# Patient Record
Sex: FEMALE | Race: WHITE
[De-identification: names, ages, dates, MRNs, and addresses within clinical notes are randomized per-mention and may not be internally consistent; named-entity substitution may affect disease eponyms.]

---

## 2018-05-29 NOTE — ED
General Adult HPI





- General


Chief complaint: Chest Pain


Stated complaint: CHEST PAIN


Time Seen by Provider: 05/29/18 19:45


Source: EMS, RN notes reviewed, old records reviewed


Mode of arrival: EMS


Limitations: no limitations





- History of Present Illness


Initial comments: 





This is a 89-year-old female to the ER for evaluation.  Patient does say for 

evaluation regarding chest pain, history of similar episode chest pain she did 

have heart catheterization which he states was normal.  Hasn't increased stress 

due to death of stent mother today.  Patient has anterior chest pain no 

radiation or shortness of breath.





- Related Data


 Home Medications











 Medication  Instructions  Recorded  Confirmed


 


Lisinopril [Prinivil] 10 mg PO DAILY 05/29/18 05/29/18


 


Metoprolol Succinate (ER) [Toprol 50 mg PO DAILY 05/29/18 05/29/18





Xl]   


 


Metoprolol Succinate (ER) [Toprol 100 mg PO DAILY 05/29/18 05/29/18





Xl]   


 


amLODIPine [Norvasc] 5 mg PO DAILY 05/29/18 05/29/18











 Allergies











Allergy/AdvReac Type Severity Reaction Status Date / Time


 


No Known Allergies Allergy   Verified 05/29/18 20:17














Review of Systems


ROS Statement: 


Those systems with pertinent positive or pertinent negative responses have been 

documented in the HPI.





ROS Other: All systems not noted in ROS Statement are negative.





Past Medical History


Past Medical History: Atrial Fibrillation, Hypertension


History of Any Multi-Drug Resistant Organisms: None Reported


Past Surgical History: Heart Catheterization, Hysterectomy, Tubal Ligation


Past Psychological History: No Psychological Hx Reported


Smoking Status: Never smoker


Past Alcohol Use History: Occasional


Past Drug Use History: None Reported





General Exam


Limitations: no limitations


General appearance: alert, in no apparent distress, anxious


Head exam: Present: atraumatic, normocephalic, normal inspection


Eye exam: Present: normal appearance, PERRL, EOMI.  Absent: scleral icterus, 

conjunctival injection, periorbital swelling


ENT exam: Present: normal exam, mucous membranes moist


Neck exam: Present: normal inspection.  Absent: tenderness, meningismus, 

lymphadenopathy


Respiratory exam: Present: normal lung sounds bilaterally.  Absent: respiratory 

distress, wheezes, rales, rhonchi, stridor


Cardiovascular Exam: Present: regular rate, normal rhythm, normal heart sounds.

  Absent: systolic murmur, diastolic murmur, rubs, gallop, clicks


GI/Abdominal exam: Present: soft, normal bowel sounds.  Absent: distended, 

tenderness, guarding, rebound, rigid


Extremities exam: Present: normal inspection, full ROM, normal capillary 

refill.  Absent: tenderness, pedal edema, joint swelling, calf tenderness


Back exam: Present: normal inspection


Neurological exam: Present: alert, oriented X3, CN II-XII intact


Psychiatric exam: Present: normal affect, normal mood


Skin exam: Present: warm, dry, intact, normal color.  Absent: rash





Course


 Vital Signs











  05/29/18 05/29/18





  19:43 21:14


 


Temperature 98.0 F 


 


Pulse Rate 93 91


 


Respiratory 18 18





Rate  


 


Blood Pressure 152/78 163/96


 


O2 Sat by Pulse 100 98





Oximetry  














- Reevaluation(s)


Reevaluation #1: 





05/29/18 20:39


Medical record is reviewed





EKG Findings





- EKG Comments:


EKG Findings:: EKG shows sinus rhythm rate of 87, CA 1:30, QRS 90, QTc 445





Medical Decision Making





- Medical Decision Making





58 female to ED co CP, SOB, Diaphoresis, continued chest pain currently, 

positive troponin 0.7, patient be admitted for anticoagulation cardiology 

evaluation and observation.  Positive has positive troponin again patient 

placed on anticoagulation telemetry and we'll trend troponins





- Lab Data


Result diagrams: 


 05/29/18 19:49





 05/29/18 19:49


 Lab Results











  05/29/18 05/29/18 05/29/18 Range/Units





  19:49 19:49 19:49 


 


WBC   8.9   (3.8-10.6)  k/uL


 


RBC   5.45 H   (3.80-5.40)  m/uL


 


Hgb   16.1 H   (11.4-16.0)  gm/dL


 


Hct   48.5 H   (34.0-46.0)  %


 


MCV   89.0   (80.0-100.0)  fL


 


MCH   29.5   (25.0-35.0)  pg


 


MCHC   33.2   (31.0-37.0)  g/dL


 


RDW   12.5   (11.5-15.5)  %


 


Plt Count   259   (150-450)  k/uL


 


Neutrophils %   68   %


 


Lymphocytes %   23   %


 


Monocytes %   6   %


 


Eosinophils %   2   %


 


Basophils %   1   %


 


Neutrophils #   6.0   (1.3-7.7)  k/uL


 


Lymphocytes #   2.0   (1.0-4.8)  k/uL


 


Monocytes #   0.6   (0-1.0)  k/uL


 


Eosinophils #   0.2   (0-0.7)  k/uL


 


Basophils #   0.1   (0-0.2)  k/uL


 


PT     (9.0-12.0)  sec


 


INR     (<1.2)  


 


APTT     (22.0-30.0)  sec


 


Sodium    141  (137-145)  mmol/L


 


Potassium    4.4  (3.5-5.1)  mmol/L


 


Chloride    102  ()  mmol/L


 


Carbon Dioxide    23  (22-30)  mmol/L


 


Anion Gap    16  mmol/L


 


BUN    15  (7-17)  mg/dL


 


Creatinine    0.92  (0.52-1.04)  mg/dL


 


Est GFR (CKD-EPI)AfAm    79  (>60 ml/min/1.73 sqM)  


 


Est GFR (CKD-EPI)NonAf    69  (>60 ml/min/1.73 sqM)  


 


Glucose    121 H  (74-99)  mg/dL


 


Calcium    9.4  (8.4-10.2)  mg/dL


 


Magnesium    2.0  (1.6-2.3)  mg/dL


 


Total Bilirubin    0.8  (0.2-1.3)  mg/dL


 


AST    42 H  (14-36)  U/L


 


ALT    49  (9-52)  U/L


 


Alkaline Phosphatase    68  ()  U/L


 


Total Creatine Kinase  122    ()  U/L


 


CK-MB (CK-2)  3.8 H*    (0.0-2.4)  ng/mL


 


CK-MB (CK-2) Rel Index  3.1    


 


Troponin I  0.766 H*    (0.000-0.034)  ng/mL


 


Total Protein    7.2  (6.3-8.2)  g/dL


 


Albumin    4.4  (3.5-5.0)  g/dL


 


Lipase    137  ()  U/L














  05/29/18 Range/Units





  19:49 


 


WBC   (3.8-10.6)  k/uL


 


RBC   (3.80-5.40)  m/uL


 


Hgb   (11.4-16.0)  gm/dL


 


Hct   (34.0-46.0)  %


 


MCV   (80.0-100.0)  fL


 


MCH   (25.0-35.0)  pg


 


MCHC   (31.0-37.0)  g/dL


 


RDW   (11.5-15.5)  %


 


Plt Count   (150-450)  k/uL


 


Neutrophils %   %


 


Lymphocytes %   %


 


Monocytes %   %


 


Eosinophils %   %


 


Basophils %   %


 


Neutrophils #   (1.3-7.7)  k/uL


 


Lymphocytes #   (1.0-4.8)  k/uL


 


Monocytes #   (0-1.0)  k/uL


 


Eosinophils #   (0-0.7)  k/uL


 


Basophils #   (0-0.2)  k/uL


 


PT  9.9  (9.0-12.0)  sec


 


INR  1.0  (<1.2)  


 


APTT  22.6  (22.0-30.0)  sec


 


Sodium   (137-145)  mmol/L


 


Potassium   (3.5-5.1)  mmol/L


 


Chloride   ()  mmol/L


 


Carbon Dioxide   (22-30)  mmol/L


 


Anion Gap   mmol/L


 


BUN   (7-17)  mg/dL


 


Creatinine   (0.52-1.04)  mg/dL


 


Est GFR (CKD-EPI)AfAm   (>60 ml/min/1.73 sqM)  


 


Est GFR (CKD-EPI)NonAf   (>60 ml/min/1.73 sqM)  


 


Glucose   (74-99)  mg/dL


 


Calcium   (8.4-10.2)  mg/dL


 


Magnesium   (1.6-2.3)  mg/dL


 


Total Bilirubin   (0.2-1.3)  mg/dL


 


AST   (14-36)  U/L


 


ALT   (9-52)  U/L


 


Alkaline Phosphatase   ()  U/L


 


Total Creatine Kinase   ()  U/L


 


CK-MB (CK-2)   (0.0-2.4)  ng/mL


 


CK-MB (CK-2) Rel Index   


 


Troponin I   (0.000-0.034)  ng/mL


 


Total Protein   (6.3-8.2)  g/dL


 


Albumin   (3.5-5.0)  g/dL


 


Lipase   ()  U/L














- Radiology Data


Radiology results: report reviewed (cxr neg for acute dz), image reviewed





Critical Care Time


Critical Care Time: Yes


Total Critical Care Time: 31





Disposition


Clinical Impression: 


 NSTEMI (non-ST elevated myocardial infarction)





Disposition: ADMITTED [facility] IP TO [facility] [place]


Condition: Serious


Is patient prescribed a controlled substance at d/c from ED?: No


Referrals: 


Laureen Jordan DO [Primary Care Provider] - 1-2 days

## 2018-05-29 NOTE — XR
EXAMINATION TYPE: XR chest 2V

 

DATE OF EXAM: 5/29/2018

 

COMPARISON: Prior chest 7/7/2011

 

HISTORY: Chest pain

 

TECHNIQUE:  Frontal and lateral views of the chest are obtained.

 

FINDINGS:  There is no focal air space opacity, pleural effusion, or pneumothorax seen.  The cardiac 
silhouette size is enlarged. There are overlying cardiac leads. Prominent lung volume may be indicati
ve of COPD. The osseous structures are intact.

 

IMPRESSION:  Cardiomegaly

## 2018-05-30 NOTE — HP
HISTORY AND PHYSICAL



DATE OF SERVICE:

05/29/2018



CHIEF COMPLAINT:

Chest pain.



HISTORY OF PRESENT ILLNESS:

This 58-year-old woman with a past medical history of multiple medical problems

including atrial fibrillation, hypertension, history of cardiac catheterization being

followed by Dr. Jordan and Dr. Peck in the outpatient setting complaining of

chest pain.  The pain was felt in the anterior part of the chest which was similar to

the previous chest pain.  The pain was pressure type of pain, mild to moderate

intensity and radiating to the back and the shoulder blades without any associated

sweating or palpitation.  Patient came to [facility] and admitted for further

evaluation and treatment. Patient also reported increase in stress due to her mother's

demise recently and EKG was done which showed sinus rhythm and occasional PVCs.  No

acute abnormality, but however the troponins were found to be elevated up to 0.76.

Hence, the patient admitted for further  evaluation and treatment. There is no history

of any fever or rigors. No history of headache, loss of consciousness, or seizures.



PAST MEDICAL HISTORY:

Atrial fibrillation, hypertension, history of cardiac catheterization.



MEDICATIONS:

Medications prior to admission include:

1. Prinivil 10 mg p.o. daily.

2. Norvasc 5 mg p.o. daily.

3. Toprol  mg p.o. daily.



ALLERGIES:

Allergies are none.



FAMILY HISTORY:

No history of heart disease or strokes in the family.



SOCIAL HISTORY:

No history of smoking. No history of alcohol intake.



REVIEW OF SYSTEMS:

ENT: No diminished hearing or diminished vision.

CARDIOVASCULAR SYSTEM: As mentioned earlier.

RESPIRATORY SYSTEM: As mentioned earlier.

GI: No nausea or vomiting.

: No dysuria.

NERVOUS SYSTEM: No numbness or weakness.

ALLERGY/IMMUNOLOGY:  No history of asthma or hay fever.

MUSCULOSKELETAL: As mentioned earlier.

HEMATOLOGY/ONCOLOGY: No history of anemia.

ENDOCRINE: No history of diabetes or hypothyroidism.

CONSTITUTIONAL: As mentioned.

DERMATOLOGY: Negative.

RHEUMATOLOGY:  Negative.

PSYCHIATRY:  As mentioned earlier.



PHYSICAL EXAMINATION:

The patient is alert and oriented x3. Pulse 98, blood pressure 186/93, respiration 18,

temperature 97.9, pulse ox 97% on room air.

HEENT: Conjunctivae normal.  Oral mucosa moist.

Neck is no jugular venous distention.  No carotid bruit. No lymph node enlargement.

CARDIOVASCULAR: S1 and S2 muffled.

RESPIRATORY:  Breath sounds diminished at the bases. No rhonchi, no crackles.

ABDOMEN:  Soft, obese, nontender.  No mass palpable.

LEGS:  No edema, no swelling.

NERVOUS SYSTEM: Higher functions as mentioned earlier. Moves all 4 limbs. No focal

deficits.

LYMPHATICS:  No lymphadenopathy of the neck, axillae or groin.

SKIN:  No ulcer, rash or bleeding.



LABS:

Labs are CBC hemoglobin 16.1. Glucose 121. Troponin noted.



ASSESSMENT:

1. Chest pain, possible acute non ST-segment elevation myocardial infarction.

2. Troponin 0.766.

3. Mild polycythemia.

4. Increased random blood sugar.

5. Atrial fibrillation.

6. Hypertension.

7. History of cardiac catheterization.



RECOMMENDATIONS AND DISCUSSION:

This 58-year-old woman who presented with multiple complex medical issues, we will

monitor the patient closely. Continue the current medication. Continue symptomatic

treatment. Otherwise, at this time I would recommend cardiology consultation. Repeat

labs in the morning.  Possible cardiac cath. Medication reconciliation was done.

Otherwise we will continue to monitor and guarded prognosis because of multiple complex

medical issues. Further recommendations to follow.



MMODL / IJN: [id number] / Job#: [id number]

## 2018-05-30 NOTE — ECHOF
Referral Reason:stemi



MEASUREMENTS

--------

HEIGHT: 170.2 cm

WEIGHT: 117.9 kg

BP: 110/72

RVIDd:   3.0 cm     (< 3.3)

IVSd:   1.4 cm     (0.6 - 1.1)

LVIDd:   4.0 cm     (3.9 - 5.3)

LVPWd:   1.3 cm     (0.6 - 1.1)

IVSs:   1.3 cm

LVIDs:   3.4 cm

LVPWs:   1.9 cm

LA Diam:   3.2 cm     (2.7 - 3.8)

LAESV Index (A-L):   25.60 ml/m

Ao Diam:   3.2 cm     (2.0 - 3.7)

AV Cusp:   2.4 cm     (1.5 - 2.6)

MV EXCURSION:   19.783 mm     (> 18.000)

MV EF SLOPE:   129 mm/s     (70 - 150)

EPSS:   0.3 cm

MV E Ryan:   0.95 m/s

MV DecT:   174 ms

MV A Ryan:   0.93 m/s

MV E/A Ratio:   1.02 

RAP:   5.00 mmHg

RVSP:   42.15 mmHg







FINDINGS

--------

Sinus rhythm with extra systolic beats.

This was a technically adequate study.

The left ventricular size is normal.   There is moderate concentric left ventricular hypertrophy.   O
verall left ventricular systolic function is moderately impaired with, an EF between 35 - 40 %.   Mid
 anterior LV wall motion is hypokinetic.    Mid anteroseptal LV wall motion is hypokinetic.    Apical
 anterior LV wall motion is hypokinetic.    Apical septum LV wall motion is hypokinetic.

The right ventricle is normal in size.

Normal LA  size by volume 22+/-6 ml/m2.

The right atrium is normal in size.

The aortic valve is trileaflet and appears structurally normal.

The mitral valve is normal.   Mild mitral regurgitation is present.

Mild tricuspid regurgitation present.   There is mild pulmonary hypertension.   The right ventricular
 systolic pressure, as measured by Doppler, is 42.15mmHg.

There is no pulmonic regurgitation present.

The aortic root size is normal.

Normal inferior vena cava with normal inspiratory collapse consistent with estimated right atrial pre
ssure of  5 mmHg.   The inferior vena cava is mildly dilated.

There is no pericardial effusion.



CONCLUSIONS

--------

1. Sinus rhythm with extra systolic beats.

2. This was a technically adequate study.

3. The left ventricular size is normal.

4. There is moderate concentric left ventricular hypertrophy.

5. Overall left ventricular systolic function is moderately impaired with, an EF between 35 - 40 %.

6. Mid anterior LV wall motion is hypokinetic.

7. Mid anteroseptal LV wall motion is hypokinetic.

8. Apical anterior LV wall motion is hypokinetic.

9. Apical septum LV wall motion is hypokinetic.

10. Normal LA size by volume 22+/-6 ml/m2.

11. The aortic valve is trileaflet and appears structurally normal.

12. Mild mitral regurgitation is present.

13. Mild tricuspid regurgitation present.

14. There is mild pulmonary hypertension.

15. There is no pulmonic regurgitation present.

16. The aortic root size is normal.

17. Normal inferior vena cava with normal inspiratory collapse consistent with estimated right atrial
 pressure of  5 mmHg.

18. The inferior vena cava is mildly dilated.

19. There is no pericardial effusion.





SONOGRAPHER: Tessa Riggins RDCS

## 2018-05-30 NOTE — P.CRDCN
History of Present Illness


Consult date: 05/30/18


Requesting physician: Mushtaq Lerma


Consult reason: non-Q-wave MI


Chief complaint: Chest pain


History of present illness: 


This is a pleasant 58-year-old  female who follows regularly with Dr. Peck in the office.  She has a known history of hypertension, family 

history of premature coronary artery, who had a myocardial infarction 60s, she 

is nondiabetic, nonsmoker, no hyperlipidemia.  Patient states she recently had 

a stress test in the office with Dr. Peck in January which was reported 

to be normal.  Patient also states that over 10 years ago she had a heart 

catheterization which was reported to be normal.  She presents to the hospital 

on this occasion with symptoms of chest pressure and heaviness.  According to 

the patient, she was quite angry and frustrated yesterday had to walk out to 

the road have a truck and boat move out of blocking a driveway, on return to 

the office, patient then received a phone call that her mother-in-law had just 

passed away.  Just prior to walking out to the road, patient states that she 

noticed a pressure and heaviness sensation in her chest, this sensation 

continue to worsen and became quite severe, she was nauseated and diaphoretic.  

She called her daughter who stopped in the EMS was then called and patient was 

to the emergency room for further evaluation.  Her blood pressure on arrival 

here 152/78, heart rate 90, 100% on room air.  Temperature 98.0.  White blood 

cell count normal, hemoglobin 16.1, platelet count 259.  Sodium 141, potassium 

4.4, BUN 15, creatinine 0.9.  Troponins 0.766 and 2.3.  Magnesium level 2.0.  

EKG on arrival here showed normal sinus rhythm with PVCs, no acute changes 

noted.  Chest x-ray shows cardiomegaly.  Subsequent EKG shows normal sinus 

rhythm with PVCs, no acute changes noted.  At the time of my examination this 

morning, patient continues to have pressure in her chest, we will apply oxygen 

and repeat an EKG this morning.  Stat echocardiogram with Doppler study will be 

obtained.  Patient is currently on heparin drip along with aspirin, Lipitor 80, 

lisinopril 10 mg daily, Toprol 150 mg daily.





Past Medical History


Past Medical History: Atrial Fibrillation, Hypertension


History of Any Multi-Drug Resistant Organisms: None Reported


Past Surgical History: Heart Catheterization, Hysterectomy, Tubal Ligation


Past Psychological History: No Psychological Hx Reported


Smoking Status: Never smoker


Past Alcohol Use History: Occasional


Past Drug Use History: None Reported





Medications and Allergies


 Home Medications











 Medication  Instructions  Recorded  Confirmed  Type


 


Lisinopril [Prinivil] 10 mg PO DAILY 05/29/18 05/29/18 History


 


Metoprolol Succinate (ER) [Toprol 50 mg PO DAILY 05/29/18 05/29/18 History





Xl]    


 


Metoprolol Succinate (ER) [Toprol 100 mg PO DAILY 05/29/18 05/29/18 History





Xl]    


 


amLODIPine [Norvasc] 5 mg PO DAILY 05/29/18 05/29/18 History











 Allergies











Allergy/AdvReac Type Severity Reaction Status Date / Time


 


No Known Allergies Allergy   Verified 05/29/18 20:17














Physical Exam


Vitals: 


 Vital Signs











  Temp Pulse Pulse Resp BP BP Pulse Ox


 


 05/30/18 04:00    85  12   


 


 05/30/18 03:15  97.8 F   85  12   110/72  93 L


 


 05/30/18 00:00    85  12   124/74  94 L


 


 05/29/18 22:25  97.9 F   98  18   186/93  97


 


 05/29/18 21:58  99.0 F  89   18  158/83   96


 


 05/29/18 21:14   91   18  163/96   98


 


 05/29/18 19:43  98.0 F  93   18  152/78   100








 Intake and Output











 05/29/18 05/30/18 05/30/18





 22:59 06:59 14:59


 


Intake Total  101 


 


Balance  101 


 


Intake:   


 


  Intake, IV Titration  101 





  Amount   


 


    Heparin Sodium,Porcine/  101 





    D5w Pmx 25,000 unit In   





    Dextrose/Water 1 500ml.   





    bag @ 8.48 UNITS/KG/HR 20   





    mls/hr IV .Q24H Pending sale to [facility] Rx#:   





    [id number]   


 


Other:   


 


  # Voids 1 2 


 


  Weight 117.934 kg 118.2 kg 











PHYSICAL EXAMINATION: 





GENERAL: This is a 58-year-old  female complaining of mild chest 

heaviness at the time of my examination





HEENT: Head is atraumatic, normocephalic.  Pupils equal, round.  Sclera 

anicteric. Conjunctiva are clear.  Mucous membranes of the mouth are moist.  

Neck is supple.  There is no elevated jugular venous pressure.]  bruit is heard.





HEART EXAMINATION: Heart S1, S2 normal.  No murmur or gallop heard.





CHEST EXAMINATION: Lungs are clear to auscultation and precussion. No chest 

wall tenderness is noted on palpation or with deep breathing.





ABDOMEN:  Soft, obese, nontender. Bowel sounds are heard. No organomegaly noted.


 


EXTREMITIES: 2+ peripheral pulses with no evidence of peripheral edema and no 

calf tenderness noted.





NEUROLOGIC patient is awake, alert and oriented -3.


 


.


 











Results





 05/29/18 19:49





 05/29/18 19:49


 Cardiac Enzymes











  05/29/18 05/29/18 05/30/18 Range/Units





  19:49 19:49 01:13 


 


AST   42 H   (14-36)  U/L


 


CK-MB (CK-2)  3.8 H*   9.1 H*  (0.0-2.4)  ng/mL


 


Troponin I  0.766 H*   2.330 H*  (0.000-0.034)  ng/mL








 Coagulation











  05/29/18 05/30/18 Range/Units





  19:49 01:13 


 


PT  9.9   (9.0-12.0)  sec


 


APTT  22.6  36.5 H  (22.0-30.0)  sec








 CBC











  05/29/18 Range/Units





  19:49 


 


WBC  8.9  (3.8-10.6)  k/uL


 


RBC  5.45 H  (3.80-5.40)  m/uL


 


Hgb  16.1 H  (11.4-16.0)  gm/dL


 


Hct  48.5 H  (34.0-46.0)  %


 


Plt Count  259  (150-450)  k/uL








 Comprehensive Metabolic Panel











  05/29/18 Range/Units





  19:49 


 


Sodium  141  (137-145)  mmol/L


 


Potassium  4.4  (3.5-5.1)  mmol/L


 


Chloride  102  ()  mmol/L


 


Carbon Dioxide  23  (22-30)  mmol/L


 


BUN  15  (7-17)  mg/dL


 


Creatinine  0.92  (0.52-1.04)  mg/dL


 


Glucose  121 H  (74-99)  mg/dL


 


Calcium  9.4  (8.4-10.2)  mg/dL


 


AST  42 H  (14-36)  U/L


 


ALT  49  (9-52)  U/L


 


Alkaline Phosphatase  68  ()  U/L


 


Total Protein  7.2  (6.3-8.2)  g/dL


 


Albumin  4.4  (3.5-5.0)  g/dL








 Current Medications











Generic Name Dose Route Start Last Admin





  Trade Name Freq  PRN Reason Stop Dose Admin


 


Acetaminophen  500 mg  05/30/18 00:08  





  Tylenol Tab  PO   





  Q6HR PRN   





  Fever and/ or MILD Pain   


 


Hydrocodone Bitart/Acetaminophen  1 each  05/30/18 00:08  





  [place] 5-325  PO   





  Q6HR PRN   





  MODERATE Pain   


 


Alprazolam  0.25 mg  05/30/18 00:08  





  Xanax  PO   





  TID PRN   





  Anxiety   


 


Amlodipine Besylate  5 mg  05/29/18 23:45  05/30/18 00:07





  Norvasc  PO   5 mg





  DAILY VIOLET   Administration


 


Aspirin  325 mg  05/30/18 09:00  





  Aspirin  PO   





  DAILY Pending sale to [facility]   


 


Atorvastatin Calcium  80 mg  05/29/18 22:47  05/29/18 23:21





  Lipitor  PO   80 mg





  DAILY VIOLET   Administration


 


Heparin Sodium (Porcine)  0 unit  05/29/18 21:28  





  Heparin  IV   





  Q6HR PRN   





  Low PTT   





  Protocol   


 


Hydralazine HCl  10 mg  05/30/18 00:08  





  Apresoline  IVP   





  Q4HR PRN   





  sbp> 140, dbp> 90   


 


Heparin Sodium/Dextrose 25,000  500 mls @ 20 mls/hr  05/29/18 21:45  05/30/18 03

:00





  unit/ IV Solution  IV   11.48 units/kg/hr





  .Q24H VIOLET   27.07 mls/hr





  Protocol   Titration





  8.48 UNITS/KG/HR   


 


Sodium Chloride  1,000 mls @ 100 mls/hr  05/29/18 21:30  05/29/18 21:56





  Saline 0.9%  IV   100 mls/hr





  .Q10H VIOLET   Administration


 


Lisinopril  10 mg  05/29/18 23:45  05/30/18 00:07





  Zestril  PO   10 mg





  DAILY VIOLET   Administration


 


Metoprolol Succinate  50 mg  05/30/18 09:00  





  Toprol Xl  PO   





  DAILY VIOLET   


 


Metoprolol Succinate  100 mg  05/30/18 09:00  





  Toprol Xl  PO   





  DAILY Pending sale to [facility]   


 


Nitroglycerin  0.4 mg  05/29/18 21:28  05/29/18 22:54





  Nitrostat  SUBLINGUAL   0.4 mg





  Q5M PRN   Administration





  Chest Pain   


 


Temazepam  15 mg  05/30/18 00:08  





  Restoril  PO   





  HS PRN   





  Insomnia   








 Intake and Output











 05/29/18 05/30/18 05/30/18





 22:59 06:59 14:59


 


Intake Total  101 


 


Balance  101 


 


Intake:   


 


  Intake, IV Titration  101 





  Amount   


 


    Heparin Sodium,Porcine/  101 





    D5w Pmx 25,000 unit In   





    Dextrose/Water 1 500ml.   





    bag @ 8.48 UNITS/KG/HR 20   





    mls/hr IV .Q24H VIOLET Rx#:   





    [id number]   


 


Other:   


 


  # Voids 1 2 


 


  Weight 117.934 kg 118.2 kg 








 





 05/29/18 19:49 





 05/29/18 19:49 











EKG Interpretations (text)


EKG shows normal sinus rhythm with occasional PVCs, no acute changes noted








Assessment and Plan


Plan: 


Assessment and plan


#1 symptoms of chest pressure and heaviness with associated nausea, troponin 

0.76, 2.3.  EKG shows normal sinus rhythm with occasional.  No acute changes 

noted.  Clinical picture  suggests acute coronary syndrome.


#2 hypertension





Plan


We will apply oxygen, repeat EKG this morning.  Obtain stat echocardiogram with 

Doppler study.  Patient has been advised that she will need to undergo cardiac 

catheterization, the risks and the benefits were explained to the patient in 

detail and she is willing to proceed.  Further recommendations will be based on 

these findings and patient's clinical course.





DNP note has been reviewed, I agree with a documented findings and plan of 

care.  Patient was seen and examined.

## 2018-05-31 NOTE — P.PN
Subjective


Progress Note Date: 05/30/18


Progress note being dictated for Dr. Mobley.














Interval history: This 58-year-old female admitted with chest pain, possible 

acute non-STEMI, elevated troponin and multiple other medical issues.  

Evaluated by cardiology.  Underwent cardiac catheterization with reports of no 

significant obstructive CAD,Echo reporting anterior hypokinesis and EF 35%, 

attributed to stress cardiomyopathy. Medical management recommended.  Denies 

chest pain, palpitations or increasing shortness of breath.  Telemetry sinus 

rhythm with occasional PVCs.








Objective





- Vital Signs


Vital signs: 


 Vital Signs











Temp  98.1 F   05/30/18 15:35


 


Pulse  82   05/30/18 16:46


 


Resp  16   05/30/18 15:35


 


BP  107/71   05/30/18 16:46


 


Pulse Ox  96   05/30/18 16:46








 Intake & Output











 05/30/18 05/30/18 05/31/18





 06:59 18:59 06:59


 


Intake Total 101 340 


 


Output Total  700 


 


Balance 101 -360 


 


Weight 118.2 kg  


 


Intake:   


 


  IV  100 


 


  Intake, IV Titration 101  





  Amount   


 


    Heparin Sodium,Porcine/ 101  





    D5w Pmx 25,000 unit In   





    Dextrose/Water 1 500ml.   





    bag @ 8.48 UNITS/KG/HR 20   





    mls/hr IV .Q24H VIOLET Rx#:   





    [id number]   


 


  Oral  240 


 


Output:   


 


  Urine  700 


 


Other:   


 


  # Voids 2  1














- Exam


PHYSICAL EXAM:


VITAL SIGNS: As above


GENERAL: Lying flat, no acute distress


HEENT:  Conjunctivae normal. eyes normal.  Oral mucosa moist


NECK:  No JVD. No thyroid enlargement. No LNs


CARDIOVASCULAR:  S1, S2 muffled. No murmur


RESPIRATION: Breath sounds diminished in the bases. No rhonchi or crackles. No 

bronchial breathing.


ABDOMEN:  Soft,  nontender . No guarding. no masses palpable.Bowel sounds heard.


LEGS:  No edema. no swelling 


PSYCHIATRY: Alert and oriented -3, mood and affect normal.


NERVOUS SYSTEM:  Cranial N 2-12 grossly normal. No focal deficits.


Skin: no ulcer no rash 


Joints: No active swelling. No inflammation.


Lymphatic system. No LN neck axilla or groin.











- Labs


CBC & Chem 7: 


 05/31/18 06:12





 05/31/18 06:12


Labs: 


 Abnormal Lab Results - Last 24 Hours (Table)











  05/29/18 05/29/18 05/29/18 Range/Units





  19:49 19:49 19:49 


 


RBC   5.45 H   (3.80-5.40)  m/uL


 


Hgb   16.1 H   (11.4-16.0)  gm/dL


 


Hct   48.5 H   (34.0-46.0)  %


 


APTT     (22.0-30.0)  sec


 


Glucose    121 H  (74-99)  mg/dL


 


POC Glucose (mg/dL)     (75-99)  mg/dL


 


AST    42 H  (14-36)  U/L


 


Total Creatine Kinase     ()  U/L


 


CK-MB (CK-2)  3.8 H*    (0.0-2.4)  ng/mL


 


Troponin I  0.766 H*    (0.000-0.034)  ng/mL


 


Triglycerides     (<150)  mg/dL


 


HDL Cholesterol     (40-60)  mg/dL














  05/30/18 05/30/18 05/30/18 Range/Units





  01:13 01:13 07:54 


 


RBC     (3.80-5.40)  m/uL


 


Hgb     (11.4-16.0)  gm/dL


 


Hct     (34.0-46.0)  %


 


APTT   36.5 H   (22.0-30.0)  sec


 


Glucose     (74-99)  mg/dL


 


POC Glucose (mg/dL)     (75-99)  mg/dL


 


AST     (14-36)  U/L


 


Total Creatine Kinase  226 H   651 H  ()  U/L


 


CK-MB (CK-2)  9.1 H*   10.8 H*  (0.0-2.4)  ng/mL


 


Troponin I  2.330 H*   1.900 H*  (0.000-0.034)  ng/mL


 


Triglycerides     (<150)  mg/dL


 


HDL Cholesterol     (40-60)  mg/dL














  05/30/18 05/30/18 Range/Units





  07:54 11:40 


 


RBC    (3.80-5.40)  m/uL


 


Hgb    (11.4-16.0)  gm/dL


 


Hct    (34.0-46.0)  %


 


APTT    (22.0-30.0)  sec


 


Glucose    (74-99)  mg/dL


 


POC Glucose (mg/dL)   118 H  (75-99)  mg/dL


 


AST    (14-36)  U/L


 


Total Creatine Kinase    ()  U/L


 


CK-MB (CK-2)    (0.0-2.4)  ng/mL


 


Troponin I    (0.000-0.034)  ng/mL


 


Triglycerides  202 H   (<150)  mg/dL


 


HDL Cholesterol  39 L   (40-60)  mg/dL














Assessment and Plan


Assessment: 


1.  Chest pain, acute non-STEMI ruled out, status post cardiac cath reporting 

no obstructive CAD, anterior hypokinesia, EF 35%; stress cardiomyopathy as per 

cardiology].


2. [ Troponin 0.766].


3. [ Hypertension].

















Plan: Continue current medication regime ,monitoring and symptomatic treatment.

  Maximizing medical therapy.  Potential discharge planning in progress for 

tomorrow pending cardiac clearance.














The impression and plan of care has been dictated as directed.





:


I performed a history and examination of this patient,  discussed the same with 

the dictator.  I agree with the dictator's note ,documented as a scribe.  Any 

additional findings or plans will be noted.

## 2018-05-31 NOTE — P.PN
Subjective


Progress Note Date: 05/31/18





This is a pleasant 58-year-old  female who follows regularly with Dr. Peck in the office.  She has a known history of hypertension, family 

history of premature coronary artery, who had a myocardial infarction 60s, she 

is nondiabetic, nonsmoker, no hyperlipidemia.  Patient states she recently had 

a stress test in the office with Dr. Peck in January which was reported 

to be normal.  Patient also states that over 10 years ago she had a heart 

catheterization which was reported to be normal.  She presents to the hospital 

on this occasion with symptoms of chest pressure and heaviness.  According to 

the patient, she was quite angry and frustrated yesterday had to walk out to 

the road have a truck and boat move out of blocking a driveway, on return to 

the office, patient then received a phone call that her mother-in-law had just 

passed away.  Just prior to walking out to the road, patient states that she 

noticed a pressure and heaviness sensation in her chest, this sensation 

continue to worsen and became quite severe, she was nauseated and diaphoretic.  

She called her daughter who stopped in the EMS was then called and patient was 

to the emergency room for further evaluation.  Her blood pressure on arrival 

here 152/78, heart rate 90, 100% on room air.  Temperature 98.0.  White blood 

cell count normal, hemoglobin 16.1, platelet count 259.  Sodium 141, potassium 

4.4, BUN 15, creatinine 0.9.  Troponins 0.766 and 2.3.  Magnesium level 2.0.  

EKG on arrival here showed normal sinus rhythm with PVCs, no acute changes 

noted.  Chest x-ray shows cardiomegaly.  Subsequent EKG shows normal sinus 

rhythm with PVCs, no acute changes noted.  At the time of my examination this 

morning, patient continues to have pressure in her chest, we will apply oxygen 

and repeat an EKG this morning.  Stat echocardiogram with Doppler study will be 

obtained.  Patient is currently on heparin drip along with aspirin, Lipitor 80, 

lisinopril 10 mg daily, Toprol 150 mg daily.





05/31/2018


Patient underwent a cardiac catheterization yesterday that did not reveal any 

significant obstructive coronary artery disease.  Echocardiogram with Doppler 

study revealed an overall left ventricular systolic function of 35-40%.  

Clinical picture suggestive of stress cardiomyopathy or blood pressure today 128

/80 with a heart rate in the 80s.  92% on room air.  White blood cell count is 

normal, hemoglobin 13.9, platelet count 209.  Sodium 140, potassium 4.3, BUN 13

, creatinine 0.8.  We will continue the patient on aspirin 81 mg daily, Lipitor 

80 mg daily, lisinopril 10 mg daily, metoprolol 150 mg daily, we'll also add 

Aldactone 25 mg daily to her medication regime.  She's been instructed to be up 

ambulating in the hallway today we'll plan for possible discharge home in 24 

hours if stable.








Objective





- Vital Signs


Vital signs: 


 Vital Signs











Temp  98.4 F   05/31/18 04:00


 


Pulse  79   05/31/18 11:40


 


Resp  16   05/31/18 11:40


 


BP  128/84   05/31/18 11:40


 


Pulse Ox  92 L  05/31/18 11:40








 Intake & Output











 05/30/18 05/31/18 05/31/18





 18:59 06:59 18:59


 


Intake Total 340  620


 


Output Total 700  


 


Balance -360  620


 


Weight  118.3 kg 


 


Intake:   


 


    


 


  Oral 240  620


 


Output:   


 


  Urine 700  


 


Other:   


 


  Voiding Method  Toilet Toilet


 


  # Voids  2 1


 


  # Bowel Movements   0














- Exam


PHYSICAL EXAMINATION: 





GENERAL:





HEENT: Head is atraumatic, normocephalic.  Pupils equal, round.  Sclera 

anicteric. Conjunctiva are clear.  Mucous membranes of the mouth are moist.  

Neck is supple.  There is no elevated jugular venous pressure.] No carotid 

bruit is heard.





HEART EXAMINATION: Heart S1, S2 normal.  No murmur or gallop heard.





CHEST EXAMINATION: Lungs are clear to auscultation and precussion. No chest 

wall tenderness is noted on palpation or with deep breathing.





ABDOMEN:  Soft, obese, nontender. Bowel sounds are heard. No organomegaly noted.


 


EXTREMITIES: 2+ peripheral pulses with no evidence of peripheral edema and no 

calf tenderness noted.  Right radial site clean and dry, good distal pulse.





NEUROLOGIC patient is awake, alert and oriented -3.


 


.


 











- Labs


CBC & Chem 7: 


 05/31/18 06:12





 05/31/18 06:12


Labs: 


 Abnormal Lab Results - Last 24 Hours (Table)











  05/31/18 Range/Units





  06:12 


 


Glucose  107 H  (74-99)  mg/dL














Assessment and Plan


Plan: 


Assessment and plan


#1 symptoms of chest pressure and heaviness with associated nausea, troponin 

0.76, 2.3.  EKG shows normal sinus rhythm with occasional.  No acute changes 

noted.  Patient underwent a cardiac catheterization yesterday which did not 

reveal any significant obstructive coronary artery disease echocardiogram with 

Doppler study revealed anterior hypokinesia with ejection fraction of 35%.  

Suggesting a stress cardiomyopathy. 


#2 hypertension





Plan


We will continue the patient on her current medications, add Aldactone to her 

medication regime.  Increase activity today as tolerated and plan for possible 

discharge home in 24 hours if stable.


DNP note has been reviewed, I agree with a documented findings and plan of 

care.  Patient was seen and examined.

## 2018-05-31 NOTE — P.PN
Subjective


Progress Note Date: 05/31/18


Progress note being dictated for Dr. Mobley.














Interval history: This 58-year-old female admitted with chest pain, possible 

acute non-STEMI, elevated troponin and multiple other medical issues.  

Evaluated by cardiology.  Underwent cardiac catheterization with reports of no 

significant obstructive CAD,Echo reporting anterior hypokinesis and EF 35%, 

attributed to stress cardiomyopathy. Medical management recommended.  Denies 

chest pain, palpitations or increasing shortness of breath.  Telemetry sinus 

rhythm with occasional PVCs.








5/31/18   no overnight events.  Ambulating, tolerating exertion.  Denies chest 

pain, denies shortness of breath.  Denies focal deficits.  Mild nausea last 

night, which has subsided.  Tolerating diet.  Continue maximizing medical 

therapy.





Objective





- Vital Signs


Vital signs: 


 Vital Signs











Temp  98.4 F   05/31/18 04:00


 


Pulse  80   05/31/18 15:23


 


Resp  16   05/31/18 15:23


 


BP  109/67   05/31/18 15:23


 


Pulse Ox  100   05/31/18 15:23








 Intake & Output











 05/30/18 05/31/18 05/31/18





 18:59 06:59 18:59


 


Intake Total 340  1100


 


Output Total 700  


 


Balance -360  1100


 


Weight  118.3 kg 


 


Intake:   


 


    


 


  Oral 240  1100


 


Output:   


 


  Urine 700  


 


Other:   


 


  Voiding Method  Toilet Toilet


 


  # Voids  2 2


 


  # Bowel Movements   0














- Exam


PHYSICAL EXAM:


VITAL SIGNS: As above


GENERAL: Sitting up in bed, no acute distress


HEENT:  Conjunctivae normal. eyes normal.  Oral mucosa moist


NECK:  No JVD. No thyroid enlargement. No LNs


CARDIOVASCULAR:  S1, S2 muffled. No murmur


RESPIRATION: Breath sounds diminished in the bases. No rhonchi or crackles. 


ABDOMEN:  Soft,  nontender . No guarding. no masses palpable.Bowel sounds heard.


LEGS:  No edema. no swelling 


PSYCHIATRY: Alert and oriented -3, mood and affect normal.


NERVOUS SYSTEM:  Cranial N 2-12 grossly normal. No focal deficits.


Skin: no ulcer no rash 


Joints: No active swelling. No inflammation.














- Labs


CBC & Chem 7: 


 05/31/18 06:12





 05/31/18 06:12


Labs: 


 Abnormal Lab Results - Last 24 Hours (Table)











  05/31/18 Range/Units





  06:12 


 


Glucose  107 H  (74-99)  mg/dL














Assessment and Plan


Assessment: 


1.  Chest pain, acute non-STEMI ruled out, status post cardiac cath reporting 

no obstructive CAD, anterior hypokinesia, EF 35%; stress cardiomyopathy as per 

cardiology].


2. [ Troponin 0.766].


3. [ Hypertension].

















Plan: Continue current medication regime ,monitoring and symptomatic treatment.

  Maximizing medical therapy.  Increase ambulation as tolerated. Potential 

discharge planning in progress for tomorrow pending cardiac clearance.














The impression and plan of care has been dictated as directed.





:


I performed a history and examination of this patient,  discussed the same with 

the dictator.  I agree with the dictator's note ,documented as a scribe.  Any 

additional findings or plans will be noted.

## 2018-06-01 NOTE — P.PCN
Date of Procedure: 06/01/18


Preoperative Diagnosis: 


Non-ST elevation MI


Postoperative Diagnosis: 


Mild coronary artery disease.  Broken heart syndrome


Procedure(s) Performed: 


This is a 58-year-old female with history of hypertension and 

hypercholesteremia and known mild coronary artery disease who was admitted to 

the hospital with symptoms of chest pain and positive troponin suggestive of non

-ST elevation myocardial infarction.  Echocardiogram showed wall motion 

abnormalities involving the anteroapical and septal area.  Patient is advised 

to have a cardiac catheterization for definitive diagnosis.








CONSENT:I have discussed the risks, benefits and alternative therapies for the 

above-mentioned procedure and for both sedation/analgesia as well as necessary 

blood product administration, if indicated, as they pertain to this patient.  

The patient has indicated understanding and acceptance of the risks and 

procedures discussed.








PROCEDURE: Patient was brought to the lab in a fasting state.  Patient was 

given some IV sedation.  The right groin is infiltrated with lidocaine and 

right femoral artery was entered using Seldinger technique.  A 6-Armenian 

catheter was left in place and selective coronary arteriography and left 

ventriculography was performed.  Patient tolerated the procedure well.  Femoral 

angiogram was performed and manual compression was applied for hemostasis.  No 

immediate complications were noted and patient was transferred to [facility] in a 

stable condition





Conscious Sedation: Versed 1mg


Fentanyl 25g


Duration 20minutes   


HEMODYNAMICS: The aortic pressure is about 130/70.  Left ankle end-diastolic 

pressure is about 16-20.  There was no gradient across the aortic valve





SELECTIVE CORONARY ARTERIOGRAPHY: 


                          LEFT MAIN: This is a long with plaque with about 30% 

luminal narrowing at the bifurcation into LAD and circumflex system.


                          THE LEFT ANTERIOR DESCENDING CORONARY ARTERY:  This 

is a good caliber vessel with mild disease without any significant focal 

occlusive lesions.


                          THE LEFT CIRCUMFLEX AND IS CORONARY ARTERY:  This is 

a nondominant vessel with about 60-70% lesion in the midportion.  This seemed 

to be nonculprit vessel.


                          THE RIGHT CORONARY ARTERY:  This is a dominant vessel 

with mild disease in the proximal portion.  It uses good-sized PDA and PLV.





      





LEFT VENTRICULOGRAPHY:  This revealed severe hypokinesia of the basal and mid 

anterior wall.  Apical portion appears to be karena well.  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 

consistent with apical ballooning syndrome.


FINAL IMPRESSION:  #1.  Broken heart syndrome #2.  Mild to moderate coronary 

artery disease involving the distal left main and also right coronary artery.  #

3.  Impaired LV function.  #4.  Elevated end-diastolic pressures





PLAN:  continue maximal medical therapy.





PROGNOSIS:  fair .

## 2018-06-01 NOTE — P.DS
Providers


Date of admission: 


05/29/18 21:28





Expected date of discharge: 06/01/18


Attending physician: 


Mushtaq Mobley


Consults: 





 





05/29/18 21:28


Consult Physician Urgent 


   Consulting Provider: Fortunato Gutierrez


   Consult Reason/Comments: nstemi


   Do you want consulting provider notified?: Yes











Primary care physician: 


Laureen Jordan





Hospital Course: 


Final Diagnoses:


1.  Chest pain, acute non-STEMI ruled out, status post cardiac cath reporting 

no obstructive CAD, anterior hypokinesia, EF 35%; stress cardiomyopathy,

takotsubo syndrome  as per cardiology].


2. [ Troponin 0.766].


3. [ Hypertension].




















Hospital course:This 58-year-old female admitted with chest pain, possible 

acute non-STEMI, elevated troponin and multiple other medical issues.  

Evaluated by cardiology.  Underwent cardiac catheterization with reports of no 

significant obstructive CAD,Echo reporting anterior hypokinesis and EF 35%, 

attributed to stress cardiomyopathy/takotsubo syndrome. Medical management 

recommended.  Cleared by cardiology for discharge.  Patient is being discharged 

home in a stable condition with guarded prognosis.














PHYSICAL EXAM:





GENERAL: Alert and oriented 3, Sitting up in bed, no acute distress


CARDIOVASCULAR:  S1, S2 muffled. No murmur


RESPIRATION: Breath sounds diminished in the bases. No rhonchi or crackles. 


ABDOMEN:  Soft,  nontender . No guarding. no masses palpable.Bowel sounds heard.


NERVOUS SYSTEM:  Cranial N 2-12 grossly normal. No focal deficits.

















The impression and plan of care has been dictated as directed.





DrJackie:


I performed a history and examination of this patient,  discussed the same with 

the dictator.  I agree with the dictator's note ,documented as a scribe.  Any 

additional findings or plans will be noted.














Time taken: 35 minutes








Patient Condition at Discharge: Stable





Plan - Discharge Summary


Discharge Rx Participant: Yes


New Discharge Prescriptions: 


New


   Aspirin 81 mg PO DAILY #30 chew


   Atorvastatin [Lipitor] 80 mg PO DAILY #30 tab


   Spironolactone [Aldactone] 25 mg PO DAILY #30 tab





Continue


   Lisinopril [Prinivil] 10 mg PO DAILY


   Metoprolol Succinate (ER) [Toprol XL] 100 mg PO DAILY


   Metoprolol Succinate (ER) [Toprol XL] 50 mg PO DAILY





No Action


   amLODIPine [Norvasc] 5 mg PO DAILY


Discharge Medication List





Lisinopril [Prinivil] 10 mg PO DAILY 05/29/18 [History]


Metoprolol Succinate (ER) [Toprol XL] 50 mg PO DAILY 05/29/18 [History]


Metoprolol Succinate (ER) [Toprol XL] 100 mg PO DAILY 05/29/18 [History]


amLODIPine [Norvasc] 5 mg PO DAILY 05/29/18 [History]


Aspirin 81 mg PO DAILY #30 chew 06/01/18 [Rx]


Atorvastatin [Lipitor] 80 mg PO DAILY #30 tab 06/01/18 [Rx]


Spironolactone [Aldactone] 25 mg PO DAILY #30 tab 06/01/18 [Rx]








Follow up Appointment(s)/Referral(s): 


Laureen Jordan DO [Primary Care Provider] - 06/05/18 8:40 am (Tuesday)


Bennett Peck MD [STAFF PHYSICIAN] - 06/07/18 11:30 am (Thursday)


Ambulatory/Diagnostic Orders: 


Comprehensive Metabolic Panel [LAB.AMB] Time Frame: 3 Days, Location: 

Determined By Patient


Patient Instructions/Handouts:  *[facility] - After Heart Catheterization - 

Ambulatory Care Instructions, Left Heart Catheterization (DC)


Discharge Disposition: HOME SELF-CARE

## 2018-06-01 NOTE — PN
PROGRESS NOTE



Mrs. Burnham came in with a takotsubo syndrome type picture.  No significant obstructive

CAD.  She is doing well today and denies chest pain, improved functional capacity,

feels well.



Vital signs are stable.  S1-S2 heard normally.  Lungs are clear.  Abdomen and lower

extremity exam is unchanged.



Plan is to continue current medications.  Increase activity.  She can be discharged and

she will see Dr. Peck in one week.





MMODL / IJN: [id number] / Job#: [id number]

## 2018-08-23 NOTE — CONS
CONSULTATION



DATE OF SERVICE:

08/23/2018



A 58-year-old lady has been evaluated in [facility] for possible obstructive sleep

apnea-hypopnea syndrome.



HISTORY OF PRESENT ILLNESS/SLEEP-WAKE EVALUATION:

Patient usual sleep schedule from 11:00 p.m. to 6:00 a.m. on weekdays and from 11:00

p.m. to 7:00 a.m. on weekend. Sometimes she has a problem with falling asleep, but not

more than 1 hour.  No TV in bedroom.  She sleeps on the side position with snoring and

awakenings from sleep up to 6 times with nocturia. No history of hypnagogic

hallucinations, sleep paralysis or cataplexy.  [place] Sleepiness Scale increased to

10.



PAST MEDICAL HISTORY:

Positive for hypertension, obesity, hyperlipidemia, heart attack in May 29, 2018,

anxiety.



PAST SURGICAL HISTORY:

Tubal ligation, partial hysterectomy, cardiac cath showed some narrowing of coronary

arteries.



MEDICATIONS:

Toprol, Norvasc, lisinopril, Lipitor, Aldactone, aspirin, Cymbalta.



SOCIAL HISTORY:

Negative for smoking. Alcohol consumption occasional.



FAMILY HISTORY:

Hypertension, heart problems, sinus headaches, pneumoniae, cancer.



REVIEW OF SYSTEMS:

Multiple awakenings from sleep, sometimes difficulties to initiate sleep, sleepiness

and tiredness during the day, episodes of anxiety.



PHYSICAL EXAM:

GENERAL   lady without distress.

VITAL SIGNS  /62, HR about 36, RR 18, height 5 feet 6 inches, weight 240.4, body

mass index 38.7, temperature 98.2, oxygen saturation on room air 95%.

HEENT  PERRLA, EOMI, evaluation of oropharynx showed extremely low position of soft

palate.  Wide neck 19-1/2 inches in circumference.

NECK  Supple, no JVD.  Thyroid is not palpable.

LUNGS  Clear to percussion and to auscultation.  Good air exchange.  No wheezing or

rhonchi.

HEART  S1, S2.  Regular bradycardia.

ABDOMEN  Obese. Soft and nontender.  Bowel sounds are present.  No organomegaly

appreciated.

EXTREMITIES  No clubbing or cyanosis.

CNS  Awake, alert, and oriented X3.  Cranial nerves 2 to 7 intact.  There is no

fasciculation or atrophy. noted.  No focal deficits observed.



IMPRESSION:

1. Snoring, multiple awakenings from sleep, low position of soft palate, excessive

    daytime sleepiness, wide neck, obstructive sleep apnea-hypopnea syndrome.

2. Obesity, BMI 38.7.

3. Coronary artery disease, status post heart attack in May of 2018.

4. Anxiety.

5. Hyperlipidemia.



PLAN:

1. Polysomnography for evaluation of patient's breathing during sleep.

2. CPAP/BiPAP titration if sleep study confirms obstructive sleep apnea-hypopnea

    syndrome.

3. Preferable position during sleep on the side.

4. No driving if patient feels any sleepiness.

5. I will see patient for follow up visit to explain results of testing and following

    plan.



Thank you very much for referring this patient for consultation.



Sincerely,



Jude Madera MD, PhD, FAASM

Diplomat of [employer] Specialties

American Board of Internal Medicine

Medical Director of [facility]





MMSHARRONL / SHELLEYN: [id number] / Job#: [id number]

## 2018-08-30 NOTE — MM
Reason for exam: screening  (asymptomatic).



History:

Patient is postmenopausal.

Family history of breast cancer in mother at age 70.



Physical Findings:

A clinical breast exam by your physician is recommended on an annual basis and 

results should be correlated with mammographic findings.



MG 3D Screening Mammo W/Cad

Bilateral CC and MLO view(s) were taken.

No prior studies available for comparison.

The breast tissue is heterogeneously dense. This may lower the sensitivity of 

mammography.  There is no discrete abnormality.  No significant changes when 

compared with prior studies.





ASSESSMENT: Negative, BI-RAD 1



RECOMMENDATION:

Routine screening mammogram of both breasts in 1 year.

## 2018-09-13 NOTE — BD
EXAMINATION TYPE: Axial Bone Density

 

DATE OF EXAM: 9/13/2018

 

COMPARISON: NONE

 

CLINICAL HISTORY: Postmenopausal female

 

 

Height:  66

Weight:  238.2

 

FRAX RISK QUESTIONS:

Alcohol (3 or more units per day):  no

Family History (Parent hip fracture):  no

Glucocorticoids (More than 3mos):  no

           (Ex: prednisone, prednisolone, methylprednisolone, dexamethasone, and hydrocortisone).    
     

History of Fracture in Adulthood: no

Secondary Osteoporosis:

  1.  Type 1 Diabetes: no

  2.  Hyperthyroidism: no

  3.  Menopause before 45: yes

  4.  Malnutrition: no

  5.  Chronic liver disease: no

Rheumatoid Arthritis: no

Current Tobacco Use: no

 

RISK FACTORS 

HISTORY OF: 

Family History of Osteoporosis: no

Active: yes

Diet low in dairy products/other sources of calcium:  no

Postmenopausal woman: hysterectomy age 42

 

MEDICATIONS: Cymbalta, Aldactone, aspirin, lisinopril, Lipitor, amlodipine, Toprol

 

 

Additional History:

 

 

EXAM MEASUREMENTS: 

Bone mineral densitometry was performed using the [employer] System.

Bone mineral density as measured about the Lumbar spine is:  

----- L1-L4(G/cm2): 1.201

T Score Values are as follows:

----- L2: -0.6

----- L3: 1.1

----- L4: 1.0

----- L1-L4: 0.2

Bone mineral density : baseline

 

Bone mineral density about the R hip (g/cm2): 0.966

Bone mineral density about the L hip (g/cm2): 1.068

T Score values are as follows:

-----R Neck: -0.5

-----L Neck: 0.2

-----R Total: 0.3

-----L Total: 0.5 

bone mineral density: baseline 

 

 

IMPRESSION:

Normal (Values between +1 and -1 indicate normal bone mass).  Consider repeating this study in 5 year
s or sooner if there is some new clinical indication.

 

NOTE:  T-SCORE=SD OF THE YOUNG ADULT MEAN.

## 2018-12-19 NOTE — PN
PROGRESS NOTE



DATE OF SERVICE:

12/19/2018





58-year-old lady has been followed in Sleep Center for treatment of obstructive sleep

apnea-hypopnea syndrome.  Recently patient had a polysomnogram done which showed

obstructive sleep apnea with apnea-hypopnea index 14.3 and oxygen desaturation to 84%.

The patient was started on treatment with Auto PAP and able to use equipment every

night for the whole night and she feels better with the machine and she sleeps better.

[place] Sleepiness Scale is 3, which is totally normal.



MEDICATIONS:

Toprol, Norvasc, lisinopril, Lipitor, Aldactone, aspirin, Cymbalta.



I checked the patient CPAP unit.  Range of the pressure 5-20 cm of water. Usage is 100%

of the night more, than 4 hours every 7.1 hours.  Pressure most of the time 9.9 cm of

water. Leak is 28 L/minute which is slightly high for the nasal pillow mask may

indicate the patient opens her mouth.  Apnea-hypopnea index of 0.7, which is absolutely

normal.



PHYSICAL EXAM:

Patient in no distress.  /83, HR 59, RR 16, weight 248, temp 98.2.  Oropharynx

extremely low position of soft palate.  Abdomen slightly obese.

Neck  Supple, no JVD.  Thyroid is not palpable.

LUNGS  Clear to percussion and to auscultation.  Good air exchange.  No wheezing or

rhonchi.

HEART  S1, S2 regular.  No murmurs, gallops, or rubs.

ABDOMEN: Obese.  Soft and nontender.  Bowel sounds are present.  No organomegaly

appreciated.

EXTREMITIES  No clubbing or cyanosis.

CNS  Awake, alert, and oriented X3.  Cranial nerves 2 to 7 intact.  There is no

fasciculation or atrophy. noted.  No focal deficits observed.



IMPRESSION:

1. Obstructive sleep apnea-hypopnea syndrome apnea-hypopnea index 14.3 with oxygen

    desaturation to 84% with full control on CPAP.  The patient demonstrated 100%

    compliance with treatment benefitting from treatment.

2. Coronary artery disease, status post heart attack.

3. Hypertension.

4. Anxiety.

5. Hyperlipidemia.



PLAN:

1. Patient will continue to use CPAP equipment every night for the whole night.

2. I will decrease maximal pressure in the machine to 12.

3. Losing weight.

4. Sleep hygiene with regular time in bed for at least 8 hours.

5. Prescription for chin strap.

Thank you very much for allowing me to participate in management of your patient.



Sincerely,







Jude Madera MD, PhD, FAASM

Diplomat of American Board of Medical Specialties

American Board of Internal Medicine

Medical Director of [facility] [place]





FRANKLIN / ZUHAIR: [id number] / Job#: [id number]

## 2019-08-09 NOTE — CT
EXAMINATION TYPE: CT abdomen pelvis wo con

 

DATE OF EXAM: 8/9/2019

 

HISTORY: Right sided flank pain with urination changes. Low back pain.

 

CT DLP: 1499.9 mGycm.  Automated Exposure Control for Dose Reduction was Utilized.

 

TECHNIQUE:  CT scan of the abdomen and pelvis is performed without oral or IV contrast.

 

COMPARISON: NONE

 

FINDINGS:  Within the limitations of a non-contrast study, the following observations are made.

 

LUNG BASES: Coronary calcification in the RCA distribution axial image 1 is noted.

 

LIVER/GB: Low density dependent gallstone. No CT evidence for surrounding inflammatory change

 

PANCREAS: No significant abnormality is seen.

 

SPLEEN: No significant abnormality is seen.

 

ADRENALS: No significant abnormality is seen.

 

KIDNEYS: Single 1 to 2 mm calculus upper pole of the right kidney coronal image 67. No hydronephrosis
 or obstructing ureteral calculi are seen bilaterally. No intraluminal calculi and poorly distended b
ladder.

 

BOWEL: Slightly low-lying cecum into right pelvis. No suspicious small and large bowel dilatation.

 

GENITAL ORGANS: Uterus is surgically absent or markedly atrophic. Remnant ovaries are felt present.

 

LYMPH NODES: No greater than 1cm abdominal or pelvic lymph nodes are appreciated.

 

OSSEOUS STRUCTURES: Moderate disc space narrowing at L4-L5 and L5-S1 levels.

 

OTHER: No significant additional abnormality is seen.

 

IMPRESSION: Single 1 to 2 mm nonobstructing right renal calculus. No hydronephrosis or obstructing ur
eteral calculi identified bilaterally

## 2019-10-24 NOTE — SFUN
SLEEP CENTER FOLLOW UP NOTE



DATE OF SERVICE:

10/24/2019



A 59-year-old lady who has been followed in the Sleep Center for treatment of

obstructive sleep apnea-hypopnea syndrome.  Patient successfully continuing to use her

CPAP equipment every night for the whole night without significant problems, except she

mainly feels that her mouth is dry when she wakes up in the morning.  [place]

Sleepiness Scale today is 3.



I checked her CPAP unit, range with a pressure of 5-12, average pressure 10.6, usage is

100% of nights more than 4 hours with average usage 7.5 hours per night, which is great

compliance.  Leak 28 L/minute which is slightly high for nasal pillow mask and again

indicate possibly opening mouth.  At the same time, apnea-hypopnea index is absolutely

perfect only 1.7.



Recently, patient was found to have stones in the gallbladder under evaluation.



MEDICATIONS:

Norvasc, Toprol, lisinopril, Lipitor, Aldactone, Cymbalta, aspirin.



PHYSICAL EXAM:

Patient in no distress. /79, HR 57, RR 16, height 5 foot 6-1/2 inches, weight

261.6 pounds, which is about 13 pounds above weight during last year, temperature 98.9.

Oxygen saturation at room air 94%.

OROPHARYNX:  Low position of soft palate.  Mallampati IV.

ABDOMEN:  Slightly obese.

Neck  Supple, no JVD.  Thyroid is not palpable.

LUNGS  Clear to percussion and to auscultation.  Good air exchange.  No wheezing or

rhonchi.

HEART  S1, S2 regular.  No murmurs, gallops, or rubs.

EXTREMITIES  No clubbing or cyanosis.

CNS  Awake, alert, and oriented X3.  Cranial nerves 2 to 7 intact.  There is no

fasciculation or atrophy. noted.  No focal deficits observed.



IMPRESSION:

1. Obstructive sleep apnea-hypopnea syndrome, on full control with CPAP.  Patient

    demonstrated 100% compliance with treatment.

2. Patient possibly opens her mouth during the sleep, slightly high leak.

3. Obesity, patient increased weight around 12 pounds.  Body mass index 41.4.

4. Hypertension.

5. Coronary artery disease, status post heart attack.

6. Cholelithiasis. Recently found that under evaluation.

7. History of anxiety.

8. Hyperlipidemia.



PLAN:

1. Patient will continue to use CPAP equipment every night for the whole night.

2. Sleep hygiene with regular time in bed for at least 7-1/2 hours.

3. Losing weight.

4. Prescription for all necessary CPAP supplies and additionally for the chin strap.

5. Precautions related to driving, no driving if feeling sleepiness.

6. Follow-up visit in 1 year or earlier if patient has any problems.



Thank you very much for allowing me to participate in management of your patient.



Sincerely,





Jude Madera MD, PhD, FAASM

Diplomat of American Board of Medical Specialties

American Board of Internal Medicine

Medical Director of [facility] [place]





MMODL / IJN: [id number] / Job#: [id number]

## 2020-09-24 NOTE — MM
Reason for exam: screening  (asymptomatic).

Last mammogram was performed 2 years and 1 month ago.



History:

Patient is postmenopausal.

Family history of breast cancer in mother at age 70.



Physical Findings:

A clinical breast exam by your physician is recommended on an annual basis and 

results should be correlated with mammographic findings.



MG 3D Screening Mammo W/Cad

Bilateral CC and MLO view(s) were taken.

Prior study comparison: August 29, 2018, bilateral MG 3d screening mammo w/cad.

The breast tissue is heterogeneously dense. This may lower the sensitivity of 

mammography.  There are benign appearing round, linear calcifications bilaterally.

There is chronic nodularity bilaterally. There is no discrete abnormality.





ASSESSMENT: Benign, BI-RAD 2



RECOMMENDATION:

Routine screening mammogram of both breasts in 1 year.

## 2020-10-22 ENCOUNTER — HOSPITAL ENCOUNTER (OUTPATIENT)
Dept: HOSPITAL 47 - SLEEP | Age: 60
Discharge: HOME | End: 2020-10-22
Attending: INTERNAL MEDICINE
Payer: COMMERCIAL

## 2020-10-22 DIAGNOSIS — Z87.19: ICD-10-CM

## 2020-10-22 DIAGNOSIS — G47.33: Primary | ICD-10-CM

## 2020-10-22 DIAGNOSIS — Z99.89: ICD-10-CM

## 2020-10-22 DIAGNOSIS — Z90.49: ICD-10-CM

## 2020-10-22 DIAGNOSIS — E78.5: ICD-10-CM

## 2020-10-22 DIAGNOSIS — I25.2: ICD-10-CM

## 2020-10-22 DIAGNOSIS — Z86.59: ICD-10-CM

## 2020-10-22 DIAGNOSIS — I25.10: ICD-10-CM

## 2020-10-22 DIAGNOSIS — I10: ICD-10-CM

## 2020-10-22 NOTE — SFUN
SLEEP CENTER FOLLOW UP NOTE



DATE OF SERVICE:

10/22/2020



This is a 60-year-old lady who has been followed in Sleep Center for treatment of

obstructive sleep apnea-hypopnea syndrome.  The patient successfully continues to use

her CPAP equipment every night.  Sometimes she feels that possibly she opens her mouth.

She is getting all her supplies in time.



[place] Sleepiness Scale today is 13, which may indicate some sleepiness.



Recently the patient had a cholecystectomy.



I checked the patient's CPAP unit. Range of the pressure is from 5 to 12 with average

pressure 10.9. Usage is 30/30 nights for more than 4 hours; average 6.1 hours per

night. Leak is 31 L/minute, which is high. Apnea-hypopnea index is 1.7, which is

perfect.



MEDICATIONS:

1. Toprol 100 mg once daily.

2. Amlodipine 5 mg once daily.

3. Lisinopril 10 mg once daily.

4. Lipitor 40 mg once daily.

5. Aldactone half a tablet 25 mg daily.

6. Baby aspirin 81 mg daily.

7. Citalopram HBr 40 mg once daily at bedtime.



PHYSICAL EXAMINATION:

GENERAL: A pleasant patient in no distress.

VITAL SIGNS: /81, HR 62, RR 15, height 5 feet 6-1/2 inches, weight 272, BMI 43.2,

temperature 98.4.  Patient's weight has increased by 11 pounds since last visit.

Oxygen saturation at room air 93%.

HEENT: PERRLA, EOMI. Evaluation of oropharynx showed tongue protrudes midline.

Extremely low position of soft palate. Mallampati IV.

NECK: Supple. No JVD.  Thyroid is not palpable.

LUNGS: Clear to percussion and to auscultation.  Good air exchange.  No wheezing or

rhonchi.

HEART: S1, S2 regular.  No murmurs, gallops or rubs.

ABDOMEN: Obese.

EXTREMITIES:  No clubbing or cyanosis.

CNS: Awake, alert, and oriented X3.  Cranial nerves 2 to 7 intact.  There is no

fasciculation or atrophy. noted.  No focal deficits observed.



IMPRESSION:

1. Obstructive sleep apnea-hypopnea syndrome.  Patient demonstrated 100% compliance

    with treatment, benefitting from treatment.

2. Significant leak reading from the machine. Possibly patient opens her mouth during

    sleep.

3. Obesity.  Patient's weight has increased by 11 pounds since her last visit.

4. Hypertension.

5. Coronary artery disease, status post heart attack.

6. History of cholelithiasis, status post cholecystectomy.

7. History of anxiety.

8. Hyperlipidemia.



PLAN:

1. Prescription for chin strap.  I explained to the patient the necessity of using a

    chinstrap.

2. Patient will continue to use PAP equipment every night for the whole night.

3. Sleep hygiene with regular time in bed for at least 7-1/2 to 8 hours.

4. Precautions related to driving. No driving if feeling sleepiness.

5. I will maintain all necessary prescription for PAP supplies including mask, tube,

    filters.

6. Watching weight.

7. Follow-up visit in 6 months or earlier if patient has any problems.

Thank you very much for allowing me to participate in the management of your patient.



Sincerely,







Jude Madera MD, PhD, FAASM

Diplomat of American Board of Medical Specialties

American Board of Internal Medicine

Medical Director of [facility] [place]





MMODL / IJN: [id number] / Job#: [id number]

## 2021-03-31 ENCOUNTER — HOSPITAL ENCOUNTER (INPATIENT)
Dept: HOSPITAL 47 - EC | Age: 61
LOS: 21 days | Discharge: NURSING, REHAB OR LONG-TERM CARE | DRG: 870 | End: 2021-04-21
Attending: HOSPITALIST | Admitting: HOSPITALIST
Payer: COMMERCIAL

## 2021-03-31 VITALS — BODY MASS INDEX: 39.3 KG/M2

## 2021-03-31 DIAGNOSIS — Z98.51: ICD-10-CM

## 2021-03-31 DIAGNOSIS — D72.810: ICD-10-CM

## 2021-03-31 DIAGNOSIS — I95.9: ICD-10-CM

## 2021-03-31 DIAGNOSIS — E87.3: ICD-10-CM

## 2021-03-31 DIAGNOSIS — Z79.82: ICD-10-CM

## 2021-03-31 DIAGNOSIS — E66.9: ICD-10-CM

## 2021-03-31 DIAGNOSIS — Z90.710: ICD-10-CM

## 2021-03-31 DIAGNOSIS — I48.0: ICD-10-CM

## 2021-03-31 DIAGNOSIS — E78.5: ICD-10-CM

## 2021-03-31 DIAGNOSIS — R74.01: ICD-10-CM

## 2021-03-31 DIAGNOSIS — R00.1: ICD-10-CM

## 2021-03-31 DIAGNOSIS — G72.0: ICD-10-CM

## 2021-03-31 DIAGNOSIS — T38.0X5A: ICD-10-CM

## 2021-03-31 DIAGNOSIS — Z86.79: ICD-10-CM

## 2021-03-31 DIAGNOSIS — A08.39: ICD-10-CM

## 2021-03-31 DIAGNOSIS — U07.1: ICD-10-CM

## 2021-03-31 DIAGNOSIS — E43: ICD-10-CM

## 2021-03-31 DIAGNOSIS — I10: ICD-10-CM

## 2021-03-31 DIAGNOSIS — J12.82: ICD-10-CM

## 2021-03-31 DIAGNOSIS — A41.89: Primary | ICD-10-CM

## 2021-03-31 DIAGNOSIS — Z79.899: ICD-10-CM

## 2021-03-31 DIAGNOSIS — G72.81: ICD-10-CM

## 2021-03-31 DIAGNOSIS — Z90.49: ICD-10-CM

## 2021-03-31 DIAGNOSIS — I25.10: ICD-10-CM

## 2021-03-31 DIAGNOSIS — E66.01: ICD-10-CM

## 2021-03-31 LAB
ALBUMIN SERPL-MCNC: 3.4 G/DL (ref 3.5–5)
ALP SERPL-CCNC: 106 U/L (ref 38–126)
ALT SERPL-CCNC: 60 U/L (ref 4–34)
ANION GAP SERPL CALC-SCNC: 9 MMOL/L
APTT BLD: 25.2 SEC (ref 22–30)
ARTERIAL PATENCY WRIST A: (no result)
AST SERPL-CCNC: 94 U/L (ref 14–36)
BASOPHILS # BLD AUTO: 0 K/UL (ref 0–0.2)
BASOPHILS NFR BLD AUTO: 0 %
BUN SERPL-SCNC: 19 MG/DL (ref 7–17)
CALCIUM SPEC-MCNC: 8.1 MG/DL (ref 8.4–10.2)
CHLORIDE SERPL-SCNC: 108 MMOL/L (ref 98–107)
CO2 BLDA-SCNC: 21 MMOL/L (ref 19–24)
CO2 BLDA-SCNC: 23 MMOL/L (ref 19–24)
CO2 BLDA-SCNC: 25 MMOL/L (ref 19–24)
CO2 SERPL-SCNC: 21 MMOL/L (ref 22–30)
EOSINOPHIL # BLD AUTO: 0 K/UL (ref 0–0.7)
EOSINOPHIL NFR BLD AUTO: 0 %
ERYTHROCYTE [DISTWIDTH] IN BLOOD BY AUTOMATED COUNT: 4.28 M/UL (ref 3.8–5.4)
ERYTHROCYTE [DISTWIDTH] IN BLOOD: 13 % (ref 11.5–15.5)
GLUCOSE BLD-MCNC: 193 MG/DL (ref 75–99)
GLUCOSE SERPL-MCNC: 167 MG/DL (ref 74–99)
HCO3 BLDA-SCNC: 20 MMOL/L (ref 21–25)
HCO3 BLDA-SCNC: 22 MMOL/L (ref 21–25)
HCO3 BLDA-SCNC: 24 MMOL/L (ref 21–25)
HCT VFR BLD AUTO: 37.4 % (ref 34–46)
HGB BLD-MCNC: 13.3 GM/DL (ref 11.4–16)
INR PPP: 0.9 (ref ?–1.2)
LDH SPEC-CCNC: 1471 U/L (ref 313–618)
LYMPHOCYTES # SPEC AUTO: 0.5 K/UL (ref 1–4.8)
LYMPHOCYTES NFR SPEC AUTO: 24 %
MAGNESIUM SPEC-SCNC: 2.1 MG/DL (ref 1.6–2.3)
MCH RBC QN AUTO: 31 PG (ref 25–35)
MCHC RBC AUTO-ENTMCNC: 35.5 G/DL (ref 31–37)
MCV RBC AUTO: 87.5 FL (ref 80–100)
MONOCYTES # BLD AUTO: 0.2 K/UL (ref 0–1)
MONOCYTES NFR BLD AUTO: 8 %
NEUTROPHILS # BLD AUTO: 1.4 K/UL (ref 1.3–7.7)
NEUTROPHILS NFR BLD AUTO: 67 %
PCO2 BLDA: 35 MMHG (ref 35–45)
PCO2 BLDA: 38 MMHG (ref 35–45)
PCO2 BLDA: 41 MMHG (ref 35–45)
PH BLDA: 7.36 [PH] (ref 7.35–7.45)
PH BLDA: 7.37 [PH] (ref 7.35–7.45)
PH BLDA: 7.37 [PH] (ref 7.35–7.45)
PLATELET # BLD AUTO: 101 K/UL (ref 150–450)
PO2 BLDA: 54 MMHG (ref 83–108)
PO2 BLDA: 61 MMHG (ref 83–108)
PO2 BLDA: 84 MMHG (ref 83–108)
POTASSIUM SERPL-SCNC: 3.8 MMOL/L (ref 3.5–5.1)
PROT SERPL-MCNC: 6.1 G/DL (ref 6.3–8.2)
PT BLD: 9.5 SEC (ref 9–12)
SODIUM SERPL-SCNC: 138 MMOL/L (ref 137–145)
WBC # BLD AUTO: 2.1 K/UL (ref 3.8–10.6)

## 2021-03-31 PROCEDURE — 80053 COMPREHEN METABOLIC PANEL: CPT

## 2021-03-31 PROCEDURE — 94660 CPAP INITIATION&MGMT: CPT

## 2021-03-31 PROCEDURE — 94002 VENT MGMT INPAT INIT DAY: CPT

## 2021-03-31 PROCEDURE — 06HY33Z INSERTION OF INFUSION DEVICE INTO LOWER VEIN, PERCUTANEOUS APPROACH: ICD-10-PCS

## 2021-03-31 PROCEDURE — 82805 BLOOD GASES W/O2 SATURATION: CPT

## 2021-03-31 PROCEDURE — 87635 SARS-COV-2 COVID-19 AMP PRB: CPT

## 2021-03-31 PROCEDURE — XW033H5 INTRODUCTION OF TOCILIZUMAB INTO PERIPHERAL VEIN, PERCUTANEOUS APPROACH, NEW TECHNOLOGY GROUP 5: ICD-10-PCS

## 2021-03-31 PROCEDURE — 36410 VNPNXR 3YR/> PHY/QHP DX/THER: CPT

## 2021-03-31 PROCEDURE — 87205 SMEAR GRAM STAIN: CPT

## 2021-03-31 PROCEDURE — 85384 FIBRINOGEN ACTIVITY: CPT

## 2021-03-31 PROCEDURE — 84132 ASSAY OF SERUM POTASSIUM: CPT

## 2021-03-31 PROCEDURE — 84100 ASSAY OF PHOSPHORUS: CPT

## 2021-03-31 PROCEDURE — 5A09357 ASSISTANCE WITH RESPIRATORY VENTILATION, LESS THAN 24 CONSECUTIVE HOURS, CONTINUOUS POSITIVE AIRWAY PRESSURE: ICD-10-PCS

## 2021-03-31 PROCEDURE — 82728 ASSAY OF FERRITIN: CPT

## 2021-03-31 PROCEDURE — 85027 COMPLETE CBC AUTOMATED: CPT

## 2021-03-31 PROCEDURE — 80048 BASIC METABOLIC PNL TOTAL CA: CPT

## 2021-03-31 PROCEDURE — 93306 TTE W/DOPPLER COMPLETE: CPT

## 2021-03-31 PROCEDURE — 85610 PROTHROMBIN TIME: CPT

## 2021-03-31 PROCEDURE — 94640 AIRWAY INHALATION TREATMENT: CPT

## 2021-03-31 PROCEDURE — 4A133J1 MONITORING OF ARTERIAL PULSE, PERIPHERAL, PERCUTANEOUS APPROACH: ICD-10-PCS

## 2021-03-31 PROCEDURE — 5A1955Z RESPIRATORY VENTILATION, GREATER THAN 96 CONSECUTIVE HOURS: ICD-10-PCS

## 2021-03-31 PROCEDURE — 84145 PROCALCITONIN (PCT): CPT

## 2021-03-31 PROCEDURE — 84484 ASSAY OF TROPONIN QUANT: CPT

## 2021-03-31 PROCEDURE — 83735 ASSAY OF MAGNESIUM: CPT

## 2021-03-31 PROCEDURE — 84443 ASSAY THYROID STIM HORMONE: CPT

## 2021-03-31 PROCEDURE — 94003 VENT MGMT INPAT SUBQ DAY: CPT

## 2021-03-31 PROCEDURE — 84478 ASSAY OF TRIGLYCERIDES: CPT

## 2021-03-31 PROCEDURE — 85379 FIBRIN DEGRADATION QUANT: CPT

## 2021-03-31 PROCEDURE — 86140 C-REACTIVE PROTEIN: CPT

## 2021-03-31 PROCEDURE — 87070 CULTURE OTHR SPECIMN AEROBIC: CPT

## 2021-03-31 PROCEDURE — 99291 CRITICAL CARE FIRST HOUR: CPT

## 2021-03-31 PROCEDURE — 3E0333Z INTRODUCTION OF ANTI-INFLAMMATORY INTO PERIPHERAL VEIN, PERCUTANEOUS APPROACH: ICD-10-PCS

## 2021-03-31 PROCEDURE — 4A133B1 MONITORING OF ARTERIAL PRESSURE, PERIPHERAL, PERCUTANEOUS APPROACH: ICD-10-PCS

## 2021-03-31 PROCEDURE — 03HY32Z INSERTION OF MONITORING DEVICE INTO UPPER ARTERY, PERCUTANEOUS APPROACH: ICD-10-PCS

## 2021-03-31 PROCEDURE — 71045 X-RAY EXAM CHEST 1 VIEW: CPT

## 2021-03-31 PROCEDURE — 85730 THROMBOPLASTIN TIME PARTIAL: CPT

## 2021-03-31 PROCEDURE — 76937 US GUIDE VASCULAR ACCESS: CPT

## 2021-03-31 PROCEDURE — 93005 ELECTROCARDIOGRAM TRACING: CPT

## 2021-03-31 PROCEDURE — 87040 BLOOD CULTURE FOR BACTERIA: CPT

## 2021-03-31 PROCEDURE — 83605 ASSAY OF LACTIC ACID: CPT

## 2021-03-31 PROCEDURE — 83625 ASSAY OF LDH ENZYMES: CPT

## 2021-03-31 PROCEDURE — 82553 CREATINE MB FRACTION: CPT

## 2021-03-31 PROCEDURE — 82550 ASSAY OF CK (CPK): CPT

## 2021-03-31 PROCEDURE — 83615 LACTATE (LD) (LDH) ENZYME: CPT

## 2021-03-31 PROCEDURE — 85025 COMPLETE CBC W/AUTO DIFF WBC: CPT

## 2021-03-31 PROCEDURE — 36600 WITHDRAWAL OF ARTERIAL BLOOD: CPT

## 2021-03-31 PROCEDURE — 5A0935A ASSISTANCE WITH RESPIRATORY VENTILATION, LESS THAN 24 CONSECUTIVE HOURS, HIGH NASAL FLOW/VELOCITY: ICD-10-PCS

## 2021-03-31 PROCEDURE — 0BH17EZ INSERTION OF ENDOTRACHEAL AIRWAY INTO TRACHEA, VIA NATURAL OR ARTIFICIAL OPENING: ICD-10-PCS

## 2021-03-31 RX ADMIN — ENOXAPARIN SODIUM SCH MG: 40 INJECTION SUBCUTANEOUS at 08:39

## 2021-03-31 RX ADMIN — CITALOPRAM HYDROBROMIDE SCH MG: 20 TABLET ORAL at 08:38

## 2021-03-31 RX ADMIN — CHLORHEXIDINE GLUCONATE SCH ML: 1.2 RINSE ORAL at 20:45

## 2021-03-31 RX ADMIN — SODIUM CHLORIDE SCH MLS/HR: 900 INJECTION, SOLUTION INTRAVENOUS at 14:42

## 2021-03-31 RX ADMIN — ALBUTEROL SULFATE PRN PUFF: 90 AEROSOL, METERED RESPIRATORY (INHALATION) at 07:44

## 2021-03-31 RX ADMIN — SPIRONOLACTONE SCH MG: 25 TABLET, FILM COATED ORAL at 08:38

## 2021-03-31 RX ADMIN — FAMOTIDINE SCH MG: 20 TABLET, FILM COATED ORAL at 08:36

## 2021-03-31 RX ADMIN — Medication SCH MG: at 08:40

## 2021-03-31 RX ADMIN — CEFAZOLIN SCH MLS/HR: 330 INJECTION, POWDER, FOR SOLUTION INTRAMUSCULAR; INTRAVENOUS at 18:17

## 2021-03-31 RX ADMIN — DEXTROSE SCH MG: 50 INJECTION, SOLUTION INTRAVENOUS at 08:41

## 2021-03-31 RX ADMIN — ATORVASTATIN CALCIUM SCH MG: 40 TABLET, FILM COATED ORAL at 08:44

## 2021-03-31 RX ADMIN — ASPIRIN 81 MG CHEWABLE TABLET SCH MG: 81 TABLET CHEWABLE at 08:37

## 2021-03-31 RX ADMIN — Medication SCH MCG: at 08:35

## 2021-03-31 RX ADMIN — SODIUM CHLORIDE SCH MLS/HR: 900 INJECTION, SOLUTION INTRAVENOUS at 16:58

## 2021-03-31 RX ADMIN — ONDANSETRON PRN MG: 2 INJECTION INTRAMUSCULAR; INTRAVENOUS at 01:59

## 2021-03-31 RX ADMIN — METOPROLOL SUCCINATE SCH MG: 100 TABLET, EXTENDED RELEASE ORAL at 08:40

## 2021-03-31 RX ADMIN — CEFAZOLIN SCH MLS/HR: 330 INJECTION, POWDER, FOR SOLUTION INTRAMUSCULAR; INTRAVENOUS at 22:30

## 2021-03-31 RX ADMIN — OXYCODONE HYDROCHLORIDE AND ACETAMINOPHEN SCH MG: 500 TABLET ORAL at 08:37

## 2021-03-31 NOTE — ED
Recheck HPI





- General


Chief Complaint: Shortness of Breath


Stated Complaint: LELIA


Time Seen by Provider: 03/31/21 00:42


Source: patient, EMS, RN notes reviewed, old records reviewed


Mode of arrival: EMS


Limitations: no limitations





- History of Present Illness


Initial Comments: 





This is a 61-year-old female DF for evaluation.  Patient presents with signifi

cant respiratory distress known positive coronavirus with persistent fevers now 

increasing shortness of breath.  Patient brought in by EMS he states patient's 

been significantly low on oxygen level.  Patient herself is unable to give 

complaints secondary to poor strain secondary to current clinical condition 

patient has history of A. fib hypertension and persistent shortness of breath


MD Complaint: abnormal lab (Positive coronavirus)


-: days(s)


Returns Today for: persistent/worsening pain related to initial visit, other 

(Worsening shortness of breath)


Symptoms Since Prior Visit: worsening pain, fever


Context: other (Sent in for worsening symptoms)


Associated Symptoms: fever, chills, shortness of breath, malaise, nausea


Treatments Prior to Arrival: home treatments





- Related Data


                                Home Medications











 Medication  Instructions  Recorded  Confirmed


 


Lisinopril [Prinivil] 10 mg PO DAILY 05/29/18 05/29/18


 


Metoprolol Succinate (ER) [Toprol 50 mg PO DAILY 05/29/18 05/29/18





XL]   


 


Metoprolol Succinate (ER) [Toprol 100 mg PO DAILY 05/29/18 05/29/18





XL]   


 


amLODIPine [Norvasc] 5 mg PO DAILY 05/29/18 05/29/18








                                  Previous Rx's











 Medication  Instructions  Recorded


 


Aspirin 81 mg PO DAILY #30 chew 06/01/18


 


Atorvastatin [Lipitor] 80 mg PO DAILY #30 tab 06/01/18


 


Spironolactone [Aldactone] 25 mg PO DAILY #30 tab 06/01/18











                                    Allergies











Allergy/AdvReac Type Severity Reaction Status Date / Time


 


No Known Allergies Allergy   Verified 05/29/18 20:17














Review of Systems


ROS Statement: 


Those systems with pertinent positive or pertinent negative responses have been 

documented in the HPI.





ROS Other: All systems not noted in ROS Statement are negative.





Past Medical History


Past Medical History: Atrial Fibrillation, Hypertension


History of Any Multi-Drug Resistant Organisms: None Reported


Past Surgical History: Cholecystectomy, Heart Catheterization, Hysterectomy, 

Tubal Ligation


Past Psychological History: No Psychological Hx Reported


Past Alcohol Use History: Occasional


Past Drug Use History: None Reported





General Exam


Limitations: no limitations


General appearance: alert, in no apparent distress, anxious


Head exam: Present: atraumatic, normocephalic, normal inspection


Eye exam: Present: normal appearance, PERRL, EOMI.  Absent: scleral icterus, 

conjunctival injection, periorbital swelling


ENT exam: Present: normal exam, mucous membranes moist


Neck exam: Present: normal inspection.  Absent: tenderness, meningismus, 

lymphadenopathy


Respiratory exam: Present: respiratory distress, rhonchi, decreased breath 

sounds, prolonged expiratory.  Absent: wheezes, rales, stridor


Cardiovascular Exam: Present: regular rate, normal rhythm, normal heart sounds. 

 Absent: systolic murmur, diastolic murmur, rubs, gallop, clicks


GI/Abdominal exam: Present: soft, normal bowel sounds.  Absent: distended, 

tenderness, guarding, rebound, rigid


Extremities exam: Present: normal inspection, full ROM, normal capillary refill.

  Absent: tenderness, pedal edema, joint swelling, calf tenderness


Back exam: Present: normal inspection


Neurological exam: Present: alert, oriented X3, CN II-XII intact


Psychiatric exam: Present: normal affect, normal mood


Skin exam: Present: warm, dry, intact, normal color.  Absent: rash





Course


                                   Vital Signs











  03/31/21





  00:34


 


Temperature 98.6 F


 


Pulse Rate 88


 


Respiratory 18





Rate 


 


Blood Pressure 178/82


 


O2 Sat by Pulse 90 L





Oximetry 














- Reevaluation(s)


Reevaluation #1: 





03/31/21 01:31


Medical record is reviewed


Reevaluation #2: 





03/31/21 01:31


Patient has no improvement in symptoms here in the ER


Reevaluation #3: 





03/31/21 01:31


Spoke patient regarding results of findings questions answered





- Consultations


Consultation #1: 





Spoke with [facility] agreed to admit the patient





Medical Decision Making





- Medical Decision Making





61 female with known coronavirus will admit for positive coronavirus with 

pneumonia and hypoxia





- EKG Data


-: EKG Interpreted by Me (EKG shows sinus rhythm 89  QRS 86 QTc 462)





- Radiology Data


Radiology results: report reviewed (Chest x-ray does show coronavirus 

pneumonia), image reviewed





Critical Care Time


Critical Care Time: Yes


Total Critical Care Time: 31





Disposition


Clinical Impression: 


 Coronavirus infection, Pneumonia due to COVID-19 virus, Hypoxia





Disposition: ADMITTED AS IP TO THIS HOSP


Condition: Fair


Is patient prescribed a controlled substance at d/c from ED?: No

## 2021-03-31 NOTE — P.HPIM
History of Present Illness








This is a pleasant 61 years old female with past medical history of atrial 

fibrillation not on anticoagulation and her cardiologist is Dr. Peck, 

hypertension, hyperlipidemia.  She is a patient of Dr. Ribeiro


Patient states that she was tested positive for covid On 03/26/2021.  And now 

she presents with dyspnea of one-day duration when started earlier.  Associated 

with cough on the patella brown phlegm.


She is also complaining of from chest pain without coughing , on the right side 

of the chest nonradiating about 5/10 in severity


She has no vomiting but diarrhea about twice per day





Patient is afebrile, tachypneic 22-28 breaths per minute, hypoxic needed 15 L/m 

of oxygen with oxygen saturation 85%


Labs showing leukopenia with WBC 2.1K with lymphopenia.  Rest of the CBC, INR 

unremarkable.  BMP is unremarkable with carbon dioxide is 21.  Lactic acid 1.2. 

Liver enzymes slightly elevated with AST 94 and ALT 60.


Lactate dehydrogenase is elevated at 1471, C-reactive protein is 88.


Chest x-ray: Multifocal patchy opacity in the mid and lower lungs bilaterally


EKG shows normal sinus rhythm with PVC at 89 BPM, with T-wave inversion in lead 

3 and aVF


Instrument emergency room patient was started on Lovenox, given Decadron, normal

saline and albuterol.




















Review of Systems





CONSTITUTIONAL: No fever, no malaise, no fatigue. 


HEENT: No recent visual problems or hearing problems. Denied any sore throat. 


CARDIOVASCULAR: No  orthopnea, PND, no palpitations, no syncope. 


PULMONARY: No chest wall tenderness, no cough, no hemoptysis. 


GASTROINTESTINAL: No diarrhea, no nausea, no vomiting, no abdominal pain. 

Normoactive bowel sounds. 


NEUROLOGICAL: No headaches, no weakness, no numbness. 


HEMATOLOGICAL: Denies any bleeding or petechiae. 


GENITOURINARY: Denies any burning micturition, frequency, or urgency. 


MUSCULOSKELETAL/RHEUMATOLOGICAL: Denies any joint pain, swelling, or any muscle 

pain. 


ENDOCRINE: Denies any polyuria or polydipsia.











Past Medical History


Past Medical History: Atrial Fibrillation, Hypertension


History of Any Multi-Drug Resistant Organisms: None Reported


Past Surgical History: Cholecystectomy, Heart Catheterization, Hysterectomy, 

Tubal Ligation


Past Psychological History: No Psychological Hx Reported


Past Alcohol Use History: Occasional


Past Drug Use History: None Reported





Medications and Allergies


                                Home Medications











 Medication  Instructions  Recorded  Confirmed  Type


 


Lisinopril [Prinivil] 10 mg PO DAILY 05/29/18 03/31/21 History


 


Metoprolol Succinate (ER) [Toprol 100 mg PO DAILY 05/29/18 03/31/21 History





XL]    


 


amLODIPine [Norvasc] 5 mg PO DAILY 05/29/18 03/31/21 History


 


Aspirin 81 mg PO DAILY #30 chew 06/01/18 03/31/21 Rx


 


Atorvastatin [Lipitor] 40 mg PO DAILY 03/31/21 03/31/21 History


 


Citalopram Hydrobromide [CeleXA] 40 mg PO DAILY 03/31/21 03/31/21 History


 


Spironolactone [Aldactone] 12.5 mg PO DAILY 03/31/21 03/31/21 History








                                    Allergies











Allergy/AdvReac Type Severity Reaction Status Date / Time


 


No Known Allergies Allergy   Verified 03/31/21 07:35














Physical Exam


Vitals: 


                                   Vital Signs











  Temp Pulse Resp BP Pulse Ox


 


 03/31/21 07:41   81  22  147/71  85 L


 


 03/31/21 06:07  98.8 F  88  28 H  144/75  87 L


 


 03/31/21 04:27  99.2 F  76  28 H  132/76  88 L


 


 03/31/21 04:21      86 L


 


 03/31/21 04:00      79 L


 


 03/31/21 03:10    22  


 


 03/31/21 02:10   84  20   87 L


 


 03/31/21 00:34  98.6 F  88  18  178/82  90 L








                                Intake and Output











 03/30/21 03/31/21 03/31/21





 22:59 06:59 14:59


 


Other:   


 


  Weight  120.202 kg 














GENERAL: The patient is alert and oriented x3, not in any acute distress. Well 

developed, well nourished. 


HEENT: Pupils are round and equally reacting to light. EOMI. No scleral icterus.

 No conjunctival pallor. Normocephalic, atraumatic. No pharyngeal erythema. No 

thyromegaly. 


CARDIOVASCULAR: S1 and S2 present. No murmurs, rubs, or gallops. 


-PULMONARY: Chest is clear to auscultation, no wheezing.  Bilateral crepitation


ABDOMEN: Soft, nontender, nondistended, normoactive bowel sounds. No palpable 

organomegaly. 


MUSCULOSKELETAL: No joint swelling or deformity. 


EXTREMITIES: No cyanosis, clubbing, or pedal edema. 


NEUROLOGICAL: Gross neurological examination did not reveal any focal deficits. 


SKIN: No rashes. No petechiae








Results


CBC & Chem 7: 


                                 03/31/21 01:16





                                 03/31/21 01:16


Labs: 


                  Abnormal Lab Results - Last 24 Hours (Table)











  03/31/21 03/31/21 Range/Units





  01:16 01:16 


 


WBC  2.1 L   (3.8-10.6)  k/uL


 


Plt Count  101 L   (150-450)  k/uL


 


Lymphocytes #  0.5 L   (1.0-4.8)  k/uL


 


Chloride   108 H  ()  mmol/L


 


Carbon Dioxide   21 L  (22-30)  mmol/L


 


BUN   19 H  (7-17)  mg/dL


 


Glucose   167 H  (74-99)  mg/dL


 


Calcium   8.1 L  (8.4-10.2)  mg/dL


 


AST   94 H  (14-36)  U/L


 


ALT   60 H  (4-34)  U/L


 


Lactate Dehydrogenase   1471 H  (313-618)  U/L


 


C-Reactive Protein   88.1 H  (<10.0)  mg/L


 


Total Protein   6.1 L  (6.3-8.2)  g/dL


 


Albumin   3.4 L  (3.5-5.0)  g/dL














Assessment and Plan


Assessment: 





Acute bilateral pneumonia, mostly secondary to Covid 19 infection


Acute hypoxic respiratory failure


Increased inflammatory markers


Acute Covid gastroenteritis


Mild transaminitis, mostly secondary to Covid


Hypertension


Hyperlipidemia


Paroxysmal A. fib, currently heart rate controlled

















Plan: 





This is a pleasant 61 years old female who presents with respiratory distress 

mostly secondary to covid, retest covid.  Continue with steroids, vitamin C, 

zinc, vitamin D, bronchodilator and oxygen as needed.  Lovenox for DVT 

prophylaxis and pulmonary consult


Labs and medication were reviewed..  Continue same treatment.  Continue with 

symptomatic treatment.  Resume home medication.  Monitor lytes and vitals.  DVT 

and GI prophylaxis.  Further recommendations depends on the clinical course of 

the patient


DVT prophylaxis: Subcutaneous Lovenox


GI Prophylaxis: Pepcid


Prognosis is guarded

## 2021-03-31 NOTE — P.CNPUL
History of Present Illness


Consult date: 03/31/21


Requesting physician: Mushtaq Lerma


Reason for consult: dyspnea, cough, hypoxemia, pneumonia, abnormal CXR/CT


Chief complaint: Shortness of breath, cough, fever, diarrhea.


History of present illness: 





61-year-old female, who hasn't been feeling well for about 10-11 days.  She 

tested positive for COVID 19 on March 24.  She's been sick for 10-11 days old.  

She complains of TYPICAL symptoms including weakness, fever, diarrhea, cough, 

and shortness of breath.  She's quite hypoxemic.  On AIRVO 100%, and a 

nonrebreather mask, her saturations are right around 80-83%.  Any movement, and 

she desaturates even further.  I just called the intensive care unit, and we'll 

plan on putting her in the intensive care unit.  She's currently on saline at 75

mL an hour.  She can barely speak without becoming short of breath.  The patient

is not a candidate for REM, but could get TOCI.  Sodium 138, potassium 3.8, 

chlorides 108, CO2 21, anion gap 9, BUN 19, creatinine 0.7.  AST 94, ALTs 60, 

LDH 1471, C-reactive protein 88.1.  Again, the patient's been sick for at least 

10-11 days and maybe a bit longer.  Her oral intake has been poor as well.  The 

patient's chest x-ray shows diffuse bilateral infiltrates consistent with 

coronavirus infection.





Review of Systems





REVIEW OF SYSTEMS:  


CONSTITUTIONAL:  Fever, weakness, decreased oral intake, fatigue.


NEUROLOGIC: [ Negative.]


HEENT:  [ Negative.]


CARDIAC:  [Negative.]


PULMONARY:  Shortness of breath, cough, painful cough.


GI:  Diarrhea.


:  [Negative.]


RHEUMATOLOGIC: [ Negative.]


IMMUNOLOGIC: [ Negative.]


ENDOCRINE:  [Negative.  ] 


DERMATOLOGIC: [Negative.]








Past Medical History


Past Medical History: Atrial Fibrillation, Hypertension


History of Any Multi-Drug Resistant Organisms: None Reported


Past Surgical History: Cholecystectomy, Heart Catheterization, Hysterectomy, 

Tubal Ligation


Past Psychological History: No Psychological Hx Reported


Past Alcohol Use History: Occasional


Past Drug Use History: None Reported





Medications and Allergies


                                Home Medications











 Medication  Instructions  Recorded  Confirmed  Type


 


Lisinopril [Prinivil] 10 mg PO DAILY 05/29/18 03/31/21 History


 


Metoprolol Succinate (ER) [Toprol 100 mg PO DAILY 05/29/18 03/31/21 History





XL]    


 


amLODIPine [Norvasc] 5 mg PO DAILY 05/29/18 03/31/21 History


 


Aspirin 81 mg PO DAILY #30 chew 06/01/18 03/31/21 Rx


 


Atorvastatin [Lipitor] 40 mg PO DAILY 03/31/21 03/31/21 History


 


Citalopram Hydrobromide [CeleXA] 40 mg PO DAILY 03/31/21 03/31/21 History


 


Spironolactone [Aldactone] 12.5 mg PO DAILY 03/31/21 03/31/21 History








                                    Allergies











Allergy/AdvReac Type Severity Reaction Status Date / Time


 


No Known Allergies Allergy   Verified 03/31/21 07:35














Physical Exam


Osteopathic Statement: *.  No significant issues noted on an osteopathic 

structural exam other than those noted in the History and Physical/Consult.


Vitals: 


                                   Vital Signs











  Temp Pulse Resp BP Pulse Ox


 


 03/31/21 10:39  99.1 F    


 


 03/31/21 10:14   88  24  


 


 03/31/21 07:41   81  22  147/71  85 L


 


 03/31/21 06:07  98.8 F  88  28 H  144/75  87 L


 


 03/31/21 04:27  99.2 F  76  28 H  132/76  88 L


 


 03/31/21 04:21      86 L


 


 03/31/21 04:00      79 L


 


 03/31/21 03:10    22  


 


 03/31/21 02:10   84  20   87 L


 


 03/31/21 00:34  98.6 F  88  18  178/82  90 L








                                Intake and Output











 03/30/21 03/31/21 03/31/21





 22:59 06:59 14:59


 


Other:   


 


  Weight  120.202 kg 














No acute distress, oriented 3.  Conversational dyspnea, without audible 

wheezing, or use of accessory muscles.





HEENT examination is grossly unremarkable.  Mucous membranes are moist.  No oral

 lesions.





Neck supple.  Full range of motion.  No adenopathy thyromegaly or neck vein 

distention.





Cardiovascular examination reveals regular rhythm rate.  S1-S2 normal.  No S3 or

 S4.  No discernible murmur noted.  Heart sounds distant.  Heart rate 88 bpm.





Lungs reveal diffuse bilateral rhonchi and crackles.  Breath sounds equal.  She 

takes a deep breath when she coughs.  Also painful to take a deep breath.  





Abdomen soft bowel sounds are heard.  No masses or tenderness.





Extremities are intact.  No cyanosis clubbing or edema.





Skin is without rash or lesion.





Neurologic examination is brief but nonfocal.





Results





- Laboratory Findings


CBC and BMP: 


                                 03/31/21 01:16





                                 03/31/21 01:16


PT/INR, D-dimer











PT  9.5 sec (9.0-12.0)   03/31/21  02:29    


 


INR  0.9  (<1.2)   03/31/21  02:29    








Abnormal lab findings: 


                                  Abnormal Labs











  03/31/21 03/31/21 03/31/21





  01:16 01:16 08:50


 


WBC  2.1 L  


 


Plt Count  101 L  


 


Lymphocytes #  0.5 L  


 


Chloride   108 H 


 


Carbon Dioxide   21 L 


 


BUN   19 H 


 


Glucose   167 H 


 


Calcium   8.1 L 


 


AST   94 H 


 


ALT   60 H 


 


Lactate Dehydrogenase   1471 H 


 


C-Reactive Protein   88.1 H 


 


Total Protein   6.1 L 


 


Albumin   3.4 L 


 


Coronavirus (PCR)    Detected A














- Diagnostic Findings


Chest x-ray: image reviewed





Assessment and Plan


Assessment: 





Acute hypoxemic respiratory failure secondary to COVID 19 pneumonia/pneumonitis.





History of hypertension.





History of atrial fibrillation.





Previous heart catheterization.





History of hyperlipidemia.








Plan: 





Plan dated 03/31/2021.





The patient will be moved to the intensive care unit.  We'll get a blood gas.  

The patient is not a candidate for REM.  She's had symptoms for at least 11 days

 and maybe longer.  She is a candidate for TOCI.  We'll put the order in.  She 

also needs a D-dimer test done.  Additional recommendations and suggestions are 

forthcoming.  In the end, she may or intubation and mechanical ventilation.  We 

can watch her more closely in the intensive care unit.  I did speak to the 

charge nurse in the ICU.  Additional recommendations and suggestions are 

forthcoming.  She should deftly get Decadron 6 mg IV daily. 


Time with Patient: Greater than 30

## 2021-03-31 NOTE — PCN
PROCEDURE NOTE



PROCEDURE PERFORMED:

Placement of the right radial arterial line.



PREOPERATIVE DIAGNOSES:

Acute hypoxic respiratory failure, hypotension, and the line had to be placed

emergently.



PROCEDURE:

The patient was placed in a supine position, the right wrist was prepared in a sterile

fashion and drapes were applied.  The right radial artery was palpated, cannulated

easily and a guidewire was placed.  A Cook catheter was inserted over the guidewire,

and the guidewire was removed.  Good blood flow, good waveform noted.  No evidence of

any immediate complications.





MMODL / IJN: [id number] / Job#: [id number]

## 2021-03-31 NOTE — PCN
PROCEDURE NOTE



PROCEDURE REPORT:

Placement of the right femoral triple-lumen catheter.



PREOPERATIVE DIAGNOSIS:

Acute hypoxic respiratory failure requiring immediate intubation and mechanical

ventilation as soon as she arrived to the ICU with relative hypotension, and the line

had to be placed emergently.



PROCEDURE:

Patient was placed in the supine position, the right groin was prepared in a sterile

fashion and drapes were applied.  The right groin was locally anesthetized with

lidocaine.  The right femoral vein was attempted to be cannulated, however, we

cannulated the right femoral artery, a triple-lumen catheter was placed and then we

realized that this was actually the right femoral artery.  Medial to that placement,

the right femoral vein was then cannulated, and a triple-lumen catheter was inserted

after a guidewire placed and after dilatation around the guidewire.  After placement of

the right femoral vein catheter, we went ahead and removed the right femoral arterial

catheter, and a FemoStop was applied.  No evidence of any immediate complications.

Procedure was well tolerated.  A FemoStop was applied after the catheter from the right

femoral artery was removed.





MMODL / IJN: [id number] / Job#: [id number]

## 2021-03-31 NOTE — XR
EXAMINATION TYPE: XR chest 1V portable

 

DATE OF EXAM: 3/31/2021

 

COMPARISON: Prior chest x-ray same dated earlier time

 

HISTORY: Intubated

 

TECHNIQUE: Single frontal view of the chest is obtained.

 

FINDINGS:  No significant interval change.

 

IMPRESSION:  NG tube is folded within the esophagus.

## 2021-03-31 NOTE — XR
EXAMINATION TYPE: XR chest 1V portable

 

DATE OF EXAM: 3/31/2021

 

COMPARISON: 3/31/2021

 

INDICATION: NG tube placement

 

TECHNIQUE: Single frontal view of the chest is obtained.

 

FINDINGS:  

The heart size is normal.  

The pulmonary vasculature is normal.  

Patchy infiltrates are present bilaterally.  

Endotracheal tube remains present with the tip above the rajnan. There is an adjustment of the nasoga
stric tube which transverses the thorax and has the tip located within the abdomen.

 

IMPRESSION:  

1. Patchy bilateral lung infiltrates.

2. Adjustment of the nasogastric tube with the tip in the abdomen

## 2021-03-31 NOTE — XR
EXAMINATION TYPE: XR chest 1V portable

 

DATE OF EXAM: 3/31/2021

 

COMPARISON: Prior chest x-ray dated same dated earlier time.

 

HISTORY: Tube placement

 

TECHNIQUE: Single frontal view of the chest is obtained.

 

FINDINGS:  There is been interval placement of an endotracheal tube which is thought to overlie the t
fredy air column. NG tube is folded in the midthoracic esophagus shows the distal tip overlying the
 cervical esophagus. Bilateral pneumonia changes are again seen.

 

IMPRESSION:  NG tube as described, report relayed to ICU staff at the time of interpretation by telep
thuan

## 2021-03-31 NOTE — XR
EXAM:

  XR Chest, 1 View

 

CLINICAL HISTORY:

  ITS.REASON XR Reason: Suspected COVID-19 pneumonia

 

TECHNIQUE:

  Frontal view of the chest.

 

COMPARISON:

  No relevant prior studies available.

 

FINDINGS:

  Lungs: Multifocal patchy opacities in the mid to lower lungs 

bilaterally.

  Pleural space:  No acute findings

  Heart:  No cardiomegaly.

  Bones/joints:  No acute findings.

 

IMPRESSION:     

  

Multifocal patchy opacities in the mid to lower lungs bilaterally.

## 2021-04-01 LAB
ALBUMIN SERPL-MCNC: 2.5 G/DL (ref 3.5–5)
ALP SERPL-CCNC: 94 U/L (ref 38–126)
ALT SERPL-CCNC: 59 U/L (ref 4–34)
ANION GAP SERPL CALC-SCNC: 3 MMOL/L
AST SERPL-CCNC: 81 U/L (ref 14–36)
BASOPHILS # BLD AUTO: 0 K/UL (ref 0–0.2)
BASOPHILS NFR BLD AUTO: 0 %
BUN SERPL-SCNC: 18 MG/DL (ref 7–17)
CALCIUM SPEC-MCNC: 7.5 MG/DL (ref 8.4–10.2)
CHLORIDE SERPL-SCNC: 114 MMOL/L (ref 98–107)
CK SERPL-CCNC: 190 U/L (ref 30–135)
CO2 BLDA-SCNC: 20 MMOL/L (ref 19–24)
CO2 SERPL-SCNC: 21 MMOL/L (ref 22–30)
EOSINOPHIL # BLD AUTO: 0 K/UL (ref 0–0.7)
EOSINOPHIL NFR BLD AUTO: 0 %
ERYTHROCYTE [DISTWIDTH] IN BLOOD BY AUTOMATED COUNT: 3.62 M/UL (ref 3.8–5.4)
ERYTHROCYTE [DISTWIDTH] IN BLOOD: 13.2 % (ref 11.5–15.5)
GLUCOSE BLD-MCNC: 173 MG/DL (ref 75–99)
GLUCOSE BLD-MCNC: 174 MG/DL (ref 75–99)
GLUCOSE BLD-MCNC: 180 MG/DL (ref 75–99)
GLUCOSE BLD-MCNC: 185 MG/DL (ref 75–99)
GLUCOSE BLD-MCNC: 193 MG/DL (ref 75–99)
GLUCOSE SERPL-MCNC: 191 MG/DL (ref 74–99)
HCO3 BLDA-SCNC: 19 MMOL/L (ref 21–25)
HCT VFR BLD AUTO: 32.2 % (ref 34–46)
HGB BLD-MCNC: 11.3 GM/DL (ref 11.4–16)
LDH SPEC-CCNC: 1497 U/L (ref 313–618)
LYMPHOCYTES # SPEC AUTO: 0.8 K/UL (ref 1–4.8)
LYMPHOCYTES NFR SPEC AUTO: 26 %
MAGNESIUM SPEC-SCNC: 2.2 MG/DL (ref 1.6–2.3)
MCH RBC QN AUTO: 31.3 PG (ref 25–35)
MCHC RBC AUTO-ENTMCNC: 35.1 G/DL (ref 31–37)
MCV RBC AUTO: 89.1 FL (ref 80–100)
MONOCYTES # BLD AUTO: 0.1 K/UL (ref 0–1)
MONOCYTES NFR BLD AUTO: 4 %
NEUTROPHILS # BLD AUTO: 2 K/UL (ref 1.3–7.7)
NEUTROPHILS NFR BLD AUTO: 69 %
PCO2 BLDA: 33 MMHG (ref 35–45)
PH BLDA: 7.38 [PH] (ref 7.35–7.45)
PLATELET # BLD AUTO: 188 K/UL (ref 150–450)
PO2 BLDA: 70 MMHG (ref 83–108)
POTASSIUM SERPL-SCNC: 3.7 MMOL/L (ref 3.5–5.1)
PROT SERPL-MCNC: 4.8 G/DL (ref 6.3–8.2)
SODIUM SERPL-SCNC: 138 MMOL/L (ref 137–145)
WBC # BLD AUTO: 2.9 K/UL (ref 3.8–10.6)

## 2021-04-01 RX ADMIN — SODIUM CHLORIDE SCH MLS/HR: 900 INJECTION, SOLUTION INTRAVENOUS at 02:19

## 2021-04-01 RX ADMIN — ALBUTEROL SULFATE PRN PUFF: 90 AEROSOL, METERED RESPIRATORY (INHALATION) at 07:06

## 2021-04-01 RX ADMIN — INSULIN ASPART SCH UNIT: 100 INJECTION, SOLUTION INTRAVENOUS; SUBCUTANEOUS at 15:00

## 2021-04-01 RX ADMIN — POTASSIUM CHLORIDE SCH MLS/HR: 7.46 INJECTION, SOLUTION INTRAVENOUS at 07:08

## 2021-04-01 RX ADMIN — SODIUM CHLORIDE SCH MLS/HR: 900 INJECTION, SOLUTION INTRAVENOUS at 17:06

## 2021-04-01 RX ADMIN — CEFAZOLIN SCH MLS/HR: 330 INJECTION, POWDER, FOR SOLUTION INTRAMUSCULAR; INTRAVENOUS at 17:13

## 2021-04-01 RX ADMIN — DEXTROSE SCH MG: 50 INJECTION, SOLUTION INTRAVENOUS at 09:40

## 2021-04-01 RX ADMIN — ASPIRIN 81 MG CHEWABLE TABLET SCH MG: 81 TABLET CHEWABLE at 09:42

## 2021-04-01 RX ADMIN — NOREPINEPHRINE BITARTRATE SCH: 1 INJECTION, SOLUTION, CONCENTRATE INTRAVENOUS at 09:38

## 2021-04-01 RX ADMIN — INSULIN ASPART SCH UNIT: 100 INJECTION, SOLUTION INTRAVENOUS; SUBCUTANEOUS at 00:25

## 2021-04-01 RX ADMIN — ENOXAPARIN SODIUM SCH MG: 40 INJECTION SUBCUTANEOUS at 09:41

## 2021-04-01 RX ADMIN — Medication SCH MG: at 09:42

## 2021-04-01 RX ADMIN — SODIUM CHLORIDE SCH MLS/HR: 900 INJECTION, SOLUTION INTRAVENOUS at 03:11

## 2021-04-01 RX ADMIN — OXYCODONE HYDROCHLORIDE AND ACETAMINOPHEN SCH MG: 500 TABLET ORAL at 09:42

## 2021-04-01 RX ADMIN — CISATRACURIUM BESYLATE SCH MLS/HR: 10 INJECTION INTRAVENOUS at 13:32

## 2021-04-01 RX ADMIN — SPIRONOLACTONE SCH MG: 25 TABLET, FILM COATED ORAL at 09:42

## 2021-04-01 RX ADMIN — NOREPINEPHRINE BITARTRATE SCH: 1 INJECTION, SOLUTION, CONCENTRATE INTRAVENOUS at 09:39

## 2021-04-01 RX ADMIN — NOREPINEPHRINE BITARTRATE SCH: 1 INJECTION, SOLUTION, CONCENTRATE INTRAVENOUS at 19:28

## 2021-04-01 RX ADMIN — METOPROLOL SUCCINATE SCH: 100 TABLET, EXTENDED RELEASE ORAL at 09:43

## 2021-04-01 RX ADMIN — FAMOTIDINE SCH MG: 20 TABLET, FILM COATED ORAL at 09:41

## 2021-04-01 RX ADMIN — CHLORHEXIDINE GLUCONATE SCH ML: 1.2 RINSE ORAL at 21:25

## 2021-04-01 RX ADMIN — INSULIN ASPART SCH UNIT: 100 INJECTION, SOLUTION INTRAVENOUS; SUBCUTANEOUS at 18:57

## 2021-04-01 RX ADMIN — PANTOPRAZOLE SODIUM SCH MG: 40 INJECTION, POWDER, FOR SOLUTION INTRAVENOUS at 09:41

## 2021-04-01 RX ADMIN — POTASSIUM CHLORIDE SCH MLS/HR: 7.46 INJECTION, SOLUTION INTRAVENOUS at 06:11

## 2021-04-01 RX ADMIN — ATORVASTATIN CALCIUM SCH MG: 40 TABLET, FILM COATED ORAL at 09:41

## 2021-04-01 RX ADMIN — Medication SCH MCG: at 09:42

## 2021-04-01 RX ADMIN — CITALOPRAM HYDROBROMIDE SCH MG: 20 TABLET ORAL at 09:42

## 2021-04-01 RX ADMIN — INSULIN ASPART SCH UNIT: 100 INJECTION, SOLUTION INTRAVENOUS; SUBCUTANEOUS at 06:24

## 2021-04-01 RX ADMIN — ALBUTEROL SULFATE PRN PUFF: 90 AEROSOL, METERED RESPIRATORY (INHALATION) at 15:06

## 2021-04-01 RX ADMIN — CEFAZOLIN SCH MLS/HR: 330 INJECTION, POWDER, FOR SOLUTION INTRAMUSCULAR; INTRAVENOUS at 07:15

## 2021-04-01 RX ADMIN — ALBUTEROL SULFATE PRN PUFF: 90 AEROSOL, METERED RESPIRATORY (INHALATION) at 22:06

## 2021-04-01 RX ADMIN — CHLORHEXIDINE GLUCONATE SCH ML: 1.2 RINSE ORAL at 09:41

## 2021-04-01 RX ADMIN — DEXTRAN 70 AND HYPROMELLOSE 2910 SCH DROPS: 1; 3 SOLUTION/ DROPS OPHTHALMIC at 21:25

## 2021-04-01 NOTE — XR
EXAMINATION TYPE: XR chest 1V

 

DATE OF EXAM: 4/1/2021

 

COMPARISON: 3/31/2021

 

INDICATION: Difficulty breathing

 

TECHNIQUE: Single frontal view of the chest is obtained.

 

FINDINGS:  

The heart size is normal.  

The pulmonary vasculature is normal.  

Patchy infiltrates are present bilaterally can be compatible with atypical pneumonia. There may be so
me improvement over the interval.  

Endotracheal tube tip is above the ranjan. Nasogastric tube transverses the thorax.

 

IMPRESSION:  

1. Improving patchy bilateral lung infiltrates.

2. Lines and catheters discussed above

## 2021-04-01 NOTE — P.PN
Subjective





This is a pleasant 61 years old female with past medical history of atrial 

fibrillation not on anticoagulation and her cardiologist is Dr. Peck, 

hypertension, hyperlipidemia.  She is a patient of Dr. Ribeiro


Patient states that she was tested positive for covid On 03/26/2021.  And now 

she presents with dyspnea of one-day duration when started earlier.  Associated 

with cough on the patella brown phlegm.


She is also complaining of from chest pain without coughing , on the right side 

of the chest nonradiating about 5/10 in severity


She has no vomiting but diarrhea about twice per day





Patient is afebrile, tachypneic 22-28 breaths per minute, hypoxic needed 15 L/m 

of oxygen with oxygen saturation 85%


Labs showing leukopenia with WBC 2.1K with lymphopenia.  Rest of the CBC, INR 

unremarkable.  BMP is unremarkable with carbon dioxide is 21.  Lactic acid 1.2. 

Liver enzymes slightly elevated with AST 94 and ALT 60.


Lactate dehydrogenase is elevated at 1471, C-reactive protein is 88.


Chest x-ray: Multifocal patchy opacity in the mid and lower lungs bilaterally


EKG shows normal sinus rhythm with PVC at 89 BPM, with T-wave inversion in lead 

3 and aVF


Instrument emergency room patient was started on Lovenox, given Decadron, normal

saline and albuterol.








04/1/2021


Patient yesterday she got intubated and placed on mechanical ventilation, 

currently she needs high peak of 16, with pulmonary/critical care team monitor 

her closely and help with vent management.


She is tachypneic and bradycardic.  Temperature is 95.5


Labs showing WBCs of 2.9, hemoglobin 11.3.  PH is normal 7.3, pCO2 is low 33 and

pO2 70.  BMP is unremarkable, sugar is controlled.  Liver enzymes mildly 

elevated.  Blood culture showed no growth so far


Chest x-ray this morning, improving patchy bilateral lung infiltrates 

radiologist.  However when I reviewed chest x-ray by myself showing improvement 

in the upper part of the lung but worsening in the lower part of the lungs 

compared to yesterday.


She is still a normal sinus to 30 mL/h, she is on zinc, vitamin C, vitamin D, 

dexamethasone.  She status 1 dose of tocilizumab.  Also she is on Lovenox.








Review of systems: N/a


                               Active Medications











Generic Name Dose Route Start Last Admin





  Trade Name Freq  PRN Reason Stop Dose Admin


 


Albuterol Sulfate  2 puff  03/31/21 00:55  04/01/21 07:06





  Albuterol Hfa Inhaler  INHALATION   2 puff





  RT-Q6H PRN   Administration





  Shortness Of Breath Or Wheezing  


 


Amlodipine Besylate  5 mg  03/31/21 09:00  04/01/21 09:42





  Amlodipine 5 Mg Tab  PO   5 mg





  DAILY VIOLET   Administration


 


Ascorbic Acid  1,000 mg  03/31/21 09:00  04/01/21 09:42





  Ascorbic Acid 500 Mg Tab  PO   1,000 mg





  DAILY VIOLET   Administration


 


Aspirin  81 mg  03/31/21 09:00  04/01/21 09:42





  Aspirin 81 Mg  PO   81 mg





  DAILY VIOLET   Administration


 


Atorvastatin Calcium  40 mg  03/31/21 09:00  04/01/21 09:41





  Atorvastatin 40 Mg Tab  PO   40 mg





  DAILY VIOLET   Administration


 


Chlorhexidine Gluconate  15 ml  03/31/21 21:00  04/01/21 09:41





  Chlorhexidine Gluconate 15 Ml Cup  MUCOUS MEM   15 ml





  BID VIOLET   Administration


 


Cholecalciferol  50 mcg  03/31/21 09:00  04/01/21 09:42





  Cholecalciferol 25 Mcg (1000 Iu) Tablet  PO   50 mcg





  DAILY VIOLET   Administration


 


Citalopram Hydrobromide  40 mg  03/31/21 09:00  04/01/21 09:42





  Citalopram Hydrobromide 20 Mg Tab  PO   40 mg





  DAILY VIOLET   Administration


 


Dexamethasone Sodium Phosphate  6 mg  03/31/21 09:00  04/01/21 09:40





  Dexamethasone Sod Phosphate 10 Mg/Ml 1 Ml Vial  IV   6 mg





  DAILY VIOLET   Administration


 


Enoxaparin Sodium  40 mg  03/31/21 09:00  04/01/21 09:41





  Enoxaparin 40 Mg/0.4 Ml Syringe  SQ   40 mg





  DAILY VIOLET   Administration


 


Propofol 1,000 mg/ IV Solution  100 mls @ 0 mls/hr  03/31/21 14:00  04/01/21 

04:30





  IV   50 mcg/kg/min





  .Q0M VIOLET   36.061 mls/hr





    Administration





  Protocol  





  Titrate  


 


Fentanyl Citrate 1,000 mcg/  100 mls @ 0 mls/hr  03/31/21 14:30  04/01/21 10:15





  Sodium Chloride  IV   1 mcg/kg/hr





  .Q0M VIOLET   12.02 mls/hr





    Titration





  Protocol  





  Per Protocol  


 


Sodium Chloride  1,000 mls @ 130 mls/hr  03/31/21 16:45  04/01/21 07:15





  Saline 0.9%  IV   130 mls/hr





  .Q7H42M VIOLET   Administration


 


Norepinephrine Bitartrate 4 mg  254 mls @ 22.898 mls/hr  03/31/21 20:00  

04/01/21 09:39





  / Sodium Chloride  IV   Not Given





  .Q11H6M VIOLET  





  Protocol  





  0.05 MCG/KG/MIN  


 


Cisatracurium Besylate 200 mg/  200 mls @ 7.512 mls/hr  04/01/21 11:00 





  Sodium Chloride  IV  





  .Q24H VIOLET  





  Protocol  





  1 MCG/KG/MIN  


 


Insulin Aspart  0 unit  04/01/21 00:00  04/01/21 06:24





  Insulin Aspart (Novolog) 100 Unit/Ml Vial  SQ   2 unit





  Q6H VIOLET   Administration





  Protocol  


 


Lisinopril  10 mg  03/31/21 09:00  04/01/21 09:41





  Lisinopril 10 Mg Tab  PO   10 mg





  DAILY VIOLET   Administration


 


Metoprolol Succinate  100 mg  03/31/21 09:00  04/01/21 09:43





  Metoprolol Succinate (Er) 100 Mg Tab.Er.24h  PO   Not Given





  DAILY VIOLET  


 


Miscellaneous Information  1 each  04/01/21 05:53 





  Potassium Replacement Protocol 1 Each Misc  MISCELLANE  





  DAILY PRN  





  Per Protocol  





  Protocol  


 


Naloxone HCl  0.2 mg  03/31/21 00:55 





  Naloxone 0.4 Mg/Ml 1 Ml Vial  IV  





  Q2M PRN  





  Opioid Reversal  


 


Ondansetron HCl  4 mg  03/31/21 00:55  03/31/21 01:59





  Ondansetron 4 Mg/2 Ml Vial  IVP   4 mg





  Q8HR PRN   Administration





  Nausea And Vomiting  


 


Pantoprazole Sodium  40 mg  04/01/21 09:00  04/01/21 09:41





  Pantoprazole 40 Mg/10 Ml Vial  IV   40 mg





  DAILY VIOLET   Administration


 


Spironolactone  12.5 mg  03/31/21 09:00  04/01/21 09:42





  Spironolactone 25 Mg Tab  PO   12.5 mg





  DAILY VIOLET   Administration


 


Zinc Sulfate  220 mg  03/31/21 09:00  04/01/21 09:42





  Zinc Sulfate 220 Mg Cap  PO   220 mg





  DAILY VIOLET   Administration














Objective





- Vital Signs


Vital signs: 


                                   Vital Signs











Temp  95.5 F L  04/01/21 08:00


 


Pulse  53 L  04/01/21 10:00


 


Resp  34 H  04/01/21 10:00


 


BP  103/59   04/01/21 10:00


 


Pulse Ox  91 L  04/01/21 10:00








                                 Intake & Output











 03/31/21 04/01/21 04/01/21





 18:59 06:59 18:59


 


Intake Total 5480.996 2214.584 702.023


 


Output Total 280 735 225


 


Balance 1404.558 945.584 477.023


 


Weight  125.2 kg 125.2 kg


 


Intake:   


 


   1430 520


 


    0.9 NACL 520 1430 520


 


  Intake, IV Titration 1164.558 250.584 182.023





  Amount   


 


    Cisatracurium 200 mg In   139.552





    Sodium Chloride 0.9% 180   





    ml @ 1 MCG/KG/MIN 7.212   





    mls/hr IV .Q24H [facility] Rx#:   





    [id number]   


 


    Sodium Chloride 0.9% 1, 1000  





    000 ml @ 999 mls/hr IV .   





    Q1H1M ONE Rx#:[id number]   


 


    fentaNYL (PF) 1,000 mcg 21.636 50.584 42.471





    In Sodium Chloride 0.9%   





    80 ml @ Per Protocol IV .   





    Q0M [facility] Rx#:[id number]   


 


    propofoL 1,000 mg In 142.922 200 





    Empty Bag 1 bag @ Titrate   





    IV .Q0M [facility] Rx#:   





    [id number]   


 


Output:   


 


  Gastric Drainage  150 


 


  Urine 280 585 225


 


Other:   


 


  Voiding Method Indwelling Catheter Indwelling Catheter Indwelling Catheter





 External Catheter  








                       ABP, PAP, CO, CI - Last Documented











Arterial Blood Pressure        160/65

















- Exam





-GENERAL: The patient is intubated and sedated


HEENT: Pupils are round and equally reacting to light. EOMI. No scleral icterus.

No conjunctival pallor. Normocephalic, atraumatic. No pharyngeal erythema. No 

thyromegaly. 


CARDIOVASCULAR: S1 and S2 present. No murmurs, rubs, or gallops. 


PULMONARY: Chest is clear to auscultation, no wheezing or crackles. 


ABDOMEN: Soft, nontender, nondistended, normoactive bowel sounds. No palpable 

organomegaly. 


MUSCULOSKELETAL: No joint swelling or deformity. 


EXTREMITIES: No cyanosis, clubbing, or pedal edema. 


NEUROLOGICAL: Gross neurological examination did not reveal any focal deficits. 


SKIN: No rashes. no petechiae.





- Labs


CBC & Chem 7: 


                                 04/01/21 04:20





                                 04/01/21 04:20


Labs: 


                  Abnormal Lab Results - Last 24 Hours (Table)











  03/31/21 03/31/21 03/31/21 Range/Units





  12:08 12:08 12:36 


 


WBC     (3.8-10.6)  k/uL


 


RBC     (3.80-5.40)  m/uL


 


Hgb     (11.4-16.0)  gm/dL


 


Hct     (34.0-46.0)  %


 


Lymphocytes #     (1.0-4.8)  k/uL


 


D-Dimer  1.15 H    (<0.60)  mg/L FEU


 


ABG pCO2     (35-45)  mmHg


 


ABG pO2     ()  mmHg


 


ABG HCO3     (21-25)  mmol/L


 


ABG Total CO2     (19-24)  mmol/L


 


ABG O2 Saturation     (94-97)  %


 


Chloride     ()  mmol/L


 


Carbon Dioxide     (22-30)  mmol/L


 


BUN     (7-17)  mg/dL


 


Glucose     (74-99)  mg/dL


 


POC Glucose (mg/dL)    193 H  (75-99)  mg/dL


 


Calcium     (8.4-10.2)  mg/dL


 


AST     (14-36)  U/L


 


ALT     (4-34)  U/L


 


Total Protein     (6.3-8.2)  g/dL


 


Albumin     (3.5-5.0)  g/dL


 


Procalcitonin   0.10 H   (0.02-0.09)  ng/mL














  03/31/21 03/31/21 03/31/21 Range/Units





  13:38 15:11 20:22 


 


WBC     (3.8-10.6)  k/uL


 


RBC     (3.80-5.40)  m/uL


 


Hgb     (11.4-16.0)  gm/dL


 


Hct     (34.0-46.0)  %


 


Lymphocytes #     (1.0-4.8)  k/uL


 


D-Dimer     (<0.60)  mg/L FEU


 


ABG pCO2     (35-45)  mmHg


 


ABG pO2  54 L*  61 L   ()  mmHg


 


ABG HCO3    20 L  (21-25)  mmol/L


 


ABG Total CO2  25 H    (19-24)  mmol/L


 


ABG O2 Saturation  86.7 L  90.4 L   (94-97)  %


 


Chloride     ()  mmol/L


 


Carbon Dioxide     (22-30)  mmol/L


 


BUN     (7-17)  mg/dL


 


Glucose     (74-99)  mg/dL


 


POC Glucose (mg/dL)     (75-99)  mg/dL


 


Calcium     (8.4-10.2)  mg/dL


 


AST     (14-36)  U/L


 


ALT     (4-34)  U/L


 


Total Protein     (6.3-8.2)  g/dL


 


Albumin     (3.5-5.0)  g/dL


 


Procalcitonin     (0.02-0.09)  ng/mL














  04/01/21 04/01/21 04/01/21 Range/Units





  00:09 04:20 04:20 


 


WBC   2.9 L   (3.8-10.6)  k/uL


 


RBC   3.62 L   (3.80-5.40)  m/uL


 


Hgb   11.3 L   (11.4-16.0)  gm/dL


 


Hct   32.2 L   (34.0-46.0)  %


 


Lymphocytes #   0.8 L   (1.0-4.8)  k/uL


 


D-Dimer     (<0.60)  mg/L FEU


 


ABG pCO2     (35-45)  mmHg


 


ABG pO2     ()  mmHg


 


ABG HCO3     (21-25)  mmol/L


 


ABG Total CO2     (19-24)  mmol/L


 


ABG O2 Saturation     (94-97)  %


 


Chloride    114 H  ()  mmol/L


 


Carbon Dioxide    21 L  (22-30)  mmol/L


 


BUN    18 H  (7-17)  mg/dL


 


Glucose    191 H  (74-99)  mg/dL


 


POC Glucose (mg/dL)  193 H    (75-99)  mg/dL


 


Calcium    7.5 L  (8.4-10.2)  mg/dL


 


AST    81 H  (14-36)  U/L


 


ALT    59 H  (4-34)  U/L


 


Total Protein    4.8 L  (6.3-8.2)  g/dL


 


Albumin    2.5 L  (3.5-5.0)  g/dL


 


Procalcitonin     (0.02-0.09)  ng/mL














  04/01/21 04/01/21 Range/Units





  04:32 06:18 


 


WBC    (3.8-10.6)  k/uL


 


RBC    (3.80-5.40)  m/uL


 


Hgb    (11.4-16.0)  gm/dL


 


Hct    (34.0-46.0)  %


 


Lymphocytes #    (1.0-4.8)  k/uL


 


D-Dimer    (<0.60)  mg/L FEU


 


ABG pCO2  33 L   (35-45)  mmHg


 


ABG pO2  70 L   ()  mmHg


 


ABG HCO3  19 L   (21-25)  mmol/L


 


ABG Total CO2    (19-24)  mmol/L


 


ABG O2 Saturation    (94-97)  %


 


Chloride    ()  mmol/L


 


Carbon Dioxide    (22-30)  mmol/L


 


BUN    (7-17)  mg/dL


 


Glucose    (74-99)  mg/dL


 


POC Glucose (mg/dL)   174 H  (75-99)  mg/dL


 


Calcium    (8.4-10.2)  mg/dL


 


AST    (14-36)  U/L


 


ALT    (4-34)  U/L


 


Total Protein    (6.3-8.2)  g/dL


 


Albumin    (3.5-5.0)  g/dL


 


Procalcitonin    (0.02-0.09)  ng/mL








                      Microbiology - Last 24 Hours (Table)











 03/31/21 02:30 Blood Culture - Preliminary





 Blood    No Growth after 24 hours


 


 03/31/21 02:15 Blood Culture - Preliminary





 Blood    No Growth after 24 hours














Assessment and Plan


Assessment: 





Acute bilateral pneumonia, mostly secondary to Covid 19 infection


Acute hypoxic respiratory failure, needing intubation and mechanical ventilation


Increased inflammatory markers


Acute Covid gastroenteritis


Mild transaminitis, mostly secondary to Covid


Hypertension


Hyperlipidemia


Paroxysmal A. fib, currently heart rate controlled

















Plan: 





This is a pleasant 61 years old female who presents with respiratory distress 

mostly secondary to covid, retest covid.  Continue with steroids, vitamin C, 

zinc, vitamin D, bronchodilator and oxygen as needed.  Lovenox for DVT 

prophylaxis and pulmonary consult.  Continue with vent management as per 

pulmonary team.


Labs and medication were reviewed..  Continue same treatment.  Continue with 

symptomatic treatment.  Resume home medication.  Monitor lytes and vitals.  DVT 

and GI prophylaxis.  Further recommendations depends on the clinical course of 

the patient


DVT prophylaxis: Subcutaneous Lovenox


GI Prophylaxis: Pepcid


Prognosis is guarded

## 2021-04-01 NOTE — P.PN
Subjective


Progress Note Date: 04/01/21


Principal diagnosis: 





Hypoxemic respiratory failure secondary to CoVID 19 pneumonia





61-year-old female, who hasn't been feeling well for about 10-11 days.  She 

tested positive for COVID 19 on March 24.  She's been sick for 10-11 days old.  

She complains of TYPICAL symptoms including weakness, fever, diarrhea, cough, 

and shortness of breath.  She's quite hypoxemic.  On AIRVO 100%, and a 

nonrebreather mask, her saturations are right around 80-83%.  Any movement, and 

she desaturates even further.  I just called the intensive care unit, and we'll 

plan on putting her in the intensive care unit.  She's currently on saline at 75

mL an hour.  She can barely speak without becoming short of breath.  The patient

is not a candidate for REM, but could get TOCI.  Sodium 138, potassium 3.8, 

chlorides 108, CO2 21, anion gap 9, BUN 19, creatinine 0.7.  AST 94, ALTs 60, 

LDH 1471, C-reactive protein 88.1.  Again, the patient's been sick for at least 

10-11 days and maybe a bit longer.  Her oral intake has been poor as well.  The 

patient's chest x-ray shows diffuse bilateral infiltrates consistent with 

coronavirus infection.





The patient is seen today 04/01/2021 in follow-up in the intensive care unit.  

Shortly after arriving to the ICU yesterday she required intubation and mechan

ical ventilatory support injury to acute hypoxemic respiratory failure.  She is 

currently on the ventilator and assist control mode with a rate of 34, tidal on 

450, FiO2 70%, PEEP of 16.  Morning blood gases reveal pO2 of 70, pCO2 33, P 

87.38.  Fentanyl drip at 0.5 mcg/kg per hour, propofol at 15 mcg/kg/m, Nimbex at

1 mcg/min per hour.  0.9 normal saline at 130 ML's per hour.  She did receive 

Tociluzimab.  She remains on Decadron 6 mg IV daily.  Lovenox 40 mg subcutaneous

daily.  Tube feedings will be initiated for nutritional support.  Blood cultures

reveal no growth.  White count 2.9.  Hemoglobin 11.3 and platelets 188.  

Lymphocytes 0.8.  She is continued on Lovenox, dexamethasone, vitamin 

supplements.





Objective





- Vital Signs


Vital signs: 


                                   Vital Signs











Temp  95.5 F L  04/01/21 08:00


 


Pulse  53 L  04/01/21 10:00


 


Resp  34 H  04/01/21 10:00


 


BP  103/59   04/01/21 10:00


 


Pulse Ox  91 L  04/01/21 10:00








                                 Intake & Output











 03/31/21 04/01/21 04/01/21





 18:59 06:59 18:59


 


Intake Total 7989.608 5655.584 702.023


 


Output Total 280 735 225


 


Balance 1404.558 945.584 477.023


 


Weight  125.2 kg 125.2 kg


 


Intake:   


 


   1430 520


 


    0.9 NACL 520 1430 520


 


  Intake, IV Titration 1164.558 250.584 182.023





  Amount   


 


    Cisatracurium 200 mg In   139.552





    Sodium Chloride 0.9% 180   





    ml @ 1 MCG/KG/MIN 7.212   





    mls/hr IV .Q24H [facility] Rx#:   





    [id number]   


 


    Sodium Chloride 0.9% 1, 1000  





    000 ml @ 999 mls/hr IV .   





    Q1H1M ONE Rx#:[id number]   


 


    fentaNYL (PF) 1,000 mcg 21.636 50.584 42.471





    In Sodium Chloride 0.9%   





    80 ml @ Per Protocol IV .   





    Q0M [facility] Rx#:[id number]   


 


    propofoL 1,000 mg In 142.922 200 





    Empty Bag 1 bag @ Titrate   





    IV .Q0M [facility] Rx#:   





    [id number]   


 


Output:   


 


  Gastric Drainage  150 


 


  Urine 280 585 225


 


Other:   


 


  Voiding Method Indwelling Catheter Indwelling Catheter Indwelling Catheter





 External Catheter  








                       ABP, PAP, CO, CI - Last Documented











Arterial Blood Pressure        160/65

















- Exam





GENERAL EXAM: Intubated, sedated, paralyzed 61-year-old female patient, 

comfortable in no apparent distress.


HEAD: Normocephalic.


EYES: Normal reaction of pupils, equal size.


NOSE: Clear with pink turbinates.


THROAT: No erythema or exudates.


NECK: No masses, no JVD.


CHEST: No chest wall deformity.


LUNGS: Equal air entry with crackles in the bilateral posterior bases.


CVS: S1 and S2 normal with no audible murmur, regular rhythm.


ABDOMEN: No hepatosplenomegaly, normal bowel sounds, no guarding or rigidity.


SPINE: No scoliosis or deformity


SKIN: No rashes


CENTRAL NERVOUS SYSTEM: The dated, paralyzed, tone is normal in all 4 

extremities.


EXTREMITIES: There is no peripheral edema.  No clubbing, no cyanosis.  

Peripheral pulses are intact.





- Labs


CBC & Chem 7: 


                                 04/01/21 04:20





                                 04/01/21 04:20


Labs: 


                  Abnormal Lab Results - Last 24 Hours (Table)











  03/31/21 03/31/21 03/31/21 Range/Units





  12:08 12:08 12:36 


 


WBC     (3.8-10.6)  k/uL


 


RBC     (3.80-5.40)  m/uL


 


Hgb     (11.4-16.0)  gm/dL


 


Hct     (34.0-46.0)  %


 


Lymphocytes #     (1.0-4.8)  k/uL


 


D-Dimer  1.15 H    (<0.60)  mg/L FEU


 


ABG pCO2     (35-45)  mmHg


 


ABG pO2     ()  mmHg


 


ABG HCO3     (21-25)  mmol/L


 


ABG Total CO2     (19-24)  mmol/L


 


ABG O2 Saturation     (94-97)  %


 


Chloride     ()  mmol/L


 


Carbon Dioxide     (22-30)  mmol/L


 


BUN     (7-17)  mg/dL


 


Glucose     (74-99)  mg/dL


 


POC Glucose (mg/dL)    193 H  (75-99)  mg/dL


 


Calcium     (8.4-10.2)  mg/dL


 


AST     (14-36)  U/L


 


ALT     (4-34)  U/L


 


Total Protein     (6.3-8.2)  g/dL


 


Albumin     (3.5-5.0)  g/dL


 


Procalcitonin   0.10 H   (0.02-0.09)  ng/mL














  03/31/21 03/31/21 03/31/21 Range/Units





  13:38 15:11 20:22 


 


WBC     (3.8-10.6)  k/uL


 


RBC     (3.80-5.40)  m/uL


 


Hgb     (11.4-16.0)  gm/dL


 


Hct     (34.0-46.0)  %


 


Lymphocytes #     (1.0-4.8)  k/uL


 


D-Dimer     (<0.60)  mg/L FEU


 


ABG pCO2     (35-45)  mmHg


 


ABG pO2  54 L*  61 L   ()  mmHg


 


ABG HCO3    20 L  (21-25)  mmol/L


 


ABG Total CO2  25 H    (19-24)  mmol/L


 


ABG O2 Saturation  86.7 L  90.4 L   (94-97)  %


 


Chloride     ()  mmol/L


 


Carbon Dioxide     (22-30)  mmol/L


 


BUN     (7-17)  mg/dL


 


Glucose     (74-99)  mg/dL


 


POC Glucose (mg/dL)     (75-99)  mg/dL


 


Calcium     (8.4-10.2)  mg/dL


 


AST     (14-36)  U/L


 


ALT     (4-34)  U/L


 


Total Protein     (6.3-8.2)  g/dL


 


Albumin     (3.5-5.0)  g/dL


 


Procalcitonin     (0.02-0.09)  ng/mL














  04/01/21 04/01/21 04/01/21 Range/Units





  00:09 04:20 04:20 


 


WBC   2.9 L   (3.8-10.6)  k/uL


 


RBC   3.62 L   (3.80-5.40)  m/uL


 


Hgb   11.3 L   (11.4-16.0)  gm/dL


 


Hct   32.2 L   (34.0-46.0)  %


 


Lymphocytes #   0.8 L   (1.0-4.8)  k/uL


 


D-Dimer     (<0.60)  mg/L FEU


 


ABG pCO2     (35-45)  mmHg


 


ABG pO2     ()  mmHg


 


ABG HCO3     (21-25)  mmol/L


 


ABG Total CO2     (19-24)  mmol/L


 


ABG O2 Saturation     (94-97)  %


 


Chloride    114 H  ()  mmol/L


 


Carbon Dioxide    21 L  (22-30)  mmol/L


 


BUN    18 H  (7-17)  mg/dL


 


Glucose    191 H  (74-99)  mg/dL


 


POC Glucose (mg/dL)  193 H    (75-99)  mg/dL


 


Calcium    7.5 L  (8.4-10.2)  mg/dL


 


AST    81 H  (14-36)  U/L


 


ALT    59 H  (4-34)  U/L


 


Total Protein    4.8 L  (6.3-8.2)  g/dL


 


Albumin    2.5 L  (3.5-5.0)  g/dL


 


Procalcitonin     (0.02-0.09)  ng/mL














  04/01/21 04/01/21 Range/Units





  04:32 06:18 


 


WBC    (3.8-10.6)  k/uL


 


RBC    (3.80-5.40)  m/uL


 


Hgb    (11.4-16.0)  gm/dL


 


Hct    (34.0-46.0)  %


 


Lymphocytes #    (1.0-4.8)  k/uL


 


D-Dimer    (<0.60)  mg/L FEU


 


ABG pCO2  33 L   (35-45)  mmHg


 


ABG pO2  70 L   ()  mmHg


 


ABG HCO3  19 L   (21-25)  mmol/L


 


ABG Total CO2    (19-24)  mmol/L


 


ABG O2 Saturation    (94-97)  %


 


Chloride    ()  mmol/L


 


Carbon Dioxide    (22-30)  mmol/L


 


BUN    (7-17)  mg/dL


 


Glucose    (74-99)  mg/dL


 


POC Glucose (mg/dL)   174 H  (75-99)  mg/dL


 


Calcium    (8.4-10.2)  mg/dL


 


AST    (14-36)  U/L


 


ALT    (4-34)  U/L


 


Total Protein    (6.3-8.2)  g/dL


 


Albumin    (3.5-5.0)  g/dL


 


Procalcitonin    (0.02-0.09)  ng/mL








                      Microbiology - Last 24 Hours (Table)











 03/31/21 02:30 Blood Culture - Preliminary





 Blood    No Growth after 24 hours


 


 03/31/21 02:15 Blood Culture - Preliminary





 Blood    No Growth after 24 hours














Assessment and Plan


Assessment: 





1 Acute hypoxemic respiratory failure secondary to CoVID 19 

pneumonia/pneumonitis requiring intubation mechanical ventilatory support on 

03/31/2021.  Outside the window for Remdesivir. Received tocilizumab.





2 Hypertension, history of





3 Paroxysmal atrial fibrillation





4 Hyperlipidemia





Plan:





The patient was seen and evaluated by Dr. Barba


Decrease the FiO2 to 60%


Attempted to wean off Nimbex however the patient had desaturations and became 

asynchronous


Continue Decadron, Lovenox, vitamin supplements


Received tocilizumab


Tube feedings for nutritional support


Follow-up d-dimer, inflammatory markers


Chest x-ray, ABGs in a.m.


We will continue to follow and make further recommendations based on her 

clinical status





Critical care time 38 minutes





I, the cosigning physician, performed a history & physical examination of the 

patient. Lungs sounds with coarse crackles in the bilateral bases.  Maintaining 

O2 saturations in the 90s on 60% FiO2 and a PEEP of 16.  I discussed the 

assessment and plan of care with my nurse practitioner, Leatha Rodriguez. I attest to 

the above note as dictated by her.

## 2021-04-02 LAB
ANION GAP SERPL CALC-SCNC: 3 MMOL/L
BASOPHILS # BLD AUTO: 0 K/UL (ref 0–0.2)
BASOPHILS NFR BLD AUTO: 0 %
BUN SERPL-SCNC: 18 MG/DL (ref 7–17)
CALCIUM SPEC-MCNC: 7.6 MG/DL (ref 8.4–10.2)
CHLORIDE SERPL-SCNC: 116 MMOL/L (ref 98–107)
CK SERPL-CCNC: 144 U/L (ref 30–135)
CO2 BLDA-SCNC: 21 MMOL/L (ref 19–24)
CO2 SERPL-SCNC: 20 MMOL/L (ref 22–30)
EOSINOPHIL # BLD AUTO: 0 K/UL (ref 0–0.7)
EOSINOPHIL NFR BLD AUTO: 0 %
ERYTHROCYTE [DISTWIDTH] IN BLOOD BY AUTOMATED COUNT: 3.65 M/UL (ref 3.8–5.4)
ERYTHROCYTE [DISTWIDTH] IN BLOOD: 13.3 % (ref 11.5–15.5)
GLUCOSE BLD-MCNC: 180 MG/DL (ref 75–99)
GLUCOSE BLD-MCNC: 183 MG/DL (ref 75–99)
GLUCOSE BLD-MCNC: 198 MG/DL (ref 75–99)
GLUCOSE SERPL-MCNC: 174 MG/DL (ref 74–99)
HCO3 BLDA-SCNC: 20 MMOL/L (ref 21–25)
HCT VFR BLD AUTO: 32.6 % (ref 34–46)
HGB BLD-MCNC: 11.4 GM/DL (ref 11.4–16)
LDH SPEC-CCNC: 1263 U/L (ref 313–618)
LYMPHOCYTES # SPEC AUTO: 0.7 K/UL (ref 1–4.8)
LYMPHOCYTES NFR SPEC AUTO: 17 %
MCH RBC QN AUTO: 31.3 PG (ref 25–35)
MCHC RBC AUTO-ENTMCNC: 35.1 G/DL (ref 31–37)
MCV RBC AUTO: 89.2 FL (ref 80–100)
MONOCYTES # BLD AUTO: 0.3 K/UL (ref 0–1)
MONOCYTES NFR BLD AUTO: 8 %
NEUTROPHILS # BLD AUTO: 2.9 K/UL (ref 1.3–7.7)
NEUTROPHILS NFR BLD AUTO: 74 %
PCO2 BLDA: 34 MMHG (ref 35–45)
PH BLDA: 7.39 [PH] (ref 7.35–7.45)
PLATELET # BLD AUTO: 241 K/UL (ref 150–450)
PO2 BLDA: 104 MMHG (ref 83–108)
POTASSIUM SERPL-SCNC: 4.4 MMOL/L (ref 3.5–5.1)
SODIUM SERPL-SCNC: 139 MMOL/L (ref 137–145)
WBC # BLD AUTO: 3.9 K/UL (ref 3.8–10.6)

## 2021-04-02 RX ADMIN — ALBUTEROL SULFATE PRN PUFF: 90 AEROSOL, METERED RESPIRATORY (INHALATION) at 07:27

## 2021-04-02 RX ADMIN — ALBUTEROL SULFATE PRN PUFF: 90 AEROSOL, METERED RESPIRATORY (INHALATION) at 15:35

## 2021-04-02 RX ADMIN — CITALOPRAM HYDROBROMIDE SCH MG: 20 TABLET ORAL at 08:19

## 2021-04-02 RX ADMIN — METOPROLOL SUCCINATE SCH: 100 TABLET, EXTENDED RELEASE ORAL at 08:23

## 2021-04-02 RX ADMIN — DEXTRAN 70 AND HYPROMELLOSE 2910 SCH DROPS: 1; 3 SOLUTION/ DROPS OPHTHALMIC at 00:12

## 2021-04-02 RX ADMIN — PANTOPRAZOLE SODIUM SCH MG: 40 INJECTION, POWDER, FOR SOLUTION INTRAVENOUS at 08:19

## 2021-04-02 RX ADMIN — OXYCODONE HYDROCHLORIDE AND ACETAMINOPHEN SCH MG: 500 TABLET ORAL at 08:20

## 2021-04-02 RX ADMIN — ATORVASTATIN CALCIUM SCH MG: 40 TABLET, FILM COATED ORAL at 08:19

## 2021-04-02 RX ADMIN — INSULIN ASPART SCH UNIT: 100 INJECTION, SOLUTION INTRAVENOUS; SUBCUTANEOUS at 17:25

## 2021-04-02 RX ADMIN — SODIUM CHLORIDE SCH MLS/HR: 900 INJECTION, SOLUTION INTRAVENOUS at 19:03

## 2021-04-02 RX ADMIN — INSULIN ASPART SCH UNIT: 100 INJECTION, SOLUTION INTRAVENOUS; SUBCUTANEOUS at 11:52

## 2021-04-02 RX ADMIN — INSULIN ASPART SCH UNIT: 100 INJECTION, SOLUTION INTRAVENOUS; SUBCUTANEOUS at 06:14

## 2021-04-02 RX ADMIN — CEFAZOLIN SCH MLS/HR: 330 INJECTION, POWDER, FOR SOLUTION INTRAMUSCULAR; INTRAVENOUS at 11:53

## 2021-04-02 RX ADMIN — DEXTRAN 70 AND HYPROMELLOSE 2910 SCH DROPS: 1; 3 SOLUTION/ DROPS OPHTHALMIC at 08:11

## 2021-04-02 RX ADMIN — ASPIRIN 81 MG CHEWABLE TABLET SCH MG: 81 TABLET CHEWABLE at 08:20

## 2021-04-02 RX ADMIN — Medication SCH MG: at 08:20

## 2021-04-02 RX ADMIN — DEXTRAN 70 AND HYPROMELLOSE 2910 SCH DROPS: 1; 3 SOLUTION/ DROPS OPHTHALMIC at 11:52

## 2021-04-02 RX ADMIN — ENOXAPARIN SODIUM SCH MG: 40 INJECTION SUBCUTANEOUS at 08:20

## 2021-04-02 RX ADMIN — NOREPINEPHRINE BITARTRATE SCH: 1 INJECTION, SOLUTION, CONCENTRATE INTRAVENOUS at 17:25

## 2021-04-02 RX ADMIN — CEFAZOLIN SCH MLS/HR: 330 INJECTION, POWDER, FOR SOLUTION INTRAMUSCULAR; INTRAVENOUS at 06:14

## 2021-04-02 RX ADMIN — SPIRONOLACTONE SCH MG: 25 TABLET, FILM COATED ORAL at 08:20

## 2021-04-02 RX ADMIN — DEXTRAN 70 AND HYPROMELLOSE 2910 SCH DROPS: 1; 3 SOLUTION/ DROPS OPHTHALMIC at 17:24

## 2021-04-02 RX ADMIN — CHLORHEXIDINE GLUCONATE SCH ML: 1.2 RINSE ORAL at 08:19

## 2021-04-02 RX ADMIN — CHLORHEXIDINE GLUCONATE SCH ML: 1.2 RINSE ORAL at 21:19

## 2021-04-02 RX ADMIN — DEXTRAN 70 AND HYPROMELLOSE 2910 SCH DROPS: 1; 3 SOLUTION/ DROPS OPHTHALMIC at 04:21

## 2021-04-02 RX ADMIN — Medication SCH MCG: at 08:20

## 2021-04-02 RX ADMIN — CEFAZOLIN SCH MLS/HR: 330 INJECTION, POWDER, FOR SOLUTION INTRAMUSCULAR; INTRAVENOUS at 21:19

## 2021-04-02 RX ADMIN — INSULIN ASPART SCH UNIT: 100 INJECTION, SOLUTION INTRAVENOUS; SUBCUTANEOUS at 00:11

## 2021-04-02 RX ADMIN — DEXTROSE SCH MG: 50 INJECTION, SOLUTION INTRAVENOUS at 08:19

## 2021-04-02 RX ADMIN — SODIUM CHLORIDE SCH MLS/HR: 900 INJECTION, SOLUTION INTRAVENOUS at 08:21

## 2021-04-02 RX ADMIN — CISATRACURIUM BESYLATE SCH: 10 INJECTION INTRAVENOUS at 11:24

## 2021-04-02 RX ADMIN — CEFAZOLIN SCH MLS/HR: 330 INJECTION, POWDER, FOR SOLUTION INTRAMUSCULAR; INTRAVENOUS at 00:12

## 2021-04-02 RX ADMIN — DEXTRAN 70 AND HYPROMELLOSE 2910 SCH DROPS: 1; 3 SOLUTION/ DROPS OPHTHALMIC at 21:19

## 2021-04-02 RX ADMIN — ALBUTEROL SULFATE PRN PUFF: 90 AEROSOL, METERED RESPIRATORY (INHALATION) at 18:56

## 2021-04-02 RX ADMIN — NOREPINEPHRINE BITARTRATE SCH: 1 INJECTION, SOLUTION, CONCENTRATE INTRAVENOUS at 06:01

## 2021-04-02 NOTE — XR
EXAMINATION TYPE: XR chest 1V

 

DATE OF EXAM: 4/2/2021

 

COMPARISON: 4/1/2021

 

HISTORY: 61-year-old female COVID, on ventilator.

 

TECHNIQUE: Single frontal view of the chest is obtained.

 

FINDINGS:  

ET tube tip at the level of the medial clavicular heads. NG tube courses below the diaphragm. Heart u
pper limits of normal in size. Patchy bilateral airspace opacities have become less confluent from pr
ior exam. No pleural effusion.

 

IMPRESSION:  

Persistent but improving patchy bilateral airspace disease.

## 2021-04-02 NOTE — P.PN
Subjective


Progress Note Date: 04/02/21


Principal diagnosis: 


Acute hypoxemic respiratory failure secondary to COVID 19 pneumonia/pneumonitis/

requiring intubation and mechanical ventilation





61 years old female with past medical history of atrial fibrillation not on 

anticoagulation and her cardiologist is Dr. Peck, hypertension, 

hyperlipidemia.  She is a patient of Dr. Ribeiro


Patient states that she was tested positive for covid On 03/26/2021.  And now 

she presents with dyspnea of one-day duration when started earlier.  Associated 

with cough on the patella brown phlegm.


She is also complaining of from chest pain without coughing , on the right side 

of the chest nonradiating about 5/10 in severity


She has no vomiting but diarrhea about twice per day





04/02/2021 


patient is seen and evaluatedin follow-up in the intensive care unit.  She 

remains intubated on the mechanical ventilator.  Assist-control mode.  She is 

currently sedated; remains on 0.9 normal saline at 130 ML's per hour.  She is 

being nourished with vital HP at 20 ML's per hour.  


- She did receive tocilizumab; remains on dexamethasone, Lovenox, vitamin 

supplements.  


- Chest x-ray showing persistent but improving patchy bilateral airspace 

disease.  Sputum and blood cultures are pending.  White count 3.9.  Hemoglobin 

11.4.  Lymphocytes 0.7.  D-dimer 1.19.  Sodium 139.  Potassium 4.4.  Bicarb 20. 

Creatinine 0.64.  Glucose 174.


pulmonary service is following and planning to repeat ABGs and chest x-ray in a

.m.; further plan of care pending clinical course





Objective





- Vital Signs


Vital signs: 


                                   Vital Signs











Temp  98.6 F   04/02/21 04:00


 


Pulse  45 L  04/02/21 07:00


 


Resp  34 H  04/02/21 07:00


 


BP  105/54   04/02/21 07:00


 


Pulse Ox  98   04/02/21 07:00








                                 Intake & Output











 04/01/21 04/02/21 04/02/21





 18:59 06:59 18:59


 


Intake Total 2008.082 2079.168 307.653


 


Output Total 570 580 50


 


Balance 0695.260 1577.168 257.653


 


Weight 125.2 kg 126.6 kg 


 


Intake:   


 


  IV 1560 1593 133


 


    0.9 NACL 1560 1560 130


 


    pressure bag  33 3


 


  Intake, IV Titration 448.082 286.168 154.653





  Amount   


 


    Cisatracurium 200 mg In 139.552  





    Sodium Chloride 0.9% 180   





    ml @ 1 MCG/KG/MIN 7.212   





    mls/hr IV .Q24H VIOLET Rx#:   





    [id number]   


 


    Cisatracurium 200 mg In 27.794 9.953 





    Sodium Chloride 0.9% 180   





    ml @ 1 MCG/KG/MIN 7.512   





    mls/hr IV .Q24H VIOLET Rx#:   





    [id number]   


 


    fentaNYL (PF) 1,000 mcg 100.200  91.552





    In Sodium Chloride 0.9%   





    80 ml @ Per Protocol IV .   





    Q0M VIOLET Rx#:[id number]   


 


    propofoL 1,000 mg In 180.536 276.215 63.101





    Empty Bag 1 bag @ Titrate   





    IV .Q0M VIOLET Rx#:   





    [id number]   


 


  Tube Feeding  140 20


 


  Other  60 


 


Output:   


 


  Urine 570 580 50


 


Other:   


 


  Voiding Method Indwelling Catheter Indwelling Catheter Indwelling Catheter








                       ABP, PAP, CO, CI - Last Documented











Arterial Blood Pressure        120/52

















- Exam


GENERAL EXAM: Intubated


HEAD: Normocephalic.


NECK: No masses, no JVD.


CHEST: No chest wall deformity.


LUNGS: Equal air entry with crackles in the bilateral posterior bases.


CVS: S1 and S2 normal with no audible murmur, regular rhythm.


ABDOMEN: No hepatosplenomegaly, normal bowel sounds, no guarding or rigidity.


CENTRAL NERVOUS SYSTEM: The dated, paralyzed, tone is normal in all 4 extremiti

es.


EXTREMITIES: There is no peripheral edema.  No clubbing, no cyanosis.  

Peripheral pulses are intact.








- Labs


CBC & Chem 7: 


                                 04/02/21 03:50





                                 04/02/21 03:50


Labs: 


                  Abnormal Lab Results - Last 24 Hours (Table)











  04/01/21 04/01/21 04/01/21 Range/Units





  04:20 13:50 18:52 


 


RBC     (3.80-5.40)  m/uL


 


Hct     (34.0-46.0)  %


 


Lymphocytes #     (1.0-4.8)  k/uL


 


D-Dimer     (<0.60)  mg/L FEU


 


ABG pCO2     (35-45)  mmHg


 


ABG HCO3     (21-25)  mmol/L


 


ABG O2 Saturation     (94-97)  %


 


Chloride     ()  mmol/L


 


Carbon Dioxide     (22-30)  mmol/L


 


BUN     (7-17)  mg/dL


 


Glucose     (74-99)  mg/dL


 


POC Glucose (mg/dL)   173 H  185 H  (75-99)  mg/dL


 


Calcium     (8.4-10.2)  mg/dL


 


Lactate Dehydrogenase  1497 H    (313-618)  U/L


 


Creatine Kinase  190 H    ()  U/L


 


C-Reactive Protein  61.8 H    (<10.0)  mg/L














  04/01/21 04/02/21 04/02/21 Range/Units





  23:50 03:50 03:50 


 


RBC    3.65 L  (3.80-5.40)  m/uL


 


Hct    32.6 L  (34.0-46.0)  %


 


Lymphocytes #    0.7 L  (1.0-4.8)  k/uL


 


D-Dimer   1.19 H   (<0.60)  mg/L FEU


 


ABG pCO2     (35-45)  mmHg


 


ABG HCO3     (21-25)  mmol/L


 


ABG O2 Saturation     (94-97)  %


 


Chloride     ()  mmol/L


 


Carbon Dioxide     (22-30)  mmol/L


 


BUN     (7-17)  mg/dL


 


Glucose     (74-99)  mg/dL


 


POC Glucose (mg/dL)  180 H    (75-99)  mg/dL


 


Calcium     (8.4-10.2)  mg/dL


 


Lactate Dehydrogenase     (313-618)  U/L


 


Creatine Kinase     ()  U/L


 


C-Reactive Protein     (<10.0)  mg/L














  04/02/21 04/02/21 04/02/21 Range/Units





  03:50 04:10 06:06 


 


RBC     (3.80-5.40)  m/uL


 


Hct     (34.0-46.0)  %


 


Lymphocytes #     (1.0-4.8)  k/uL


 


D-Dimer     (<0.60)  mg/L FEU


 


ABG pCO2   34 L   (35-45)  mmHg


 


ABG HCO3   20 L   (21-25)  mmol/L


 


ABG O2 Saturation   98.0 H   (94-97)  %


 


Chloride  116 H    ()  mmol/L


 


Carbon Dioxide  20 L    (22-30)  mmol/L


 


BUN  18 H    (7-17)  mg/dL


 


Glucose  174 H    (74-99)  mg/dL


 


POC Glucose (mg/dL)    183 H  (75-99)  mg/dL


 


Calcium  7.6 L    (8.4-10.2)  mg/dL


 


Lactate Dehydrogenase     (313-618)  U/L


 


Creatine Kinase     ()  U/L


 


C-Reactive Protein     (<10.0)  mg/L








                      Microbiology - Last 24 Hours (Table)











 03/31/21 02:30 Blood Culture - Preliminary





 Blood    No Growth after 48 hours


 


 03/31/21 02:15 Blood Culture - Preliminary





 Blood    No Growth after 48 hours


 


 04/01/21 11:13 Gram Stain - Preliminary





 Sputum Sputum Culture - Preliminary














Assessment and Plan


Assessment: 


Acute bilateral pneumonia, mostly secondary to Covid 19 infection


Acute hypoxic respiratory failure, needing intubation and mechanical ventilation


Increased inflammatory markers


Acute Covid gastroenteritis


Mild transaminitis, mostly secondary to Covid


Hypertension


Hyperlipidemia


Paroxysmal A. fib, currently heart rate controlled





Plan: 


This is a pleasant 61 years old female who presents with respiratory distress 

mostly secondary to covid, retest covid.  Continue with steroids, vitamin C, 

zinc, vitamin D, bronchodilator and oxygen as needed.  Lovenox for DVT 

prophylaxis and pulmonary consult.  Continue with vent management as per 

pulmonary team.


Labs and medication were reviewed..  Continue same treatment.  Continue with 

symptomatic treatment.  Resume home medication.  Monitor lytes and vitals.  DVT 

and GI prophylaxis.  Further recommendations depends on the clinical course of 

the patient


DVT prophylaxis: Subcutaneous Lovenox


GI Prophylaxis: Pepcid


Prognosis is guarded

## 2021-04-02 NOTE — P.PN
Subjective


Progress Note Date: 04/02/21


Principal diagnosis: 





Hypoxemic respiratory failure secondary to CoVID 19 pneumonia





61-year-old female, who hasn't been feeling well for about 10-11 days.  She 

tested positive for COVID 19 on March 24.  She's been sick for 10-11 days old.  

She complains of TYPICAL symptoms including weakness, fever, diarrhea, cough, 

and shortness of breath.  She's quite hypoxemic.  On AIRVO 100%, and a 

nonrebreather mask, her saturations are right around 80-83%.  Any movement, and 

she desaturates even further.  I just called the intensive care unit, and we'll 

plan on putting her in the intensive care unit.  She's currently on saline at 75

mL an hour.  She can barely speak without becoming short of breath.  The patient

is not a candidate for REM, but could get TOCI.  Sodium 138, potassium 3.8, 

chlorides 108, CO2 21, anion gap 9, BUN 19, creatinine 0.7.  AST 94, ALTs 60, 

LDH 1471, C-reactive protein 88.1.  Again, the patient's been sick for at least 

10-11 days and maybe a bit longer.  Her oral intake has been poor as well.  The 

patient's chest x-ray shows diffuse bilateral infiltrates consistent with 

coronavirus infection.





The patient is seen today 04/01/2021 in follow-up in the intensive care unit.  

Shortly after arriving to the ICU yesterday she required intubation and mechan

ical ventilatory support injury to acute hypoxemic respiratory failure.  She is 

currently on the ventilator and assist control mode with a rate of 34, tidal on 

450, FiO2 70%, PEEP of 16.  Morning blood gases reveal pO2 of 70, pCO2 33, P 

87.38.  Fentanyl drip at 0.5 mcg/kg per hour, propofol at 15 mcg/kg/m, Nimbex at

1 mcg/min per hour.  0.9 normal saline at 130 ML's per hour.  She did receive 

Tociluzimab.  She remains on Decadron 6 mg IV daily.  Lovenox 40 mg subcutaneous

daily.  Tube feedings will be initiated for nutritional support.  Blood cultures

reveal no growth.  White count 2.9.  Hemoglobin 11.3 and platelets 188.  

Lymphocytes 0.8.  She is continued on Lovenox, dexamethasone, vitamin 

supplements.





The patient is seen today 04/02/2021 in follow-up in the intensive care unit.  

She remains intubated on the mechanical ventilator.  Assist-control mode.  Rate 

of 30, tidal volume 450, FiO2 60% and a PEEP of 16.  She is currently sedated on

propofol at 40 mcg/kg/m, Nimbex at 1 mcg/kg/m, fentanyl at 0.5 mcg/kg per hour. 

She has 0.9 normal saline at 130 ML's per hour.  She is being nourished with 

vital HP at 20 ML's per hour.  She did receive tocilizumab.  He remains on 

dexamethasone, Lovenox, vitamin supplements.  Chest x-ray showing persistent but

improving patchy bilateral airspace disease.  Sputum and blood cultures are 

pending.  White count 3.9.  Hemoglobin 11.4.  Lymphocytes 0.7.  D-dimer 1.19.  

Sodium 139.  Potassium 4.4.  Bicarb 20.  Creatinine 0.64.  Glucose 174.





Objective





- Vital Signs


Vital signs: 


                                   Vital Signs











Temp  98.8 F   04/02/21 08:00


 


Pulse  40 L  04/02/21 12:00


 


Resp  34 H  04/02/21 12:00


 


BP  105/57   04/02/21 12:00


 


Pulse Ox  97   04/02/21 12:00








                                 Intake & Output











 04/01/21 04/02/21 04/02/21





 18:59 06:59 18:59


 


Intake Total 2008.082 2079.168 1371.909


 


Output Total 570 580 325


 


Balance 0569.111 2929.168 1046.909


 


Weight 125.2 kg 126.6 kg 


 


Intake:   


 


  IV 1560 1593 798


 


    0.9 NACL 1560 1560 780


 


    pressure bag  33 18


 


  Intake, IV Titration 448.082 286.168 360.909





  Amount   


 


    Cisatracurium 200 mg In 139.552  





    Sodium Chloride 0.9% 180   





    ml @ 1 MCG/KG/MIN 7.212   





    mls/hr IV .Q24H VIOLET Rx#:   





    [id number]   


 


    Cisatracurium 200 mg In 27.794 9.953 109.8





    Sodium Chloride 0.9% 180   





    ml @ 1 MCG/KG/MIN 7.512   





    mls/hr IV .Q24H VIOLET Rx#:   





    [id number]   


 


    fentaNYL (PF) 1,000 mcg 100.200  109.883





    In Sodium Chloride 0.9%   





    80 ml @ Per Protocol IV .   





    Q0M VIOLET Rx#:[id number]   


 


    propofoL 1,000 mg In 180.536 276.215 141.226





    Empty Bag 1 bag @ Titrate   





    IV .Q0M VIOLET Rx#:   





    [id number]   


 


  Tube Feeding  140 153


 


  Other  60 60


 


Output:   


 


  Urine 570 580 325


 


Other:   


 


  Voiding Method Indwelling Catheter Indwelling Catheter Indwelling Catheter








                       ABP, PAP, CO, CI - Last Documented











Arterial Blood Pressure        115/49

















- Exam





GENERAL EXAM: Intubated, sedated, paralyzed 61-year-old female patient, 

comfortable in no apparent distress.


HEAD: Normocephalic.


EYES: Normal reaction of pupils, equal size.


NOSE: Clear with pink turbinates.


THROAT: No erythema or exudates.


NECK: No masses, no JVD.


CHEST: No chest wall deformity.


LUNGS: Equal air entry with crackles in the bilateral posterior bases.


CVS: S1 and S2 normal with no audible murmur, regular rhythm.


ABDOMEN: No hepatosplenomegaly, normal bowel sounds, no guarding or rigidity.


SPINE: No scoliosis or deformity


SKIN: No rashes


CENTRAL NERVOUS SYSTEM: The dated, paralyzed, tone is normal in all 4 e

xtremities.


EXTREMITIES: There is no peripheral edema.  No clubbing, no cyanosis.  

Peripheral pulses are intact.





- Labs


CBC & Chem 7: 


                                 04/02/21 03:50





                                 04/02/21 03:50


Labs: 


                  Abnormal Lab Results - Last 24 Hours (Table)











  04/01/21 04/01/21 04/01/21 Range/Units





  04:20 13:50 18:52 


 


RBC     (3.80-5.40)  m/uL


 


Hct     (34.0-46.0)  %


 


Lymphocytes #     (1.0-4.8)  k/uL


 


D-Dimer     (<0.60)  mg/L FEU


 


ABG pCO2     (35-45)  mmHg


 


ABG HCO3     (21-25)  mmol/L


 


ABG O2 Saturation     (94-97)  %


 


Chloride     ()  mmol/L


 


Carbon Dioxide     (22-30)  mmol/L


 


BUN     (7-17)  mg/dL


 


Glucose     (74-99)  mg/dL


 


POC Glucose (mg/dL)   173 H  185 H  (75-99)  mg/dL


 


Calcium     (8.4-10.2)  mg/dL


 


Lactate Dehydrogenase  1497 H    (313-618)  U/L


 


Creatine Kinase  190 H    ()  U/L


 


C-Reactive Protein  61.8 H    (<10.0)  mg/L














  04/01/21 04/02/21 04/02/21 Range/Units





  23:50 03:50 03:50 


 


RBC    3.65 L  (3.80-5.40)  m/uL


 


Hct    32.6 L  (34.0-46.0)  %


 


Lymphocytes #    0.7 L  (1.0-4.8)  k/uL


 


D-Dimer   1.19 H   (<0.60)  mg/L FEU


 


ABG pCO2     (35-45)  mmHg


 


ABG HCO3     (21-25)  mmol/L


 


ABG O2 Saturation     (94-97)  %


 


Chloride     ()  mmol/L


 


Carbon Dioxide     (22-30)  mmol/L


 


BUN     (7-17)  mg/dL


 


Glucose     (74-99)  mg/dL


 


POC Glucose (mg/dL)  180 H    (75-99)  mg/dL


 


Calcium     (8.4-10.2)  mg/dL


 


Lactate Dehydrogenase     (313-618)  U/L


 


Creatine Kinase     ()  U/L


 


C-Reactive Protein     (<10.0)  mg/L














  04/02/21 04/02/21 04/02/21 Range/Units





  03:50 04:10 06:06 


 


RBC     (3.80-5.40)  m/uL


 


Hct     (34.0-46.0)  %


 


Lymphocytes #     (1.0-4.8)  k/uL


 


D-Dimer     (<0.60)  mg/L FEU


 


ABG pCO2   34 L   (35-45)  mmHg


 


ABG HCO3   20 L   (21-25)  mmol/L


 


ABG O2 Saturation   98.0 H   (94-97)  %


 


Chloride  116 H    ()  mmol/L


 


Carbon Dioxide  20 L    (22-30)  mmol/L


 


BUN  18 H    (7-17)  mg/dL


 


Glucose  174 H    (74-99)  mg/dL


 


POC Glucose (mg/dL)    183 H  (75-99)  mg/dL


 


Calcium  7.6 L    (8.4-10.2)  mg/dL


 


Lactate Dehydrogenase     (313-618)  U/L


 


Creatine Kinase     ()  U/L


 


C-Reactive Protein     (<10.0)  mg/L














  04/02/21 Range/Units





  11:43 


 


RBC   (3.80-5.40)  m/uL


 


Hct   (34.0-46.0)  %


 


Lymphocytes #   (1.0-4.8)  k/uL


 


D-Dimer   (<0.60)  mg/L FEU


 


ABG pCO2   (35-45)  mmHg


 


ABG HCO3   (21-25)  mmol/L


 


ABG O2 Saturation   (94-97)  %


 


Chloride   ()  mmol/L


 


Carbon Dioxide   (22-30)  mmol/L


 


BUN   (7-17)  mg/dL


 


Glucose   (74-99)  mg/dL


 


POC Glucose (mg/dL)  180 H  (75-99)  mg/dL


 


Calcium   (8.4-10.2)  mg/dL


 


Lactate Dehydrogenase   (313-618)  U/L


 


Creatine Kinase   ()  U/L


 


C-Reactive Protein   (<10.0)  mg/L








                      Microbiology - Last 24 Hours (Table)











 03/31/21 02:30 Blood Culture - Preliminary





 Blood    No Growth after 48 hours


 


 03/31/21 02:15 Blood Culture - Preliminary





 Blood    No Growth after 48 hours


 


 04/01/21 11:13 Gram Stain - Preliminary





 Sputum Sputum Culture - Preliminary














Assessment and Plan


Assessment: 





1 Acute hypoxemic respiratory failure secondary to CoVID 19 

pneumonia/pneumonitis requiring intubation mechanical ventilatory support on 

03/31/2021.  Outside the window for Remdesivir. Received tocilizumab.





2 Hypertension, history of





3 Paroxysmal atrial fibrillation





4 Hyperlipidemia





Plan:





The patient was seen and evaluated by Dr. Barba


Decrease the FiO2 to 50%


Increase fentanyl drip to 1 mg/kg/h


Increased propofol to 75 mg/kg/m


Discontinue Nimbex


Continue Decadron, Lovenox, vitamin supplements


Received tocilizumab


Follow-up d-dimer, inflammatory markers


Chest x-ray, ABGs in a.m.


We will continue to follow and make further recommendations based on her 

clinical status





Critical care time 36 minutes





I, the cosigning physician, performed a history & physical examination of the 

patient. Lungs sounds with coarse crackles in the bilateral bases.  Maintaining 

O2 saturations in the 90s on 50% FiO2 and a PEEP of 16.  I discussed the 

assessment and plan of care with my nurse practitioner, Leatha Rodriguez. I attest to 

the above note as dictated by her.





Time with Patient: Greater than 30

## 2021-04-03 LAB
ALBUMIN SERPL-MCNC: 2.5 G/DL (ref 3.5–5)
ALP SERPL-CCNC: 84 U/L (ref 38–126)
ALT SERPL-CCNC: 61 U/L (ref 4–34)
ANION GAP SERPL CALC-SCNC: 4 MMOL/L
AST SERPL-CCNC: 61 U/L (ref 14–36)
BASOPHILS # BLD AUTO: 0 K/UL (ref 0–0.2)
BASOPHILS NFR BLD AUTO: 0 %
BUN SERPL-SCNC: 22 MG/DL (ref 7–17)
CALCIUM SPEC-MCNC: 7.7 MG/DL (ref 8.4–10.2)
CHLORIDE SERPL-SCNC: 115 MMOL/L (ref 98–107)
CK SERPL-CCNC: 85 U/L (ref 30–135)
CO2 BLDA-SCNC: 20 MMOL/L (ref 19–24)
CO2 SERPL-SCNC: 19 MMOL/L (ref 22–30)
EOSINOPHIL # BLD AUTO: 0 K/UL (ref 0–0.7)
EOSINOPHIL NFR BLD AUTO: 0 %
ERYTHROCYTE [DISTWIDTH] IN BLOOD BY AUTOMATED COUNT: 3.81 M/UL (ref 3.8–5.4)
ERYTHROCYTE [DISTWIDTH] IN BLOOD: 13.5 % (ref 11.5–15.5)
GLUCOSE BLD-MCNC: 162 MG/DL (ref 75–99)
GLUCOSE BLD-MCNC: 169 MG/DL (ref 75–99)
GLUCOSE BLD-MCNC: 171 MG/DL (ref 75–99)
GLUCOSE BLD-MCNC: 189 MG/DL (ref 75–99)
GLUCOSE BLD-MCNC: 189 MG/DL (ref 75–99)
GLUCOSE SERPL-MCNC: 176 MG/DL (ref 74–99)
HCO3 BLDA-SCNC: 19 MMOL/L (ref 21–25)
HCT VFR BLD AUTO: 34.1 % (ref 34–46)
HGB BLD-MCNC: 11.8 GM/DL (ref 11.4–16)
LDH SPEC-CCNC: 1133 U/L (ref 313–618)
LYMPHOCYTES # SPEC AUTO: 0.9 K/UL (ref 1–4.8)
LYMPHOCYTES NFR SPEC AUTO: 23 %
MCH RBC QN AUTO: 31 PG (ref 25–35)
MCHC RBC AUTO-ENTMCNC: 34.7 G/DL (ref 31–37)
MCV RBC AUTO: 89.5 FL (ref 80–100)
MONOCYTES # BLD AUTO: 0.3 K/UL (ref 0–1)
MONOCYTES NFR BLD AUTO: 8 %
NEUTROPHILS # BLD AUTO: 2.7 K/UL (ref 1.3–7.7)
NEUTROPHILS NFR BLD AUTO: 68 %
PCO2 BLDA: 30 MMHG (ref 35–45)
PH BLDA: 7.4 [PH] (ref 7.35–7.45)
PLATELET # BLD AUTO: 260 K/UL (ref 150–450)
PO2 BLDA: 117 MMHG (ref 83–108)
POTASSIUM SERPL-SCNC: 4.2 MMOL/L (ref 3.5–5.1)
PROT SERPL-MCNC: 4.9 G/DL (ref 6.3–8.2)
SODIUM SERPL-SCNC: 138 MMOL/L (ref 137–145)
WBC # BLD AUTO: 4 K/UL (ref 3.8–10.6)

## 2021-04-03 RX ADMIN — CEFAZOLIN SCH MLS/HR: 330 INJECTION, POWDER, FOR SOLUTION INTRAMUSCULAR; INTRAVENOUS at 06:27

## 2021-04-03 RX ADMIN — ALBUTEROL SULFATE SCH PUFF: 90 AEROSOL, METERED RESPIRATORY (INHALATION) at 18:59

## 2021-04-03 RX ADMIN — DEXTRAN 70 AND HYPROMELLOSE 2910 SCH DROPS: 1; 3 SOLUTION/ DROPS OPHTHALMIC at 00:17

## 2021-04-03 RX ADMIN — NOREPINEPHRINE BITARTRATE SCH: 1 INJECTION, SOLUTION, CONCENTRATE INTRAVENOUS at 16:46

## 2021-04-03 RX ADMIN — SPIRONOLACTONE SCH MG: 25 TABLET, FILM COATED ORAL at 09:11

## 2021-04-03 RX ADMIN — DEXTROSE SCH MG: 50 INJECTION, SOLUTION INTRAVENOUS at 09:15

## 2021-04-03 RX ADMIN — CEFAZOLIN SCH MLS/HR: 330 INJECTION, POWDER, FOR SOLUTION INTRAMUSCULAR; INTRAVENOUS at 14:09

## 2021-04-03 RX ADMIN — CHLORHEXIDINE GLUCONATE SCH ML: 1.2 RINSE ORAL at 09:10

## 2021-04-03 RX ADMIN — CITALOPRAM HYDROBROMIDE SCH MG: 20 TABLET ORAL at 09:14

## 2021-04-03 RX ADMIN — NOREPINEPHRINE BITARTRATE SCH: 1 INJECTION, SOLUTION, CONCENTRATE INTRAVENOUS at 02:43

## 2021-04-03 RX ADMIN — Medication SCH MCG: at 09:11

## 2021-04-03 RX ADMIN — INSULIN ASPART SCH UNIT: 100 INJECTION, SOLUTION INTRAVENOUS; SUBCUTANEOUS at 00:14

## 2021-04-03 RX ADMIN — CISATRACURIUM BESYLATE SCH: 10 INJECTION INTRAVENOUS at 11:15

## 2021-04-03 RX ADMIN — SODIUM CHLORIDE SCH MLS/HR: 900 INJECTION, SOLUTION INTRAVENOUS at 11:07

## 2021-04-03 RX ADMIN — DEXTRAN 70 AND HYPROMELLOSE 2910 SCH DROPS: 1; 3 SOLUTION/ DROPS OPHTHALMIC at 09:16

## 2021-04-03 RX ADMIN — DEXTRAN 70 AND HYPROMELLOSE 2910 SCH DROPS: 1; 3 SOLUTION/ DROPS OPHTHALMIC at 23:41

## 2021-04-03 RX ADMIN — ASPIRIN 81 MG CHEWABLE TABLET SCH MG: 81 TABLET CHEWABLE at 09:11

## 2021-04-03 RX ADMIN — ALBUTEROL SULFATE PRN PUFF: 90 AEROSOL, METERED RESPIRATORY (INHALATION) at 11:36

## 2021-04-03 RX ADMIN — ALBUTEROL SULFATE SCH PUFF: 90 AEROSOL, METERED RESPIRATORY (INHALATION) at 22:51

## 2021-04-03 RX ADMIN — INSULIN ASPART SCH UNIT: 100 INJECTION, SOLUTION INTRAVENOUS; SUBCUTANEOUS at 17:49

## 2021-04-03 RX ADMIN — ALBUTEROL SULFATE SCH PUFF: 90 AEROSOL, METERED RESPIRATORY (INHALATION) at 16:42

## 2021-04-03 RX ADMIN — ALBUTEROL SULFATE PRN PUFF: 90 AEROSOL, METERED RESPIRATORY (INHALATION) at 08:02

## 2021-04-03 RX ADMIN — PANTOPRAZOLE SODIUM SCH MG: 40 INJECTION, POWDER, FOR SOLUTION INTRAVENOUS at 09:10

## 2021-04-03 RX ADMIN — SODIUM CHLORIDE SCH MLS/HR: 900 INJECTION, SOLUTION INTRAVENOUS at 18:39

## 2021-04-03 RX ADMIN — ENOXAPARIN SODIUM SCH MG: 40 INJECTION SUBCUTANEOUS at 09:10

## 2021-04-03 RX ADMIN — METOPROLOL SUCCINATE SCH: 100 TABLET, EXTENDED RELEASE ORAL at 09:15

## 2021-04-03 RX ADMIN — INSULIN ASPART SCH UNIT: 100 INJECTION, SOLUTION INTRAVENOUS; SUBCUTANEOUS at 23:41

## 2021-04-03 RX ADMIN — INSULIN ASPART SCH UNIT: 100 INJECTION, SOLUTION INTRAVENOUS; SUBCUTANEOUS at 06:26

## 2021-04-03 RX ADMIN — ATORVASTATIN CALCIUM SCH MG: 40 TABLET, FILM COATED ORAL at 09:11

## 2021-04-03 RX ADMIN — Medication SCH MG: at 09:16

## 2021-04-03 RX ADMIN — OXYCODONE HYDROCHLORIDE AND ACETAMINOPHEN SCH MG: 500 TABLET ORAL at 09:15

## 2021-04-03 RX ADMIN — DEXTRAN 70 AND HYPROMELLOSE 2910 SCH DROPS: 1; 3 SOLUTION/ DROPS OPHTHALMIC at 14:09

## 2021-04-03 RX ADMIN — SODIUM CHLORIDE SCH MLS/HR: 900 INJECTION, SOLUTION INTRAVENOUS at 02:44

## 2021-04-03 RX ADMIN — DEXTRAN 70 AND HYPROMELLOSE 2910 SCH DROPS: 1; 3 SOLUTION/ DROPS OPHTHALMIC at 04:31

## 2021-04-03 RX ADMIN — CHLORHEXIDINE GLUCONATE SCH ML: 1.2 RINSE ORAL at 20:33

## 2021-04-03 RX ADMIN — CEFAZOLIN SCH MLS/HR: 330 INJECTION, POWDER, FOR SOLUTION INTRAMUSCULAR; INTRAVENOUS at 20:34

## 2021-04-03 RX ADMIN — INSULIN ASPART SCH UNIT: 100 INJECTION, SOLUTION INTRAVENOUS; SUBCUTANEOUS at 14:06

## 2021-04-03 RX ADMIN — DEXTRAN 70 AND HYPROMELLOSE 2910 SCH DROPS: 1; 3 SOLUTION/ DROPS OPHTHALMIC at 20:34

## 2021-04-03 RX ADMIN — DEXTRAN 70 AND HYPROMELLOSE 2910 SCH DROPS: 1; 3 SOLUTION/ DROPS OPHTHALMIC at 17:49

## 2021-04-03 NOTE — P.PN
Subjective


Progress Note Date: 04/03/21


Principal diagnosis: 





Hypoxemic respiratory failure secondary to CoVID 19 pneumonia





61-year-old female, who hasn't been feeling well for about 10-11 days.  She 

tested positive for COVID 19 on March 24.  She's been sick for 10-11 days old.  

She complains of TYPICAL symptoms including weakness, fever, diarrhea, cough, 

and shortness of breath.  She's quite hypoxemic.  On AIRVO 100%, and a 

nonrebreather mask, her saturations are right around 80-83%.  Any movement, and 

she desaturates even further.  I just called the intensive care unit, and we'll 

plan on putting her in the intensive care unit.  She's currently on saline at 75

mL an hour.  She can barely speak without becoming short of breath.  The patient

is not a candidate for REM, but could get TOCI.  Sodium 138, potassium 3.8, 

chlorides 108, CO2 21, anion gap 9, BUN 19, creatinine 0.7.  AST 94, ALTs 60, 

LDH 1471, C-reactive protein 88.1.  Again, the patient's been sick for at least 

10-11 days and maybe a bit longer.  Her oral intake has been poor as well.  The 

patient's chest x-ray shows diffuse bilateral infiltrates consistent with 

coronavirus infection.





The patient is seen today 04/01/2021 in follow-up in the intensive care unit.  

Shortly after arriving to the ICU yesterday she required intubation and mechan

ical ventilatory support injury to acute hypoxemic respiratory failure.  She is 

currently on the ventilator and assist control mode with a rate of 34, tidal on 

450, FiO2 70%, PEEP of 16.  Morning blood gases reveal pO2 of 70, pCO2 33, P 

87.38.  Fentanyl drip at 0.5 mcg/kg per hour, propofol at 15 mcg/kg/m, Nimbex at

1 mcg/min per hour.  0.9 normal saline at 130 ML's per hour.  She did receive 

Tociluzimab.  She remains on Decadron 6 mg IV daily.  Lovenox 40 mg subcutaneous

daily.  Tube feedings will be initiated for nutritional support.  Blood cultures

reveal no growth.  White count 2.9.  Hemoglobin 11.3 and platelets 188.  

Lymphocytes 0.8.  She is continued on Lovenox, dexamethasone, vitamin 

supplements.





The patient is seen today 04/02/2021 in follow-up in the intensive care unit.  

She remains intubated on the mechanical ventilator.  Assist-control mode.  Rate 

of 30, tidal volume 450, FiO2 60% and a PEEP of 16.  She is currently sedated on

propofol at 40 mcg/kg/m, Nimbex at 1 mcg/kg/m, fentanyl at 0.5 mcg/kg per hour. 

She has 0.9 normal saline at 130 ML's per hour.  She is being nourished with 

vital HP at 20 ML's per hour.  She did receive tocilizumab.  He remains on 

dexamethasone, Lovenox, vitamin supplements.  Chest x-ray showing persistent but

improving patchy bilateral airspace disease.  Sputum and blood cultures are 

pending.  White count 3.9.  Hemoglobin 11.4.  Lymphocytes 0.7.  D-dimer 1.19.  

Sodium 139.  Potassium 4.4.  Bicarb 20.  Creatinine 0.64.  Glucose 174.





The patient is seen today 04/03/2020 in follow-up in the intensive care unit.  

She remains intubated on mechanical ventilator.  Current settings are assist-

control mode.  Rate of 34, tidal volume 450, FiO2 50% and a PEEP of 16.  Morning

blood gases reveal a pO2 of 117, pCO2 30, pH 7.40.  She remains sedated on 

propofol at 55 mcg/kg/m.  Fentanyl and 1 mcg/kg/hr.  She is being nourished with

vital HP at 33 ML's per hour.  0.9 normal saline at 130 ML's per hour.  She 

remains on dexamethasone, Lovenox, bronchodilators.  White count 4.0.  

Hemoglobin 11.8.  D-dimer 1.81.  Sodium 138.  Potassium 4.2.  Bicarb 19.  LDH 

1133.  C-reactive protein 15.5.  Asked x-ray continues to show moderate 

scattered interstitial and partial airspace opacities.  





Objective





- Vital Signs


Vital signs: 


                                   Vital Signs











Temp  98.2 F   04/03/21 08:00


 


Pulse  44 L  04/03/21 10:00


 


Resp  30 H  04/03/21 10:00


 


BP  127/74   04/03/21 10:00


 


Pulse Ox  93 L  04/03/21 10:00








                                 Intake & Output











 04/02/21 04/03/21 04/03/21





 18:59 06:59 18:59


 


Intake Total 2845.578 2618.832 1019.312


 


Output Total 555 645 250


 


Balance 2290.578 1973.832 769.312


 


Weight 126.6 kg 129.2 kg 129.2 kg


 


Intake:   


 


  IV 1596 1596 532


 


    0.9 NACL 1560 1560 520


 


    pressure bag 36 36 12


 


  Intake, IV Titration 742.578 536.832 292.312





  Amount   


 


    Cisatracurium 200 mg In 109.8  





    Sodium Chloride 0.9% 180   





    ml @ 1 MCG/KG/MIN 7.512   





    mls/hr IV .Q24H VIOLET Rx#:   





    [id number]   


 


    fentaNYL (PF) 1,000 mcg 191.552 92.354 100





    In Sodium Chloride 0.9%   





    80 ml @ Per Protocol IV .   





    Q0M VIOLET Rx#:[id number]   


 


    propofoL 1,000 mg In 441.226 444.478 192.312





    Empty Bag 1 bag @ Titrate   





    IV .Q0M VIOLET Rx#:   





    [id number]   


 


  Tube Feeding 417 396 165


 


  Other 90 90 30


 


Output:   


 


  Urine 555 645 250


 


Other:   


 


  Voiding Method Indwelling Catheter Indwelling Catheter Indwelling Catheter








                       ABP, PAP, CO, CI - Last Documented











Arterial Blood Pressure        141/56

















- Exam





GENERAL EXAM: Intubated, sedated, 61-year-old female patient, comfortable in no 

apparent distress.


HEAD: Normocephalic.


EYES: Normal reaction of pupils, equal size.


NOSE: Clear with pink turbinates.


THROAT: No erythema or exudates.


NECK: No masses, no JVD.


CHEST: No chest wall deformity.


LUNGS: Equal air entry with crackles in the bilateral posterior bases.


CVS: S1 and S2 normal with no audible murmur, regular rhythm.


ABDOMEN: No hepatosplenomegaly, normal bowel sounds, no guarding or rigidity.


SPINE: No scoliosis or deformity


SKIN: No rashes


CENTRAL NERVOUS SYSTEM: Sedated, tone is normal in all 4 extremities.


EXTREMITIES: There is no peripheral edema.  No clubbing, no cyanosis.  

Peripheral pulses are intact.





- Labs


CBC & Chem 7: 


                                 04/03/21 03:45





                                 04/03/21 03:45


Labs: 


                  Abnormal Lab Results - Last 24 Hours (Table)











  04/02/21 04/02/21 04/03/21 Range/Units





  03:50 17:20 00:06 


 


Lymphocytes #     (1.0-4.8)  k/uL


 


D-Dimer     (<0.60)  mg/L FEU


 


ABG pCO2     (35-45)  mmHg


 


ABG pO2     ()  mmHg


 


ABG HCO3     (21-25)  mmol/L


 


ABG O2 Saturation     (94-97)  %


 


Chloride     ()  mmol/L


 


Carbon Dioxide     (22-30)  mmol/L


 


BUN     (7-17)  mg/dL


 


Glucose     (74-99)  mg/dL


 


POC Glucose (mg/dL)   198 H  189 H  (75-99)  mg/dL


 


Calcium     (8.4-10.2)  mg/dL


 


AST     (14-36)  U/L


 


ALT     (4-34)  U/L


 


Lactate Dehydrogenase  1263 H    (313-618)  U/L


 


Creatine Kinase  144 H    ()  U/L


 


C-Reactive Protein  28.5 H    (<10.0)  mg/L


 


Total Protein     (6.3-8.2)  g/dL


 


Albumin     (3.5-5.0)  g/dL














  04/03/21 04/03/21 04/03/21 Range/Units





  03:45 03:45 03:45 


 


Lymphocytes #  0.9 L    (1.0-4.8)  k/uL


 


D-Dimer    1.81 H  (<0.60)  mg/L FEU


 


ABG pCO2     (35-45)  mmHg


 


ABG pO2     ()  mmHg


 


ABG HCO3     (21-25)  mmol/L


 


ABG O2 Saturation     (94-97)  %


 


Chloride   115 H   ()  mmol/L


 


Carbon Dioxide   19 L   (22-30)  mmol/L


 


BUN   22 H   (7-17)  mg/dL


 


Glucose   176 H   (74-99)  mg/dL


 


POC Glucose (mg/dL)     (75-99)  mg/dL


 


Calcium   7.7 L   (8.4-10.2)  mg/dL


 


AST   61 H   (14-36)  U/L


 


ALT   61 H   (4-34)  U/L


 


Lactate Dehydrogenase   1133 H   (313-618)  U/L


 


Creatine Kinase     ()  U/L


 


C-Reactive Protein   15.5 H   (<10.0)  mg/L


 


Total Protein   4.9 L   (6.3-8.2)  g/dL


 


Albumin   2.5 L   (3.5-5.0)  g/dL














  04/03/21 04/03/21 04/03/21 Range/Units





  04:50 06:21 11:44 


 


Lymphocytes #     (1.0-4.8)  k/uL


 


D-Dimer     (<0.60)  mg/L FEU


 


ABG pCO2  30 L    (35-45)  mmHg


 


ABG pO2  117 H    ()  mmHg


 


ABG HCO3  19 L    (21-25)  mmol/L


 


ABG O2 Saturation  99.0 H    (94-97)  %


 


Chloride     ()  mmol/L


 


Carbon Dioxide     (22-30)  mmol/L


 


BUN     (7-17)  mg/dL


 


Glucose     (74-99)  mg/dL


 


POC Glucose (mg/dL)   171 H  162 H  (75-99)  mg/dL


 


Calcium     (8.4-10.2)  mg/dL


 


AST     (14-36)  U/L


 


ALT     (4-34)  U/L


 


Lactate Dehydrogenase     (313-618)  U/L


 


Creatine Kinase     ()  U/L


 


C-Reactive Protein     (<10.0)  mg/L


 


Total Protein     (6.3-8.2)  g/dL


 


Albumin     (3.5-5.0)  g/dL








                      Microbiology - Last 24 Hours (Table)











 04/01/21 11:13 Gram Stain - Final





 Sputum Sputum Culture - Final


 


 03/31/21 02:30 Blood Culture - Preliminary





 Blood    No Growth after 72 hours


 


 03/31/21 02:15 Blood Culture - Preliminary





 Blood    No Growth after 72 hours














Assessment and Plan


Assessment: 





1 Acute hypoxemic respiratory failure secondary to CoVID 19 pneumonia/pn

eumonitis requiring intubation mechanical ventilatory support on 03/31/2021.  

Outside the window for Remdesivir. Received tocilizumab.





2 Hypertension, history of





3 Paroxysmal atrial fibrillation





4 Hyperlipidemia





Plan:





The patient was seen and evaluated by Dr. Barba


Chest x-ray, ABGs and labs reviewed


Decrease the PEEP to 12.  Decrease the rate to 30.


Continue Decadron, Lovenox, vitamin supplements


Received tocilizumab


Follow-up d-dimer, inflammatory markers


Chest x-ray, ABGs in a.m.


We will continue to follow and make further recommendations based on her 

clinical status





Critical care time 38 minutes





I, the cosigning physician, performed a history & physical examination of the 

patient. Lungs sounds with coarse crackles in the bilateral bases.  Maintaining 

O2 saturations in the 90s on 50% FiO2 and a PEEP of 12.  I discussed the 

assessment and plan of care with my nurse practitioner, Leatha Rodriguez. I attest to 

the above note as dictated by her.

## 2021-04-03 NOTE — P.PN
Subjective


Progress Note Date: 04/03/21


Principal diagnosis: 


Acute hypoxemic respiratory failure secondary to COVID 19 pneumonia/pneumonitis/

requiring intubation and mechanical ventilation





61 years old female with past medical history of atrial fibrillation not on 

anticoagulation and her cardiologist is Dr. Peck, hypertension, 

hyperlipidemia.  She is a patient of Dr. Ribeiro


Patient states that she was tested positive for covid On 03/26/2021.  And now 

she presents with dyspnea of one-day duration when started earlier.  Associated 

with cough on the patella brown phlegm.


She is also complaining of from chest pain without coughing , on the right side 

of the chest nonradiating about 5/10 in severity


She has no vomiting but diarrhea about twice per day





04/02/2021 


patient is seen and evaluatedin follow-up in the intensive care unit.  She 

remains intubated on the mechanical ventilator.  Assist-control mode.  She is 

currently sedated; remains on 0.9 normal saline at 130 ML's per hour.  She is 

being nourished with vital HP at 20 ML's per hour.  


- She did receive tocilizumab; remains on dexamethasone, Lovenox, vitamin 

supplements.  


- Chest x-ray showing persistent but improving patchy bilateral airspace 

disease.  Sputum and blood cultures are pending.  White count 3.9.  Hemoglobin 

11.4.  Lymphocytes 0.7.  D-dimer 1.19.  Sodium 139.  Potassium 4.4.  Bicarb 20. 

Creatinine 0.64.  Glucose 174.


pulmonary service is following and planning to repeat ABGs and chest x-ray in a

.m.; further plan of care pending clinical course





04/03/2021


Patient is seen and evaluated in follow-up in the intensive care unit; remains 

intubated on mechanical ventilator.  Current settings are assist-control mode.  


She remains sedated;  being nourished with vital HP at 33 ML's per hour.  0.9 

normal saline at 130 ML's per hour.  


Patient remains on dexamethasone, Lovenox, bronchodilators.  


White count 4.0.  Hemoglobin 11.8.  D-dimer 1.81.  Sodium 138.  Potassium 4.2.  

Bicarb 19.  LDH 1133.  C-reactive protein 15.5.  Cx-ray continues to show 

moderate scattered interstitial and partial airspace opacities.  


Pulmonary service on board and recommending to continue with current management 

with plans to monitor d-dimer, inflammatory markers a chest x-ray and ABGs





Objective





- Vital Signs


Vital signs: 


                                   Vital Signs











Temp  97.9 F   04/03/21 04:00


 


Pulse  40 L  04/03/21 07:00


 


Resp  34 H  04/03/21 08:00


 


BP  123/66   04/03/21 07:00


 


Pulse Ox  97   04/03/21 07:00








                                 Intake & Output











 04/02/21 04/03/21 04/03/21





 18:59 06:59 18:59


 


Intake Total 2845.578 2618.832 657.962


 


Output Total 555 645 210


 


Balance 2290.578 1973.832 447.962


 


Weight 126.6 kg 129.2 kg 


 


Intake:   


 


  IV 1596 1596 399


 


    0.9 NACL 1560 1560 390


 


    pressure bag 36 36 9


 


  Intake, IV Titration 742.578 536.832 96.962





  Amount   


 


    Cisatracurium 200 mg In 109.8  





    Sodium Chloride 0.9% 180   





    ml @ 1 MCG/KG/MIN 7.512   





    mls/hr IV .Q24H VIOLET Rx#:   





    [id number]   


 


    fentaNYL (PF) 1,000 mcg 191.552 92.354 





    In Sodium Chloride 0.9%   





    80 ml @ Per Protocol IV .   





    Q0M VIOLET Rx#:[id number]   


 


    propofoL 1,000 mg In 441.226 444.478 96.962





    Empty Bag 1 bag @ Titrate   





    IV .Q0M VIOLET Rx#:   





    [id number]   


 


  Tube Feeding 417 396 132


 


  Other 90 90 30


 


Output:   


 


  Urine 555 645 210


 


Other:   


 


  Voiding Method Indwelling Catheter Indwelling Catheter Indwelling Catheter








                       ABP, PAP, CO, CI - Last Documented











Arterial Blood Pressure        139/56

















- Exam


GENERAL EXAM: Intubated


HEAD: Normocephalic.


NECK: No masses, no JVD.


CHEST: No chest wall deformity.


LUNGS: Equal air entry with crackles in the bilateral posterior bases.


CVS: S1 and S2 normal with no audible murmur, regular rhythm.


ABDOMEN: No hepatosplenomegaly, normal bowel sounds, no guarding or rigidity.


CENTRAL NERVOUS SYSTEM: The dated, paralyzed, tone is normal in all 4 extremitie

s.


EXTREMITIES: There is no peripheral edema.  No clubbing, no cyanosis.  

Peripheral pulses are intact.








- Labs


CBC & Chem 7: 


                                 04/03/21 03:45





                                 04/03/21 03:45


Labs: 


                  Abnormal Lab Results - Last 24 Hours (Table)











  04/02/21 04/02/21 04/02/21 Range/Units





  03:50 11:43 17:20 


 


Lymphocytes #     (1.0-4.8)  k/uL


 


D-Dimer     (<0.60)  mg/L FEU


 


ABG pCO2     (35-45)  mmHg


 


ABG pO2     ()  mmHg


 


ABG HCO3     (21-25)  mmol/L


 


ABG O2 Saturation     (94-97)  %


 


Chloride     ()  mmol/L


 


Carbon Dioxide     (22-30)  mmol/L


 


BUN     (7-17)  mg/dL


 


Glucose     (74-99)  mg/dL


 


POC Glucose (mg/dL)   180 H  198 H  (75-99)  mg/dL


 


Calcium     (8.4-10.2)  mg/dL


 


AST     (14-36)  U/L


 


ALT     (4-34)  U/L


 


Lactate Dehydrogenase  1263 H    (313-618)  U/L


 


Creatine Kinase  144 H    ()  U/L


 


C-Reactive Protein  28.5 H    (<10.0)  mg/L


 


Total Protein     (6.3-8.2)  g/dL


 


Albumin     (3.5-5.0)  g/dL














  04/03/21 04/03/21 04/03/21 Range/Units





  00:06 03:45 03:45 


 


Lymphocytes #   0.9 L   (1.0-4.8)  k/uL


 


D-Dimer     (<0.60)  mg/L FEU


 


ABG pCO2     (35-45)  mmHg


 


ABG pO2     ()  mmHg


 


ABG HCO3     (21-25)  mmol/L


 


ABG O2 Saturation     (94-97)  %


 


Chloride    115 H  ()  mmol/L


 


Carbon Dioxide    19 L  (22-30)  mmol/L


 


BUN    22 H  (7-17)  mg/dL


 


Glucose    176 H  (74-99)  mg/dL


 


POC Glucose (mg/dL)  189 H    (75-99)  mg/dL


 


Calcium    7.7 L  (8.4-10.2)  mg/dL


 


AST    61 H  (14-36)  U/L


 


ALT    61 H  (4-34)  U/L


 


Lactate Dehydrogenase    1133 H  (313-618)  U/L


 


Creatine Kinase     ()  U/L


 


C-Reactive Protein    15.5 H  (<10.0)  mg/L


 


Total Protein    4.9 L  (6.3-8.2)  g/dL


 


Albumin    2.5 L  (3.5-5.0)  g/dL














  04/03/21 04/03/21 04/03/21 Range/Units





  03:45 04:50 06:21 


 


Lymphocytes #     (1.0-4.8)  k/uL


 


D-Dimer  1.81 H    (<0.60)  mg/L FEU


 


ABG pCO2   30 L   (35-45)  mmHg


 


ABG pO2   117 H   ()  mmHg


 


ABG HCO3   19 L   (21-25)  mmol/L


 


ABG O2 Saturation   99.0 H   (94-97)  %


 


Chloride     ()  mmol/L


 


Carbon Dioxide     (22-30)  mmol/L


 


BUN     (7-17)  mg/dL


 


Glucose     (74-99)  mg/dL


 


POC Glucose (mg/dL)    171 H  (75-99)  mg/dL


 


Calcium     (8.4-10.2)  mg/dL


 


AST     (14-36)  U/L


 


ALT     (4-34)  U/L


 


Lactate Dehydrogenase     (313-618)  U/L


 


Creatine Kinase     ()  U/L


 


C-Reactive Protein     (<10.0)  mg/L


 


Total Protein     (6.3-8.2)  g/dL


 


Albumin     (3.5-5.0)  g/dL








                      Microbiology - Last 24 Hours (Table)











 04/01/21 11:13 Gram Stain - Final





 Sputum Sputum Culture - Final


 


 03/31/21 02:30 Blood Culture - Preliminary





 Blood    No Growth after 72 hours


 


 03/31/21 02:15 Blood Culture - Preliminary





 Blood    No Growth after 72 hours














Assessment and Plan


Assessment: 


Acute bilateral pneumonia, mostly secondary to Covid 19 infection


Acute hypoxic respiratory failure, needing intubation and mechanical ventilation


Increased inflammatory markers


Acute Covid gastroenteritis


Mild transaminitis, mostly secondary to Covid


Hypertension


Hyperlipidemia


Paroxysmal A. fib, currently heart rate controlled





Plan: 


This is a pleasant 61 years old female who presents with respiratory distress 

mostly secondary to covid, retest covid.  Continue with steroids, vitamin C, 

zinc, vitamin D, bronchodilator and oxygen as needed.  Lovenox for DVT 

prophylaxis and pulmonary consult.  Continue with vent management as per 

pulmonary team.


Labs and medication were reviewed..  Continue same treatment.  Continue with 

symptomatic treatment.  Resume home medication.  Monitor lytes and vitals.  DVT 

and GI prophylaxis.  Further recommendations depends on the clinical course of 

the patient


DVT prophylaxis: Subcutaneous Lovenox


GI Prophylaxis: Pepcid


Prognosis is guarded

## 2021-04-03 NOTE — XR
EXAMINATION TYPE: XR chest 1V portable

 

DATE OF EXAM: 4/3/2021

 

COMPARISON: 4/2/2021

 

HISTORY: Tube placement

 

TECHNIQUE: Single frontal view of the chest is obtained.

 

FINDINGS:  There is an ET tube approximately 4.8 cm above the ranjan. There is an NG tube within the 
stomach.

 

There are moderate scattered interstitial and partial airspace opacities persist type change since th
ere is less inspiratory effort on the current study.

 

There is no large pleural effusion or pneumothorax. Heart size is normal. The osseous structures are 
intact

 

IMPRESSION:  Allowing for differences in technique with poor inspiration on the current study there i
s slightly no significant interval change.

## 2021-04-04 LAB
ALBUMIN SERPL-MCNC: 2.4 G/DL (ref 3.5–5)
ALP SERPL-CCNC: 73 U/L (ref 38–126)
ALT SERPL-CCNC: 71 U/L (ref 4–34)
ANION GAP SERPL CALC-SCNC: 3 MMOL/L
AST SERPL-CCNC: 63 U/L (ref 14–36)
BASOPHILS # BLD AUTO: 0 K/UL (ref 0–0.2)
BASOPHILS NFR BLD AUTO: 0 %
BUN SERPL-SCNC: 20 MG/DL (ref 7–17)
CALCIUM SPEC-MCNC: 7.6 MG/DL (ref 8.4–10.2)
CHLORIDE SERPL-SCNC: 117 MMOL/L (ref 98–107)
CK SERPL-CCNC: 215 U/L (ref 30–135)
CO2 BLDA-SCNC: 21 MMOL/L (ref 19–24)
CO2 SERPL-SCNC: 19 MMOL/L (ref 22–30)
EOSINOPHIL # BLD AUTO: 0 K/UL (ref 0–0.7)
EOSINOPHIL NFR BLD AUTO: 0 %
ERYTHROCYTE [DISTWIDTH] IN BLOOD BY AUTOMATED COUNT: 3.82 M/UL (ref 3.8–5.4)
ERYTHROCYTE [DISTWIDTH] IN BLOOD: 13.3 % (ref 11.5–15.5)
GLUCOSE BLD-MCNC: 147 MG/DL (ref 75–99)
GLUCOSE BLD-MCNC: 164 MG/DL (ref 75–99)
GLUCOSE BLD-MCNC: 180 MG/DL (ref 75–99)
GLUCOSE BLD-MCNC: 190 MG/DL (ref 75–99)
GLUCOSE SERPL-MCNC: 145 MG/DL (ref 74–99)
HCO3 BLDA-SCNC: 20 MMOL/L (ref 21–25)
HCT VFR BLD AUTO: 34 % (ref 34–46)
HGB BLD-MCNC: 12 GM/DL (ref 11.4–16)
LDH SPEC-CCNC: 1082 U/L (ref 313–618)
LYMPHOCYTES # SPEC AUTO: 1.3 K/UL (ref 1–4.8)
LYMPHOCYTES NFR SPEC AUTO: 24 %
MCH RBC QN AUTO: 31.4 PG (ref 25–35)
MCHC RBC AUTO-ENTMCNC: 35.3 G/DL (ref 31–37)
MCV RBC AUTO: 88.9 FL (ref 80–100)
MONOCYTES # BLD AUTO: 0.3 K/UL (ref 0–1)
MONOCYTES NFR BLD AUTO: 6 %
NEUTROPHILS # BLD AUTO: 3.6 K/UL (ref 1.3–7.7)
NEUTROPHILS NFR BLD AUTO: 68 %
PCO2 BLDA: 33 MMHG (ref 35–45)
PH BLDA: 7.39 [PH] (ref 7.35–7.45)
PLATELET # BLD AUTO: 269 K/UL (ref 150–450)
PO2 BLDA: 65 MMHG (ref 83–108)
POTASSIUM SERPL-SCNC: 4 MMOL/L (ref 3.5–5.1)
PROT SERPL-MCNC: 4.6 G/DL (ref 6.3–8.2)
SODIUM SERPL-SCNC: 139 MMOL/L (ref 137–145)
WBC # BLD AUTO: 5.3 K/UL (ref 3.8–10.6)

## 2021-04-04 RX ADMIN — Medication SCH MG: at 08:26

## 2021-04-04 RX ADMIN — ALBUTEROL SULFATE SCH PUFF: 90 AEROSOL, METERED RESPIRATORY (INHALATION) at 19:45

## 2021-04-04 RX ADMIN — ALBUTEROL SULFATE SCH PUFF: 90 AEROSOL, METERED RESPIRATORY (INHALATION) at 15:28

## 2021-04-04 RX ADMIN — DEXTRAN 70 AND HYPROMELLOSE 2910 SCH DROPS: 1; 3 SOLUTION/ DROPS OPHTHALMIC at 02:56

## 2021-04-04 RX ADMIN — DEXTRAN 70 AND HYPROMELLOSE 2910 SCH DROPS: 1; 3 SOLUTION/ DROPS OPHTHALMIC at 19:29

## 2021-04-04 RX ADMIN — DEXTRAN 70 AND HYPROMELLOSE 2910 SCH DROPS: 1; 3 SOLUTION/ DROPS OPHTHALMIC at 16:17

## 2021-04-04 RX ADMIN — NOREPINEPHRINE BITARTRATE SCH: 1 INJECTION, SOLUTION, CONCENTRATE INTRAVENOUS at 13:02

## 2021-04-04 RX ADMIN — INSULIN ASPART SCH UNIT: 100 INJECTION, SOLUTION INTRAVENOUS; SUBCUTANEOUS at 17:36

## 2021-04-04 RX ADMIN — Medication SCH MCG: at 08:27

## 2021-04-04 RX ADMIN — ALBUTEROL SULFATE SCH PUFF: 90 AEROSOL, METERED RESPIRATORY (INHALATION) at 11:56

## 2021-04-04 RX ADMIN — INSULIN ASPART SCH UNIT: 100 INJECTION, SOLUTION INTRAVENOUS; SUBCUTANEOUS at 06:34

## 2021-04-04 RX ADMIN — CEFAZOLIN SCH MLS/HR: 330 INJECTION, POWDER, FOR SOLUTION INTRAMUSCULAR; INTRAVENOUS at 06:34

## 2021-04-04 RX ADMIN — CHLORHEXIDINE GLUCONATE SCH ML: 1.2 RINSE ORAL at 19:37

## 2021-04-04 RX ADMIN — CISATRACURIUM BESYLATE SCH: 10 INJECTION INTRAVENOUS at 12:25

## 2021-04-04 RX ADMIN — DEXTRAN 70 AND HYPROMELLOSE 2910 SCH DROPS: 1; 3 SOLUTION/ DROPS OPHTHALMIC at 23:06

## 2021-04-04 RX ADMIN — CITALOPRAM HYDROBROMIDE SCH MG: 20 TABLET ORAL at 08:27

## 2021-04-04 RX ADMIN — SPIRONOLACTONE SCH MG: 25 TABLET, FILM COATED ORAL at 08:26

## 2021-04-04 RX ADMIN — CHLORHEXIDINE GLUCONATE SCH ML: 1.2 RINSE ORAL at 08:26

## 2021-04-04 RX ADMIN — CEFAZOLIN SCH MLS/HR: 330 INJECTION, POWDER, FOR SOLUTION INTRAMUSCULAR; INTRAVENOUS at 16:17

## 2021-04-04 RX ADMIN — ALBUTEROL SULFATE SCH PUFF: 90 AEROSOL, METERED RESPIRATORY (INHALATION) at 23:19

## 2021-04-04 RX ADMIN — METOPROLOL SUCCINATE SCH: 100 TABLET, EXTENDED RELEASE ORAL at 08:28

## 2021-04-04 RX ADMIN — SODIUM CHLORIDE SCH MLS/HR: 900 INJECTION, SOLUTION INTRAVENOUS at 02:52

## 2021-04-04 RX ADMIN — ALBUTEROL SULFATE SCH PUFF: 90 AEROSOL, METERED RESPIRATORY (INHALATION) at 08:48

## 2021-04-04 RX ADMIN — DEXTROSE SCH MG: 50 INJECTION, SOLUTION INTRAVENOUS at 08:26

## 2021-04-04 RX ADMIN — DEXTRAN 70 AND HYPROMELLOSE 2910 SCH DROPS: 1; 3 SOLUTION/ DROPS OPHTHALMIC at 08:40

## 2021-04-04 RX ADMIN — NOREPINEPHRINE BITARTRATE SCH: 1 INJECTION, SOLUTION, CONCENTRATE INTRAVENOUS at 01:54

## 2021-04-04 RX ADMIN — ENOXAPARIN SODIUM SCH MG: 40 INJECTION SUBCUTANEOUS at 08:28

## 2021-04-04 RX ADMIN — DEXTRAN 70 AND HYPROMELLOSE 2910 SCH DROPS: 1; 3 SOLUTION/ DROPS OPHTHALMIC at 12:25

## 2021-04-04 RX ADMIN — CEFAZOLIN SCH MLS/HR: 330 INJECTION, POWDER, FOR SOLUTION INTRAMUSCULAR; INTRAVENOUS at 17:38

## 2021-04-04 RX ADMIN — INSULIN ASPART SCH UNIT: 100 INJECTION, SOLUTION INTRAVENOUS; SUBCUTANEOUS at 12:27

## 2021-04-04 RX ADMIN — SODIUM CHLORIDE SCH MLS/HR: 900 INJECTION, SOLUTION INTRAVENOUS at 09:01

## 2021-04-04 RX ADMIN — SODIUM CHLORIDE SCH MLS/HR: 900 INJECTION, SOLUTION INTRAVENOUS at 23:47

## 2021-04-04 RX ADMIN — NOREPINEPHRINE BITARTRATE SCH: 1 INJECTION, SOLUTION, CONCENTRATE INTRAVENOUS at 23:07

## 2021-04-04 RX ADMIN — INSULIN ASPART SCH UNIT: 100 INJECTION, SOLUTION INTRAVENOUS; SUBCUTANEOUS at 23:47

## 2021-04-04 RX ADMIN — OXYCODONE HYDROCHLORIDE AND ACETAMINOPHEN SCH MG: 500 TABLET ORAL at 08:26

## 2021-04-04 RX ADMIN — PANTOPRAZOLE SODIUM SCH MG: 40 INJECTION, POWDER, FOR SOLUTION INTRAVENOUS at 08:25

## 2021-04-04 RX ADMIN — ATORVASTATIN CALCIUM SCH MG: 40 TABLET, FILM COATED ORAL at 08:26

## 2021-04-04 RX ADMIN — ALBUTEROL SULFATE SCH PUFF: 90 AEROSOL, METERED RESPIRATORY (INHALATION) at 03:00

## 2021-04-04 RX ADMIN — SODIUM CHLORIDE SCH MLS/HR: 900 INJECTION, SOLUTION INTRAVENOUS at 17:35

## 2021-04-04 RX ADMIN — ASPIRIN 81 MG CHEWABLE TABLET SCH MG: 81 TABLET CHEWABLE at 08:26

## 2021-04-04 NOTE — P.PN
Subjective


Progress Note Date: 04/04/21


Principal diagnosis: 


Acute hypoxemic respiratory failure secondary to COVID 19 pneumonia/pneumonitis/

requiring intubation and mechanical ventilation





61 years old female with past medical history of atrial fibrillation not on 

anticoagulation and her cardiologist is Dr. Peck, hypertension, 

hyperlipidemia.  She is a patient of Dr. Ribeiro


Patient states that she was tested positive for covid On 03/26/2021.  And now 

she presents with dyspnea of one-day duration when started earlier.  Associated 

with cough on the patella brown phlegm.


She is also complaining of from chest pain without coughing , on the right side 

of the chest nonradiating about 5/10 in severity


She has no vomiting but diarrhea about twice per day





04/02/2021 


patient is seen and evaluatedin follow-up in the intensive care unit.  She 

remains intubated on the mechanical ventilator.  Assist-control mode.  She is 

currently sedated; remains on 0.9 normal saline at 130 ML's per hour.  She is 

being nourished with vital HP at 20 ML's per hour.  


- She did receive tocilizumab; remains on dexamethasone, Lovenox, vitamin 

supplements.  


- Chest x-ray showing persistent but improving patchy bilateral airspace 

disease.  Sputum and blood cultures are pending.  White count 3.9.  Hemoglobin 

11.4.  Lymphocytes 0.7.  D-dimer 1.19.  Sodium 139.  Potassium 4.4.  Bicarb 20. 

Creatinine 0.64.  Glucose 174.


pulmonary service is following and planning to repeat ABGs and chest x-ray in a

.m.; further plan of care pending clinical course





04/03/2021


Patient is seen and evaluated in follow-up in the intensive care unit; remains 

intubated on mechanical ventilator.  Current settings are assist-control mode.  


She remains sedated;  being nourished with vital HP at 33 ML's per hour.  0.9 

normal saline at 130 ML's per hour.  


Patient remains on dexamethasone, Lovenox, bronchodilators.  


White count 4.0.  Hemoglobin 11.8.  D-dimer 1.81.  Sodium 138.  Potassium 4.2.  

Bicarb 19.  LDH 1133.  C-reactive protein 15.5.  Cx-ray continues to show 

moderate scattered interstitial and partial airspace opacities.  


Pulmonary service on board and recommending to continue with current management 

with plans to monitor d-dimer, inflammatory markers a chest x-ray and ABGs





04/04/2021 


Patient is seen and evaluated in follow-up in the intensive care unit.  She 

remains intubated and sedated.  


Vital HP at 24 mls per hour.  She did receive Actemra.  


Blood culture reveals no growth.  Sputum culture reveals no growth.  White count

5.3.  Hemoglobin 12.0.  D-dimer 2.0.  Sodium 139.  Potassium 4.0.  Creatinine 

0.59.  LDH 1082.  C reactive protein 8.6.  Chest x-ray continues to show no 

interval change,  persistent moderate patchy opacities bilaterally. 


Continued on vitamin supplements, Lovenox, IV Solu-Medrol. 





Objective





- Vital Signs


Vital signs: 


                                   Vital Signs











Temp  97.0 F L  04/04/21 04:00


 


Pulse  37 L  04/04/21 07:00


 


Resp  30 H  04/04/21 07:00


 


BP  132/76   04/04/21 07:00


 


Pulse Ox  91 L  04/04/21 07:00








                                 Intake & Output











 04/03/21 04/04/21 04/04/21





 18:59 06:59 18:59


 


Intake Total 2715.863 2399.864 157


 


Output Total 790 965 50


 


Balance 7685.683 2892.864 107


 


Weight 129.2 kg  


 


Intake:   


 


  IV 1626 1596 133


 


    0.9 NACL 1560 1560 130


 


    pressure bag 66 36 3


 


  Intake, IV Titration 582.863 401.864 





  Amount   


 


    fentaNYL (PF) 1,000 mcg 190.551 98.764 





    In Sodium Chloride 0.9%   





    80 ml @ Per Protocol IV .   





    Q0M VIOLET Rx#:[id number]   


 


    propofoL 1,000 mg In 392.312 303.100 





    Empty Bag 1 bag @ Titrate   





    IV .Q0M VIOLET Rx#:   





    [id number]   


 


  Tube Feeding 417 312 24


 


  Other 90 90 


 


Output:   


 


  Urine 790 965 50


 


Other:   


 


  Voiding Method Indwelling Catheter Indwelling Catheter 








                       ABP, PAP, CO, CI - Last Documented











Arterial Blood Pressure        151/64

















- Exam


GENERAL EXAM: Intubated


HEAD: Normocephalic.


NECK: No masses, no JVD.


CHEST: No chest wall deformity.


LUNGS: Equal air entry with crackles in the bilateral posterior bases.


CVS: S1 and S2 normal with no audible murmur, regular rhythm.


ABDOMEN: No hepatosplenomegaly, normal bowel sounds, no guarding or rigidity.


CENTRAL NERVOUS SYSTEM: The dated, paralyzed, tone is normal in all 4 

extremities.


EXTREMITIES: There is no peripheral edema.  No clubbing, no cyanosis.  

Peripheral pulses are intact.








- Labs


CBC & Chem 7: 


                                 04/04/21 03:45





                                 04/04/21 03:45


Labs: 


                  Abnormal Lab Results - Last 24 Hours (Table)











  04/03/21 04/03/21 04/03/21 Range/Units





  11:44 17:14 23:34 


 


D-Dimer     (<0.60)  mg/L FEU


 


ABG pCO2     (35-45)  mmHg


 


ABG pO2     ()  mmHg


 


ABG HCO3     (21-25)  mmol/L


 


ABG O2 Saturation     (94-97)  %


 


Chloride     ()  mmol/L


 


Carbon Dioxide     (22-30)  mmol/L


 


BUN     (7-17)  mg/dL


 


Glucose     (74-99)  mg/dL


 


POC Glucose (mg/dL)  162 H  189 H  169 H  (75-99)  mg/dL


 


Calcium     (8.4-10.2)  mg/dL


 


AST     (14-36)  U/L


 


ALT     (4-34)  U/L


 


Lactate Dehydrogenase     (313-618)  U/L


 


Creatine Kinase     ()  U/L


 


Total Protein     (6.3-8.2)  g/dL


 


Albumin     (3.5-5.0)  g/dL














  04/04/21 04/04/21 04/04/21 Range/Units





  03:45 03:45 05:46 


 


D-Dimer  2.00 H    (<0.60)  mg/L FEU


 


ABG pCO2    33 L  (35-45)  mmHg


 


ABG pO2    65 L  ()  mmHg


 


ABG HCO3    20 L  (21-25)  mmol/L


 


ABG O2 Saturation    93.0 L  (94-97)  %


 


Chloride   117 H   ()  mmol/L


 


Carbon Dioxide   19 L   (22-30)  mmol/L


 


BUN   20 H   (7-17)  mg/dL


 


Glucose   145 H   (74-99)  mg/dL


 


POC Glucose (mg/dL)     (75-99)  mg/dL


 


Calcium   7.6 L   (8.4-10.2)  mg/dL


 


AST   63 H   (14-36)  U/L


 


ALT   71 H   (4-34)  U/L


 


Lactate Dehydrogenase   1082 H   (313-618)  U/L


 


Creatine Kinase   215 H   ()  U/L


 


Total Protein   4.6 L   (6.3-8.2)  g/dL


 


Albumin   2.4 L   (3.5-5.0)  g/dL














  04/04/21 Range/Units





  05:58 


 


D-Dimer   (<0.60)  mg/L FEU


 


ABG pCO2   (35-45)  mmHg


 


ABG pO2   ()  mmHg


 


ABG HCO3   (21-25)  mmol/L


 


ABG O2 Saturation   (94-97)  %


 


Chloride   ()  mmol/L


 


Carbon Dioxide   (22-30)  mmol/L


 


BUN   (7-17)  mg/dL


 


Glucose   (74-99)  mg/dL


 


POC Glucose (mg/dL)  147 H  (75-99)  mg/dL


 


Calcium   (8.4-10.2)  mg/dL


 


AST   (14-36)  U/L


 


ALT   (4-34)  U/L


 


Lactate Dehydrogenase   (313-618)  U/L


 


Creatine Kinase   ()  U/L


 


Total Protein   (6.3-8.2)  g/dL


 


Albumin   (3.5-5.0)  g/dL








                      Microbiology - Last 24 Hours (Table)











 03/31/21 02:30 Blood Culture - Preliminary





 Blood    No Growth after 96 hours


 


 03/31/21 02:15 Blood Culture - Preliminary





 Blood    No Growth after 96 hours


 


 04/01/21 11:13 Gram Stain - Final





 Sputum Sputum Culture - Final














Assessment and Plan


Assessment: 


Acute bilateral pneumonia, mostly secondary to Covid 19 infection


Acute hypoxic respiratory failure, needing intubation and mechanical ventilation


Increased inflammatory markers


Acute Covid gastroenteritis


Mild transaminitis, mostly secondary to Covid


Hypertension


Hyperlipidemia


Paroxysmal A. fib, currently heart rate controlled





Plan: 


This is a pleasant 61 years old female who presents with respiratory distress 

mostly secondary to covid, retest covid.  Continue with steroids, vitamin C, 

zinc, vitamin D, bronchodilator and oxygen as needed.  Lovenox for DVT 

prophylaxis and pulmonary consult.  Continue with vent management as per 

pulmonary team.


Labs and medication were reviewed..  Continue same treatment.  Continue with 

symptomatic treatment.  Resume home medication.  Monitor lytes and vitals.  DVT 

and GI prophylaxis.  Further recommendations depends on the clinical course of 

the patient


DVT prophylaxis: Subcutaneous Lovenox


GI Prophylaxis: Pepcid


Prognosis is guarded

## 2021-04-04 NOTE — XR
EXAMINATION TYPE: XR chest 1V portable

 

DATE OF EXAM: 4/4/2021

 

COMPARISON: 4/3/2021

 

HISTORY: Tube placement.

 

TECHNIQUE: Single frontal view of the chest is obtained.

 

FINDINGS:  The endotracheal and nasogastric tubes remain in place. Again the endotracheal tube is hig
h riding. There is persistent moderate patchy opacities in the mid to lower lungs bilaterally, simila
r to the prior study. No significant pleural effusion, or pneumothorax seen.  The cardiac silhouette 
size is stable.   The osseous structures are unchanged.

 

IMPRESSION:  No significant interval change. Again high riding ET tube.

## 2021-04-04 NOTE — P.PN
Subjective


Progress Note Date: 04/04/21


Principal diagnosis: 





Hypoxemic respiratory failure secondary to CoVID 19 pneumonia





61-year-old female, who hasn't been feeling well for about 10-11 days.  She 

tested positive for COVID 19 on March 24.  She's been sick for 10-11 days old.  

She complains of TYPICAL symptoms including weakness, fever, diarrhea, cough, 

and shortness of breath.  She's quite hypoxemic.  On AIRVO 100%, and a 

nonrebreather mask, her saturations are right around 80-83%.  Any movement, and 

she desaturates even further.  I just called the intensive care unit, and we'll 

plan on putting her in the intensive care unit.  She's currently on saline at 75

mL an hour.  She can barely speak without becoming short of breath.  The patient

is not a candidate for REM, but could get TOCI.  Sodium 138, potassium 3.8, 

chlorides 108, CO2 21, anion gap 9, BUN 19, creatinine 0.7.  AST 94, ALTs 60, 

LDH 1471, C-reactive protein 88.1.  Again, the patient's been sick for at least 

10-11 days and maybe a bit longer.  Her oral intake has been poor as well.  The 

patient's chest x-ray shows diffuse bilateral infiltrates consistent with 

coronavirus infection.





The patient is seen today 04/01/2021 in follow-up in the intensive care unit.  

Shortly after arriving to the ICU yesterday she required intubation and mechan

ical ventilatory support injury to acute hypoxemic respiratory failure.  She is 

currently on the ventilator and assist control mode with a rate of 34, tidal on 

450, FiO2 70%, PEEP of 16.  Morning blood gases reveal pO2 of 70, pCO2 33, P 

87.38.  Fentanyl drip at 0.5 mcg/kg per hour, propofol at 15 mcg/kg/m, Nimbex at

1 mcg/min per hour.  0.9 normal saline at 130 ML's per hour.  She did receive 

Tociluzimab.  She remains on Decadron 6 mg IV daily.  Lovenox 40 mg subcutaneous

daily.  Tube feedings will be initiated for nutritional support.  Blood cultures

reveal no growth.  White count 2.9.  Hemoglobin 11.3 and platelets 188.  

Lymphocytes 0.8.  She is continued on Lovenox, dexamethasone, vitamin 

supplements.





The patient is seen today 04/02/2021 in follow-up in the intensive care unit.  

She remains intubated on the mechanical ventilator.  Assist-control mode.  Rate 

of 30, tidal volume 450, FiO2 60% and a PEEP of 16.  She is currently sedated on

propofol at 40 mcg/kg/m, Nimbex at 1 mcg/kg/m, fentanyl at 0.5 mcg/kg per hour. 

She has 0.9 normal saline at 130 ML's per hour.  She is being nourished with 

vital HP at 20 ML's per hour.  She did receive tocilizumab.  He remains on 

dexamethasone, Lovenox, vitamin supplements.  Chest x-ray showing persistent but

improving patchy bilateral airspace disease.  Sputum and blood cultures are 

pending.  White count 3.9.  Hemoglobin 11.4.  Lymphocytes 0.7.  D-dimer 1.19.  

Sodium 139.  Potassium 4.4.  Bicarb 20.  Creatinine 0.64.  Glucose 174.





The patient is seen today 04/03/2020 in follow-up in the intensive care unit.  

She remains intubated on mechanical ventilator.  Current settings are assist-

control mode.  Rate of 34, tidal volume 450, FiO2 50% and a PEEP of 16.  Morning

blood gases reveal a pO2 of 117, pCO2 30, pH 7.40.  She remains sedated on 

propofol at 55 mcg/kg/m.  Fentanyl and 1 mcg/kg/hr.  She is being nourished with

vital HP at 33 ML's per hour.  0.9 normal saline at 130 ML's per hour.  She 

remains on dexamethasone, Lovenox, bronchodilators.  White count 4.0.  

Hemoglobin 11.8.  D-dimer 1.81.  Sodium 138.  Potassium 4.2.  Bicarb 19.  LDH 

1133.  C-reactive protein 15.5.  Asked x-ray continues to show moderate 

scattered interstitial and partial airspace opacities.  





The patient is seen today 04/04/2021 in follow-up in the intensive care unit.  

She remains intubated, sedated.  Current settings are assist control at a rate 

of 30, tidal volume 450, FiO2 50% and a PEEP of 15.  She is sedated on propofol 

at 60 mcg/kg/m, fentanyl at 1 mcg/kg per hour, 0.9 normal saline 130 ML's per 

hour.  Vital HP at 24 mls per hour.  She did receive Actemra.  Blood culture 

reveals no growth.  Sputum culture reveals no growth.  White count 5.3.  

Hemoglobin 12.0.  D-dimer 2.0.  Sodium 139.  Potassium 4.0.  Creatinine 0.59.  

LDH 1082.  C reactive protein 8.6.  Chest x-ray continues to show no interval 

change, continues with persistent moderate patchy opacities bilaterally.  

Endotracheal tube to be advanced to 2 cms.  Continued on vitamin supplements, 

Lovenox, IV Solu-Medrol. 





Objective





- Vital Signs


Vital signs: 


                                   Vital Signs











Temp  97.7 F   04/04/21 08:00


 


Pulse  41 L  04/04/21 09:00


 


Resp  30 H  04/04/21 09:00


 


BP  122/68   04/04/21 09:00


 


Pulse Ox  92 L  04/04/21 09:00








                                 Intake & Output











 04/03/21 04/04/21 04/04/21





 18:59 06:59 18:59


 


Intake Total 2715.863 2399.864 598.923


 


Output Total 790 965 235


 


Balance 9428.084 1363.864 363.923


 


Weight 129.2 kg  


 


Intake:   


 


  IV 1626 1596 399


 


    0.9 NACL 1560 1560 390


 


    pressure bag 66 36 9


 


  Intake, IV Titration 582.863 401.864 73.923





  Amount   


 


    fentaNYL (PF) 1,000 mcg 190.551 98.764 73.923





    In Sodium Chloride 0.9%   





    80 ml @ Per Protocol IV .   





    Q0M VIOLET Rx#:[id number]   


 


    propofoL 1,000 mg In 392.312 303.100 





    Empty Bag 1 bag @ Titrate   





    IV .Q0M VIOLET Rx#:   





    [id number]   


 


  Tube Feeding 417 312 96


 


  Other 90 90 30


 


Output:   


 


  Urine 790 965 235


 


Other:   


 


  Voiding Method Indwelling Catheter Indwelling Catheter Indwelling Catheter








                       ABP, PAP, CO, CI - Last Documented











Arterial Blood Pressure        153/67

















- Exam





GENERAL EXAM: Intubated, sedated, 61-year-old female patient, comfortable in no 

apparent distress.


HEAD: Normocephalic.


EYES: Normal reaction of pupils, equal size.


NOSE: Clear with pink turbinates.


THROAT: No erythema or exudates.


NECK: No masses, no JVD.


CHEST: No chest wall deformity.


LUNGS: Equal air entry with crackles in the bilateral posterior bases.


CVS: S1 and S2 normal with no audible murmur, regular rhythm.


ABDOMEN: No hepatosplenomegaly, normal bowel sounds, no guarding or rigidity.


SPINE: No scoliosis or deformity


SKIN: No rashes


CENTRAL NERVOUS SYSTEM: Sedated, tone is normal in all 4 extremities.


EXTREMITIES: There is no peripheral edema.  No clubbing, no cyanosis.  

Peripheral pulses are intact.





- Labs


CBC & Chem 7: 


                                 04/04/21 03:45





                                 04/04/21 03:45


Labs: 


                  Abnormal Lab Results - Last 24 Hours (Table)











  04/03/21 04/03/21 04/03/21 Range/Units





  11:44 17:14 23:34 


 


D-Dimer     (<0.60)  mg/L FEU


 


ABG pCO2     (35-45)  mmHg


 


ABG pO2     ()  mmHg


 


ABG HCO3     (21-25)  mmol/L


 


ABG O2 Saturation     (94-97)  %


 


Chloride     ()  mmol/L


 


Carbon Dioxide     (22-30)  mmol/L


 


BUN     (7-17)  mg/dL


 


Glucose     (74-99)  mg/dL


 


POC Glucose (mg/dL)  162 H  189 H  169 H  (75-99)  mg/dL


 


Calcium     (8.4-10.2)  mg/dL


 


AST     (14-36)  U/L


 


ALT     (4-34)  U/L


 


Lactate Dehydrogenase     (313-618)  U/L


 


Creatine Kinase     ()  U/L


 


Total Protein     (6.3-8.2)  g/dL


 


Albumin     (3.5-5.0)  g/dL














  04/04/21 04/04/21 04/04/21 Range/Units





  03:45 03:45 05:46 


 


D-Dimer  2.00 H    (<0.60)  mg/L FEU


 


ABG pCO2    33 L  (35-45)  mmHg


 


ABG pO2    65 L  ()  mmHg


 


ABG HCO3    20 L  (21-25)  mmol/L


 


ABG O2 Saturation    93.0 L  (94-97)  %


 


Chloride   117 H   ()  mmol/L


 


Carbon Dioxide   19 L   (22-30)  mmol/L


 


BUN   20 H   (7-17)  mg/dL


 


Glucose   145 H   (74-99)  mg/dL


 


POC Glucose (mg/dL)     (75-99)  mg/dL


 


Calcium   7.6 L   (8.4-10.2)  mg/dL


 


AST   63 H   (14-36)  U/L


 


ALT   71 H   (4-34)  U/L


 


Lactate Dehydrogenase   1082 H   (313-618)  U/L


 


Creatine Kinase   215 H   ()  U/L


 


Total Protein   4.6 L   (6.3-8.2)  g/dL


 


Albumin   2.4 L   (3.5-5.0)  g/dL














  04/04/21 Range/Units





  05:58 


 


D-Dimer   (<0.60)  mg/L FEU


 


ABG pCO2   (35-45)  mmHg


 


ABG pO2   ()  mmHg


 


ABG HCO3   (21-25)  mmol/L


 


ABG O2 Saturation   (94-97)  %


 


Chloride   ()  mmol/L


 


Carbon Dioxide   (22-30)  mmol/L


 


BUN   (7-17)  mg/dL


 


Glucose   (74-99)  mg/dL


 


POC Glucose (mg/dL)  147 H  (75-99)  mg/dL


 


Calcium   (8.4-10.2)  mg/dL


 


AST   (14-36)  U/L


 


ALT   (4-34)  U/L


 


Lactate Dehydrogenase   (313-618)  U/L


 


Creatine Kinase   ()  U/L


 


Total Protein   (6.3-8.2)  g/dL


 


Albumin   (3.5-5.0)  g/dL








                      Microbiology - Last 24 Hours (Table)











 03/31/21 02:30 Blood Culture - Preliminary





 Blood    No Growth after 96 hours


 


 03/31/21 02:15 Blood Culture - Preliminary





 Blood    No Growth after 96 hours


 


 04/01/21 11:13 Gram Stain - Final





 Sputum Sputum Culture - Final














Assessment and Plan


Assessment: 





1 Acute hypoxemic respiratory failure secondary to CoVID 19 pneum

onia/pneumonitis requiring intubation mechanical ventilatory support on 

03/31/2021.  Outside the window for Remdesivir. Received tocilizumab.





2 Hypertension, history of





3 Paroxysmal atrial fibrillation





4 Hyperlipidemia





Plan:





The patient was seen and evaluated by Dr. Barba


Chest x-ray, ABGs and labs reviewed


PEEP had been increased to 15 earlier this morning


Continue the current vent settings


Advance ETT 2 cms


Continue Decadron, Lovenox, vitamin supplements


Received tocilizumab


Chest x-ray, ABGs in a.m.


We will continue to follow and make further recommendations based on her 

clinical status





Critical care time 36 minutes





I, the cosigning physician, performed a history & physical examination of the 

patient. Lungs sounds with coarse crackles in the bilateral bases.  Maintaining 

O2 saturations in the 90s on 50% FiO2 and a PEEP of 15.  I discussed the 

assessment and plan of care with my nurse practitioner, Leatha Rodriguez. I attest to 

the above note as dictated by her.

## 2021-04-05 LAB
ALBUMIN SERPL-MCNC: 2.4 G/DL (ref 3.5–5)
ALP SERPL-CCNC: 69 U/L (ref 38–126)
ALT SERPL-CCNC: 65 U/L (ref 4–34)
ANION GAP SERPL CALC-SCNC: 3 MMOL/L
AST SERPL-CCNC: 45 U/L (ref 14–36)
BUN SERPL-SCNC: 18 MG/DL (ref 7–17)
CALCIUM SPEC-MCNC: 7.6 MG/DL (ref 8.4–10.2)
CELLS COUNTED: 100
CHLORIDE SERPL-SCNC: 114 MMOL/L (ref 98–107)
CO2 BLDA-SCNC: 22 MMOL/L (ref 19–24)
CO2 SERPL-SCNC: 20 MMOL/L (ref 22–30)
ERYTHROCYTE [DISTWIDTH] IN BLOOD BY AUTOMATED COUNT: 3.76 M/UL (ref 3.8–5.4)
ERYTHROCYTE [DISTWIDTH] IN BLOOD: 13.5 % (ref 11.5–15.5)
GLUCOSE BLD-MCNC: 131 MG/DL (ref 75–99)
GLUCOSE BLD-MCNC: 190 MG/DL (ref 75–99)
GLUCOSE BLD-MCNC: 194 MG/DL (ref 75–99)
GLUCOSE SERPL-MCNC: 132 MG/DL (ref 74–99)
HCO3 BLDA-SCNC: 21 MMOL/L (ref 21–25)
HCT VFR BLD AUTO: 33.1 % (ref 34–46)
HGB BLD-MCNC: 11.8 GM/DL (ref 11.4–16)
LDH SPEC-CCNC: 956 U/L (ref 313–618)
LYMPHOCYTES # BLD MANUAL: 1.43 K/UL (ref 1–4.8)
MCH RBC QN AUTO: 31.5 PG (ref 25–35)
MCHC RBC AUTO-ENTMCNC: 35.8 G/DL (ref 31–37)
MCV RBC AUTO: 88.1 FL (ref 80–100)
MONOCYTES # BLD MANUAL: 0.06 K/UL (ref 0–1)
NEUTROPHILS NFR BLD MANUAL: 75 %
NEUTS SEG # BLD MANUAL: 4.7 K/UL (ref 1.3–7.7)
PCO2 BLDA: 33 MMHG (ref 35–45)
PH BLDA: 7.41 [PH] (ref 7.35–7.45)
PLATELET # BLD AUTO: 272 K/UL (ref 150–450)
PO2 BLDA: 66 MMHG (ref 83–108)
POTASSIUM SERPL-SCNC: 3.8 MMOL/L (ref 3.5–5.1)
PROT SERPL-MCNC: 4.5 G/DL (ref 6.3–8.2)
SODIUM SERPL-SCNC: 137 MMOL/L (ref 137–145)
WBC # BLD AUTO: 6.2 K/UL (ref 3.8–10.6)

## 2021-04-05 PROCEDURE — 3E033XZ INTRODUCTION OF VASOPRESSOR INTO PERIPHERAL VEIN, PERCUTANEOUS APPROACH: ICD-10-PCS

## 2021-04-05 RX ADMIN — DEXTRAN 70 AND HYPROMELLOSE 2910 SCH DROPS: 1; 3 SOLUTION/ DROPS OPHTHALMIC at 08:01

## 2021-04-05 RX ADMIN — DEXTRAN 70 AND HYPROMELLOSE 2910 SCH: 1; 3 SOLUTION/ DROPS OPHTHALMIC at 15:47

## 2021-04-05 RX ADMIN — DOPAMINE HYDROCHLORIDE IN DEXTROSE SCH MLS/HR: 3.2 INJECTION, SOLUTION INTRAVENOUS at 12:17

## 2021-04-05 RX ADMIN — ALBUTEROL SULFATE SCH PUFF: 90 AEROSOL, METERED RESPIRATORY (INHALATION) at 19:16

## 2021-04-05 RX ADMIN — DEXTRAN 70 AND HYPROMELLOSE 2910 SCH: 1; 3 SOLUTION/ DROPS OPHTHALMIC at 12:44

## 2021-04-05 RX ADMIN — SPIRONOLACTONE SCH MG: 25 TABLET, FILM COATED ORAL at 08:00

## 2021-04-05 RX ADMIN — ALBUTEROL SULFATE SCH PUFF: 90 AEROSOL, METERED RESPIRATORY (INHALATION) at 11:04

## 2021-04-05 RX ADMIN — INSULIN ASPART SCH UNIT: 100 INJECTION, SOLUTION INTRAVENOUS; SUBCUTANEOUS at 12:40

## 2021-04-05 RX ADMIN — CHLORHEXIDINE GLUCONATE SCH ML: 1.2 RINSE ORAL at 19:57

## 2021-04-05 RX ADMIN — Medication SCH MG: at 07:57

## 2021-04-05 RX ADMIN — ASPIRIN 81 MG CHEWABLE TABLET SCH MG: 81 TABLET CHEWABLE at 07:57

## 2021-04-05 RX ADMIN — CITALOPRAM HYDROBROMIDE SCH MG: 20 TABLET ORAL at 08:00

## 2021-04-05 RX ADMIN — DEXTROSE SCH MG: 50 INJECTION, SOLUTION INTRAVENOUS at 08:01

## 2021-04-05 RX ADMIN — ALBUTEROL SULFATE SCH PUFF: 90 AEROSOL, METERED RESPIRATORY (INHALATION) at 23:12

## 2021-04-05 RX ADMIN — ATORVASTATIN CALCIUM SCH MG: 40 TABLET, FILM COATED ORAL at 07:56

## 2021-04-05 RX ADMIN — CEFAZOLIN SCH MLS/HR: 330 INJECTION, POWDER, FOR SOLUTION INTRAMUSCULAR; INTRAVENOUS at 01:18

## 2021-04-05 RX ADMIN — CHLORHEXIDINE GLUCONATE SCH ML: 1.2 RINSE ORAL at 07:55

## 2021-04-05 RX ADMIN — ALBUTEROL SULFATE SCH PUFF: 90 AEROSOL, METERED RESPIRATORY (INHALATION) at 14:45

## 2021-04-05 RX ADMIN — SODIUM CHLORIDE SCH MLS/HR: 900 INJECTION, SOLUTION INTRAVENOUS at 07:15

## 2021-04-05 RX ADMIN — SODIUM CHLORIDE SCH MLS/HR: 900 INJECTION, SOLUTION INTRAVENOUS at 23:02

## 2021-04-05 RX ADMIN — ALBUTEROL SULFATE SCH PUFF: 90 AEROSOL, METERED RESPIRATORY (INHALATION) at 03:10

## 2021-04-05 RX ADMIN — DEXTRAN 70 AND HYPROMELLOSE 2910 SCH DROPS: 1; 3 SOLUTION/ DROPS OPHTHALMIC at 19:57

## 2021-04-05 RX ADMIN — INSULIN ASPART SCH: 100 INJECTION, SOLUTION INTRAVENOUS; SUBCUTANEOUS at 06:46

## 2021-04-05 RX ADMIN — METOPROLOL SUCCINATE SCH: 100 TABLET, EXTENDED RELEASE ORAL at 07:56

## 2021-04-05 RX ADMIN — ALBUTEROL SULFATE SCH PUFF: 90 AEROSOL, METERED RESPIRATORY (INHALATION) at 07:21

## 2021-04-05 RX ADMIN — ENOXAPARIN SODIUM SCH MG: 40 INJECTION SUBCUTANEOUS at 07:55

## 2021-04-05 RX ADMIN — PANTOPRAZOLE SODIUM SCH MG: 40 INJECTION, POWDER, FOR SOLUTION INTRAVENOUS at 08:01

## 2021-04-05 RX ADMIN — INSULIN ASPART SCH UNIT: 100 INJECTION, SOLUTION INTRAVENOUS; SUBCUTANEOUS at 17:05

## 2021-04-05 RX ADMIN — DEXTRAN 70 AND HYPROMELLOSE 2910 SCH DROPS: 1; 3 SOLUTION/ DROPS OPHTHALMIC at 03:11

## 2021-04-05 RX ADMIN — SODIUM CHLORIDE SCH MLS/HR: 900 INJECTION, SOLUTION INTRAVENOUS at 10:57

## 2021-04-05 RX ADMIN — CEFAZOLIN SCH MLS/HR: 330 INJECTION, POWDER, FOR SOLUTION INTRAMUSCULAR; INTRAVENOUS at 19:57

## 2021-04-05 RX ADMIN — CEFAZOLIN SCH MLS/HR: 330 INJECTION, POWDER, FOR SOLUTION INTRAMUSCULAR; INTRAVENOUS at 10:09

## 2021-04-05 RX ADMIN — OXYCODONE HYDROCHLORIDE AND ACETAMINOPHEN SCH MG: 500 TABLET ORAL at 07:56

## 2021-04-05 RX ADMIN — Medication SCH MCG: at 08:00

## 2021-04-05 NOTE — PN
PROGRESS NOTE



DATE OF SERVICE:

04/05/2021



This 61-year-old woman who was admitted with acute COVID-19 pneumonia is being closely

monitored.  Patient is on mechanical ventilation at this time.  The patient is

intubated and sedated.  The PEEP has been reduced today.  The patient also had PVCs.

Cardiology evaluation has been sought.  The patient also has some bradycardia.  The

troponin was found to be less than 0.012.  A 2D echo with Doppler has been ordered.

Most recent chest x-ray, which was reviewed personally by me, showed extensive

bilateral lower lobe infiltrates highly suggestive of COVID-19 pneumonia. Past medical

history reviewed. Review of systems could not be taken. The patient has been started on

IV dopamine drip at this time, Lovenox.



CURRENT MEDICATIONS:

Current medications include Ventolin, Norvasc, vitamin C, aspirin, Lipitor, Peridex,

Celexa, Decadron, dopamine. Doses were reviewed.



PHYSICAL EXAMINATION:

Patient is mechanically ventilated and sedated. Pulse 51, regular, blood pressure

140/70, respiration 30. Temperature is 98.8, pulse ox 92% on mechanical ventilation.

Vent settings are noted. As mentioned earlier, PEEP has been reduced.

HEENT: Conjunctivae normal.

NECK: No jugular venous distention.

CARDIOVASCULAR SYSTEM:  S1, S2 muffled.

RESPIRATORY SYSTEM: Breath sounds diminished at the bases.  A few scattered rhonchi.

ABDOMEN: Soft, obese, non-tender.

NERVOUS SYSTEM: The patient is mechanically ventilated and sedated.



LABS:

WBC 6.2, hemoglobin 11.8. CO2 is 20.  Calcium is 7.6, AST is 65, ALT is 45, .

Cultures are negative so far.



ASSESSMENT:

1. Acute COVID-19 infection with acute bilateral pneumonia, viral pneumonia with acute

    hypoxic respiratory failure, on mechanical ventilation.

2. Bradycardia for evaluation.

3. Elevated inflammatory markers of COVID-19.

4. Acute COVID-19 gastroenteritis, present on admission.

5. Elevated AST and ALT, most likely secondary to COVID-19.

6. Hypertension.

7. Hyperlipidemia.

8. Paroxysmal atrial fibrillation history.

9. Obesity with body mass index 48.2.

10.History of hypertension.

11.History of cholecystectomy.

12.FULL CODE.



RECOMMENDATIONS AND DISCUSSION:

In this 61-year-old woman who presented with multiple complex medical issues, we will

monitor the patient closely, continue the current medications, continue with

symptomatic treatment, continue with Lovenox. Continue with dopamine drip.  Continue

the rest of the medications.  Continue zinc. Continue with the multivitamins.

Prognosis is extremely guarded because of multiple complex medical issues. Further

recommendations to follow. The patient continues to be afebrile now. Once again, the

prognosis is extremely guarded because of the severe COVID-19 infection and markedly

abnormal x-ray and vent dependence.





MMODL / IJN: [id number] / Job#: [id number]

## 2021-04-05 NOTE — ECHOF
Referral Reason:bradycardia



MEASUREMENTS

--------

HEIGHT: 167.6 cm

WEIGHT: 135.2 kg

BP: 191/71

IVSd:   1.5 cm     (0.6 - 1.1)

LVIDd:   3.1 cm     (3.9 - 5.3)

LVPWd:   1.5 cm     (0.6 - 1.1)

EDV(Teich):   39 ml

IVSs:   1.9 cm

LVIDs:   1.4 cm

LVPWs:   1.5 cm

%IVS Thck:   28 %

ESV(Teich):   5 ml

EF(Teich):   86 %

%FS:   54 %

SV(Teich):   34 ml

LALs A4C:   5.1 cm

LAAs A4C:   16.3 cm

LAESV A-L A4C:   45 ml

LAESV MOD A4C:   42 ml

LALs A2C:   5.7 cm

LAAs A2C:   14.2 cm

LAESV A-L A2C:   30 ml

LAESV MOD A2C:   29 ml

LAESV(A-L):   39 ml

LAESV Index (A-L):   16.40 ml/m

Ao Diam:   3.4 cm     (2.0 - 3.7)

AV Cusp:   2.3 cm     (1.5 - 2.6)

EPSS:   0.2 cm

MV E Ryan:   0.57 m/s

MV DecT:   323 ms

MV Dec [place]:   1.8 m/s

MV A Ryan:   0.79 m/s

MV E/A Ratio:   0.72 

MV PHT:   94 ms

LVOT Vmax:   1.32 m/s

LVOT maxP.96 mmHg

AV Vmax:   1.82 m/s

AV maxP.26 mmHg

TR Vmax:   2.08 m/s

TR maxP.32 mmHg

RAP:   5.00 mmHg

RVSP:   22.32 mmHg

MV EF SLOPE:   61.55 mm/s     (70 - 150)

MV EXCURSION:   17.01 mm     (> 18.000)







FINDINGS

--------

Sinus rhythm.

This was a technically difficult study with suboptimal views.

The left ventricular size is normal.   There is moderate concentric left ventricular hypertrophy.   O
verall left ventricular systolic function is normal with, an EF between 55 - 60 %.

The RV was not well visualized.

Normal LA  size by volume 22+/-6 ml/m2.

The right atrium was not well visualized.

5.0mg of Lumason was utilized for enhancement of images

Interatrial and interventricular septum intact.

The aortic valve was not well visualized.   There is no evidence of aortic regurgitation.   There is 
no evidence of aortic stenosis.

The mitral valve was not well visualized.   There is trace to mild mitral regurgitation.

Mild tricuspid regurgitation present.   There is no evidence of pulmonary hypertension.   The right v
entricular systolic pressure, as measured by Doppler, is 22.32mmHg.

The pulmonic valve was not well visualized.

The aortic root size is normal.

IVC Not well visulized.

There is no pericardial effusion.



CONCLUSIONS

--------

1. The left ventricular size is normal.

2. There is moderate concentric left ventricular hypertrophy.

3. Overall left ventricular systolic function is normal with, an EF between 55 - 60 %.

4. There is trace to mild mitral regurgitation.

5. Mild tricuspid regurgitation present.





SONOGRAPHER: Grace Osorio RDCS

## 2021-04-05 NOTE — P.PN
Subjective


Progress Note Date: 04/05/21


Principal diagnosis: 





COVID 19 pneumonia.





61-year-old female, who hasn't been feeling well for about 10-11 days.  She 

tested positive for COVID 19 on March 24.  She's been sick for 10-11 days old.  

She complains of TYPICAL symptoms including weakness, fever, diarrhea, cough, 

and shortness of breath.  She's quite hypoxemic.  On AIRVO 100%, and a 

nonrebreather mask, her saturations are right around 80-83%.  Any movement, and 

she desaturates even further.  I just called the intensive care unit, and we'll 

plan on putting her in the intensive care unit.  She's currently on saline at 75

mL an hour.  She can barely speak without becoming short of breath.  The patient

is not a candidate for REM, but could get TOCI.  Sodium 138, potassium 3.8, 

chlorides 108, CO2 21, anion gap 9, BUN 19, creatinine 0.7.  AST 94, ALTs 60, 

LDH 1471, C-reactive protein 88.1.  Again, the patient's been sick for at least 

10-11 days and maybe a bit longer.  Her oral intake has been poor as well.  The 

patient's chest x-ray shows diffuse bilateral infiltrates consistent with 

coronavirus infection.





The patient is seen today 04/01/2021 in follow-up in the intensive care unit.  

Shortly after arriving to the ICU yesterday she required intubation and 

mechanical ventilatory support injury to acute hypoxemic respiratory failure.  

She is currently on the ventilator and assist control mode with a rate of 34, 

tidal on 450, FiO2 70%, PEEP of 16.  Morning blood gases reveal pO2 of 70, pCO2 

33, P 87.38.  Fentanyl drip at 0.5 mcg/kg per hour, propofol at 15 mcg/kg/m, 

Nimbex at 1 mcg/min per hour.  0.9 normal saline at 130 ML's per hour.  She did 

receive Tociluzimab.  She remains on Decadron 6 mg IV daily.  Lovenox 40 mg 

subcutaneous daily.  Tube feedings will be initiated for nutritional support.  

Blood cultures reveal no growth.  White count 2.9.  Hemoglobin 11.3 and 

platelets 188.  Lymphocytes 0.8.  She is continued on Lovenox, dexamethasone, 

vitamin supplements.





The patient is seen today 04/02/2021 in follow-up in the intensive care unit.  

She remains intubated on the mechanical ventilator.  Assist-control mode.  Rate 

of 30, tidal volume 450, FiO2 60% and a PEEP of 16.  She is currently sedated on

propofol at 40 mcg/kg/m, Nimbex at 1 mcg/kg/m, fentanyl at 0.5 mcg/kg per hour. 

She has 0.9 normal saline at 130 ML's per hour.  She is being nourished with 

vital HP at 20 ML's per hour.  She did receive tocilizumab.  He remains on 

dexamethasone, Lovenox, vitamin supplements.  Chest x-ray showing persistent but

improving patchy bilateral airspace disease.  Sputum and blood cultures are 

pending.  White count 3.9.  Hemoglobin 11.4.  Lymphocytes 0.7.  D-dimer 1.19.  

Sodium 139.  Potassium 4.4.  Bicarb 20.  Creatinine 0.64.  Glucose 174.





The patient is seen today 04/03/2020 in follow-up in the intensive care unit.  

She remains intubated on mechanical ventilator.  Current settings are assist-

control mode.  Rate of 34, tidal volume 450, FiO2 50% and a PEEP of 16.  Morning

blood gases reveal a pO2 of 117, pCO2 30, pH 7.40.  She remains sedated on 

propofol at 55 mcg/kg/m.  Fentanyl and 1 mcg/kg/hr.  She is being nourished with

vital HP at 33 ML's per hour.  0.9 normal saline at 130 ML's per hour.  She 

remains on dexamethasone, Lovenox, bronchodilators.  White count 4.0.  Hemog

lobin 11.8.  D-dimer 1.81.  Sodium 138.  Potassium 4.2.  Bicarb 19.  LDH 1133.  

C-reactive protein 15.5.  Asked x-ray continues to show moderate scattered 

interstitial and partial airspace opacities.  





The patient is seen today 04/04/2021 in follow-up in the intensive care unit.  

She remains intubated, sedated.  Current settings are assist control at a rate 

of 30, tidal volume 450, FiO2 50% and a PEEP of 15.  She is sedated on propofol 

at 60 mcg/kg/m, fentanyl at 1 mcg/kg per hour, 0.9 normal saline 130 ML's per 

hour.  Vital HP at 24 mls per hour.  She did receive Actemra.  Blood culture 

reveals no growth.  Sputum culture reveals no growth.  White count 5.3.  

Hemoglobin 12.0.  D-dimer 2.0.  Sodium 139.  Potassium 4.0.  Creatinine 0.59.  

LDH 1082.  C reactive protein 8.6.  Chest x-ray continues to show no interval 

change, continues with persistent moderate patchy opacities bilaterally.  

Endotracheal tube to be advanced to 2 cms.  Continued on vitamin supplements, 

Lovenox, IV Solu-Medrol. 





Progress note dated 04/05/2021.





This is a patient was admitted on March 31 for COVID 19 pneumonia.  The patient 

came to the ICU on March 31, and was intubated on the same day.  She apparently 

tested positive back on March 24.  The patient remains on the mechanical 

ventilator.  She is on the volume assist control mode, rate of 30, tidal volume 

450, FiO2 50%, PEEP of 15 to be dropped to 10.  Blood gases show pO2 of 66, pCO2

33, and a pH 7.41.  The patient's on propofol at 60 mcg/kg/m, fentanyl 1 

mcg/kg/h, normal saline at 130 mL an hour and vital high protein at 24, which is

goal.  The patient did receive TOCI.   White count is 6.2, hemoglobin 11.8, 

hematocrit 33.1, platelet count 272,000.  Sodium 137, potassium 3.8, chlorides 

114, CO2 20, anion gap 3, BUN 18, creatinine 0.53.  LDH is 956.  Chest x-ray 

shows worsened bilateral infiltrates.  Small effusions are noted.





Objective





- Vital Signs


Vital signs: 


                                   Vital Signs











Temp  97.9 F   04/05/21 08:00


 


Pulse  38 L  04/05/21 10:00


 


Resp  30 H  04/05/21 10:00


 


BP  118/67   04/05/21 10:00


 


Pulse Ox  83 L  04/05/21 10:00








                                 Intake & Output











 04/04/21 04/05/21 04/05/21





 18:59 06:59 18:59


 


Intake Total 2676.923 2196.406 893.850


 


Output Total 1115 1170 360


 


Balance 2234.495 5889.406 533.850


 


Weight 135.6 kg  


 


Intake:   


 


  IV 1729 1463 532


 


    0.9 NACL 1690 780 


 


    Sodium Chloride 0.9% 1,  650 520





    000 ml @ 130 mls/hr IV .   





    [facility] Rx[facility][id number]   


 


    pressure bag 39 33 12


 


  Intake, IV Titration 473.923 379.406 235.850





  Amount   


 


    fentaNYL (PF) 1,000 mcg 173.923 74.524 134.223





    In Sodium Chloride 0.9%   





    80 ml @ Per Protocol IV .   





    Q0M [facility] Rx#:[id number]   


 


    propofoL 1,000 mg In 300 304.882 101.627





    Empty Bag 1 bag @ Titrate   





    IV .[facility]0M [facility] Rx#:   





    [id number]   


 


  Tube Feeding 384 264 96


 


  Other 90 90 30


 


Output:   


 


  Urine 1115 1170 360


 


Other:   


 


  Voiding Method Indwelling Catheter Indwelling Catheter Indwelling Catheter








                       ABP, PAP, CO, CI - Last Documented











Arterial Blood Pressure        150/59

















- Exam





Sedated, and mechanically ventilated.





HEENT examination is grossly unremarkable.  Orally placed endotracheal tube is 

noted.





Neck supple.  Full range of motion.  No adenopathy thyromegaly or neck vein 

distention.





Cardiovascular examination reveals regular rhythm rate.  S1-S2 normal.  No S3 or

S4.  No discernible murmur noted.





Lungs reveal bibasilar crackles and few scattered rhonchi.  No wheezes.  Breath 

sounds equal bilaterally.





Abdomen soft bowel sounds are heard.  No masses or tenderness.





Extremities are intact.  No cyanosis clubbing or edema.





Skin is without rash or lesion.





Neurologic examination cannot be adequately assessed as she is currently on 

propofol and fentanyl.





- Labs


CBC & Chem 7: 


                                 04/05/21 04:10





                                 04/05/21 04:10


Labs: 


                  Abnormal Lab Results - Last 24 Hours (Table)











  04/04/21 04/04/21 04/04/21 Range/Units





  12:19 17:31 23:32 


 


RBC     (3.80-5.40)  m/uL


 


Hct     (34.0-46.0)  %


 


ABG pCO2     (35-45)  mmHg


 


ABG pO2     ()  mmHg


 


Chloride     ()  mmol/L


 


Carbon Dioxide     (22-30)  mmol/L


 


BUN     (7-17)  mg/dL


 


Glucose     (74-99)  mg/dL


 


POC Glucose (mg/dL)  180 H  190 H  164 H  (75-99)  mg/dL


 


Calcium     (8.4-10.2)  mg/dL


 


AST     (14-36)  U/L


 


ALT     (4-34)  U/L


 


Lactate Dehydrogenase     (313-618)  U/L


 


Total Protein     (6.3-8.2)  g/dL


 


Albumin     (3.5-5.0)  g/dL














  04/05/21 04/05/21 04/05/21 Range/Units





  04:10 04:10 05:45 


 


RBC  3.76 L    (3.80-5.40)  m/uL


 


Hct  33.1 L    (34.0-46.0)  %


 


ABG pCO2     (35-45)  mmHg


 


ABG pO2     ()  mmHg


 


Chloride   114 H   ()  mmol/L


 


Carbon Dioxide   20 L   (22-30)  mmol/L


 


BUN   18 H   (7-17)  mg/dL


 


Glucose   132 H   (74-99)  mg/dL


 


POC Glucose (mg/dL)    131 H  (75-99)  mg/dL


 


Calcium   7.6 L   (8.4-10.2)  mg/dL


 


AST   45 H   (14-36)  U/L


 


ALT   65 H   (4-34)  U/L


 


Lactate Dehydrogenase   956 H   (313-618)  U/L


 


Total Protein   4.5 L   (6.3-8.2)  g/dL


 


Albumin   2.4 L   (3.5-5.0)  g/dL














  04/05/21 Range/Units





  05:50 


 


RBC   (3.80-5.40)  m/uL


 


Hct   (34.0-46.0)  %


 


ABG pCO2  33 L  (35-45)  mmHg


 


ABG pO2  66 L  ()  mmHg


 


Chloride   ()  mmol/L


 


Carbon Dioxide   (22-30)  mmol/L


 


BUN   (7-17)  mg/dL


 


Glucose   (74-99)  mg/dL


 


POC Glucose (mg/dL)   (75-99)  mg/dL


 


Calcium   (8.4-10.2)  mg/dL


 


AST   (14-36)  U/L


 


ALT   (4-34)  U/L


 


Lactate Dehydrogenase   (313-618)  U/L


 


Total Protein   (6.3-8.2)  g/dL


 


Albumin   (3.5-5.0)  g/dL








                      Microbiology - Last 24 Hours (Table)











 03/31/21 02:30 Blood Culture - Preliminary





 Blood    No Growth after 120 hours


 


 03/31/21 02:15 Blood Culture - Preliminary





 Blood    No Growth after 120 hours














Assessment and Plan


Assessment: 





Acute hypoxemic respiratory failure secondary to COVID 19 pneumonia/pneumonitis,

status post TOCI.





History of hypertension.





History of atrial fibrillation.





Previous heart catheterization.





History of hyperlipidemia.








Plan: 





Plan dated 03/31/2021.





The patient will be moved to the intensive care unit.  We'll get a blood gas.  

The patient is not a candidate for REM.  She's had symptoms for at least 11 days

and maybe longer.  She is a candidate for TOCI.  We'll put the order in.  She 

also needs a D-dimer test done.  Additional recommendations and suggestions are 

forthcoming.  In the end, she may or intubation and mechanical ventilation.  We 

can watch her more closely in the intensive care unit.  I did speak to the 

charge nurse in the ICU.  Additional recommendations and suggestions are 

forthcoming.  She should deftly get Decadron 6 mg IV daily. 





Plan dated 04/05/2021.





We will try to make a PEEP change, and drop the PEEP from 15 down to 10.  In 

addition, the patient will have the IV cut back to 40 mL an hour, and we'll give

her a dose of Lasix.  This will help to improve oxygenation.  We'll continue 

tube feedings at goal.  Additional recommendations and suggestions are 

forthcoming.  We will attempted daily eruption sedation.  No additional 

recommendations are made.  Prognosis is guarded.  All the other medications are 

appropriate.  They have been reviewed today.  Unnecessary medications were 

discontinued from the MAR.


Time with Patient: Greater than 30

## 2021-04-05 NOTE — P.CRDCN
History of Present Illness


Consult date: 04/05/21


History of present illness: 





this is a 61-year-old female patient on we requested to see in the intensive 

care unit for further evaluation of her bradycardia.  The patient was diagnosed 

with COVID-19 infection.  Currently she is in acute hypoxic respiratory failure.

 We requested to see the patient for bradycardia.  Throughout the day the 

patient developed multiple episodes of sinus bradycardia and early her today she

did have an episode of 8 seconds of sinus pause.  Currently she has been 

maintaining heart rate in the 50s.  She was started on dopamine by the intensive

care team.  No prior history of cardiac arrhythmia.  No history of coronary 

artery disease or congestive heart failure or any cardiac arrhythmia.the patient

was admitted to the hospital a few days ago with increasing shortness of breath 

as well as increasing weakness and fever and diarrhea as well as cough.  She was

hypoxic.  initially she was admitted to the floor and subsequently she was tr

ansferred to the intensive care unit.  There is questionable history of 

paroxysmal atrial fibrillation the patient is not receiving any oral 

anticoagulation as an outpatient.she does have history of hypertension as well 

as dyslipidemia.  At this point would going to obtain a TSH and free T4.  We are

also going to obtain serial cardiac enzymes to rule out any acute coronary event

in the setting of COVID infection.  Also will obtain an echocardiogram was 

Doppler to establish LV function.





Past Medical History


Past Medical History: Atrial Fibrillation, Hypertension


History of Any Multi-Drug Resistant Organisms: None Reported


Past Surgical History: Cholecystectomy, Heart Catheterization, Hysterectomy, 

Tubal Ligation


Past Psychological History: No Psychological Hx Reported


Past Alcohol Use History: Occasional


Past Drug Use History: None Reported





- Past Family History


  ** Mother


History Unknown: Yes





Medications and Allergies


                                Home Medications











 Medication  Instructions  Recorded  Confirmed  Type


 


Lisinopril [Prinivil] 10 mg PO DAILY 05/29/18 03/31/21 History


 


Metoprolol Succinate (ER) [Toprol 100 mg PO DAILY 05/29/18 03/31/21 History





XL]    


 


amLODIPine [Norvasc] 5 mg PO DAILY 05/29/18 03/31/21 History


 


Aspirin 81 mg PO DAILY #30 chew 06/01/18 03/31/21 Rx


 


Atorvastatin [Lipitor] 40 mg PO DAILY 03/31/21 03/31/21 History


 


Citalopram Hydrobromide [CeleXA] 40 mg PO DAILY 03/31/21 03/31/21 History


 


Spironolactone [Aldactone] 12.5 mg PO DAILY 03/31/21 03/31/21 History








                                    Allergies











Allergy/AdvReac Type Severity Reaction Status Date / Time


 


No Known Allergies Allergy   Verified 03/31/21 07:35














Physical Exam


Vitals: 


                                   Vital Signs











  Temp Pulse Resp BP Pulse Ox


 


 04/05/21 13:00   73  30 H  134/67  93 L


 


 04/05/21 12:00  98.6 F  51 L  30 H  157/84  90 L


 


 04/05/21 11:00   43 L  30 H  126/61  88 L


 


 04/05/21 10:00   38 L  30 H  118/67  83 L


 


 04/05/21 09:00   39 L  30 H  116/62  88 L


 


 04/05/21 08:00  97.9 F  38 L  30 H   93 L


 


 04/05/21 07:00   37 L  30 H  116/61  93 L


 


 04/05/21 06:00   37 L  30 H  132/73  92 L


 


 04/05/21 05:00   35 L  30 H  118/81  93 L


 


 04/05/21 04:00  98.1 F  37 L  30 H  139/74  92 L


 


 04/05/21 03:00   34 L  30 H  143/74  93 L


 


 04/05/21 02:00   33 L  30 H   92 L


 


 04/05/21 01:00   34 L  30 H  144/70  94 L


 


 04/05/21 00:00  98.2 F  35 L  30 H  137/71  94 L


 


 04/04/21 23:00   35 L  30 H  143/68  94 L


 


 04/04/21 22:00   39 L  30 H  135/74  93 L


 


 04/04/21 21:00   36 L  30 H  123/79  94 L


 


 04/04/21 20:00  99.0 F  40 L  30 H  132/75  93 L


 


 04/04/21 18:00   36 L  30 H  136/70  93 L


 


 04/04/21 17:00   39 L  30 H  135/96  94 L


 


 04/04/21 16:00  98.1 F  39 L  30 H  136/70  93 L


 


 04/04/21 15:00   39 L  30 H  132/77  93 L


 


 04/04/21 14:00   38 L  30 H  130/69  92 L








                                Intake and Output











 04/04/21 04/05/21 04/05/21





 22:59 06:59 14:59


 


Intake Total 8329.912 7189.965 1124.850


 


Output Total 760 865 525


 


Balance 882.441 727.965 599.850


 


Intake:   


 


  IV 1064 1064 661


 


    0.9 NACL 1040 390 


 


    Sodium Chloride 0.9% 1,  650 640





    000 ml @ 40 mls/hr IV .   





    Q24H [facility] Rx#:[id number]   


 


    pressure bag 24 24 21


 


  Intake, IV Titration 302.441 276.965 235.850





  Amount   


 


    fentaNYL (PF) 1,000 mcg 100 74.524 134.223





    In Sodium Chloride 0.9%   





    80 ml @ Per Protocol IV .   





    Q0M [facility] Rx#:[id number]   


 


    propofoL 1,000 mg In 202.441 202.441 101.627





    Empty Bag 1 bag @ Titrate   





    IV .Q0M [facility] Rx#:   





    [id number]   


 


  Tube Feeding 216 192 168


 


  Other 60 60 60


 


Output:   


 


  Urine 760 865 525


 


Other:   


 


  Voiding Method Indwelling Catheter Indwelling Catheter Indwelling Catheter


 


  Weight   135.4 kg








                         ABP, PAP, CO, CI - Last 8 Hours











Arterial Blood Pressure        191/71


 


Arterial Blood Pressure        170/67


 


Arterial Blood Pressure        137/56


 


Arterial Blood Pressure        150/59


 


Arterial Blood Pressure        145/57


 


Arterial Blood Pressure        142/56


 


Arterial Blood Pressure        141/55


 


Arterial Blood Pressure        148/58

















- Constitutional


General appearance: no acute distress





Results





                                 04/05/21 04:10





                                 04/05/21 04:10


                                 Cardiac Enzymes











  04/05/21 Range/Units





  04:10 


 


AST  45 H  (14-36)  U/L


 


Lactate Dehydrogenase  956 H  (313-618)  U/L








                                       CBC











  04/05/21 Range/Units





  04:10 


 


WBC  6.2  (3.8-10.6)  k/uL


 


RBC  3.76 L  (3.80-5.40)  m/uL


 


Hgb  11.8  (11.4-16.0)  gm/dL


 


Hct  33.1 L  (34.0-46.0)  %


 


Plt Count  272  (150-450)  k/uL








                          Comprehensive Metabolic Panel











  04/05/21 Range/Units





  04:10 


 


Sodium  137  (137-145)  mmol/L


 


Potassium  3.8  (3.5-5.1)  mmol/L


 


Chloride  114 H  ()  mmol/L


 


Carbon Dioxide  20 L  (22-30)  mmol/L


 


BUN  18 H  (7-17)  mg/dL


 


Creatinine  0.53  (0.52-1.04)  mg/dL


 


Glucose  132 H  (74-99)  mg/dL


 


Calcium  7.6 L  (8.4-10.2)  mg/dL


 


AST  45 H  (14-36)  U/L


 


ALT  65 H  (4-34)  U/L


 


Alkaline Phosphatase  69  ()  U/L


 


Total Protein  4.5 L  (6.3-8.2)  g/dL


 


Albumin  2.4 L  (3.5-5.0)  g/dL








                               Current Medications











Generic Name Dose Route Start Last Admin





  Trade Name Freq  PRN Reason Stop Dose Admin


 


Albuterol Sulfate  2 puff  04/03/21 16:00  04/05/21 11:04





  Albuterol Hfa Inhaler  INHALATION   2 puff





  RT-Q4H VIOLET   Administration


 


Amlodipine Besylate  5 mg  03/31/21 09:00  04/05/21 07:56





  Amlodipine 5 Mg Tab  PO   5 mg





  DAILY VIOLET   Administration


 


Artificial Tears  2 drops  04/01/21 20:00  04/05/21 12:44





  Artificial Tears-Hypromellose Drops 15 Ml Btl  BOTH EYES   Not Given





  Q4HR VIOLET  


 


Ascorbic Acid  1,000 mg  03/31/21 09:00  04/05/21 07:56





  Ascorbic Acid 500 Mg Tab  PO   1,000 mg





  DAILY VIOLET   Administration


 


Aspirin  81 mg  03/31/21 09:00  04/05/21 07:57





  Aspirin 81 Mg  PO   81 mg





  DAILY VIOLET   Administration


 


Atorvastatin Calcium  40 mg  03/31/21 09:00  04/05/21 07:56





  Atorvastatin 40 Mg Tab  PO   40 mg





  DAILY VIOLET   Administration


 


Chlorhexidine Gluconate  15 ml  03/31/21 21:00  04/05/21 07:55





  Chlorhexidine Gluconate 15 Ml Cup  MUCOUS MEM   15 ml





  BID VIOLET   Administration


 


Cholecalciferol  50 mcg  03/31/21 09:00  04/05/21 08:00





  Cholecalciferol 25 Mcg (1000 Iu) Tablet  PO   50 mcg





  DAILY VIOLET   Administration


 


Citalopram Hydrobromide  40 mg  03/31/21 09:00  04/05/21 08:00





  Citalopram Hydrobromide 20 Mg Tab  PO   40 mg





  DAILY VIOLET   Administration


 


Dexamethasone Sodium Phosphate  6 mg  03/31/21 09:00  04/05/21 08:01





  Dexamethasone Sod Phosphate 10 Mg/Ml 1 Ml Vial  IV   6 mg





  DAILY VIOLET   Administration


 


Enoxaparin Sodium  40 mg  03/31/21 09:00  04/05/21 07:55





  Enoxaparin 40 Mg/0.4 Ml Syringe  SQ   40 mg





  DAILY VIOLET   Administration


 


Propofol 1,000 mg/ IV Solution  100 mls @ 0 mls/hr  03/31/21 14:00  04/05/21 

10:55





  IV   60 mcg/kg/min





  .Q0M VIOLET   48.816 mls/hr





    Administration





  Protocol  





  Titrate  


 


Fentanyl Citrate 1,000 mcg/  100 mls @ 0 mls/hr  03/31/21 14:30  04/05/21 10:57





  Sodium Chloride  IV   1 mcg/kg/hr





  .Q0M VIOLET   12.02 mls/hr





    Administration





  Protocol  





  Per Protocol  


 


Sodium Chloride  1,000 mls @ 40 mls/hr  03/31/21 16:45  04/05/21 10:09





  Saline 0.9%  IV   130 mls/hr





  .Q24H [facility]IOLET   Administration


 


Dopamine HCl/Dextrose 800 mg/  250 mls @ 5.085 mls/hr  04/05/21 12:30  04/05/21 

12:17





  IV Solution  IV   2 mcg/kg/min





  .Q24H VIOLET   5.085 mls/hr





    Administration





  Protocol  





  2 MCG/KG/MIN  


 


Insulin Aspart  0 unit  04/01/21 00:00  04/05/21 12:40





  Insulin Aspart (Novolog) 100 Unit/Ml Vial  SQ   3 unit





  Q6H VIOLET   Administration





  Protocol  


 


Lisinopril  10 mg  03/31/21 09:00  04/05/21 07:57





  Lisinopril 10 Mg Tab  PO   10 mg





  DAILY VIOLET   Administration


 


Miscellaneous Information  1 each  04/01/21 05:53 





  Potassium Replacement Protocol 1 Each Misc  MISCELLANE  





  DAILY PRN  





  Per Protocol  





  Protocol  


 


Naloxone HCl  0.2 mg  03/31/21 00:55 





  Naloxone 0.4 Mg/Ml 1 Ml Vial  IV  





  Q2M PRN  





  Opioid Reversal  


 


Ondansetron HCl  4 mg  03/31/21 00:55  03/31/21 01:59





  Ondansetron 4 Mg/2 Ml Vial  IVP   4 mg





  Q8HR PRN   Administration





  Nausea And Vomiting  


 


Pantoprazole Sodium  40 mg  04/01/21 09:00  04/05/21 08:01





  Pantoprazole 40 Mg/10 Ml Vial  IV   40 mg





  DAILY VIOLET   Administration


 


Spironolactone  12.5 mg  03/31/21 09:00  04/05/21 08:00





  Spironolactone 25 Mg Tab  PO   12.5 mg





  DAILY VIOLET   Administration


 


Zinc Sulfate  220 mg  03/31/21 09:00  04/05/21 07:57





  Zinc Sulfate 220 Mg Cap  PO   220 mg





  DAILY VIOLET   Administration








                                Intake and Output











 04/04/21 04/05/21 04/05/21





 22:59 06:59 14:59


 


Intake Total 8157.564 6406.965 1124.850


 


Output Total 760 865 525


 


Balance 882.441 727.965 599.850


 


Intake:   


 


  IV 1064 1064 661


 


    0.9 NACL 1040 390 


 


    Sodium Chloride 0.9% 1,  650 640





    000 ml @ 40 mls/hr IV .   





    Q24H [facility] Rx#:[id number]   


 


    pressure bag 24 24 21


 


  Intake, IV Titration 302.441 276.965 235.850





  Amount   


 


    fentaNYL (PF) 1,000 mcg 100 74.524 134.223





    In Sodium Chloride 0.9%   





    80 ml @ Per Protocol IV .   





    Q0M [facility] Rx#:[id number]   


 


    propofoL 1,000 mg In 202.441 202.441 101.627





    Empty Bag 1 bag @ Titrate   





    IV .Q0M [facility] Rx#:   





    [id number]   


 


  Tube Feeding 216 192 168


 


  Other 60 60 60


 


Output:   


 


  Urine 760 865 525


 


Other:   


 


  Voiding Method Indwelling Catheter Indwelling Catheter Indwelling Catheter


 


  Weight   135.4 kg








                                 Patient Weight











 04/06/21





 06:59


 


Weight 135.4 kg








                                        





                                 04/05/21 04:10 





                                 04/05/21 04:10 











Assessment and Plan


Assessment: 





assessment


Acute hypoxic respiratory failure secondary to pneumonia


Sinus bradycardia


Questionable history of paroxysmal atrial fibrillation


Multiple comorbid conditions





Plan


Rule out acute coronary event


Rule out thyroid disorder.  We'll obtain a TSH and free T4


Agree about keeping the patient on dopamine at this point


Obtain an echocardiogram was Doppler


Follow-up with the patient

## 2021-04-05 NOTE — XR
EXAMINATION TYPE: XR chest 1V portable

 

DATE OF EXAM: 4/5/2021

 

Comparison: 4/4/2021

 

Clinical History: 61-year-old female Tube placement

 

Findings:

ET tube remains satisfactory. NG tube courses below the diaphragm. Heart remains mildly enlarged. Int
erstitial changes throughout the lungs and consolidation within the mid and lower lungs persists with
 small to moderate effusions. Changes relatively similar to slightly worsened from prior exam.

 

 

Impression:

Correlate for pulmonary edema, stable to slightly worsened from prior exam. Small-to-moderate effusio
ns with adjacent atelectasis and/or consolidation persists.

## 2021-04-06 LAB
ALBUMIN SERPL-MCNC: 2.7 G/DL (ref 3.5–5)
ALP SERPL-CCNC: 76 U/L (ref 38–126)
ALT SERPL-CCNC: 73 U/L (ref 4–34)
ANION GAP SERPL CALC-SCNC: 5 MMOL/L
AST SERPL-CCNC: 52 U/L (ref 14–36)
BASOPHILS # BLD AUTO: 0 K/UL (ref 0–0.2)
BASOPHILS NFR BLD AUTO: 1 %
BUN SERPL-SCNC: 17 MG/DL (ref 7–17)
CALCIUM SPEC-MCNC: 8 MG/DL (ref 8.4–10.2)
CHLORIDE SERPL-SCNC: 109 MMOL/L (ref 98–107)
CO2 BLDA-SCNC: 26 MMOL/L (ref 19–24)
CO2 SERPL-SCNC: 24 MMOL/L (ref 22–30)
EOSINOPHIL # BLD AUTO: 0.1 K/UL (ref 0–0.7)
EOSINOPHIL NFR BLD AUTO: 1 %
ERYTHROCYTE [DISTWIDTH] IN BLOOD BY AUTOMATED COUNT: 4.46 M/UL (ref 3.8–5.4)
ERYTHROCYTE [DISTWIDTH] IN BLOOD: 13.6 % (ref 11.5–15.5)
GLUCOSE BLD-MCNC: 108 MG/DL (ref 75–99)
GLUCOSE BLD-MCNC: 131 MG/DL (ref 75–99)
GLUCOSE BLD-MCNC: 135 MG/DL (ref 75–99)
GLUCOSE BLD-MCNC: 153 MG/DL (ref 75–99)
GLUCOSE BLD-MCNC: 177 MG/DL (ref 75–99)
GLUCOSE SERPL-MCNC: 118 MG/DL (ref 74–99)
HCO3 BLDA-SCNC: 25 MMOL/L (ref 21–25)
HCT VFR BLD AUTO: 38.4 % (ref 34–46)
HGB BLD-MCNC: 13.4 GM/DL (ref 11.4–16)
LYMPHOCYTES # SPEC AUTO: 2 K/UL (ref 1–4.8)
LYMPHOCYTES NFR SPEC AUTO: 26 %
MCH RBC QN AUTO: 30 PG (ref 25–35)
MCHC RBC AUTO-ENTMCNC: 34.9 G/DL (ref 31–37)
MCV RBC AUTO: 86.1 FL (ref 80–100)
MONOCYTES # BLD AUTO: 0.5 K/UL (ref 0–1)
MONOCYTES NFR BLD AUTO: 7 %
NEUTROPHILS # BLD AUTO: 4.9 K/UL (ref 1.3–7.7)
NEUTROPHILS NFR BLD AUTO: 64 %
PCO2 BLDA: 31 MMHG (ref 35–45)
PH BLDA: 7.52 [PH] (ref 7.35–7.45)
PLATELET # BLD AUTO: 355 K/UL (ref 150–450)
PO2 BLDA: 74 MMHG (ref 83–108)
POTASSIUM SERPL-SCNC: 3.4 MMOL/L (ref 3.5–5.1)
PROT SERPL-MCNC: 5.2 G/DL (ref 6.3–8.2)
SODIUM SERPL-SCNC: 138 MMOL/L (ref 137–145)
WBC # BLD AUTO: 7.7 K/UL (ref 3.8–10.6)

## 2021-04-06 RX ADMIN — CITALOPRAM HYDROBROMIDE SCH MG: 20 TABLET ORAL at 10:01

## 2021-04-06 RX ADMIN — INSULIN ASPART SCH: 100 INJECTION, SOLUTION INTRAVENOUS; SUBCUTANEOUS at 00:25

## 2021-04-06 RX ADMIN — CHLORHEXIDINE GLUCONATE SCH ML: 1.2 RINSE ORAL at 10:00

## 2021-04-06 RX ADMIN — DEXTRAN 70 AND HYPROMELLOSE 2910 SCH DROPS: 1; 3 SOLUTION/ DROPS OPHTHALMIC at 00:25

## 2021-04-06 RX ADMIN — ALBUTEROL SULFATE SCH PUFF: 90 AEROSOL, METERED RESPIRATORY (INHALATION) at 15:16

## 2021-04-06 RX ADMIN — DEXTRAN 70 AND HYPROMELLOSE 2910 SCH DROPS: 1; 3 SOLUTION/ DROPS OPHTHALMIC at 13:00

## 2021-04-06 RX ADMIN — ALBUTEROL SULFATE SCH PUFF: 90 AEROSOL, METERED RESPIRATORY (INHALATION) at 07:13

## 2021-04-06 RX ADMIN — ALBUTEROL SULFATE SCH PUFF: 90 AEROSOL, METERED RESPIRATORY (INHALATION) at 23:54

## 2021-04-06 RX ADMIN — ATORVASTATIN CALCIUM SCH MG: 40 TABLET, FILM COATED ORAL at 10:02

## 2021-04-06 RX ADMIN — INSULIN ASPART SCH UNIT: 100 INJECTION, SOLUTION INTRAVENOUS; SUBCUTANEOUS at 17:56

## 2021-04-06 RX ADMIN — CEFAZOLIN SCH MLS/HR: 330 INJECTION, POWDER, FOR SOLUTION INTRAMUSCULAR; INTRAVENOUS at 12:57

## 2021-04-06 RX ADMIN — OXYCODONE HYDROCHLORIDE AND ACETAMINOPHEN SCH MG: 500 TABLET ORAL at 10:00

## 2021-04-06 RX ADMIN — DEXTROSE SCH MG: 50 INJECTION, SOLUTION INTRAVENOUS at 12:57

## 2021-04-06 RX ADMIN — ASPIRIN 81 MG CHEWABLE TABLET SCH MG: 81 TABLET CHEWABLE at 10:00

## 2021-04-06 RX ADMIN — ALBUTEROL SULFATE SCH PUFF: 90 AEROSOL, METERED RESPIRATORY (INHALATION) at 10:51

## 2021-04-06 RX ADMIN — SODIUM CHLORIDE SCH MLS/HR: 900 INJECTION, SOLUTION INTRAVENOUS at 06:47

## 2021-04-06 RX ADMIN — Medication SCH MCG: at 10:00

## 2021-04-06 RX ADMIN — Medication SCH MG: at 10:01

## 2021-04-06 RX ADMIN — INSULIN ASPART SCH UNIT: 100 INJECTION, SOLUTION INTRAVENOUS; SUBCUTANEOUS at 13:00

## 2021-04-06 RX ADMIN — DEXTRAN 70 AND HYPROMELLOSE 2910 SCH DROPS: 1; 3 SOLUTION/ DROPS OPHTHALMIC at 17:57

## 2021-04-06 RX ADMIN — ENOXAPARIN SODIUM SCH MG: 40 INJECTION SUBCUTANEOUS at 09:59

## 2021-04-06 RX ADMIN — SPIRONOLACTONE SCH MG: 25 TABLET, FILM COATED ORAL at 10:01

## 2021-04-06 RX ADMIN — SODIUM CHLORIDE SCH MLS/HR: 900 INJECTION, SOLUTION INTRAVENOUS at 15:16

## 2021-04-06 RX ADMIN — POTASSIUM BICARBONATE SCH MEQ: 782 TABLET, EFFERVESCENT ORAL at 06:38

## 2021-04-06 RX ADMIN — POTASSIUM BICARBONATE SCH MEQ: 782 TABLET, EFFERVESCENT ORAL at 09:59

## 2021-04-06 RX ADMIN — ALBUTEROL SULFATE SCH PUFF: 90 AEROSOL, METERED RESPIRATORY (INHALATION) at 03:14

## 2021-04-06 RX ADMIN — ALBUTEROL SULFATE SCH PUFF: 90 AEROSOL, METERED RESPIRATORY (INHALATION) at 18:51

## 2021-04-06 RX ADMIN — DOPAMINE HYDROCHLORIDE IN DEXTROSE SCH: 3.2 INJECTION, SOLUTION INTRAVENOUS at 15:14

## 2021-04-06 RX ADMIN — DEXTRAN 70 AND HYPROMELLOSE 2910 SCH DROPS: 1; 3 SOLUTION/ DROPS OPHTHALMIC at 10:03

## 2021-04-06 RX ADMIN — INSULIN ASPART SCH UNIT: 100 INJECTION, SOLUTION INTRAVENOUS; SUBCUTANEOUS at 06:38

## 2021-04-06 RX ADMIN — PANTOPRAZOLE SODIUM SCH MG: 40 INJECTION, POWDER, FOR SOLUTION INTRAVENOUS at 09:59

## 2021-04-06 RX ADMIN — CHLORHEXIDINE GLUCONATE SCH ML: 1.2 RINSE ORAL at 19:57

## 2021-04-06 RX ADMIN — DEXTRAN 70 AND HYPROMELLOSE 2910 SCH DROPS: 1; 3 SOLUTION/ DROPS OPHTHALMIC at 19:57

## 2021-04-06 RX ADMIN — DEXTRAN 70 AND HYPROMELLOSE 2910 SCH: 1; 3 SOLUTION/ DROPS OPHTHALMIC at 05:54

## 2021-04-06 RX ADMIN — SODIUM CHLORIDE SCH MLS/HR: 900 INJECTION, SOLUTION INTRAVENOUS at 23:46

## 2021-04-06 NOTE — P.PN
Subjective


Progress Note Date: 04/06/21


Principal diagnosis: 





Sinus bradycardia





This is a 61-year-old female patient who was admitted to the hospital with acute

hypoxic respiratory failure secondary to pneumonia and we consulted to see the 

patient because of sinus bradycardia.  The patient was on Toprol-XL which was st

opped.  Yesterday she was started on dopamine at a small dose.





She continues to have heart rate between 50s and 60s beats per minute.  She has 

been maintaining normal sinus mechanism.  I asked to stop the dopamine and 

continue monitor the heart rate.  Currently she is not on any AV randy blocker 

agents.  The TSH and free T4 were checked and came in to be unremarkable.  The 

echo showed normal LV function without significant valvular abnormalities.  

Troponin came in to be also unremarkable.





Objective





- Vital Signs


Vital signs: 


                                   Vital Signs











Temp  97.7 F   04/06/21 04:00


 


Pulse  59 L  04/06/21 06:00


 


Resp  30 H  04/06/21 06:00


 


BP  143/69   04/06/21 06:00


 


Pulse Ox  94 L  04/06/21 06:00








                                 Intake & Output











 04/05/21 04/06/21 04/06/21





 18:59 06:59 18:59


 


Intake Total 1385.103 3083.591 


 


Output Total 2085 2615 


 


Balance -445.593 -1095.409 


 


Weight 135.4 kg  


 


Intake:   


 


   516 


 


    Sodium Chloride 0.9% 1, 840 480 





    000 ml @ 40 mls/hr IV .   





    Q24H VIOLET Rx#:[id number]   


 


    pressure bag 36 36 


 


  Intake, IV Titration 385.407 595.591 





  Amount   


 


    fentaNYL (PF) 1,000 mcg 134.223 193.155 





    In Sodium Chloride 0.9%   





    80 ml @ Per Protocol IV .   





    Q0M VIOLET Rx#:[id number]   


 


    propofoL 1,000 mg In 251.184 402.436 





    Empty Bag 1 bag @ Titrate   





    IV .Q0M VIOLET Rx#:   





    [id number]   


 


  Tube Feeding 288 288 


 


  Other 90 120 


 


Output:   


 


  Urine 2085 2615 


 


Other:   


 


  Voiding Method Indwelling Catheter Indwelling Catheter 








                       ABP, PAP, CO, CI - Last Documented











Arterial Blood Pressure        166/62

















- Labs


CBC & Chem 7: 


                                 04/06/21 04:10





                                 04/06/21 04:10


Labs: 


                  Abnormal Lab Results - Last 24 Hours (Table)











  04/05/21 04/05/21 04/06/21 Range/Units





  12:34 17:01 00:00 


 


ABG pH     (7.35-7.45)  


 


ABG pCO2     (35-45)  mmHg


 


ABG pO2     ()  mmHg


 


ABG Total CO2     (19-24)  mmol/L


 


Potassium     (3.5-5.1)  mmol/L


 


Chloride     ()  mmol/L


 


Glucose     (74-99)  mg/dL


 


POC Glucose (mg/dL)  190 H  194 H  131 H  (75-99)  mg/dL


 


Calcium     (8.4-10.2)  mg/dL


 


AST     (14-36)  U/L


 


ALT     (4-34)  U/L


 


Total Protein     (6.3-8.2)  g/dL


 


Albumin     (3.5-5.0)  g/dL














  04/06/21 04/06/21 04/06/21 Range/Units





  04:10 04:48 06:20 


 


ABG pH   7.52 H   (7.35-7.45)  


 


ABG pCO2   31 L   (35-45)  mmHg


 


ABG pO2   74 L   ()  mmHg


 


ABG Total CO2   26 H   (19-24)  mmol/L


 


Potassium  3.4 L    (3.5-5.1)  mmol/L


 


Chloride  109 H    ()  mmol/L


 


Glucose  118 H    (74-99)  mg/dL


 


POC Glucose (mg/dL)    135 H  (75-99)  mg/dL


 


Calcium  8.0 L    (8.4-10.2)  mg/dL


 


AST  52 H    (14-36)  U/L


 


ALT  73 H    (4-34)  U/L


 


Total Protein  5.2 L    (6.3-8.2)  g/dL


 


Albumin  2.7 L    (3.5-5.0)  g/dL








                      Microbiology - Last 24 Hours (Table)











 03/31/21 02:30 Blood Culture - Final





 Blood    No Growth after 144 hours


 


 03/31/21 02:15 Blood Culture - Final





 Blood    No Growth after 144 hours














Assessment and Plan


Assessment: 





assessment


Acute hypoxic respiratory failure secondary to pneumonia


Sinus bradycardia


Questionable history of paroxysmal atrial fibrillation


Multiple comorbid conditions





Plan


Continue holding any AV randy blocker agents


Thyroid disorder was ruled out


Acute coronary event was ruled out


The echo showed normal LV function


We'll continue following up with the patient

## 2021-04-06 NOTE — PN
PROGRESS NOTE



DATE OF SERVICE:

04/06/2021



This 61-year-old woman who was admitted with acute COVID-19 infection, acute bilateral

pneumonia on mechanical ventilation.  The patient also had bradyarrhythmia. Cardiology

is following the patient as well as Pulmonology. Currently the patient is on vent

setting about 50% FiO2. Patient also gastrointestinal symptoms, present on admission.

Most recent chest x-ray which was reviewed personally by me showed significant

bilateral infiltrates mostly in the lower lobe.



PAST MEDICAL HISTORY:

Reviewed.



REVIEW OF SYSTEMS:

Could not be taken.



CURRENT MEDICATIONS:

Current medications are reviewed and include Ventolin, Norvasc, vitamin C, aspirin,

Lipitor, Peridex, cholecalciferol, dopamine, Decadron. Doses are reviewed.



PHYSICAL EXAMINATION:

Patient is mechanically ventilated and sedated. Pulse 71, blood pressure 155/73,

respiration 30, temperature 97.9, pulse ox 92% on 50% FiO2.

HEENT:  Conjunctivae normal.

NECK: No jugular venous distention.

CARDIOVASCULAR:  S1, S2 muffled.

RESPIRATORY:  Breath sounds diminished at the bases. A few scattered rhonchi.

ABDOMEN:  Soft, nontender.

NERVOUS SYSTEM: No focal deficits.



LABS:

ABGs noted.  Sodium 138, potassium 3.4.  Other labs are noted.



ASSESSMENT:

1. Acute COVID-19 infection with acute bilateral pneumonia, viral interstitial

    pneumonia with acute hypoxic respiratory failure on mechanical ventilation.

2. Bradycardia for evaluation.

3. Occasional PVCs.

4. Elevated inflammatory markers of COVID-19.

5. Acute COVID-19 gastroenteritis, present on admission.

6. Elevated AST, ALT most likely secondary COVID-19.

7. Hypertension.

8. Hyperlipidemia.

9. Paroxysmal atrial fibrillation history.

10.Obesity with body mass index of 48.2.

11.History of hypertension.

12.History of cholecystectomy.

13.FULL CODE.



RECOMMENDATIONS AND DISCUSSION:

Recommend to continue current medications, continue symptomatic treatment. Continue

with steroids.  Continue with Lovenox.  Potassium supplementation.  Potassium and

magnesium, monitor magnesium closely.  Prognosis guarded because of multiple complex

medical issues. Further recommendations to follow.





MMODL / IJN: [id number] / Job#: [id number]

## 2021-04-06 NOTE — P.PN
Subjective


Progress Note Date: 04/06/21


Principal diagnosis: 





COVID 19 pneumonia.





61-year-old female, who hasn't been feeling well for about 10-11 days.  She 

tested positive for COVID 19 on March 24.  She's been sick for 10-11 days old.  

She complains of TYPICAL symptoms including weakness, fever, diarrhea, cough, 

and shortness of breath.  She's quite hypoxemic.  On AIRVO 100%, and a 

nonrebreather mask, her saturations are right around 80-83%.  Any movement, and 

she desaturates even further.  I just called the intensive care unit, and we'll 

plan on putting her in the intensive care unit.  She's currently on saline at 75

mL an hour.  She can barely speak without becoming short of breath.  The patient

is not a candidate for REM, but could get TOCI.  Sodium 138, potassium 3.8, 

chlorides 108, CO2 21, anion gap 9, BUN 19, creatinine 0.7.  AST 94, ALTs 60, 

LDH 1471, C-reactive protein 88.1.  Again, the patient's been sick for at least 

10-11 days and maybe a bit longer.  Her oral intake has been poor as well.  The 

patient's chest x-ray shows diffuse bilateral infiltrates consistent with 

coronavirus infection.





The patient is seen today 04/01/2021 in follow-up in the intensive care unit.  

Shortly after arriving to the ICU yesterday she required intubation and 

mechanical ventilatory support injury to acute hypoxemic respiratory failure.  

She is currently on the ventilator and assist control mode with a rate of 34, 

tidal on 450, FiO2 70%, PEEP of 16.  Morning blood gases reveal pO2 of 70, pCO2 

33, P 87.38.  Fentanyl drip at 0.5 mcg/kg per hour, propofol at 15 mcg/kg/m, 

Nimbex at 1 mcg/min per hour.  0.9 normal saline at 130 ML's per hour.  She did 

receive Tociluzimab.  She remains on Decadron 6 mg IV daily.  Lovenox 40 mg 

subcutaneous daily.  Tube feedings will be initiated for nutritional support.  

Blood cultures reveal no growth.  White count 2.9.  Hemoglobin 11.3 and 

platelets 188.  Lymphocytes 0.8.  She is continued on Lovenox, dexamethasone, 

vitamin supplements.





The patient is seen today 04/02/2021 in follow-up in the intensive care unit.  

She remains intubated on the mechanical ventilator.  Assist-control mode.  Rate 

of 30, tidal volume 450, FiO2 60% and a PEEP of 16.  She is currently sedated on

propofol at 40 mcg/kg/m, Nimbex at 1 mcg/kg/m, fentanyl at 0.5 mcg/kg per hour. 

She has 0.9 normal saline at 130 ML's per hour.  She is being nourished with 

vital HP at 20 ML's per hour.  She did receive tocilizumab.  He remains on 

dexamethasone, Lovenox, vitamin supplements.  Chest x-ray showing persistent but

improving patchy bilateral airspace disease.  Sputum and blood cultures are 

pending.  White count 3.9.  Hemoglobin 11.4.  Lymphocytes 0.7.  D-dimer 1.19.  

Sodium 139.  Potassium 4.4.  Bicarb 20.  Creatinine 0.64.  Glucose 174.





The patient is seen today 04/03/2020 in follow-up in the intensive care unit.  

She remains intubated on mechanical ventilator.  Current settings are assist-

control mode.  Rate of 34, tidal volume 450, FiO2 50% and a PEEP of 16.  Morning

blood gases reveal a pO2 of 117, pCO2 30, pH 7.40.  She remains sedated on 

propofol at 55 mcg/kg/m.  Fentanyl and 1 mcg/kg/hr.  She is being nourished with

vital HP at 33 ML's per hour.  0.9 normal saline at 130 ML's per hour.  She 

remains on dexamethasone, Lovenox, bronchodilators.  White count 4.0.  Hemog

lobin 11.8.  D-dimer 1.81.  Sodium 138.  Potassium 4.2.  Bicarb 19.  LDH 1133.  

C-reactive protein 15.5.  Asked x-ray continues to show moderate scattered 

interstitial and partial airspace opacities.  





The patient is seen today 04/04/2021 in follow-up in the intensive care unit.  

She remains intubated, sedated.  Current settings are assist control at a rate 

of 30, tidal volume 450, FiO2 50% and a PEEP of 15.  She is sedated on propofol 

at 60 mcg/kg/m, fentanyl at 1 mcg/kg per hour, 0.9 normal saline 130 ML's per 

hour.  Vital HP at 24 mls per hour.  She did receive Actemra.  Blood culture 

reveals no growth.  Sputum culture reveals no growth.  White count 5.3.  

Hemoglobin 12.0.  D-dimer 2.0.  Sodium 139.  Potassium 4.0.  Creatinine 0.59.  

LDH 1082.  C reactive protein 8.6.  Chest x-ray continues to show no interval 

change, continues with persistent moderate patchy opacities bilaterally.  

Endotracheal tube to be advanced to 2 cms.  Continued on vitamin supplements, 

Lovenox, IV Solu-Medrol. 





Progress note dated 04/05/2021.





This is a patient was admitted on March 31 for COVID 19 pneumonia.  The patient 

came to the ICU on March 31, and was intubated on the same day.  She apparently 

tested positive back on March 24.  The patient remains on the mechanical 

ventilator.  She is on the volume assist control mode, rate of 30, tidal volume 

450, FiO2 50%, PEEP of 15 to be dropped to 10.  Blood gases show pO2 of 66, pCO2

33, and a pH 7.41.  The patient's on propofol at 60 mcg/kg/m, fentanyl 1 

mcg/kg/h, normal saline at 130 mL an hour and vital high protein at 24, which is

goal.  The patient did receive TOCI.   White count is 6.2, hemoglobin 11.8, 

hematocrit 33.1, platelet count 272,000.  Sodium 137, potassium 3.8, chlorides 

114, CO2 20, anion gap 3, BUN 18, creatinine 0.53.  LDH is 956.  Chest x-ray 

shows worsened bilateral infiltrates.  Small effusions are noted.





Progress note dated 04/06/2021.





61-year-old female, admitted on March 31 for COVID 19 pneumonia.  She came to 

the ICU on March 31 and was intubated on the same day.  She tested positive back

on March 24.  The patient remains on mechanical ventilation.  She is on the 

volume assist control mode, rate 36, tidal volume 450, FiO2 50%, and PEEP is 13.

 Arterial blood gases show a PaO2 of 74, pCO2 31, and a pH of 7.52.  Blood gases

are consistent with relative hypoxemia, and a combined respiratory and metabolic

alkalosis.  The patient is getting propofol 60 mcg/kg/m, fentanyl 1 emily per 

kilogram per hour, saline at 40 mL an hour, dopamine at 2 mcg/kg/m, and vital 

high protein at goal, which is 24 mL an hour.  The cardiologist wanted to DC the

dopamine.  We are going to try to lower PEEP from 13 down to 10.  Today's CBC is

normal.  Sodium 138, potassium 3.4, chlorides 109, CO2 24, anion gap 5, BUN 17, 

and creatinine 0.53.  Chest x-ray shows improving bibasilar infiltrates.





Objective





- Vital Signs


Vital signs: 


                                   Vital Signs











Temp  97.9 F   04/06/21 12:00


 


Pulse  71   04/06/21 12:00


 


Resp  30 H  04/06/21 12:00


 


BP  155/73   04/06/21 12:00


 


Pulse Ox  92 L  04/06/21 12:00








                                 Intake & Output











 04/05/21 04/06/21 04/06/21





 18:59 06:59 18:59


 


Intake Total 6587.908 3916.591 365


 


Output Total 2085 2615 375


 


Balance -445.593 -1095.409 -10


 


Weight 135.4 kg  135.4 kg


 


Intake:   


 


   516 215


 


    Sodium Chloride 0.9% 1, 840 480 200





    000 ml @ 40 mls/hr IV .   





    Q24H VIOLET Rx#:[id number]   


 


    pressure bag 36 36 15


 


  Intake, IV Titration 385.407 595.591 





  Amount   


 


    fentaNYL (PF) 1,000 mcg 134.223 193.155 





    In Sodium Chloride 0.9%   





    80 ml @ Per Protocol IV .   





    Q0M VIOLET Rx#:[id number]   


 


    propofoL 1,000 mg In 251.184 402.436 





    Empty Bag 1 bag @ Titrate   





    IV .Q0M VIOLET Rx#:   





    [id number]   


 


  Tube Feeding 288 288 120


 


  Other 90 120 30


 


Output:   


 


  Urine 2085 2615 375


 


Other:   


 


  Voiding Method Indwelling Catheter Indwelling Catheter Indwelling Catheter








                       ABP, PAP, CO, CI - Last Documented











Arterial Blood Pressure        168/65

















- Exam





Sedated, and mechanically ventilated.





HEENT examination is grossly unremarkable.  Orally placed endotracheal tube is 

noted.





Neck supple.  Full range of motion.  No adenopathy thyromegaly or neck vein 

distention.





Cardiovascular examination reveals regular rhythm rate.  S1-S2 normal.  No S3 or

S4.  No discernible murmur noted.  Heart rate is 71 bpm.





Lungs reveal coarse bibasilar crackles and few scattered rhonchi.  No wheezes.  

Breath sounds equal bilaterally.





Abdomen soft bowel sounds are heard.  No masses or tenderness.





Extremities are intact.  No cyanosis clubbing or edema.





Skin is without rash or lesion.





Neurologic examination cannot be adequately assessed as she is currently on pr

opofol and fentanyl.





- Labs


CBC & Chem 7: 


                                 04/06/21 04:10





                                 04/06/21 04:10


Labs: 


                  Abnormal Lab Results - Last 24 Hours (Table)











  04/05/21 04/05/21 04/06/21 Range/Units





  12:34 17:01 00:00 


 


ABG pH     (7.35-7.45)  


 


ABG pCO2     (35-45)  mmHg


 


ABG pO2     ()  mmHg


 


ABG Total CO2     (19-24)  mmol/L


 


Potassium     (3.5-5.1)  mmol/L


 


Chloride     ()  mmol/L


 


Glucose     (74-99)  mg/dL


 


POC Glucose (mg/dL)  190 H  194 H  131 H  (75-99)  mg/dL


 


Calcium     (8.4-10.2)  mg/dL


 


AST     (14-36)  U/L


 


ALT     (4-34)  U/L


 


Total Protein     (6.3-8.2)  g/dL


 


Albumin     (3.5-5.0)  g/dL














  04/06/21 04/06/21 04/06/21 Range/Units





  04:10 04:48 06:20 


 


ABG pH   7.52 H   (7.35-7.45)  


 


ABG pCO2   31 L   (35-45)  mmHg


 


ABG pO2   74 L   ()  mmHg


 


ABG Total CO2   26 H   (19-24)  mmol/L


 


Potassium  3.4 L    (3.5-5.1)  mmol/L


 


Chloride  109 H    ()  mmol/L


 


Glucose  118 H    (74-99)  mg/dL


 


POC Glucose (mg/dL)    135 H  (75-99)  mg/dL


 


Calcium  8.0 L    (8.4-10.2)  mg/dL


 


AST  52 H    (14-36)  U/L


 


ALT  73 H    (4-34)  U/L


 


Total Protein  5.2 L    (6.3-8.2)  g/dL


 


Albumin  2.7 L    (3.5-5.0)  g/dL














  04/06/21 Range/Units





  11:45 


 


ABG pH   (7.35-7.45)  


 


ABG pCO2   (35-45)  mmHg


 


ABG pO2   ()  mmHg


 


ABG Total CO2   (19-24)  mmol/L


 


Potassium   (3.5-5.1)  mmol/L


 


Chloride   ()  mmol/L


 


Glucose   (74-99)  mg/dL


 


POC Glucose (mg/dL)  153 H  (75-99)  mg/dL


 


Calcium   (8.4-10.2)  mg/dL


 


AST   (14-36)  U/L


 


ALT   (4-34)  U/L


 


Total Protein   (6.3-8.2)  g/dL


 


Albumin   (3.5-5.0)  g/dL








                      Microbiology - Last 24 Hours (Table)











 03/31/21 02:30 Blood Culture - Final





 Blood    No Growth after 144 hours


 


 03/31/21 02:15 Blood Culture - Final





 Blood    No Growth after 144 hours














Assessment and Plan


Assessment: 





Acute hypoxemic respiratory failure secondary to COVID 19 pneumonia/pneumonitis,

status post TOCI.





History of hypertension.





History of atrial fibrillation.





Previous heart catheterization.





History of hyperlipidemia.








Plan: 





Plan dated 03/31/2021.





The patient will be moved to the intensive care unit.  We'll get a blood gas.  

The patient is not a candidate for REM.  She's had symptoms for at least 11 days

and maybe longer.  She is a candidate for TOCI.  We'll put the order in.  She 

also needs a D-dimer test done.  Additional recommendations and suggestions are 

forthcoming.  In the end, she may or intubation and mechanical ventilation.  We 

can watch her more closely in the intensive care unit.  I did speak to the 

charge nurse in the ICU.  Additional recommendations and suggestions are forthco

mariluz.  She should deftly get Decadron 6 mg IV daily. 





Plan dated 04/05/2021.





We will try to make a PEEP change, and drop the PEEP from 15 down to 10.  In 

addition, the patient will have the IV cut back to 40 mL an hour, and we'll give

her a dose of Lasix.  This will help to improve oxygenation.  We'll continue 

tube feedings at goal.  Additional recommendations and suggestions are 

forthcoming.  We will attempted daily eruption sedation.  No additional 

recommendations are made.  Prognosis is guarded.  All the other medications are 

appropriate.  They have been reviewed today.  Unnecessary medications were 

discontinued from the MAR.





Plan dated 04/06/2021.





We will attempt to reduce the PEEP from 13 cm of water, to 10 cm water.  Blood 

gases are borderline at best.  We'll also attempt to discontinue the dopamine as

per cardiology.  The patient will remain on all his medications at this time.  

We'll also plan to do a daily interruption of sedation.  Prognosis is guarded.  

Labs, x-rays, and medications are all reviewed.


Time with Patient: Greater than 30

## 2021-04-06 NOTE — XR
EXAMINATION TYPE: XR chest 1V portable

 

DATE OF EXAM: 4/6/2021

 

COMPARISON: 4/5/2021

 

INDICATION: Tube placement

 

TECHNIQUE: Single frontal view of the chest is obtained.

 

FINDINGS:  

The heart size is normal.  

The pulmonary vasculature is normal.  

Bibasilar infiltrates are present, slightly improved from comparison.  

There is placement of an endotracheal tube with the tip above the ranjan. There is a nasogastric tube
 present with the tip abdomen.

 

IMPRESSION:  

1. Improving bibasilar infiltrates. Small left pleural effusion may be present. 

2. Lines and catheters discussed above.

## 2021-04-07 LAB
ALBUMIN SERPL-MCNC: 2.5 G/DL (ref 3.5–5)
ALP SERPL-CCNC: 67 U/L (ref 38–126)
ALT SERPL-CCNC: 69 U/L (ref 4–34)
ANION GAP SERPL CALC-SCNC: 3 MMOL/L
AST SERPL-CCNC: 53 U/L (ref 14–36)
BASOPHILS # BLD AUTO: 0 K/UL (ref 0–0.2)
BASOPHILS NFR BLD AUTO: 0 %
BUN SERPL-SCNC: 20 MG/DL (ref 7–17)
CALCIUM SPEC-MCNC: 7.9 MG/DL (ref 8.4–10.2)
CHLORIDE SERPL-SCNC: 110 MMOL/L (ref 98–107)
CO2 BLDA-SCNC: 27 MMOL/L (ref 19–24)
CO2 SERPL-SCNC: 24 MMOL/L (ref 22–30)
EOSINOPHIL # BLD AUTO: 0.1 K/UL (ref 0–0.7)
EOSINOPHIL NFR BLD AUTO: 2 %
ERYTHROCYTE [DISTWIDTH] IN BLOOD BY AUTOMATED COUNT: 4.07 M/UL (ref 3.8–5.4)
ERYTHROCYTE [DISTWIDTH] IN BLOOD: 14 % (ref 11.5–15.5)
GLUCOSE BLD-MCNC: 117 MG/DL (ref 75–99)
GLUCOSE BLD-MCNC: 150 MG/DL (ref 75–99)
GLUCOSE BLD-MCNC: 174 MG/DL (ref 75–99)
GLUCOSE SERPL-MCNC: 120 MG/DL (ref 74–99)
HCO3 BLDA-SCNC: 26 MMOL/L (ref 21–25)
HCT VFR BLD AUTO: 35.6 % (ref 34–46)
HGB BLD-MCNC: 12.8 GM/DL (ref 11.4–16)
LYMPHOCYTES # SPEC AUTO: 2 K/UL (ref 1–4.8)
LYMPHOCYTES NFR SPEC AUTO: 31 %
MAGNESIUM SPEC-SCNC: 2 MG/DL (ref 1.6–2.3)
MCH RBC QN AUTO: 31.4 PG (ref 25–35)
MCHC RBC AUTO-ENTMCNC: 36 G/DL (ref 31–37)
MCV RBC AUTO: 87.4 FL (ref 80–100)
MONOCYTES # BLD AUTO: 0.4 K/UL (ref 0–1)
MONOCYTES NFR BLD AUTO: 7 %
NEUTROPHILS # BLD AUTO: 3.8 K/UL (ref 1.3–7.7)
NEUTROPHILS NFR BLD AUTO: 59 %
PCO2 BLDA: 32 MMHG (ref 35–45)
PH BLDA: 7.51 [PH] (ref 7.35–7.45)
PLATELET # BLD AUTO: 298 K/UL (ref 150–450)
PO2 BLDA: 58 MMHG (ref 83–108)
POTASSIUM SERPL-SCNC: 3.5 MMOL/L (ref 3.5–5.1)
PROT SERPL-MCNC: 4.9 G/DL (ref 6.3–8.2)
SODIUM SERPL-SCNC: 137 MMOL/L (ref 137–145)
WBC # BLD AUTO: 6.4 K/UL (ref 3.8–10.6)

## 2021-04-07 RX ADMIN — Medication SCH MG: at 09:30

## 2021-04-07 RX ADMIN — DEXTRAN 70 AND HYPROMELLOSE 2910 SCH DROPS: 1; 3 SOLUTION/ DROPS OPHTHALMIC at 12:35

## 2021-04-07 RX ADMIN — DEXTRAN 70 AND HYPROMELLOSE 2910 SCH DROPS: 1; 3 SOLUTION/ DROPS OPHTHALMIC at 20:54

## 2021-04-07 RX ADMIN — OXYCODONE HYDROCHLORIDE AND ACETAMINOPHEN SCH MG: 500 TABLET ORAL at 09:30

## 2021-04-07 RX ADMIN — INSULIN ASPART SCH: 100 INJECTION, SOLUTION INTRAVENOUS; SUBCUTANEOUS at 02:58

## 2021-04-07 RX ADMIN — PANTOPRAZOLE SODIUM SCH MG: 40 INJECTION, POWDER, FOR SOLUTION INTRAVENOUS at 09:28

## 2021-04-07 RX ADMIN — DEXTROSE SCH MG: 50 INJECTION, SOLUTION INTRAVENOUS at 09:29

## 2021-04-07 RX ADMIN — ENOXAPARIN SODIUM SCH MG: 40 INJECTION SUBCUTANEOUS at 09:29

## 2021-04-07 RX ADMIN — DEXTRAN 70 AND HYPROMELLOSE 2910 SCH: 1; 3 SOLUTION/ DROPS OPHTHALMIC at 02:58

## 2021-04-07 RX ADMIN — ASPIRIN 81 MG CHEWABLE TABLET SCH MG: 81 TABLET CHEWABLE at 09:29

## 2021-04-07 RX ADMIN — CHLORHEXIDINE GLUCONATE SCH ML: 1.2 RINSE ORAL at 09:28

## 2021-04-07 RX ADMIN — ALBUTEROL SULFATE SCH PUFF: 90 AEROSOL, METERED RESPIRATORY (INHALATION) at 07:30

## 2021-04-07 RX ADMIN — DEXTRAN 70 AND HYPROMELLOSE 2910 SCH DROPS: 1; 3 SOLUTION/ DROPS OPHTHALMIC at 03:49

## 2021-04-07 RX ADMIN — ALBUTEROL SULFATE SCH PUFF: 90 AEROSOL, METERED RESPIRATORY (INHALATION) at 19:24

## 2021-04-07 RX ADMIN — ALBUTEROL SULFATE SCH PUFF: 90 AEROSOL, METERED RESPIRATORY (INHALATION) at 11:39

## 2021-04-07 RX ADMIN — SODIUM CHLORIDE SCH MLS/HR: 900 INJECTION, SOLUTION INTRAVENOUS at 23:44

## 2021-04-07 RX ADMIN — DEXTRAN 70 AND HYPROMELLOSE 2910 SCH DROPS: 1; 3 SOLUTION/ DROPS OPHTHALMIC at 08:00

## 2021-04-07 RX ADMIN — CHLORHEXIDINE GLUCONATE SCH ML: 1.2 RINSE ORAL at 20:54

## 2021-04-07 RX ADMIN — ATORVASTATIN CALCIUM SCH MG: 40 TABLET, FILM COATED ORAL at 09:30

## 2021-04-07 RX ADMIN — CEFAZOLIN SCH MLS/HR: 330 INJECTION, POWDER, FOR SOLUTION INTRAMUSCULAR; INTRAVENOUS at 20:54

## 2021-04-07 RX ADMIN — POTASSIUM BICARBONATE SCH MEQ: 782 TABLET, EFFERVESCENT ORAL at 12:33

## 2021-04-07 RX ADMIN — INSULIN ASPART SCH UNIT: 100 INJECTION, SOLUTION INTRAVENOUS; SUBCUTANEOUS at 12:33

## 2021-04-07 RX ADMIN — CITALOPRAM HYDROBROMIDE SCH MG: 20 TABLET ORAL at 09:30

## 2021-04-07 RX ADMIN — ALBUTEROL SULFATE SCH PUFF: 90 AEROSOL, METERED RESPIRATORY (INHALATION) at 03:16

## 2021-04-07 RX ADMIN — SPIRONOLACTONE SCH MG: 25 TABLET, FILM COATED ORAL at 09:30

## 2021-04-07 RX ADMIN — INSULIN ASPART SCH: 100 INJECTION, SOLUTION INTRAVENOUS; SUBCUTANEOUS at 06:28

## 2021-04-07 RX ADMIN — SODIUM CHLORIDE SCH MLS/HR: 900 INJECTION, SOLUTION INTRAVENOUS at 06:28

## 2021-04-07 RX ADMIN — Medication SCH MCG: at 09:30

## 2021-04-07 RX ADMIN — POTASSIUM BICARBONATE SCH MEQ: 782 TABLET, EFFERVESCENT ORAL at 09:29

## 2021-04-07 RX ADMIN — DEXTRAN 70 AND HYPROMELLOSE 2910 SCH DROPS: 1; 3 SOLUTION/ DROPS OPHTHALMIC at 16:17

## 2021-04-07 RX ADMIN — ALBUTEROL SULFATE SCH PUFF: 90 AEROSOL, METERED RESPIRATORY (INHALATION) at 15:47

## 2021-04-07 RX ADMIN — INSULIN ASPART SCH UNIT: 100 INJECTION, SOLUTION INTRAVENOUS; SUBCUTANEOUS at 17:33

## 2021-04-07 RX ADMIN — SODIUM CHLORIDE SCH MLS/HR: 900 INJECTION, SOLUTION INTRAVENOUS at 15:00

## 2021-04-07 RX ADMIN — ALBUTEROL SULFATE SCH PUFF: 90 AEROSOL, METERED RESPIRATORY (INHALATION) at 23:47

## 2021-04-07 NOTE — P.PN
Subjective


Progress Note Date: 04/07/21


Principal diagnosis: 





Sinus bradycardia





This is a 61-year-old female patient who was admitted to the hospital with acute

hypoxic respiratory failure secondary to pneumonia and we consulted to see the 

patient because of sinus bradycardia.  The patient was on Toprol-XL which was st

opped.  Yesterday she was started on dopamine at a small dose.





The patient was seen today April 7 of 20 21st.  She continues to be intubated on

mechanical ventilation.  She is hemodynamically stable.  The heart rate 

continues to be in the 40s.  I do feel that her bradycardia is mostly related to

sedation because was the patient started waking up her heart rate goes up area 

currently she is not on any AV randy blocker agents.  Thyroid disorder was ruled

out.  The echo showed normal LV function.  Troponin came in to be unremarkable. 

I would continue the current medical regimen and continue avoiding any AV randy 

blocker agents at this point as far as her heart rate above 40 and the blood 

pressure is stable.











Objective





- Vital Signs


Vital signs: 


                                   Vital Signs











Temp  98.1 F   04/07/21 04:00


 


Pulse  44 L  04/07/21 07:00


 


Resp  30 H  04/07/21 07:00


 


BP  121/65   04/06/21 18:00


 


Pulse Ox  90 L  04/07/21 07:00








                                 Intake & Output











 04/06/21 04/07/21 04/07/21





 18:59 06:59 18:59


 


Intake Total 8990.215 4892.770 67


 


Output Total 1020 1540 70


 


Balance 394.599 -325.230 -3


 


Weight 134.3 kg  


 


Intake:   


 


   473 43


 


    Sodium Chloride 0.9% 1, 480 440 40





    000 ml @ 40 mls/hr IV .   





    Q24H VIOLET Rx#:[id number]   


 


    pressure bag 36 33 3


 


  Intake, IV Titration 520.599 387.770 





  Amount   


 


    DOPamine DRIP 800 mg In 120.599  





    Dextrose/Water 1 250ml.   





    bag @ 2 MCG/KG/MIN 5.085   





    mls/hr IV .Q24H VIOLET Rx#:   





    [id number]   


 


    fentaNYL (PF) 1,000 mcg 100 180.534 





    In Sodium Chloride 0.9%   





    80 ml @ Per Protocol IV .   





    Q0M VIOLET Rx#:[id number]   


 


    propofoL 1,000 mg In 300 207.236 





    Empty Bag 1 bag @ Titrate   





    IV .Q0M VIOLET Rx#:   





    [id number]   


 


  Tube Feeding 288 264 24


 


  Other 90 90 


 


Output:   


 


  Urine 1020 1540 70


 


Other:   


 


  Voiding Method Indwelling Catheter Indwelling Catheter 








                       ABP, PAP, CO, CI - Last Documented











Arterial Blood Pressure        124/50

















- Constitutional


General appearance: Present: no acute distress





- Labs


CBC & Chem 7: 


                                 04/07/21 04:15





                                 04/07/21 04:15


Labs: 


                  Abnormal Lab Results - Last 24 Hours (Table)











  04/06/21 04/06/21 04/06/21 Range/Units





  11:45 17:01 23:55 


 


ABG pH     (7.35-7.45)  


 


ABG pCO2     (35-45)  mmHg


 


ABG pO2     ()  mmHg


 


ABG HCO3     (21-25)  mmol/L


 


ABG Total CO2     (19-24)  mmol/L


 


ABG O2 Saturation     (94-97)  %


 


Chloride     ()  mmol/L


 


BUN     (7-17)  mg/dL


 


Glucose     (74-99)  mg/dL


 


POC Glucose (mg/dL)  153 H  177 H  108 H  (75-99)  mg/dL


 


Calcium     (8.4-10.2)  mg/dL


 


AST     (14-36)  U/L


 


ALT     (4-34)  U/L


 


Total Protein     (6.3-8.2)  g/dL


 


Albumin     (3.5-5.0)  g/dL














  04/07/21 04/07/21 Range/Units





  04:15 05:16 


 


ABG pH   7.51 H  (7.35-7.45)  


 


ABG pCO2   32 L  (35-45)  mmHg


 


ABG pO2   58 L*  ()  mmHg


 


ABG HCO3   26 H  (21-25)  mmol/L


 


ABG Total CO2   27 H  (19-24)  mmol/L


 


ABG O2 Saturation   91.3 L  (94-97)  %


 


Chloride  110 H   ()  mmol/L


 


BUN  20 H   (7-17)  mg/dL


 


Glucose  120 H   (74-99)  mg/dL


 


POC Glucose (mg/dL)    (75-99)  mg/dL


 


Calcium  7.9 L   (8.4-10.2)  mg/dL


 


AST  53 H   (14-36)  U/L


 


ALT  69 H   (4-34)  U/L


 


Total Protein  4.9 L   (6.3-8.2)  g/dL


 


Albumin  2.5 L   (3.5-5.0)  g/dL








                      Microbiology - Last 24 Hours (Table)











 03/31/21 02:30 Blood Culture - Final





 Blood    No Growth after 144 hours


 


 03/31/21 02:15 Blood Culture - Final





 Blood    No Growth after 144 hours














Assessment and Plan


Assessment: 





assessment


Acute hypoxic respiratory failure secondary to pneumonia


Sinus bradycardia


Questionable history of paroxysmal atrial fibrillation


Multiple comorbid conditions





Plan


Continue avoiding any AV randy blocker agent


Continue monitor the heart rate as far as her heart rate above 40s and the 

pressure is stable


Follow-up with the patient

## 2021-04-07 NOTE — P.PN
Subjective


Progress Note Date: 04/07/21


Principal diagnosis: 





Hypoxemic respiratory failure secondary to CoVID 19 pneumonia





61-year-old female, who hasn't been feeling well for about 10-11 days.  She 

tested positive for COVID 19 on March 24.  She's been sick for 10-11 days old.  

She complains of TYPICAL symptoms including weakness, fever, diarrhea, cough, 

and shortness of breath.  She's quite hypoxemic.  On AIRVO 100%, and a 

nonrebreather mask, her saturations are right around 80-83%.  Any movement, and 

she desaturates even further.  I just called the intensive care unit, and we'll 

plan on putting her in the intensive care unit.  She's currently on saline at 75

mL an hour.  She can barely speak without becoming short of breath.  The patient

is not a candidate for REM, but could get TOCI.  Sodium 138, potassium 3.8, 

chlorides 108, CO2 21, anion gap 9, BUN 19, creatinine 0.7.  AST 94, ALTs 60, 

LDH 1471, C-reactive protein 88.1.  Again, the patient's been sick for at least 

10-11 days and maybe a bit longer.  Her oral intake has been poor as well.  The 

patient's chest x-ray shows diffuse bilateral infiltrates consistent with 

coronavirus infection.





The patient is seen today 04/01/2021 in follow-up in the intensive care unit.  

Shortly after arriving to the ICU yesterday she required intubation and mechan

ical ventilatory support injury to acute hypoxemic respiratory failure.  She is 

currently on the ventilator and assist control mode with a rate of 34, tidal on 

450, FiO2 70%, PEEP of 16.  Morning blood gases reveal pO2 of 70, pCO2 33, P 

87.38.  Fentanyl drip at 0.5 mcg/kg per hour, propofol at 15 mcg/kg/m, Nimbex at

1 mcg/min per hour.  0.9 normal saline at 130 ML's per hour.  She did receive 

Tociluzimab.  She remains on Decadron 6 mg IV daily.  Lovenox 40 mg subcutaneous

daily.  Tube feedings will be initiated for nutritional support.  Blood cultures

reveal no growth.  White count 2.9.  Hemoglobin 11.3 and platelets 188.  

Lymphocytes 0.8.  She is continued on Lovenox, dexamethasone, vitamin 

supplements.





The patient is seen today 04/02/2021 in follow-up in the intensive care unit.  

She remains intubated on the mechanical ventilator.  Assist-control mode.  Rate 

of 30, tidal volume 450, FiO2 60% and a PEEP of 16.  She is currently sedated on

propofol at 40 mcg/kg/m, Nimbex at 1 mcg/kg/m, fentanyl at 0.5 mcg/kg per hour. 

She has 0.9 normal saline at 130 ML's per hour.  She is being nourished with 

vital HP at 20 ML's per hour.  She did receive tocilizumab.  He remains on 

dexamethasone, Lovenox, vitamin supplements.  Chest x-ray showing persistent but

improving patchy bilateral airspace disease.  Sputum and blood cultures are 

pending.  White count 3.9.  Hemoglobin 11.4.  Lymphocytes 0.7.  D-dimer 1.19.  

Sodium 139.  Potassium 4.4.  Bicarb 20.  Creatinine 0.64.  Glucose 174.





The patient is seen today 04/03/2020 in follow-up in the intensive care unit.  

She remains intubated on mechanical ventilator.  Current settings are assist-

control mode.  Rate of 34, tidal volume 450, FiO2 50% and a PEEP of 16.  Morning

blood gases reveal a pO2 of 117, pCO2 30, pH 7.40.  She remains sedated on 

propofol at 55 mcg/kg/m.  Fentanyl and 1 mcg/kg/hr.  She is being nourished with

vital HP at 33 ML's per hour.  0.9 normal saline at 130 ML's per hour.  She 

remains on dexamethasone, Lovenox, bronchodilators.  White count 4.0.  

Hemoglobin 11.8.  D-dimer 1.81.  Sodium 138.  Potassium 4.2.  Bicarb 19.  LDH 

1133.  C-reactive protein 15.5.  Asked x-ray continues to show moderate 

scattered interstitial and partial airspace opacities.  





The patient is seen today 04/04/2021 in follow-up in the intensive care unit.  

She remains intubated, sedated.  Current settings are assist control at a rate 

of 30, tidal volume 450, FiO2 50% and a PEEP of 15.  She is sedated on propofol 

at 60 mcg/kg/m, fentanyl at 1 mcg/kg per hour, 0.9 normal saline 130 ML's per 

hour.  Vital HP at 24 mls per hour.  She did receive Actemra.  Blood culture 

reveals no growth.  Sputum culture reveals no growth.  White count 5.3.  

Hemoglobin 12.0.  D-dimer 2.0.  Sodium 139.  Potassium 4.0.  Creatinine 0.59.  

LDH 1082.  C reactive protein 8.6.  Chest x-ray continues to show no interval 

change, continues with persistent moderate patchy opacities bilaterally.  

Endotracheal tube to be advanced to 2 cms.  Continued on vitamin supplements, 

Lovenox, IV Solu-Medrol. 





Progress note dated 04/05/2021.





This is a patient was admitted on March 31 for COVID 19 pneumonia.  The patient 

came to the ICU on March 31, and was intubated on the same day.  She apparently 

tested positive back on March 24.  The patient remains on the mechanical 

ventilator.  She is on the volume assist control mode, rate of 30, tidal volume 

450, FiO2 50%, PEEP of 15 to be dropped to 10.  Blood gases show pO2 of 66, pCO2

33, and a pH 7.41.  The patient's on propofol at 60 mcg/kg/m, fentanyl 1 

mcg/kg/h, normal saline at 130 mL an hour and vital high protein at 24, which is

goal.  The patient did receive TOCI.   White count is 6.2, hemoglobin 11.8, 

hematocrit 33.1, platelet count 272,000.  Sodium 137, potassium 3.8, chlorides 

114, CO2 20, anion gap 3, BUN 18, creatinine 0.53.  LDH is 956.  Chest x-ray 

shows worsened bilateral infiltrates.  Small effusions are noted.





Progress note dated 04/06/2021.





61-year-old female, admitted on March 31 for COVID 19 pneumonia.  She came to 

the ICU on March 31 and was intubated on the same day.  She tested positive back

on March 24.  The patient remains on mechanical ventilation.  She is on the 

volume assist control mode, rate 36, tidal volume 450, FiO2 50%, and PEEP is 13.

 Arterial blood gases show a PaO2 of 74, pCO2 31, and a pH of 7.52.  Blood gases

are consistent with relative hypoxemia, and a combined respiratory and metabolic

alkalosis.  The patient is getting propofol 60 mcg/kg/m, fentanyl 1 emily per 

kilogram per hour, saline at 40 mL an hour, dopamine at 2 mcg/kg/m, and vital 

high protein at goal, which is 24 mL an hour.  The cardiologist wanted to DC the

dopamine.  We are going to try to lower PEEP from 13 down to 10.  Today's CBC is

normal.  Sodium 138, potassium 3.4, chlorides 109, CO2 24, anion gap 5, BUN 17, 

and creatinine 0.53.  Chest x-ray shows improving bibasilar infiltrates.





The patient is seen today 04/07/2021 and follow-up in the intensive care unit.  

She remains intubated and on the mechanical ventilator.  Current mode assist 

control with a rate of 30, tidal volume 450, FiO2 50% and a PEEP of 10.  Morning

blood gases reveal a P O2 of 58, pCO2 32, pH 7.51.  She remains sedated on 

propofol at 60 mcg/kg/m.  Fentanyl at 1 mcg/kg/h, 0.9#at 40 ML's per hour.  She 

is being nourished with vital HP at 24 ML's per hour which is goal.  She is 

currently afebrile.  Hemodynamically stable.  She is receive daily interruption 

of sedation without success.  The plan will be for trach and PEG later this 

week.  Blood and sputum cultures revealed no growth.  WBC 6.4.  Hemoglobin 12.8.

 Sodium 137.  Potassium 3.5.  Creatinine 0.60.  Glucose 120.  AST 53.  ALT 69.  

Chest x-ray continues to show patchy perihilar and basilar infiltrates with 

interval progression.





Objective





- Vital Signs


Vital signs: 


                                   Vital Signs











Temp  98.6 F   04/07/21 08:00


 


Pulse  64   04/07/21 10:00


 


Resp  30 H  04/07/21 10:00


 


BP  134/70   04/07/21 10:00


 


Pulse Ox  90 L  04/07/21 10:00








                                 Intake & Output











 04/06/21 04/07/21 04/07/21





 18:59 06:59 18:59


 


Intake Total 7487.568 6492.770 231


 


Output Total 1020 1540 310


 


Balance 394.599 -225.230 -79


 


Weight 134.3 kg  


 


Intake:   


 


   473 129


 


    Sodium Chloride 0.9% 1, 480 440 120





    000 ml @ 40 mls/hr IV .   





    Q24H [facility] Rx#:[id number]   


 


    pressure bag 36 33 9


 


  Intake, IV Titration 520.599 487.770 





  Amount   


 


    DOPamine DRIP 800 mg In 120.599  





    Dextrose/Water 1 250ml.   





    bag @ 2 MCG/KG/MIN 5.085   





    mls/hr IV .Q24H [facility] Rx#:   





    [id number]   


 


    fentaNYL (PF) 1,000 mcg 100 180.534 





    In Sodium Chloride 0.9%   





    80 ml @ Per Protocol IV .   





    Q0M [facility] Rx#:[id number]   


 


    propofoL 1,000 mg In 300 307.236 





    Empty Bag 1 bag @ Titrate   





    IV .Q0M [facility] Rx#:   





    [id number]   


 


  Tube Feeding 288 264 72


 


  Other 90 90 30


 


Output:   


 


  Urine 1020 1540 310


 


Other:   


 


  Voiding Method Indwelling Catheter Indwelling Catheter Indwelling Catheter








                       ABP, PAP, CO, CI - Last Documented











Arterial Blood Pressure        161/58

















- Exam





GENERAL EXAM: Intubated, sedated, 61-year-old female patient, comfortable in no 

apparent distress.


HEAD: Normocephalic.


EYES: Normal reaction of pupils, equal size.


NOSE: Clear with pink turbinates.


THROAT: No erythema or exudates.


NECK: No masses, no JVD.


CHEST: No chest wall deformity.


LUNGS: Equal air entry with crackles in the bilateral posterior bases.


CVS: S1 and S2 normal with no audible murmur, regular rhythm.


ABDOMEN: No hepatosplenomegaly, normal bowel sounds, no guarding or rigidity.


SPINE: No scoliosis or deformity


SKIN: No rashes


CENTRAL NERVOUS SYSTEM: Sedated, tone is normal in all 4 extremities.


EXTREMITIES: There is no peripheral edema.  No clubbing, no cyanosis.  

Peripheral pulses are intact.





- Labs


CBC & Chem 7: 


                                 04/07/21 04:15





                                 04/07/21 04:15


Labs: 


                  Abnormal Lab Results - Last 24 Hours (Table)











  04/06/21 04/06/21 04/06/21 Range/Units





  11:45 17:01 23:55 


 


ABG pH     (7.35-7.45)  


 


ABG pCO2     (35-45)  mmHg


 


ABG pO2     ()  mmHg


 


ABG HCO3     (21-25)  mmol/L


 


ABG Total CO2     (19-24)  mmol/L


 


ABG O2 Saturation     (94-97)  %


 


Chloride     ()  mmol/L


 


BUN     (7-17)  mg/dL


 


Glucose     (74-99)  mg/dL


 


POC Glucose (mg/dL)  153 H  177 H  108 H  (75-99)  mg/dL


 


Calcium     (8.4-10.2)  mg/dL


 


AST     (14-36)  U/L


 


ALT     (4-34)  U/L


 


Total Protein     (6.3-8.2)  g/dL


 


Albumin     (3.5-5.0)  g/dL














  04/07/21 04/07/21 Range/Units





  04:15 05:16 


 


ABG pH   7.51 H  (7.35-7.45)  


 


ABG pCO2   32 L  (35-45)  mmHg


 


ABG pO2   58 L*  ()  mmHg


 


ABG HCO3   26 H  (21-25)  mmol/L


 


ABG Total CO2   27 H  (19-24)  mmol/L


 


ABG O2 Saturation   91.3 L  (94-97)  %


 


Chloride  110 H   ()  mmol/L


 


BUN  20 H   (7-17)  mg/dL


 


Glucose  120 H   (74-99)  mg/dL


 


POC Glucose (mg/dL)    (75-99)  mg/dL


 


Calcium  7.9 L   (8.4-10.2)  mg/dL


 


AST  53 H   (14-36)  U/L


 


ALT  69 H   (4-34)  U/L


 


Total Protein  4.9 L   (6.3-8.2)  g/dL


 


Albumin  2.5 L   (3.5-5.0)  g/dL














Assessment and Plan


Assessment: 





1 Acute hypoxemic respiratory failure secondary to CoVID 19 pneumonia/pneum

onitis requiring intubation mechanical ventilatory support on 03/31/2021.  

Outside the window for Remdesivir. Received tocilizumab.





2 Hypertension, history of





3 Paroxysmal atrial fibrillation





4 Hyperlipidemia





Plan:





The patient was seen and evaluated by Dr. Ruiz


Chest x-ray, ABGs and labs reviewed


Continue the current vent settings


Dopamine was discontinued


Continue Decadron, Lovenox, vitamin supplements


Not really making any progress.  We'll continue with daily interruption of 

sedation.


Plan will be for possible tracheostomy and PEG tube placement by the end of the 

week.


Dr. Ruiz will be speaking with family today


We will continue to follow and make further recommendations based on her 

clinical status





Critical care time 38 minutes





I, the cosigning physician, performed a history & physical examination of the 

patient. Lungs sounds with coarse crackles in the bilateral bases.  Maintaining 

O2 saturations in the 90s on 50% FiO2 and a PEEP of 10.  I discussed the 

assessment and plan of care with my nurse practitioner, Leatha Rodriguez. I attest to 

the above note as dictated by her.

## 2021-04-07 NOTE — P.GSCN
History of Present Illness


Consult date: 04/07/21


History of present illness: 





CHIEF COMPLAINT: Shortness of breath





HISTORY OF PRESENT ILLNESS: This is a 61-year-old female with past medical 

history of hypertension, hyperlipidemia and atrial fibrillation.  Prior surgical

history includes cholecystectomy and hysterectomy.  Patient presented to the 

emergency room with complaints of worsening shortness of breath fever, cough, 

diarrhea and weakness.  She was found to be Covid 19 positive.  Patient was 

admitted to the hospital on March 31 for Covid 19 pneumonia.  She was 

transferred to the ICU on March 31 and was intubated.  Patient is currently 

intubated and sedated and remains in the ICU.  She is also followed by 

cardiology in regards to bradycardia.  Patient has not been able to be weaned 

from the vent.  Therefore, surgical service has been placed for trach and PEG 

tube placement.





PAST MEDICAL HISTORY: 


See list.





PAST SURGICAL HISTORY: 


See list.





MEDICATIONS: 


See list.





ALLERGIES: 


See list.





SOCIAL HISTORY: No illicit drug use.  





REVIEW OF SYSTEMS: 


CONSTITUTIONAL: Denies fever or chills.


HEENT: Denies blurred vision, vision changes, or eye pain. Denies hemoptysis 


CARDIOVASCULAR: Denies chest pain or pressure.


RESPIRATORY: No shortness of breath. 


GASTROINTESTINAL: See HPI for pertinent findings


HEMATOLOGIC: Denies bleeding disorders.


GENITOURINARY:  Denies any blood in urine or increased urinary frequency.  


SKIN: Denies pruitis. Denies rash.





PHYSICAL EXAM: 


VITAL SIGNS: Reviewed


GENERAL: Well-developed in no acute distress. 


HEENT:  No sclera icterus. Extraocular movements grossly intact.  Moist buccal 

mucosa. Head is atraumatic, normocephalic. No nasal drainage.


ABDOMEN:  Soft.  Nondistended.  Nontender


NEUROLOGIC:  Patient intubated and sedated





LABORATORY DATA:


WBC 6.4 Hgb 12.8 platelets 298


Sodium 137 potassium 3.5 creatinine 0.60


Albumin 2.5





IMAGING:


Chest x-ray patchy perihilar and basilar infiltrates with interval progression.


Echo shows EF of 55-60%





ASSESSMENT: 


1.  Acute hypoxic respiratory failure secondary to Covid 19 pneumonia


2.  Severe protein calorie malnutrition


3.  Sinus bradycardia followed by cardiology





PLAN: 


-We'll schedule patient for possible tracheostomy and PEG tube placement 

tomorrow, 04/08/2021 with Dr. Garland


-Hold tube feedings after midnight


-Continue ICU management


-Continue supportive care








Physician Assistant note has been reviewed by physician. Signing provider agrees

with the documented findings, assessment, and plan of care. 





Past Medical History


Past Medical History: Atrial Fibrillation, Hypertension


History of Any Multi-Drug Resistant Organisms: None Reported


Past Surgical History: Cholecystectomy, Heart Catheterization, Hysterectomy, 

Tubal Ligation


Past Psychological History: No Psychological Hx Reported


Past Alcohol Use History: Occasional


Past Drug Use History: None Reported





- Past Family History


  ** Mother


History Unknown: Yes





Medications and Allergies


                                Home Medications











 Medication  Instructions  Recorded  Confirmed  Type


 


Lisinopril [Prinivil] 10 mg PO DAILY 05/29/18 03/31/21 History


 


Metoprolol Succinate (ER) [Toprol 100 mg PO DAILY 05/29/18 03/31/21 History





XL]    


 


amLODIPine [Norvasc] 5 mg PO DAILY 05/29/18 03/31/21 History


 


Aspirin 81 mg PO DAILY #30 chew 06/01/18 03/31/21 Rx


 


Atorvastatin [Lipitor] 40 mg PO DAILY 03/31/21 03/31/21 History


 


Citalopram Hydrobromide [CeleXA] 40 mg PO DAILY 03/31/21 03/31/21 History


 


Spironolactone [Aldactone] 12.5 mg PO DAILY 03/31/21 03/31/21 History








                                    Allergies











Allergy/AdvReac Type Severity Reaction Status Date / Time


 


No Known Allergies Allergy   Verified 03/31/21 07:35














Surgical - Exam


                                   Vital Signs











Temp Pulse Resp BP Pulse Ox


 


 98.6 F   88   18   178/82   90 L


 


 03/31/21 00:34  03/31/21 00:34  03/31/21 00:34  03/31/21 00:34  03/31/21 00:34














Results





- Labs





                                 04/07/21 04:15





                                 04/07/21 04:15


                  Abnormal Lab Results - Last 24 Hours (Table)











  04/06/21 04/06/21 04/07/21 Range/Units





  17:01 23:55 04:15 


 


ABG pH     (7.35-7.45)  


 


ABG pCO2     (35-45)  mmHg


 


ABG pO2     ()  mmHg


 


ABG HCO3     (21-25)  mmol/L


 


ABG Total CO2     (19-24)  mmol/L


 


ABG O2 Saturation     (94-97)  %


 


Chloride    110 H  ()  mmol/L


 


BUN    20 H  (7-17)  mg/dL


 


Glucose    120 H  (74-99)  mg/dL


 


POC Glucose (mg/dL)  177 H  108 H   (75-99)  mg/dL


 


Calcium    7.9 L  (8.4-10.2)  mg/dL


 


AST    53 H  (14-36)  U/L


 


ALT    69 H  (4-34)  U/L


 


Total Protein    4.9 L  (6.3-8.2)  g/dL


 


Albumin    2.5 L  (3.5-5.0)  g/dL














  04/07/21 04/07/21 Range/Units





  05:16 11:19 


 


ABG pH  7.51 H   (7.35-7.45)  


 


ABG pCO2  32 L   (35-45)  mmHg


 


ABG pO2  58 L*   ()  mmHg


 


ABG HCO3  26 H   (21-25)  mmol/L


 


ABG Total CO2  27 H   (19-24)  mmol/L


 


ABG O2 Saturation  91.3 L   (94-97)  %


 


Chloride    ()  mmol/L


 


BUN    (7-17)  mg/dL


 


Glucose    (74-99)  mg/dL


 


POC Glucose (mg/dL)   150 H  (75-99)  mg/dL


 


Calcium    (8.4-10.2)  mg/dL


 


AST    (14-36)  U/L


 


ALT    (4-34)  U/L


 


Total Protein    (6.3-8.2)  g/dL


 


Albumin    (3.5-5.0)  g/dL








                                 Diabetes panel











  04/07/21 Range/Units





  04:15 


 


Sodium  137  (137-145)  mmol/L


 


Potassium  3.5  (3.5-5.1)  mmol/L


 


Chloride  110 H  ()  mmol/L


 


Carbon Dioxide  24  (22-30)  mmol/L


 


BUN  20 H  (7-17)  mg/dL


 


Creatinine  0.60  (0.52-1.04)  mg/dL


 


Glucose  120 H  (74-99)  mg/dL


 


Calcium  7.9 L  (8.4-10.2)  mg/dL


 


AST  53 H  (14-36)  U/L


 


ALT  69 H  (4-34)  U/L


 


Alkaline Phosphatase  67  ()  U/L


 


Total Protein  4.9 L  (6.3-8.2)  g/dL


 


Albumin  2.5 L  (3.5-5.0)  g/dL








                                  Calcium panel











  04/07/21 Range/Units





  04:15 


 


Calcium  7.9 L  (8.4-10.2)  mg/dL


 


Albumin  2.5 L  (3.5-5.0)  g/dL








                                 Pituitary panel











  04/07/21 Range/Units





  04:15 


 


Sodium  137  (137-145)  mmol/L


 


Potassium  3.5  (3.5-5.1)  mmol/L


 


Chloride  110 H  ()  mmol/L


 


Carbon Dioxide  24  (22-30)  mmol/L


 


BUN  20 H  (7-17)  mg/dL


 


Creatinine  0.60  (0.52-1.04)  mg/dL


 


Glucose  120 H  (74-99)  mg/dL


 


Calcium  7.9 L  (8.4-10.2)  mg/dL








                                  Adrenal panel











  04/07/21 Range/Units





  04:15 


 


Sodium  137  (137-145)  mmol/L


 


Potassium  3.5  (3.5-5.1)  mmol/L


 


Chloride  110 H  ()  mmol/L


 


Carbon Dioxide  24  (22-30)  mmol/L


 


BUN  20 H  (7-17)  mg/dL


 


Creatinine  0.60  (0.52-1.04)  mg/dL


 


Glucose  120 H  (74-99)  mg/dL


 


Calcium  7.9 L  (8.4-10.2)  mg/dL


 


Total Bilirubin  0.9  (0.2-1.3)  mg/dL


 


AST  53 H  (14-36)  U/L


 


ALT  69 H  (4-34)  U/L


 


Alkaline Phosphatase  67  ()  U/L


 


Total Protein  4.9 L  (6.3-8.2)  g/dL


 


Albumin  2.5 L  (3.5-5.0)  g/dL

## 2021-04-07 NOTE — PN
PROGRESS NOTE



DATE OF SERVICE:

04/07/2021



INTERVAL HISTORY:

This 61-year-old woman who was admitted acute COVID-19 infection also had acute

bilateral pneumonia.  Patient had acute hypoxic respiratory failure and patient is

mechanically ventilated. Multiple consultants are following the patient closely.  The

patient had failure to wean and Dr. Ruiz recommended tracheostomy and as well as PEG

tube. Chest x-ray most recently done which was reviewed personally by me showed

extensive bilateral lower lobe infiltrates suggestive of COVID-19 pneumonia.



PAST MEDICAL HISTORY:

Reviewed.



REVIEW OF SYMPTOMS:

Could not be taken.



CURRENT MEDICATIONS:

Reviewed include Ventolin, Xanax, Norvasc, vitamin C, aspirin, Lipitor. Doses reviewed.



PHYSICAL EXAM:

GENERAL: Patient is mechanically ventilated and sedated.

VITAL SIGNS:  Pulse 80, blood pressure 142/67, respirations 20, temperature normal,

pulse ox 88% on mechanical ventilation with 50% FIO2.

HEENT:  Conjunctivae normal. Oral mucosa moist.

NECK:  No jugular venous distention. No carotid bruits. No lymph node enlargement.

RESPIRATORY: Breath sounds diminished at the bases. A few scattered rhonchi.

HEART: S1 and S2, muffled.

ABDOMEN:  Soft, no tenderness.

EXTREMITIES:  No edema, no swelling.

NERVOUS: No focal deficits.



LABS:

CBC within normal. ABG, pH of 7.51.  Other labs are noted.



ASSESSMENT:

1. Acute COVID-19 infection with acute bilateral interstitial pneumonia, viral

    pneumonia with acute hypoxic respiratory failure on mechanical ventilation.

2. Bradycardia.

3. Occasional PVCs.

4. Elevated inflammatory markers of COVID-19.

5. Acute COVID-19 gastroenteritis, present on admission.

6. Elevated AST ALT most likely secondary to COVID-19.

7. Hypertension.

8. Hyperlipidemia.

9. Paroxysmal atrial fibrillation history.

10.Obesity with body mass index of 48.2.

11.History of hypertension.

12.History of cholecystectomy.

13.FULL CODE.



RECOMMENDATIONS AND DISCUSSION:

Recommend to continue the current management and symptomatic treatment. Continue with

bronchodilators. Continue the rest of the medications.  The patient is currently on

Lovenox and as well as dexamethasone.  We will continue to monitor and otherwise

tracheostomy and PEG tube flushes plans per Dr. Ruiz.  Prognosis guarded.  Further

recommendations to follow.





MMODL / IJN: [id number] / Job#: [id number]

## 2021-04-07 NOTE — XR
EXAMINATION TYPE: XR chest 1V portable

 

DATE OF EXAM: 4/7/2021

 

COMPARISON: 4/6/2021

 

HISTORY: SOB, Follow Up

 

FINDINGS:

 

Indwelling tubes and catheters are unchanged.

 

Patchy perihilar and basilar infiltrates with interval progression noted.

 

Stable appearance of the cardio-mediastinal structures at this time.

 

Pleural effusion unchanged.

 

IMPRESSION:

1.  Patchy perihilar and basilar infiltrates with interval progression noted. Clinical correlation an
d follow up until resolution is recommended.

## 2021-04-08 LAB
ALBUMIN SERPL-MCNC: 2.6 G/DL (ref 3.5–5)
ALP SERPL-CCNC: 66 U/L (ref 38–126)
ALT SERPL-CCNC: 67 U/L (ref 4–34)
ANION GAP SERPL CALC-SCNC: 3 MMOL/L
AST SERPL-CCNC: 53 U/L (ref 14–36)
BASOPHILS # BLD AUTO: 0 K/UL (ref 0–0.2)
BASOPHILS NFR BLD AUTO: 0 %
BUN SERPL-SCNC: 18 MG/DL (ref 7–17)
CALCIUM SPEC-MCNC: 8.2 MG/DL (ref 8.4–10.2)
CHLORIDE SERPL-SCNC: 107 MMOL/L (ref 98–107)
CO2 BLDA-SCNC: 27 MMOL/L (ref 19–24)
CO2 SERPL-SCNC: 25 MMOL/L (ref 22–30)
EOSINOPHIL # BLD AUTO: 0.1 K/UL (ref 0–0.7)
EOSINOPHIL NFR BLD AUTO: 2 %
ERYTHROCYTE [DISTWIDTH] IN BLOOD BY AUTOMATED COUNT: 3.87 M/UL (ref 3.8–5.4)
ERYTHROCYTE [DISTWIDTH] IN BLOOD: 14.2 % (ref 11.5–15.5)
FERRITIN SERPL-MCNC: 467.8 NG/ML (ref 10–291)
GLUCOSE BLD-MCNC: 121 MG/DL (ref 75–99)
GLUCOSE BLD-MCNC: 144 MG/DL (ref 75–99)
GLUCOSE BLD-MCNC: 185 MG/DL (ref 75–99)
GLUCOSE SERPL-MCNC: 109 MG/DL (ref 74–99)
HCO3 BLDA-SCNC: 26 MMOL/L (ref 21–25)
HCT VFR BLD AUTO: 33.9 % (ref 34–46)
HGB BLD-MCNC: 11.8 GM/DL (ref 11.4–16)
LDH SPEC-CCNC: 860 U/L (ref 313–618)
LYMPHOCYTES # SPEC AUTO: 1.6 K/UL (ref 1–4.8)
LYMPHOCYTES NFR SPEC AUTO: 30 %
MCH RBC QN AUTO: 30.6 PG (ref 25–35)
MCHC RBC AUTO-ENTMCNC: 34.9 G/DL (ref 31–37)
MCV RBC AUTO: 87.7 FL (ref 80–100)
MONOCYTES # BLD AUTO: 0.4 K/UL (ref 0–1)
MONOCYTES NFR BLD AUTO: 8 %
NEUTROPHILS # BLD AUTO: 3.2 K/UL (ref 1.3–7.7)
NEUTROPHILS NFR BLD AUTO: 58 %
PCO2 BLDA: 30 MMHG (ref 35–45)
PH BLDA: 7.55 [PH] (ref 7.35–7.45)
PLATELET # BLD AUTO: 263 K/UL (ref 150–450)
PO2 BLDA: 59 MMHG (ref 83–108)
POTASSIUM SERPL-SCNC: 3.9 MMOL/L (ref 3.5–5.1)
PROT SERPL-MCNC: 4.8 G/DL (ref 6.3–8.2)
SODIUM SERPL-SCNC: 135 MMOL/L (ref 137–145)
WBC # BLD AUTO: 5.5 K/UL (ref 3.8–10.6)

## 2021-04-08 RX ADMIN — ALBUTEROL SULFATE SCH PUFF: 90 AEROSOL, METERED RESPIRATORY (INHALATION) at 23:16

## 2021-04-08 RX ADMIN — DEXTRAN 70 AND HYPROMELLOSE 2910 SCH DROPS: 1; 3 SOLUTION/ DROPS OPHTHALMIC at 20:39

## 2021-04-08 RX ADMIN — OXYCODONE HYDROCHLORIDE AND ACETAMINOPHEN SCH MG: 500 TABLET ORAL at 09:31

## 2021-04-08 RX ADMIN — INSULIN ASPART SCH UNIT: 100 INJECTION, SOLUTION INTRAVENOUS; SUBCUTANEOUS at 16:51

## 2021-04-08 RX ADMIN — ALBUTEROL SULFATE SCH PUFF: 90 AEROSOL, METERED RESPIRATORY (INHALATION) at 03:31

## 2021-04-08 RX ADMIN — SPIRONOLACTONE SCH MG: 25 TABLET, FILM COATED ORAL at 09:31

## 2021-04-08 RX ADMIN — ASPIRIN 81 MG CHEWABLE TABLET SCH MG: 81 TABLET CHEWABLE at 09:31

## 2021-04-08 RX ADMIN — DEXTRAN 70 AND HYPROMELLOSE 2910 SCH DROPS: 1; 3 SOLUTION/ DROPS OPHTHALMIC at 04:57

## 2021-04-08 RX ADMIN — DEXTRAN 70 AND HYPROMELLOSE 2910 SCH DROPS: 1; 3 SOLUTION/ DROPS OPHTHALMIC at 16:46

## 2021-04-08 RX ADMIN — ALBUTEROL SULFATE SCH PUFF: 90 AEROSOL, METERED RESPIRATORY (INHALATION) at 07:27

## 2021-04-08 RX ADMIN — ALBUTEROL SULFATE SCH PUFF: 90 AEROSOL, METERED RESPIRATORY (INHALATION) at 19:21

## 2021-04-08 RX ADMIN — Medication SCH MCG: at 09:30

## 2021-04-08 RX ADMIN — CEFAZOLIN SCH MLS/HR: 330 INJECTION, POWDER, FOR SOLUTION INTRAMUSCULAR; INTRAVENOUS at 20:39

## 2021-04-08 RX ADMIN — CHLORHEXIDINE GLUCONATE SCH ML: 1.2 RINSE ORAL at 09:36

## 2021-04-08 RX ADMIN — ENOXAPARIN SODIUM SCH MG: 40 INJECTION SUBCUTANEOUS at 09:36

## 2021-04-08 RX ADMIN — Medication SCH MG: at 09:31

## 2021-04-08 RX ADMIN — PANTOPRAZOLE SODIUM SCH MG: 40 INJECTION, POWDER, FOR SOLUTION INTRAVENOUS at 09:36

## 2021-04-08 RX ADMIN — CITALOPRAM HYDROBROMIDE SCH MG: 20 TABLET ORAL at 09:31

## 2021-04-08 RX ADMIN — CHLORHEXIDINE GLUCONATE SCH ML: 1.2 RINSE ORAL at 21:14

## 2021-04-08 RX ADMIN — DEXTROSE SCH MG: 50 INJECTION, SOLUTION INTRAVENOUS at 09:32

## 2021-04-08 RX ADMIN — INSULIN ASPART SCH UNIT: 100 INJECTION, SOLUTION INTRAVENOUS; SUBCUTANEOUS at 12:01

## 2021-04-08 RX ADMIN — DEXTRAN 70 AND HYPROMELLOSE 2910 SCH DROPS: 1; 3 SOLUTION/ DROPS OPHTHALMIC at 15:00

## 2021-04-08 RX ADMIN — SODIUM CHLORIDE SCH MLS/HR: 900 INJECTION, SOLUTION INTRAVENOUS at 10:30

## 2021-04-08 RX ADMIN — DEXTRAN 70 AND HYPROMELLOSE 2910 SCH DROPS: 1; 3 SOLUTION/ DROPS OPHTHALMIC at 00:01

## 2021-04-08 RX ADMIN — DEXTRAN 70 AND HYPROMELLOSE 2910 SCH DROPS: 1; 3 SOLUTION/ DROPS OPHTHALMIC at 09:32

## 2021-04-08 RX ADMIN — ALBUTEROL SULFATE SCH PUFF: 90 AEROSOL, METERED RESPIRATORY (INHALATION) at 11:03

## 2021-04-08 RX ADMIN — DOPAMINE HYDROCHLORIDE IN DEXTROSE SCH MLS/HR: 3.2 INJECTION, SOLUTION INTRAVENOUS at 15:15

## 2021-04-08 RX ADMIN — SODIUM CHLORIDE SCH MLS/HR: 900 INJECTION, SOLUTION INTRAVENOUS at 20:37

## 2021-04-08 RX ADMIN — SODIUM CHLORIDE SCH MLS/HR: 900 INJECTION, SOLUTION INTRAVENOUS at 15:00

## 2021-04-08 RX ADMIN — ATORVASTATIN CALCIUM SCH MG: 40 TABLET, FILM COATED ORAL at 09:32

## 2021-04-08 RX ADMIN — INSULIN ASPART SCH: 100 INJECTION, SOLUTION INTRAVENOUS; SUBCUTANEOUS at 05:29

## 2021-04-08 RX ADMIN — ALBUTEROL SULFATE SCH PUFF: 90 AEROSOL, METERED RESPIRATORY (INHALATION) at 15:03

## 2021-04-08 RX ADMIN — INSULIN ASPART SCH: 100 INJECTION, SOLUTION INTRAVENOUS; SUBCUTANEOUS at 00:01

## 2021-04-08 RX ADMIN — SODIUM CHLORIDE SCH MLS/HR: 900 INJECTION, SOLUTION INTRAVENOUS at 06:17

## 2021-04-08 NOTE — XR
EXAMINATION TYPE: XR chest 1V portable

 

DATE OF EXAM: 4/8/2021

 

COMPARISON: 4/7/2021

 

HISTORY: SOB, Follow Up

 

FINDINGS:

 

Indwelling tubes and catheters are unchanged.

 

No change in diffuse airspace infiltrates.

 

Stable appearance of the cardio-mediastinal structures at this time.

 

Pleural effusion unchanged.

 

IMPRESSION:

1.  Stable portable chest.  Clinical correlation and follow up until resolution is recommended.

## 2021-04-08 NOTE — P.PN
Subjective





This is a pleasant 61 years old female with past medical history of atrial 

fibrillation not on anticoagulation and her cardiologist is Dr. Peck, 

hypertension, hyperlipidemia.  She is a patient of Dr. Ribeiro


Patient states that she was tested positive for covid On 03/26/2021.  And now 

she presents with dyspnea of one-day duration when started earlier.  Associated 

with cough on the patella brown phlegm.


She is also complaining of from chest pain without coughing , on the right side 

of the chest nonradiating about 5/10 in severity


She has no vomiting but diarrhea about twice per day











04/8/2021


Patient remains intubated and on mechanical ventilation, currently she needs 

high peep of 13,(was 10 yesterday and 16 on admission) with pulmonary/critical 

care team monitor her closely and help with vent management.


She is tachypneic and bradycardic since admission and she is currently on 

dopamine to keep heart rate in 40s existed of 20s.  Afebrile.  


Pulmonary team recommended tracheostomy and PEG tube placement, family wanted to

wait for now


Labs showing leukopenia with WBC 5.5K with lymphopenia resolved.  Rest of the 

CBC, INR unremarkable.  BMP is unremarkable with carbon dioxide is 25.  Lactic 

acid 1.2.  Liver enzymes slightly elevated with AST 53 and ALT 67 (since 

admission).


Lactate dehydrogenase is elevated at 860(improving), C-reactive protein is 5.9 

(within normal limits).


Chest x-ray Stable portable chest.  Bilateral infiltrates


She is still a normal sinus to 40 mL/h, she is on zinc, vitamin C, vitamin D, 

dexamethasone.  She status post tocilizumab.  Also she is on Lovenox.











Review of systems: N/a


                               Active Medications











Generic Name Dose Route Start Last Admin





  Trade Name Freq  PRN Reason Stop Dose Admin


 


Albuterol Sulfate  2 puff  04/03/21 16:00  04/08/21 07:27





  Albuterol Hfa Inhaler  INHALATION   2 puff





  RT-Q4H [facility]IOLET   Administration


 


Alprazolam  0.25 mg  04/07/21 16:00  04/08/21 09:30





  Alprazolam 0.25 Mg Tab  PO   0.25 mg





  TID VIOLET   Administration


 


Amlodipine Besylate  10 mg  04/09/21 09:00 





  Amlodipine 5 Mg Tab  PO  





  DAILY VIOLET  


 


Artificial Tears  2 drops  04/01/21 20:00  04/08/21 09:32





  Artificial Tears-Hypromellose Drops 15 Ml Btl  BOTH EYES   2 drops





  Q4HR VIOLET   Administration


 


Ascorbic Acid  1,000 mg  03/31/21 09:00  04/08/21 09:31





  Ascorbic Acid 500 Mg Tab  PO   1,000 mg





  DAILY VIOLET   Administration


 


Aspirin  81 mg  03/31/21 09:00  04/08/21 09:31





  Aspirin 81 Mg  PO   81 mg





  DAILY VIOLET   Administration


 


Atorvastatin Calcium  40 mg  03/31/21 09:00  04/08/21 09:32





  Atorvastatin 40 Mg Tab  PO   40 mg





  DAILY VIOLET   Administration


 


Chlorhexidine Gluconate  15 ml  03/31/21 21:00  04/08/21 09:36





  Chlorhexidine Gluconate 15 Ml Cup  MUCOUS MEM   15 ml





  BID [facility]IOLET   Administration


 


Cholecalciferol  50 mcg  03/31/21 09:00  04/08/21 09:30





  Cholecalciferol 25 Mcg (1000 Iu) Tablet  PO   50 mcg





  DAILY VIOLET   Administration


 


Citalopram Hydrobromide  40 mg  03/31/21 09:00  04/08/21 09:31





  Citalopram Hydrobromide 20 Mg Tab  PO   40 mg





  DAILY VIOLET   Administration


 


Dexamethasone Sodium Phosphate  6 mg  03/31/21 09:00  04/08/21 09:32





  Dexamethasone Sod Phosphate 10 Mg/Ml 1 Ml Vial  IV   6 mg





  DAILY VIOLET   Administration


 


Enoxaparin Sodium  40 mg  03/31/21 09:00  04/08/21 09:36





  Enoxaparin 40 Mg/0.4 Ml Syringe  SQ   40 mg





  DAILY VIOLET   Administration


 


Propofol 1,000 mg/ IV Solution  100 mls @ 0 mls/hr  03/31/21 14:00  04/08/21 

04:56





  IV   50 mcg/kg/min





  .Q0M VIOLET   39.87 mls/hr





    Administration





  Protocol  





  Titrate  


 


Fentanyl Citrate 1,000 mcg/  100 mls @ 0 mls/hr  03/31/21 14:30  04/08/21 06:17





  Sodium Chloride  IV   2 mcg/kg/hr





  .Q0M VIOLET   24.04 mls/hr





    Administration





  Protocol  





  Per Protocol  


 


Sodium Chloride  1,000 mls @ 40 mls/hr  03/31/21 16:45  04/07/21 20:54





  Saline 0.9%  IV   40 mls/hr





  .Q24H [facility]   Administration


 


Insulin Aspart  0 unit  04/01/21 00:00  04/08/21 05:29





  Insulin Aspart (Novolog) 100 Unit/Ml Vial  SQ   Not Given





  Q6H VIOLET  





  Protocol  


 


Lisinopril  20 mg  04/08/21 09:30  04/08/21 09:35





  Lisinopril 10 Mg Tab  PO   20 mg





  DAILY VIOLET   Administration


 


Miscellaneous Information  1 each  04/06/21 14:29 





  Magnesium Replacement Protocol 1 Each [facility] [place]IS[place]ANE  





  DAILY PRN  





  Per Protocol  





  Protocol  


 


Miscellaneous Information  1 each  04/06/21 14:29 





  Potassium Replacement Protocol 1 Each [facility] [place]IS[place]LLANE  





  DAILY PRN  





  Per Protocol  





  Protocol  


 


Miscellaneous Information  1 each  04/08/21 05:26 





  Potassium Replacement Protocol 1 Each [facility] [place]ISCELLANE  





  DAILY PRN  





  Per Protocol  





  Protocol  


 


Naloxone HCl  0.2 mg  03/31/21 00:55 





  Naloxone 0.4 Mg/Ml 1 Ml Vial  IV  





  Q2M PRN  





  Opioid Reversal  


 


Ondansetron HCl  4 mg  03/31/21 00:55  03/31/21 01:59





  Ondansetron 4 Mg/2 Ml Vial  IVP   4 mg





  Q8HR PRN   Administration





  Nausea And Vomiting  


 


Pantoprazole Sodium  40 mg  04/01/21 09:00  04/08/21 09:36





  Pantoprazole 40 Mg/10 Ml Vial  IV   40 mg





  DAILY VIOLET   Administration


 


Spironolactone  12.5 mg  03/31/21 09:00  04/08/21 09:31





  Spironolactone 25 Mg Tab  PO   12.5 mg





  DAILY VIOLET   Administration


 


Zinc Sulfate  220 mg  03/31/21 09:00  04/08/21 09:31





  Zinc Sulfate 220 Mg Cap  PO   220 mg





  DAILY VIOLET   Administration














Objective





- Vital Signs


Vital signs: 


                                   Vital Signs











Temp  99 F   04/08/21 08:00


 


Pulse  55 L  04/08/21 09:00


 


Resp  30 H  04/08/21 09:00


 


BP  131/60   04/08/21 09:00


 


Pulse Ox  91 L  04/08/21 09:00








                                 Intake & Output











 04/07/21 04/08/21 04/08/21





 18:59 06:59 18:59


 


Intake Total 1394 1305.779 177


 


Output Total 1495 900 290


 


Balance -101 405.779 -113


 


Weight 134.3 kg 132.9 kg 


 


Intake:   


 


   516 129


 


    Sodium Chloride 0.9% 1, 480 480 120





    000 ml @ 40 mls/hr IV .   





    Q24H [facility] Rx#:[id number]   


 


    pressure bag 36 36 9


 


  Intake, IV Titration 500 435.779 





  Amount   


 


    fentaNYL (PF) 1,000 mcg 100 178.731 





    In Sodium Chloride 0.9%   





    80 ml @ Per Protocol IV .   





    Q0M [facility] Rx#:[id number]   


 


    propofoL 1,000 mg In 400 257.048 





    Empty Bag 1 bag @ Titrate   





    IV .Q0M [facility] Rx#:   





    [id number]   


 


  Tube Feeding 288 264 48


 


  Other 90 90 


 


Output:   


 


  Urine 1495 900 290


 


Other:   


 


  Voiding Method Indwelling Catheter Indwelling Catheter Indwelling Catheter








                       ABP, PAP, CO, CI - Last Documented











Arterial Blood Pressure        178/71

















- Exam





-GENERAL: The patient is intubated and sedated


HEENT: Pupils are round and equally reacting to light. EOMI. No scleral icterus.

No conjunctival pallor. Normocephalic, atraumatic. No pharyngeal erythema. No 

thyromegaly. 


CARDIOVASCULAR: S1 and S2 present. No murmurs, rubs, or gallops. 


PULMONARY: Chest is clear to auscultation, no wheezing or crackles. 


ABDOMEN: Soft, nontender, nondistended, normoactive bowel sounds. No palpable 

organomegaly. 


MUSCULOSKELETAL: No joint swelling or deformity. 


EXTREMITIES: No cyanosis, clubbing, or pedal edema. 


NEUROLOGICAL: Gross neurological examination did not reveal any focal deficits. 


SKIN: No rashes. no petechiae.





- Labs


CBC & Chem 7: 


                                 04/08/21 04:18





                                 04/08/21 04:18


Labs: 


                  Abnormal Lab Results - Last 24 Hours (Table)











  04/07/21 04/07/21 04/07/21 Range/Units





  11:19 17:33 23:56 


 


Hct     (34.0-46.0)  %


 


D-Dimer     (<0.60)  mg/L FEU


 


ABG pH     (7.35-7.45)  


 


ABG pCO2     (35-45)  mmHg


 


ABG pO2     ()  mmHg


 


ABG HCO3     (21-25)  mmol/L


 


ABG Total CO2     (19-24)  mmol/L


 


ABG O2 Saturation     (94-97)  %


 


Sodium     (137-145)  mmol/L


 


BUN     (7-17)  mg/dL


 


Glucose     (74-99)  mg/dL


 


POC Glucose (mg/dL)  150 H  174 H  117 H  (75-99)  mg/dL


 


Calcium     (8.4-10.2)  mg/dL


 


Ferritin     (10.0-291.0)  ng/mL


 


AST     (14-36)  U/L


 


ALT     (4-34)  U/L


 


Lactate Dehydrogenase     (313-618)  U/L


 


Total Protein     (6.3-8.2)  g/dL


 


Albumin     (3.5-5.0)  g/dL














  04/08/21 04/08/21 04/08/21 Range/Units





  03:55 04:18 04:18 


 


Hct   33.9 L   (34.0-46.0)  %


 


D-Dimer     (<0.60)  mg/L FEU


 


ABG pH     (7.35-7.45)  


 


ABG pCO2     (35-45)  mmHg


 


ABG pO2     ()  mmHg


 


ABG HCO3     (21-25)  mmol/L


 


ABG Total CO2     (19-24)  mmol/L


 


ABG O2 Saturation     (94-97)  %


 


Sodium    135 L  (137-145)  mmol/L


 


BUN    18 H  (7-17)  mg/dL


 


Glucose    109 H  (74-99)  mg/dL


 


POC Glucose (mg/dL)  121 H    (75-99)  mg/dL


 


Calcium    8.2 L  (8.4-10.2)  mg/dL


 


Ferritin    467.8 H  (10.0-291.0)  ng/mL


 


AST    53 H  (14-36)  U/L


 


ALT    67 H  (4-34)  U/L


 


Lactate Dehydrogenase    860 H  (313-618)  U/L


 


Total Protein    4.8 L  (6.3-8.2)  g/dL


 


Albumin    2.6 L  (3.5-5.0)  g/dL














  04/08/21 04/08/21 Range/Units





  04:18 04:51 


 


Hct    (34.0-46.0)  %


 


D-Dimer  1.59 H   (<0.60)  mg/L FEU


 


ABG pH   7.55 H  (7.35-7.45)  


 


ABG pCO2   30 L  (35-45)  mmHg


 


ABG pO2   59 L*  ()  mmHg


 


ABG HCO3   26 H  (21-25)  mmol/L


 


ABG Total CO2   27 H  (19-24)  mmol/L


 


ABG O2 Saturation   91.8 L  (94-97)  %


 


Sodium    (137-145)  mmol/L


 


BUN    (7-17)  mg/dL


 


Glucose    (74-99)  mg/dL


 


POC Glucose (mg/dL)    (75-99)  mg/dL


 


Calcium    (8.4-10.2)  mg/dL


 


Ferritin    (10.0-291.0)  ng/mL


 


AST    (14-36)  U/L


 


ALT    (4-34)  U/L


 


Lactate Dehydrogenase    (313-618)  U/L


 


Total Protein    (6.3-8.2)  g/dL


 


Albumin    (3.5-5.0)  g/dL














Assessment and Plan


Assessment: 





Acute bilateral pneumonia, mostly secondary to Covid 19 infection


Acute hypoxic respiratory failure, needing intubation and mechanical ventilation


Increased inflammatory markers


Bradycardia secondary to Covid infection, need dopamine


Acute Covid gastroenteritis


Mild transaminitis, mostly secondary to Covid


Hypertension


Hyperlipidemia


Paroxysmal A. fib, currently heart rate controlled

















Plan: 





This is a pleasant 61 years old female who presents with respiratory distress 

mostly secondary to covid.  Continue with steroids, vitamin C, zinc, vitamin D, 

bronchodilator and oxygen as needed.  Lovenox for DVT prophylaxis and pulmonary 

consult.  Continue with vent management as per pulmonary team.


Labs and medication were reviewed..  Continue same treatment.  Continue with 

symptomatic treatment.  Resume home medication.  Monitor lytes and vitals.  DVT 

and GI prophylaxis.  Further recommendations depends on the clinical course of 

the patient


DVT prophylaxis: Subcutaneous Lovenox


GI Prophylaxis: Ppi


Prognosis is guarded

## 2021-04-08 NOTE — P.PN
Subjective


Progress Note Date: 04/08/21


Principal diagnosis: 





Sinus bradycardia





This is a 61-year-old female patient who was admitted to the hospital with acute

hypoxic respiratory failure secondary to pneumonia and we consulted to see the 

patient because of sinus bradycardia.  The patient was on Toprol-XL which was st

opped.  Yesterday she was started on dopamine at a small dose.





The patient was seen today 04/08/2021.  She continues to be bradycardic and 

early this morning she was in the 20s and 30s.  Dopamine was restarted.  She is 

hypertensive and we are going to increase the dose of lisinopril as well as Norv

asc.  The echo showed normal LV function.  She continues to be intubated on 

mechanical ventilation and she is not doing better from that standpoint of view.











Objective





- Vital Signs


Vital signs: 


                                   Vital Signs











Temp  99 F   04/08/21 08:00


 


Pulse  46 L  04/08/21 08:00


 


Resp  30 H  04/08/21 08:00


 


BP  124/56   04/08/21 08:00


 


Pulse Ox  92 L  04/08/21 08:00








                                 Intake & Output











 04/07/21 04/08/21 04/08/21





 18:59 06:59 18:59


 


Intake Total 1394 1305.779 43


 


Output Total 1495 900 40


 


Balance -101 405.779 3


 


Weight 134.3 kg 132.9 kg 


 


Intake:   


 


   516 43


 


    Sodium Chloride 0.9% 1, 480 480 40





    000 ml @ 40 mls/hr IV .   





    Q24H [facility] Rx#:[id number]   


 


    pressure bag 36 36 3


 


  Intake, IV Titration 500 435.779 





  Amount   


 


    fentaNYL (PF) 1,000 mcg 100 178.731 





    In Sodium Chloride 0.9%   





    80 ml @ Per Protocol IV .   





    Q0M [facility] Rx#:[id number]   


 


    propofoL 1,000 mg In 400 257.048 





    Empty Bag 1 bag @ Titrate   





    IV .Q0M [facility] Rx#:   





    [id number]   


 


  Tube Feeding 288 264 


 


  Other 90 90 


 


Output:   


 


  Urine 1495 900 40


 


Other:   


 


  Voiding Method Indwelling Catheter Indwelling Catheter Indwelling Catheter








                       ABP, PAP, CO, CI - Last Documented











Arterial Blood Pressure        162/66

















- Labs


CBC & Chem 7: 


                                 04/08/21 04:18





                                 04/08/21 04:18


Labs: 


                  Abnormal Lab Results - Last 24 Hours (Table)











  04/07/21 04/07/21 04/07/21 Range/Units





  11:19 17:33 23:56 


 


Hct     (34.0-46.0)  %


 


D-Dimer     (<0.60)  mg/L FEU


 


ABG pH     (7.35-7.45)  


 


ABG pCO2     (35-45)  mmHg


 


ABG pO2     ()  mmHg


 


ABG HCO3     (21-25)  mmol/L


 


ABG Total CO2     (19-24)  mmol/L


 


ABG O2 Saturation     (94-97)  %


 


Sodium     (137-145)  mmol/L


 


BUN     (7-17)  mg/dL


 


Glucose     (74-99)  mg/dL


 


POC Glucose (mg/dL)  150 H  174 H  117 H  (75-99)  mg/dL


 


Calcium     (8.4-10.2)  mg/dL


 


AST     (14-36)  U/L


 


ALT     (4-34)  U/L


 


Lactate Dehydrogenase     (313-618)  U/L


 


Total Protein     (6.3-8.2)  g/dL


 


Albumin     (3.5-5.0)  g/dL














  04/08/21 04/08/21 04/08/21 Range/Units





  03:55 04:18 04:18 


 


Hct   33.9 L   (34.0-46.0)  %


 


D-Dimer     (<0.60)  mg/L FEU


 


ABG pH     (7.35-7.45)  


 


ABG pCO2     (35-45)  mmHg


 


ABG pO2     ()  mmHg


 


ABG HCO3     (21-25)  mmol/L


 


ABG Total CO2     (19-24)  mmol/L


 


ABG O2 Saturation     (94-97)  %


 


Sodium    135 L  (137-145)  mmol/L


 


BUN    18 H  (7-17)  mg/dL


 


Glucose    109 H  (74-99)  mg/dL


 


POC Glucose (mg/dL)  121 H    (75-99)  mg/dL


 


Calcium    8.2 L  (8.4-10.2)  mg/dL


 


AST    53 H  (14-36)  U/L


 


ALT    67 H  (4-34)  U/L


 


Lactate Dehydrogenase    860 H  (313-618)  U/L


 


Total Protein    4.8 L  (6.3-8.2)  g/dL


 


Albumin    2.6 L  (3.5-5.0)  g/dL














  04/08/21 04/08/21 Range/Units





  04:18 04:51 


 


Hct    (34.0-46.0)  %


 


D-Dimer  1.59 H   (<0.60)  mg/L FEU


 


ABG pH   7.55 H  (7.35-7.45)  


 


ABG pCO2   30 L  (35-45)  mmHg


 


ABG pO2   59 L*  ()  mmHg


 


ABG HCO3   26 H  (21-25)  mmol/L


 


ABG Total CO2   27 H  (19-24)  mmol/L


 


ABG O2 Saturation   91.8 L  (94-97)  %


 


Sodium    (137-145)  mmol/L


 


BUN    (7-17)  mg/dL


 


Glucose    (74-99)  mg/dL


 


POC Glucose (mg/dL)    (75-99)  mg/dL


 


Calcium    (8.4-10.2)  mg/dL


 


AST    (14-36)  U/L


 


ALT    (4-34)  U/L


 


Lactate Dehydrogenase    (313-618)  U/L


 


Total Protein    (6.3-8.2)  g/dL


 


Albumin    (3.5-5.0)  g/dL














Assessment and Plan


Assessment: 





assessment


Acute hypoxic respiratory failure secondary to pneumonia


Sinus bradycardia


Hypertension





Plan


Increase the dose of lisinopril


Increase the dose of Norvasc


Continue holding any AV randy blocker agents


Continue dopamine

## 2021-04-08 NOTE — P.PN
Subjective


Progress Note Date: 04/08/21





CHIEF COMPLAINT: Shortness of breath





HISTORY OF PRESENT ILLNESS: Patient remains in the ICU intubated and sedated.  

She is being treated for Covid 19 pneumonia and respiratory failure.  Surgical 

service is following for possible tracheostomy and PEG tube placement.  At this 

time family has declined tracheostomy and PEG tube.  Afebrile.  WBC 5.5





Patient seen and examined with Dr. de la o





PHYSICAL EXAM: 


VITAL SIGNS: Reviewed.


GENERAL: Well-developed in no acute distress. 


HEENT:  No sclera icterus. Extraocular movements grossly intact.  Moist buccal 

mucosa. Head is atraumatic, normocephalic. 


ABDOMEN:  Soft.  Nondistended. Nontender. 


NEUROLOGIC: Intubated and sedated





ASSESSMENT: 


1.  Acute hypoxic respiratory failure secondary to Covid 19 pneumonia


2.  Severe protein calorie malnutrition


3.  Sinus bradycardia followed by cardiology 





PLAN: 


-Continue ICU management


-Continue supportive care


-Patient's family has declined tracheostomy and PEG tube placement at this time








Physician Assistant note has been reviewed by physician. Signing provider agrees

with the documented findings, assessment, and plan of care. 





Objective





- Vital Signs


Vital signs: 


                                   Vital Signs











Temp  98.5 F   04/08/21 12:00


 


Pulse  50 L  04/08/21 12:00


 


Resp  30 H  04/08/21 12:00


 


BP  133/67   04/08/21 12:00


 


Pulse Ox  91 L  04/08/21 12:00








                                 Intake & Output











 04/07/21 04/08/21 04/08/21





 18:59 06:59 18:59


 


Intake Total 1394 1305.779 508


 


Output Total 1495 900 840


 


Balance -101 405.779 -332


 


Weight 134.3 kg 132.9 kg 


 


Intake:   


 


   516 258


 


    Sodium Chloride 0.9% 1, 480 480 240





    000 ml @ 40 mls/hr IV .   





    Q24H [facility] Rx#:[id number]   


 


    pressure bag 36 36 18


 


  Intake, IV Titration 500 435.779 100





  Amount   


 


    fentaNYL (PF) 1,000 mcg 100 178.731 





    In Sodium Chloride 0.9%   





    80 ml @ Per Protocol IV .   





    Q0M [facility] Rx#:[id number]   


 


    propofoL 1,000 mg In 400 257.048 100





    Empty Bag 1 bag @ Titrate   





    IV .Q0M [facility] Rx#:   





    [id number]   


 


  Tube Feeding 288 264 120


 


  Other 90 90 30


 


Output:   


 


  Urine 1495 900 840


 


Other:   


 


  Voiding Method Indwelling Catheter Indwelling Catheter Indwelling Catheter








                       ABP, PAP, CO, CI - Last Documented











Arterial Blood Pressure        170/65

















- Labs


CBC & Chem 7: 


                                 04/08/21 04:18





                                 04/08/21 04:18


Labs: 


                  Abnormal Lab Results - Last 24 Hours (Table)











  04/07/21 04/07/21 04/08/21 Range/Units





  17:33 23:56 03:55 


 


Hct     (34.0-46.0)  %


 


D-Dimer     (<0.60)  mg/L FEU


 


ABG pH     (7.35-7.45)  


 


ABG pCO2     (35-45)  mmHg


 


ABG pO2     ()  mmHg


 


ABG HCO3     (21-25)  mmol/L


 


ABG Total CO2     (19-24)  mmol/L


 


ABG O2 Saturation     (94-97)  %


 


Sodium     (137-145)  mmol/L


 


BUN     (7-17)  mg/dL


 


Glucose     (74-99)  mg/dL


 


POC Glucose (mg/dL)  174 H  117 H  121 H  (75-99)  mg/dL


 


Calcium     (8.4-10.2)  mg/dL


 


Ferritin     (10.0-291.0)  ng/mL


 


AST     (14-36)  U/L


 


ALT     (4-34)  U/L


 


Lactate Dehydrogenase     (313-618)  U/L


 


Total Protein     (6.3-8.2)  g/dL


 


Albumin     (3.5-5.0)  g/dL














  04/08/21 04/08/21 04/08/21 Range/Units





  04:18 04:18 04:18 


 


Hct  33.9 L    (34.0-46.0)  %


 


D-Dimer    1.59 H  (<0.60)  mg/L FEU


 


ABG pH     (7.35-7.45)  


 


ABG pCO2     (35-45)  mmHg


 


ABG pO2     ()  mmHg


 


ABG HCO3     (21-25)  mmol/L


 


ABG Total CO2     (19-24)  mmol/L


 


ABG O2 Saturation     (94-97)  %


 


Sodium   135 L   (137-145)  mmol/L


 


BUN   18 H   (7-17)  mg/dL


 


Glucose   109 H   (74-99)  mg/dL


 


POC Glucose (mg/dL)     (75-99)  mg/dL


 


Calcium   8.2 L   (8.4-10.2)  mg/dL


 


Ferritin   467.8 H   (10.0-291.0)  ng/mL


 


AST   53 H   (14-36)  U/L


 


ALT   67 H   (4-34)  U/L


 


Lactate Dehydrogenase   860 H   (313-618)  U/L


 


Total Protein   4.8 L   (6.3-8.2)  g/dL


 


Albumin   2.6 L   (3.5-5.0)  g/dL














  04/08/21 04/08/21 Range/Units





  04:51 11:41 


 


Hct    (34.0-46.0)  %


 


D-Dimer    (<0.60)  mg/L FEU


 


ABG pH  7.55 H   (7.35-7.45)  


 


ABG pCO2  30 L   (35-45)  mmHg


 


ABG pO2  59 L*   ()  mmHg


 


ABG HCO3  26 H   (21-25)  mmol/L


 


ABG Total CO2  27 H   (19-24)  mmol/L


 


ABG O2 Saturation  91.8 L   (94-97)  %


 


Sodium    (137-145)  mmol/L


 


BUN    (7-17)  mg/dL


 


Glucose    (74-99)  mg/dL


 


POC Glucose (mg/dL)   144 H  (75-99)  mg/dL


 


Calcium    (8.4-10.2)  mg/dL


 


Ferritin    (10.0-291.0)  ng/mL


 


AST    (14-36)  U/L


 


ALT    (4-34)  U/L


 


Lactate Dehydrogenase    (313-618)  U/L


 


Total Protein    (6.3-8.2)  g/dL


 


Albumin    (3.5-5.0)  g/dL








                      Microbiology - Last 24 Hours (Table)











 04/08/21 00:21 Sputum Culture - Preliminary





 Sputum

## 2021-04-08 NOTE — P.PN
Subjective


Progress Note Date: 04/08/21


Principal diagnosis: 





Hypoxemic respiratory failure secondary to CoVID 19 pneumonia





61-year-old female, who hasn't been feeling well for about 10-11 days.  She 

tested positive for COVID 19 on March 24.  She's been sick for 10-11 days old.  

She complains of TYPICAL symptoms including weakness, fever, diarrhea, cough, 

and shortness of breath.  She's quite hypoxemic.  On AIRVO 100%, and a 

nonrebreather mask, her saturations are right around 80-83%.  Any movement, and 

she desaturates even further.  I just called the intensive care unit, and we'll 

plan on putting her in the intensive care unit.  She's currently on saline at 75

mL an hour.  She can barely speak without becoming short of breath.  The patient

is not a candidate for REM, but could get TOCI.  Sodium 138, potassium 3.8, 

chlorides 108, CO2 21, anion gap 9, BUN 19, creatinine 0.7.  AST 94, ALTs 60, 

LDH 1471, C-reactive protein 88.1.  Again, the patient's been sick for at least 

10-11 days and maybe a bit longer.  Her oral intake has been poor as well.  The 

patient's chest x-ray shows diffuse bilateral infiltrates consistent with 

coronavirus infection.





The patient is seen today 04/01/2021 in follow-up in the intensive care unit.  

Shortly after arriving to the ICU yesterday she required intubation and mechan

ical ventilatory support injury to acute hypoxemic respiratory failure.  She is 

currently on the ventilator and assist control mode with a rate of 34, tidal on 

450, FiO2 70%, PEEP of 16.  Morning blood gases reveal pO2 of 70, pCO2 33, P 

87.38.  Fentanyl drip at 0.5 mcg/kg per hour, propofol at 15 mcg/kg/m, Nimbex at

1 mcg/min per hour.  0.9 normal saline at 130 ML's per hour.  She did receive 

Tociluzimab.  She remains on Decadron 6 mg IV daily.  Lovenox 40 mg subcutaneous

daily.  Tube feedings will be initiated for nutritional support.  Blood cultures

reveal no growth.  White count 2.9.  Hemoglobin 11.3 and platelets 188.  

Lymphocytes 0.8.  She is continued on Lovenox, dexamethasone, vitamin 

supplements.





The patient is seen today 04/02/2021 in follow-up in the intensive care unit.  

She remains intubated on the mechanical ventilator.  Assist-control mode.  Rate 

of 30, tidal volume 450, FiO2 60% and a PEEP of 16.  She is currently sedated on

propofol at 40 mcg/kg/m, Nimbex at 1 mcg/kg/m, fentanyl at 0.5 mcg/kg per hour. 

She has 0.9 normal saline at 130 ML's per hour.  She is being nourished with 

vital HP at 20 ML's per hour.  She did receive tocilizumab.  He remains on 

dexamethasone, Lovenox, vitamin supplements.  Chest x-ray showing persistent but

improving patchy bilateral airspace disease.  Sputum and blood cultures are 

pending.  White count 3.9.  Hemoglobin 11.4.  Lymphocytes 0.7.  D-dimer 1.19.  

Sodium 139.  Potassium 4.4.  Bicarb 20.  Creatinine 0.64.  Glucose 174.





The patient is seen today 04/03/2020 in follow-up in the intensive care unit.  

She remains intubated on mechanical ventilator.  Current settings are assist-

control mode.  Rate of 34, tidal volume 450, FiO2 50% and a PEEP of 16.  Morning

blood gases reveal a pO2 of 117, pCO2 30, pH 7.40.  She remains sedated on 

propofol at 55 mcg/kg/m.  Fentanyl and 1 mcg/kg/hr.  She is being nourished with

vital HP at 33 ML's per hour.  0.9 normal saline at 130 ML's per hour.  She 

remains on dexamethasone, Lovenox, bronchodilators.  White count 4.0.  

Hemoglobin 11.8.  D-dimer 1.81.  Sodium 138.  Potassium 4.2.  Bicarb 19.  LDH 

1133.  C-reactive protein 15.5.  Asked x-ray continues to show moderate 

scattered interstitial and partial airspace opacities.  





The patient is seen today 04/04/2021 in follow-up in the intensive care unit.  

She remains intubated, sedated.  Current settings are assist control at a rate 

of 30, tidal volume 450, FiO2 50% and a PEEP of 15.  She is sedated on propofol 

at 60 mcg/kg/m, fentanyl at 1 mcg/kg per hour, 0.9 normal saline 130 ML's per 

hour.  Vital HP at 24 mls per hour.  She did receive Actemra.  Blood culture 

reveals no growth.  Sputum culture reveals no growth.  White count 5.3.  

Hemoglobin 12.0.  D-dimer 2.0.  Sodium 139.  Potassium 4.0.  Creatinine 0.59.  

LDH 1082.  C reactive protein 8.6.  Chest x-ray continues to show no interval 

change, continues with persistent moderate patchy opacities bilaterally.  

Endotracheal tube to be advanced to 2 cms.  Continued on vitamin supplements, 

Lovenox, IV Solu-Medrol. 





Progress note dated 04/05/2021.





This is a patient was admitted on March 31 for COVID 19 pneumonia.  The patient 

came to the ICU on March 31, and was intubated on the same day.  She apparently 

tested positive back on March 24.  The patient remains on the mechanical 

ventilator.  She is on the volume assist control mode, rate of 30, tidal volume 

450, FiO2 50%, PEEP of 15 to be dropped to 10.  Blood gases show pO2 of 66, pCO2

33, and a pH 7.41.  The patient's on propofol at 60 mcg/kg/m, fentanyl 1 

mcg/kg/h, normal saline at 130 mL an hour and vital high protein at 24, which is

goal.  The patient did receive TOCI.   White count is 6.2, hemoglobin 11.8, 

hematocrit 33.1, platelet count 272,000.  Sodium 137, potassium 3.8, chlorides 

114, CO2 20, anion gap 3, BUN 18, creatinine 0.53.  LDH is 956.  Chest x-ray 

shows worsened bilateral infiltrates.  Small effusions are noted.





Progress note dated 04/06/2021.





61-year-old female, admitted on March 31 for COVID 19 pneumonia.  She came to 

the ICU on March 31 and was intubated on the same day.  She tested positive back

on March 24.  The patient remains on mechanical ventilation.  She is on the 

volume assist control mode, rate 36, tidal volume 450, FiO2 50%, and PEEP is 13.

 Arterial blood gases show a PaO2 of 74, pCO2 31, and a pH of 7.52.  Blood gases

are consistent with relative hypoxemia, and a combined respiratory and metabolic

alkalosis.  The patient is getting propofol 60 mcg/kg/m, fentanyl 1 emily per 

kilogram per hour, saline at 40 mL an hour, dopamine at 2 mcg/kg/m, and vital 

high protein at goal, which is 24 mL an hour.  The cardiologist wanted to DC the

dopamine.  We are going to try to lower PEEP from 13 down to 10.  Today's CBC is

normal.  Sodium 138, potassium 3.4, chlorides 109, CO2 24, anion gap 5, BUN 17, 

and creatinine 0.53.  Chest x-ray shows improving bibasilar infiltrates.





The patient is seen today 04/07/2021 and follow-up in the intensive care unit.  

She remains intubated and on the mechanical ventilator.  Current mode assist 

control with a rate of 30, tidal volume 450, FiO2 50% and a PEEP of 10.  Morning

blood gases reveal a P O2 of 58, pCO2 32, pH 7.51.  She remains sedated on 

propofol at 60 mcg/kg/m.  Fentanyl at 1 mcg/kg/h, 0.9#at 40 ML's per hour.  She 

is being nourished with vital HP at 24 ML's per hour which is goal.  She is 

currently afebrile.  Hemodynamically stable.  She is receive daily interruption 

of sedation without success.  The plan will be for trach and PEG later this 

week.  Blood and sputum cultures revealed no growth.  WBC 6.4.  Hemoglobin 12.8.

 Sodium 137.  Potassium 3.5.  Creatinine 0.60.  Glucose 120.  AST 53.  ALT 69.  

Chest x-ray continues to show patchy perihilar and basilar infiltrates with 

interval progression.





The patient is seen today 04/08/2021 in follow-up in the intensive care unit.  

She remains intubated on mechanical ventilator in assist control mode at a rate 

of 30, tight around 450, FiO2 50% and a PEEP of 10.  Morning blood gases reveal 

a P O2 of 58, pCO2 30, pH 7.54.  She remains sedated on propofol at 50 mcg/kg/m.

 Fentanyl at 2 mcg/kg/h.  0.9 normal saline at 40 ML's per hour.  She was 

initiated back on dopamine at 2 mcg/kg/m due to significant bradycardia with 

heart rate in the 20s temporarily.  Currently heart rate in the 40s.  She has 

remained hypertensive.  Chest x-ray continues to show bilateral patchy 

infiltrates.  Stable compared to previous.  Blood and sputum cultures revealed 

no growth.  White count 5.5.  Hemoglobin 11.8.  D-dimer 1.59.  Sodium 135.  

Potassium 3.9.  Creatinine 0.61.  AST 53.  ALT 67.  .  C-reactive protein

5.9.  She remains on Lovenox, dexamethasone, vitamin supplements.





Objective





- Vital Signs


Vital signs: 


                                   Vital Signs











Temp  99 F   04/08/21 08:00


 


Pulse  55 L  04/08/21 09:00


 


Resp  30 H  04/08/21 09:00


 


BP  131/60   04/08/21 09:00


 


Pulse Ox  91 L  04/08/21 09:00








                                 Intake & Output











 04/07/21 04/08/21 04/08/21





 18:59 06:59 18:59


 


Intake Total 1394 1305.779 177


 


Output Total 1495 900 290


 


Balance -101 405.779 -113


 


Weight 134.3 kg 132.9 kg 


 


Intake:   


 


   516 129


 


    Sodium Chloride 0.9% 1, 480 480 120





    000 ml @ 40 mls/hr IV .   





    Q24H VIOLET Rx#:[id number]   


 


    pressure bag 36 36 9


 


  Intake, IV Titration 500 435.779 





  Amount   


 


    fentaNYL (PF) 1,000 mcg 100 178.731 





    In Sodium Chloride 0.9%   





    80 ml @ Per Protocol IV .   





    Q0M VIOLET Rx#:[id number]   


 


    propofoL 1,000 mg In 400 257.048 





    Empty Bag 1 bag @ Titrate   





    IV .Q0M VIOLET Rx#:   





    [id number]   


 


  Tube Feeding 288 264 48


 


  Other 90 90 


 


Output:   


 


  Urine 1495 900 290


 


Other:   


 


  Voiding Method Indwelling Catheter Indwelling Catheter Indwelling Catheter








                       ABP, PAP, CO, CI - Last Documented











Arterial Blood Pressure        178/71

















- Exam





GENERAL EXAM: Intubated, sedated, 61-year-old female patient, comfortable in no 

apparent distress.


HEAD: Normocephalic.


EYES: Normal reaction of pupils, equal size.


NOSE: Clear with pink turbinates.


THROAT: No erythema or exudates.


NECK: No masses, no JVD.


CHEST: No chest wall deformity.


LUNGS: Equal air entry with crackles in the bilateral posterior bases.


CVS: S1 and S2 normal with no audible murmur, regular rhythm.


ABDOMEN: No hepatosplenomegaly, normal bowel sounds, no guarding or rigidity.


SPINE: No scoliosis or deformity


SKIN: No rashes


CENTRAL NERVOUS SYSTEM: Sedated, tone is normal in all 4 extremities.


EXTREMITIES: There is no peripheral edema.  No clubbing, no cyanosis.  Peripher

al pulses are intact.





- Labs


CBC & Chem 7: 


                                 04/08/21 04:18





                                 04/08/21 04:18


Labs: 


                  Abnormal Lab Results - Last 24 Hours (Table)











  04/07/21 04/07/21 04/07/21 Range/Units





  11:19 17:33 23:56 


 


Hct     (34.0-46.0)  %


 


D-Dimer     (<0.60)  mg/L FEU


 


ABG pH     (7.35-7.45)  


 


ABG pCO2     (35-45)  mmHg


 


ABG pO2     ()  mmHg


 


ABG HCO3     (21-25)  mmol/L


 


ABG Total CO2     (19-24)  mmol/L


 


ABG O2 Saturation     (94-97)  %


 


Sodium     (137-145)  mmol/L


 


BUN     (7-17)  mg/dL


 


Glucose     (74-99)  mg/dL


 


POC Glucose (mg/dL)  150 H  174 H  117 H  (75-99)  mg/dL


 


Calcium     (8.4-10.2)  mg/dL


 


AST     (14-36)  U/L


 


ALT     (4-34)  U/L


 


Lactate Dehydrogenase     (313-618)  U/L


 


Total Protein     (6.3-8.2)  g/dL


 


Albumin     (3.5-5.0)  g/dL














  04/08/21 04/08/21 04/08/21 Range/Units





  03:55 04:18 04:18 


 


Hct   33.9 L   (34.0-46.0)  %


 


D-Dimer     (<0.60)  mg/L FEU


 


ABG pH     (7.35-7.45)  


 


ABG pCO2     (35-45)  mmHg


 


ABG pO2     ()  mmHg


 


ABG HCO3     (21-25)  mmol/L


 


ABG Total CO2     (19-24)  mmol/L


 


ABG O2 Saturation     (94-97)  %


 


Sodium    135 L  (137-145)  mmol/L


 


BUN    18 H  (7-17)  mg/dL


 


Glucose    109 H  (74-99)  mg/dL


 


POC Glucose (mg/dL)  121 H    (75-99)  mg/dL


 


Calcium    8.2 L  (8.4-10.2)  mg/dL


 


AST    53 H  (14-36)  U/L


 


ALT    67 H  (4-34)  U/L


 


Lactate Dehydrogenase    860 H  (313-618)  U/L


 


Total Protein    4.8 L  (6.3-8.2)  g/dL


 


Albumin    2.6 L  (3.5-5.0)  g/dL














  04/08/21 04/08/21 Range/Units





  04:18 04:51 


 


Hct    (34.0-46.0)  %


 


D-Dimer  1.59 H   (<0.60)  mg/L FEU


 


ABG pH   7.55 H  (7.35-7.45)  


 


ABG pCO2   30 L  (35-45)  mmHg


 


ABG pO2   59 L*  ()  mmHg


 


ABG HCO3   26 H  (21-25)  mmol/L


 


ABG Total CO2   27 H  (19-24)  mmol/L


 


ABG O2 Saturation   91.8 L  (94-97)  %


 


Sodium    (137-145)  mmol/L


 


BUN    (7-17)  mg/dL


 


Glucose    (74-99)  mg/dL


 


POC Glucose (mg/dL)    (75-99)  mg/dL


 


Calcium    (8.4-10.2)  mg/dL


 


AST    (14-36)  U/L


 


ALT    (4-34)  U/L


 


Lactate Dehydrogenase    (313-618)  U/L


 


Total Protein    (6.3-8.2)  g/dL


 


Albumin    (3.5-5.0)  g/dL














Assessment and Plan


Assessment: 





1 Acute hypoxemic respiratory failure secondary to CoVID 19 pneumonia/pne

umonitis requiring intubation mechanical ventilatory support on 03/31/2021.  

Outside the window for Remdesivir. Received tocilizumab.





2 Hypertension, requiring Cleviprex





3 Paroxysmal atrial fibrillation





4 Hyperlipidemia





5 Bradycardia, requiring dopamine drip





Plan:





The patient was seen and evaluated by Dr. Ruiz


Chest x-ray, ABGs and labs reviewed


PEEP has been increased to 13


Required dopamine to be resumed due to bradycardia


Add cleviprex for hypertension


Continue Decadron, Lovenox, vitamin supplements


Continue nutritional support


We'll continue with daily interruption of sedation.


Recommend tracheostomy and PEG tube placement by the end of the week.


Dr. Ruiz did speak with family yesterday and they are considering her options


We will continue to follow and make further recommendations based on her 

clinical status





Critical care time 36 minutes





I, the cosigning physician, performed a history & physical examination of the 

patient. Lungs sounds with coarse crackles in the bilateral bases.  Maintaining 

O2 saturations in the 90s on 50% FiO2 and a PEEP of 13.  I discussed the 

assessment and plan of care with my nurse practitioner, Leatha Rodriguez. I attest to 

the above note as dictated by her.

## 2021-04-09 LAB
ALBUMIN SERPL-MCNC: 2.9 G/DL (ref 3.5–5)
ALP SERPL-CCNC: 73 U/L (ref 38–126)
ALT SERPL-CCNC: 110 U/L (ref 4–34)
ANION GAP SERPL CALC-SCNC: 6 MMOL/L
AST SERPL-CCNC: 83 U/L (ref 14–36)
BUN SERPL-SCNC: 18 MG/DL (ref 7–17)
CALCIUM SPEC-MCNC: 8.7 MG/DL (ref 8.4–10.2)
CHLORIDE SERPL-SCNC: 108 MMOL/L (ref 98–107)
CO2 BLDA-SCNC: 27 MMOL/L (ref 19–24)
CO2 SERPL-SCNC: 25 MMOL/L (ref 22–30)
ERYTHROCYTE [DISTWIDTH] IN BLOOD BY AUTOMATED COUNT: 4.06 M/UL (ref 3.8–5.4)
ERYTHROCYTE [DISTWIDTH] IN BLOOD: 14.3 % (ref 11.5–15.5)
FERRITIN SERPL-MCNC: 674.3 NG/ML (ref 10–291)
GLUCOSE BLD-MCNC: 137 MG/DL (ref 75–99)
GLUCOSE BLD-MCNC: 157 MG/DL (ref 75–99)
GLUCOSE BLD-MCNC: 172 MG/DL (ref 75–99)
GLUCOSE BLD-MCNC: 180 MG/DL (ref 75–99)
GLUCOSE BLD-MCNC: 182 MG/DL (ref 75–99)
GLUCOSE SERPL-MCNC: 125 MG/DL (ref 74–99)
HCO3 BLDA-SCNC: 26 MMOL/L (ref 21–25)
HCT VFR BLD AUTO: 35.6 % (ref 34–46)
HGB BLD-MCNC: 12.8 GM/DL (ref 11.4–16)
LDH SPEC-CCNC: 920 U/L (ref 313–618)
MCH RBC QN AUTO: 31.4 PG (ref 25–35)
MCHC RBC AUTO-ENTMCNC: 35.9 G/DL (ref 31–37)
MCV RBC AUTO: 87.6 FL (ref 80–100)
PCO2 BLDA: 31 MMHG (ref 35–45)
PH BLDA: 7.53 [PH] (ref 7.35–7.45)
PLATELET # BLD AUTO: 280 K/UL (ref 150–450)
PO2 BLDA: 76 MMHG (ref 83–108)
POTASSIUM SERPL-SCNC: 4 MMOL/L (ref 3.5–5.1)
PROT SERPL-MCNC: 5.5 G/DL (ref 6.3–8.2)
SODIUM SERPL-SCNC: 139 MMOL/L (ref 137–145)
TRIGL SERPL-MCNC: 291 MG/DL (ref ?–150)
WBC # BLD AUTO: 6.6 K/UL (ref 3.8–10.6)

## 2021-04-09 RX ADMIN — DEXTROSE SCH MG: 50 INJECTION, SOLUTION INTRAVENOUS at 09:34

## 2021-04-09 RX ADMIN — DEXTRAN 70 AND HYPROMELLOSE 2910 SCH DROPS: 1; 3 SOLUTION/ DROPS OPHTHALMIC at 20:59

## 2021-04-09 RX ADMIN — DEXTRAN 70 AND HYPROMELLOSE 2910 SCH DROPS: 1; 3 SOLUTION/ DROPS OPHTHALMIC at 06:30

## 2021-04-09 RX ADMIN — DEXTRAN 70 AND HYPROMELLOSE 2910 SCH DROPS: 1; 3 SOLUTION/ DROPS OPHTHALMIC at 17:21

## 2021-04-09 RX ADMIN — ALBUTEROL SULFATE SCH PUFF: 90 AEROSOL, METERED RESPIRATORY (INHALATION) at 03:26

## 2021-04-09 RX ADMIN — ALBUTEROL SULFATE SCH PUFF: 90 AEROSOL, METERED RESPIRATORY (INHALATION) at 23:15

## 2021-04-09 RX ADMIN — ASPIRIN 81 MG CHEWABLE TABLET SCH MG: 81 TABLET CHEWABLE at 09:34

## 2021-04-09 RX ADMIN — INSULIN ASPART SCH UNIT: 100 INJECTION, SOLUTION INTRAVENOUS; SUBCUTANEOUS at 13:16

## 2021-04-09 RX ADMIN — INSULIN ASPART SCH: 100 INJECTION, SOLUTION INTRAVENOUS; SUBCUTANEOUS at 06:32

## 2021-04-09 RX ADMIN — DEXTRAN 70 AND HYPROMELLOSE 2910 SCH DROPS: 1; 3 SOLUTION/ DROPS OPHTHALMIC at 09:32

## 2021-04-09 RX ADMIN — SPIRONOLACTONE SCH MG: 25 TABLET, FILM COATED ORAL at 09:30

## 2021-04-09 RX ADMIN — ALBUTEROL SULFATE SCH PUFF: 90 AEROSOL, METERED RESPIRATORY (INHALATION) at 15:55

## 2021-04-09 RX ADMIN — DEXTRAN 70 AND HYPROMELLOSE 2910 SCH DROPS: 1; 3 SOLUTION/ DROPS OPHTHALMIC at 13:14

## 2021-04-09 RX ADMIN — ALBUTEROL SULFATE SCH PUFF: 90 AEROSOL, METERED RESPIRATORY (INHALATION) at 07:24

## 2021-04-09 RX ADMIN — INSULIN ASPART SCH UNIT: 100 INJECTION, SOLUTION INTRAVENOUS; SUBCUTANEOUS at 00:30

## 2021-04-09 RX ADMIN — OXYCODONE HYDROCHLORIDE AND ACETAMINOPHEN SCH MG: 500 TABLET ORAL at 09:31

## 2021-04-09 RX ADMIN — DOPAMINE HYDROCHLORIDE IN DEXTROSE SCH: 3.2 INJECTION, SOLUTION INTRAVENOUS at 20:41

## 2021-04-09 RX ADMIN — INSULIN ASPART SCH UNIT: 100 INJECTION, SOLUTION INTRAVENOUS; SUBCUTANEOUS at 18:06

## 2021-04-09 RX ADMIN — CHLORHEXIDINE GLUCONATE SCH ML: 1.2 RINSE ORAL at 09:29

## 2021-04-09 RX ADMIN — ENOXAPARIN SODIUM SCH MG: 40 INJECTION SUBCUTANEOUS at 09:30

## 2021-04-09 RX ADMIN — SODIUM CHLORIDE SCH MLS/HR: 900 INJECTION, SOLUTION INTRAVENOUS at 17:45

## 2021-04-09 RX ADMIN — DEXTRAN 70 AND HYPROMELLOSE 2910 SCH DROPS: 1; 3 SOLUTION/ DROPS OPHTHALMIC at 00:29

## 2021-04-09 RX ADMIN — Medication SCH MG: at 09:31

## 2021-04-09 RX ADMIN — SODIUM CHLORIDE SCH MLS/HR: 900 INJECTION, SOLUTION INTRAVENOUS at 01:05

## 2021-04-09 RX ADMIN — CEFAZOLIN SCH MLS/HR: 330 INJECTION, POWDER, FOR SOLUTION INTRAMUSCULAR; INTRAVENOUS at 20:59

## 2021-04-09 RX ADMIN — SODIUM CHLORIDE SCH MLS/HR: 900 INJECTION, SOLUTION INTRAVENOUS at 06:30

## 2021-04-09 RX ADMIN — ATORVASTATIN CALCIUM SCH MG: 40 TABLET, FILM COATED ORAL at 09:31

## 2021-04-09 RX ADMIN — CITALOPRAM HYDROBROMIDE SCH MG: 20 TABLET ORAL at 09:31

## 2021-04-09 RX ADMIN — ALBUTEROL SULFATE SCH PUFF: 90 AEROSOL, METERED RESPIRATORY (INHALATION) at 19:11

## 2021-04-09 RX ADMIN — SODIUM CHLORIDE SCH MLS/HR: 900 INJECTION, SOLUTION INTRAVENOUS at 23:16

## 2021-04-09 RX ADMIN — CHLORHEXIDINE GLUCONATE SCH ML: 1.2 RINSE ORAL at 20:59

## 2021-04-09 RX ADMIN — ALBUTEROL SULFATE SCH PUFF: 90 AEROSOL, METERED RESPIRATORY (INHALATION) at 10:53

## 2021-04-09 RX ADMIN — PANTOPRAZOLE SODIUM SCH MG: 40 INJECTION, POWDER, FOR SOLUTION INTRAVENOUS at 09:30

## 2021-04-09 RX ADMIN — Medication SCH MCG: at 09:31

## 2021-04-09 NOTE — XR
EXAMINATION TYPE: XR chest 1V portable

 

DATE OF EXAM: 4/9/2021

 

Comparison: 4/8/2021

 

Clinical History: 61-year-old female COVID

 

Findings:

ET tube satisfactory. NG tube courses below the diaphragm. Heart normal size. Interstitial changes an
d patchy bilateral lower lung opacities show some improvement.

 

 

Impression:

Basilar airspace disease is improving.

## 2021-04-09 NOTE — P.PN
Subjective


Progress Note Date: 04/09/21


Principal diagnosis: 





Sinus bradycardia





This is a 61-year-old female patient who was admitted to the hospital with acute

hypoxic respiratory failure secondary to pneumonia and we consulted to see the 

patient because of sinus bradycardia.  The patient was on Toprol-XL which was st

opped.  Yesterday she was started on dopamine at a small dose.





The patient was seen today April 9 of 2021.  Unfortunately she continues to be 

intubated on mechanical ventilation and she is not doing well from the 

pulmonary/critical care standpoint overview.  She continues to be on dopamine 

with heart rate in the 40s.  The blood pressure has been better since we 

increased the dose of Norvasc as well as dressing applied yesterday.  We will 

continue monitor the heart rate.











Objective





- Vital Signs


Vital signs: 


                                   Vital Signs











Temp  98.1 F   04/09/21 08:00


 


Pulse  43 L  04/09/21 08:00


 


Resp  30 H  04/09/21 08:00


 


BP  131/64   04/09/21 08:00


 


Pulse Ox  94 L  04/09/21 08:00








                                 Intake & Output











 04/08/21 04/09/21 04/09/21





 18:59 06:59 18:59


 


Intake Total 1340 1496.466 137


 


Output Total 2440 1440 90


 


Balance -1100 56.466 47


 


Weight  131.4 kg 


 


Intake:   


 


   516 83


 


    Sodium Chloride 0.9% 1, 480 480 80





    000 ml @ 40 mls/hr IV .   





    Q24H VIOLET Rx#:[id number]   


 


    pressure bag 36 36 3


 


  Intake, IV Titration 500 602.466 





  Amount   


 


    fentaNYL (PF) 1,000 mcg 200 278.197 





    In Sodium Chloride 0.9%   





    80 ml @ Per Protocol IV .   





    Q0M VIOLET Rx#:[id number]   


 


    propofoL 1,000 mg In 300 324.269 





    Empty Bag 1 bag @ Titrate   





    IV .Q0M VIOLET Rx#:   





    [id number]   


 


  Tube Feeding 264 288 54


 


  Other 60 90 


 


Output:   


 


  Urine 2440 1440 90


 


Other:   


 


  Voiding Method Indwelling Catheter Indwelling Catheter Indwelling Catheter








                       ABP, PAP, CO, CI - Last Documented











Arterial Blood Pressure        145/54

















- Labs


CBC & Chem 7: 


                                 04/09/21 04:25





                                 04/09/21 04:25


Labs: 


                  Abnormal Lab Results - Last 24 Hours (Table)











  04/08/21 04/08/21 04/08/21 Range/Units





  04:18 11:41 16:48 


 


ABG pH     (7.35-7.45)  


 


ABG pCO2     (35-45)  mmHg


 


ABG pO2     ()  mmHg


 


ABG HCO3     (21-25)  mmol/L


 


ABG Total CO2     (19-24)  mmol/L


 


Chloride     ()  mmol/L


 


BUN     (7-17)  mg/dL


 


Glucose     (74-99)  mg/dL


 


POC Glucose (mg/dL)   144 H  185 H  (75-99)  mg/dL


 


Ferritin  467.8 H    (10.0-291.0)  ng/mL


 


AST     (14-36)  U/L


 


ALT     (4-34)  U/L


 


Lactate Dehydrogenase     (313-618)  U/L


 


Total Protein     (6.3-8.2)  g/dL


 


Albumin     (3.5-5.0)  g/dL


 


Triglycerides     (<150)  mg/dL














  04/09/21 04/09/21 04/09/21 Range/Units





  00:02 04:25 04:27 


 


ABG pH    7.53 H  (7.35-7.45)  


 


ABG pCO2    31 L  (35-45)  mmHg


 


ABG pO2    76 L  ()  mmHg


 


ABG HCO3    26 H  (21-25)  mmol/L


 


ABG Total CO2    27 H  (19-24)  mmol/L


 


Chloride   108 H   ()  mmol/L


 


BUN   18 H   (7-17)  mg/dL


 


Glucose   125 H   (74-99)  mg/dL


 


POC Glucose (mg/dL)  137 H    (75-99)  mg/dL


 


Ferritin     (10.0-291.0)  ng/mL


 


AST   83 H   (14-36)  U/L


 


ALT   110 H   (4-34)  U/L


 


Lactate Dehydrogenase   920 H   (313-618)  U/L


 


Total Protein   5.5 L   (6.3-8.2)  g/dL


 


Albumin   2.9 L   (3.5-5.0)  g/dL


 


Triglycerides   291 H   (<150)  mg/dL








                      Microbiology - Last 24 Hours (Table)











 04/08/21 00:21 Gram Stain - Preliminary





 Sputum Sputum Culture - Preliminary














Assessment and Plan


Assessment: 





assessment


Acute hypoxic respiratory failure secondary to pneumonia


Sinus bradycardia


Hypertension





Plan


Continue the current dose of lisinopril and Norvasc


Continue dopamine to support the heart rate


Follow-up with the patient

## 2021-04-09 NOTE — P.PN
Subjective


Progress Note Date: 04/09/21


Principal diagnosis: 





Hypoxemic respiratory failure secondary to CoVID 19 pneumonia





61-year-old female, who hasn't been feeling well for about 10-11 days.  She 

tested positive for COVID 19 on March 24.  She's been sick for 10-11 days old.  

She complains of TYPICAL symptoms including weakness, fever, diarrhea, cough, 

and shortness of breath.  She's quite hypoxemic.  On AIRVO 100%, and a 

nonrebreather mask, her saturations are right around 80-83%.  Any movement, and 

she desaturates even further.  I just called the intensive care unit, and we'll 

plan on putting her in the intensive care unit.  She's currently on saline at 75

mL an hour.  She can barely speak without becoming short of breath.  The patient

is not a candidate for REM, but could get TOCI.  Sodium 138, potassium 3.8, 

chlorides 108, CO2 21, anion gap 9, BUN 19, creatinine 0.7.  AST 94, ALTs 60, 

LDH 1471, C-reactive protein 88.1.  Again, the patient's been sick for at least 

10-11 days and maybe a bit longer.  Her oral intake has been poor as well.  The 

patient's chest x-ray shows diffuse bilateral infiltrates consistent with 

coronavirus infection.





The patient is seen today 04/01/2021 in follow-up in the intensive care unit.  

Shortly after arriving to the ICU yesterday she required intubation and mechan

ical ventilatory support injury to acute hypoxemic respiratory failure.  She is 

currently on the ventilator and assist control mode with a rate of 34, tidal on 

450, FiO2 70%, PEEP of 16.  Morning blood gases reveal pO2 of 70, pCO2 33, P 

87.38.  Fentanyl drip at 0.5 mcg/kg per hour, propofol at 15 mcg/kg/m, Nimbex at

1 mcg/min per hour.  0.9 normal saline at 130 ML's per hour.  She did receive 

Tociluzimab.  She remains on Decadron 6 mg IV daily.  Lovenox 40 mg subcutaneous

daily.  Tube feedings will be initiated for nutritional support.  Blood cultures

reveal no growth.  White count 2.9.  Hemoglobin 11.3 and platelets 188.  

Lymphocytes 0.8.  She is continued on Lovenox, dexamethasone, vitamin 

supplements.





The patient is seen today 04/02/2021 in follow-up in the intensive care unit.  

She remains intubated on the mechanical ventilator.  Assist-control mode.  Rate 

of 30, tidal volume 450, FiO2 60% and a PEEP of 16.  She is currently sedated on

propofol at 40 mcg/kg/m, Nimbex at 1 mcg/kg/m, fentanyl at 0.5 mcg/kg per hour. 

She has 0.9 normal saline at 130 ML's per hour.  She is being nourished with 

vital HP at 20 ML's per hour.  She did receive tocilizumab.  He remains on 

dexamethasone, Lovenox, vitamin supplements.  Chest x-ray showing persistent but

improving patchy bilateral airspace disease.  Sputum and blood cultures are 

pending.  White count 3.9.  Hemoglobin 11.4.  Lymphocytes 0.7.  D-dimer 1.19.  

Sodium 139.  Potassium 4.4.  Bicarb 20.  Creatinine 0.64.  Glucose 174.





The patient is seen today 04/03/2020 in follow-up in the intensive care unit.  

She remains intubated on mechanical ventilator.  Current settings are assist-

control mode.  Rate of 34, tidal volume 450, FiO2 50% and a PEEP of 16.  Morning

blood gases reveal a pO2 of 117, pCO2 30, pH 7.40.  She remains sedated on 

propofol at 55 mcg/kg/m.  Fentanyl and 1 mcg/kg/hr.  She is being nourished with

vital HP at 33 ML's per hour.  0.9 normal saline at 130 ML's per hour.  She 

remains on dexamethasone, Lovenox, bronchodilators.  White count 4.0.  

Hemoglobin 11.8.  D-dimer 1.81.  Sodium 138.  Potassium 4.2.  Bicarb 19.  LDH 

1133.  C-reactive protein 15.5.  Asked x-ray continues to show moderate 

scattered interstitial and partial airspace opacities.  





The patient is seen today 04/04/2021 in follow-up in the intensive care unit.  

She remains intubated, sedated.  Current settings are assist control at a rate 

of 30, tidal volume 450, FiO2 50% and a PEEP of 15.  She is sedated on propofol 

at 60 mcg/kg/m, fentanyl at 1 mcg/kg per hour, 0.9 normal saline 130 ML's per 

hour.  Vital HP at 24 mls per hour.  She did receive Actemra.  Blood culture 

reveals no growth.  Sputum culture reveals no growth.  White count 5.3.  

Hemoglobin 12.0.  D-dimer 2.0.  Sodium 139.  Potassium 4.0.  Creatinine 0.59.  

LDH 1082.  C reactive protein 8.6.  Chest x-ray continues to show no interval 

change, continues with persistent moderate patchy opacities bilaterally.  

Endotracheal tube to be advanced to 2 cms.  Continued on vitamin supplements, 

Lovenox, IV Solu-Medrol. 





Progress note dated 04/05/2021.





This is a patient was admitted on March 31 for COVID 19 pneumonia.  The patient 

came to the ICU on March 31, and was intubated on the same day.  She apparently 

tested positive back on March 24.  The patient remains on the mechanical 

ventilator.  She is on the volume assist control mode, rate of 30, tidal volume 

450, FiO2 50%, PEEP of 15 to be dropped to 10.  Blood gases show pO2 of 66, pCO2

33, and a pH 7.41.  The patient's on propofol at 60 mcg/kg/m, fentanyl 1 

mcg/kg/h, normal saline at 130 mL an hour and vital high protein at 24, which is

goal.  The patient did receive TOCI.   White count is 6.2, hemoglobin 11.8, 

hematocrit 33.1, platelet count 272,000.  Sodium 137, potassium 3.8, chlorides 

114, CO2 20, anion gap 3, BUN 18, creatinine 0.53.  LDH is 956.  Chest x-ray 

shows worsened bilateral infiltrates.  Small effusions are noted.





Progress note dated 04/06/2021.





61-year-old female, admitted on March 31 for COVID 19 pneumonia.  She came to 

the ICU on March 31 and was intubated on the same day.  She tested positive back

on March 24.  The patient remains on mechanical ventilation.  She is on the 

volume assist control mode, rate 36, tidal volume 450, FiO2 50%, and PEEP is 13.

 Arterial blood gases show a PaO2 of 74, pCO2 31, and a pH of 7.52.  Blood gases

are consistent with relative hypoxemia, and a combined respiratory and metabolic

alkalosis.  The patient is getting propofol 60 mcg/kg/m, fentanyl 1 emily per 

kilogram per hour, saline at 40 mL an hour, dopamine at 2 mcg/kg/m, and vital 

high protein at goal, which is 24 mL an hour.  The cardiologist wanted to DC the

dopamine.  We are going to try to lower PEEP from 13 down to 10.  Today's CBC is

normal.  Sodium 138, potassium 3.4, chlorides 109, CO2 24, anion gap 5, BUN 17, 

and creatinine 0.53.  Chest x-ray shows improving bibasilar infiltrates.





The patient is seen today 04/07/2021 and follow-up in the intensive care unit.  

She remains intubated and on the mechanical ventilator.  Current mode assist 

control with a rate of 30, tidal volume 450, FiO2 50% and a PEEP of 10.  Morning

blood gases reveal a P O2 of 58, pCO2 32, pH 7.51.  She remains sedated on 

propofol at 60 mcg/kg/m.  Fentanyl at 1 mcg/kg/h, 0.9#at 40 ML's per hour.  She 

is being nourished with vital HP at 24 ML's per hour which is goal.  She is 

currently afebrile.  Hemodynamically stable.  She is receive daily interruption 

of sedation without success.  The plan will be for trach and PEG later this 

week.  Blood and sputum cultures revealed no growth.  WBC 6.4.  Hemoglobin 12.8.

 Sodium 137.  Potassium 3.5.  Creatinine 0.60.  Glucose 120.  AST 53.  ALT 69.  

Chest x-ray continues to show patchy perihilar and basilar infiltrates with 

interval progression.





The patient is seen today 04/08/2021 in follow-up in the intensive care unit.  

She remains intubated on mechanical ventilator in assist control mode at a rate 

of 30, tight around 450, FiO2 50% and a PEEP of 10.  Morning blood gases reveal 

a P O2 of 58, pCO2 30, pH 7.54.  She remains sedated on propofol at 50 mcg/kg/m.

 Fentanyl at 2 mcg/kg/h.  0.9 normal saline at 40 ML's per hour.  She was 

initiated back on dopamine at 2 mcg/kg/m due to significant bradycardia with 

heart rate in the 20s temporarily.  Currently heart rate in the 40s.  She has 

remained hypertensive.  Chest x-ray continues to show bilateral patchy 

infiltrates.  Stable compared to previous.  Blood and sputum cultures revealed 

no growth.  White count 5.5.  Hemoglobin 11.8.  D-dimer 1.59.  Sodium 135.  

Potassium 3.9.  Creatinine 0.61.  AST 53.  ALT 67.  .  C-reactive protein

5.9.  She remains on Lovenox, dexamethasone, vitamin supplements.





The patient is seen today 04/09/2021 in follow-up in the intensive care unit.  

She remains intubated and on mechanical ventilator.  Current settings are 

assist-control mode.  Rate of 30, tidal volume 450.  FiO2 50% and a PEEP of 13. 

Morning blood gases reveal a pO2 of 76, pCO2 31, pH 7.53.  She remains sedated 

on propofol at 40 mcg/kg/m, dopamine at 2 mcg/kg/m.  Fentanyl at 1.5 mg/kg per 

hour.  0.9 normal saline at 40 miles per hour.  She is being nourished with 

vital HP at 24 ML's per hour which is goal.  Chest x-ray reveals bilateral 

airspace disease slightly improved.  Blood, sputum cultures reveal no growth.  

White count 6.6.  Hemoglobin 12.8.  Sodium 139.  Potassium 4.0.  Chloride 108.  

Bicarb 25.  Creatinine 0.52.  .  C-reactive protein 6.4.  She remains on 

vitamin supplements, Lovenox, Decadron.





Objective





- Vital Signs


Vital signs: 


                                   Vital Signs











Temp  98.1 F   04/09/21 08:00


 


Pulse  43 L  04/09/21 09:00


 


Resp  30 H  04/09/21 09:00


 


BP  123/56   04/09/21 09:00


 


Pulse Ox  93 L  04/09/21 09:00








                                 Intake & Output











 04/08/21 04/09/21 04/09/21





 18:59 06:59 18:59


 


Intake Total 1340 1496.466 247.168


 


Output Total 2440 1440 165


 


Balance -1100 56.466 82.168


 


Weight  131.4 kg 


 


Intake:   


 


   516 123


 


    Sodium Chloride 0.9% 1, 480 480 120





    000 ml @ 40 mls/hr IV .   





    Q24H VIOLET Rx#:[id number]   


 


    pressure bag 36 36 3


 


  Intake, IV Titration 500 602.466 70.168





  Amount   


 


    fentaNYL (PF) 1,000 mcg 200 278.197 





    In Sodium Chloride 0.9%   





    80 ml @ Per Protocol IV .   





    Q0M VIOLET Rx#:[id number]   


 


    propofoL 1,000 mg In 300 324.269 70.168





    Empty Bag 1 bag @ Titrate   





    IV .Q0M VIOLET Rx#:   





    [id number]   


 


  Tube Feeding 264 288 54


 


  Other 60 90 


 


Output:   


 


  Urine 2440 1440 165


 


Other:   


 


  Voiding Method Indwelling Catheter Indwelling Catheter Indwelling Catheter








                       ABP, PAP, CO, CI - Last Documented











Arterial Blood Pressure        147/63

















- Exam





GENERAL EXAM: Intubated, sedated, 61-year-old female patient, comfortable in no 

apparent distress.


HEAD: Normocephalic.


EYES: Normal reaction of pupils, equal size.


NOSE: Clear with pink turbinates.


THROAT: No erythema or exudates.


NECK: No masses, no JVD.


CHEST: No chest wall deformity.


LUNGS: Equal air entry with crackles in the bilateral posterior bases.


CVS: S1 and S2 normal with no audible murmur, regular rhythm.


ABDOMEN: No hepatosplenomegaly, normal bowel sounds, no guarding or rigidity.


SPINE: No scoliosis or deformity


SKIN: No rashes


CENTRAL NERVOUS SYSTEM: Sedated, tone is normal in all 4 extremities.


EXTREMITIES: There is no peripheral edema.  No clubbing, no cyanosis.  

Peripheral pulses are intact.





- Labs


CBC & Chem 7: 


                                 04/09/21 04:25





                                 04/09/21 04:25


Labs: 


                  Abnormal Lab Results - Last 24 Hours (Table)











  04/08/21 04/08/21 04/08/21 Range/Units





  04:18 11:41 16:48 


 


ABG pH     (7.35-7.45)  


 


ABG pCO2     (35-45)  mmHg


 


ABG pO2     ()  mmHg


 


ABG HCO3     (21-25)  mmol/L


 


ABG Total CO2     (19-24)  mmol/L


 


Chloride     ()  mmol/L


 


BUN     (7-17)  mg/dL


 


Glucose     (74-99)  mg/dL


 


POC Glucose (mg/dL)   144 H  185 H  (75-99)  mg/dL


 


Ferritin  467.8 H    (10.0-291.0)  ng/mL


 


AST     (14-36)  U/L


 


ALT     (4-34)  U/L


 


Lactate Dehydrogenase     (313-618)  U/L


 


Total Protein     (6.3-8.2)  g/dL


 


Albumin     (3.5-5.0)  g/dL


 


Triglycerides     (<150)  mg/dL














  04/09/21 04/09/21 04/09/21 Range/Units





  00:02 04:25 04:27 


 


ABG pH    7.53 H  (7.35-7.45)  


 


ABG pCO2    31 L  (35-45)  mmHg


 


ABG pO2    76 L  ()  mmHg


 


ABG HCO3    26 H  (21-25)  mmol/L


 


ABG Total CO2    27 H  (19-24)  mmol/L


 


Chloride   108 H   ()  mmol/L


 


BUN   18 H   (7-17)  mg/dL


 


Glucose   125 H   (74-99)  mg/dL


 


POC Glucose (mg/dL)  137 H    (75-99)  mg/dL


 


Ferritin     (10.0-291.0)  ng/mL


 


AST   83 H   (14-36)  U/L


 


ALT   110 H   (4-34)  U/L


 


Lactate Dehydrogenase   920 H   (313-618)  U/L


 


Total Protein   5.5 L   (6.3-8.2)  g/dL


 


Albumin   2.9 L   (3.5-5.0)  g/dL


 


Triglycerides   291 H   (<150)  mg/dL








                      Microbiology - Last 24 Hours (Table)











 04/08/21 00:21 Gram Stain - Preliminary





 Sputum Sputum Culture - Preliminary














Assessment and Plan


Assessment: 





1 Acute hypoxemic respiratory failure secondary to CoVID 19 

pneumonia/pneumonitis requiring intubation mechanical ventilatory support on 

03/31/2021.  Outside the window for Remdesivir. Received tocilizumab.





2 Hypertension, requiring Cleviprex





3 Paroxysmal atrial fibrillation





4 Hyperlipidemia





5 Bradycardia, requiring dopamine drip





Plan:





The patient was seen and evaluated by Dr. Ruiz


Chest x-ray, ABGs and labs reviewed


No mechanical ventilation changes today


Still requiring dopamine due to bradycardia


Continue Decadron, Lovenox, vitamin supplements


Continue nutritional support


We'll continue with daily interruption of sedation.


Recommend tracheostomy and PEG tube placement by the end of the week.


Dr. Ruiz did speak with family and they will let us know their decision.


We will continue to follow and make further recommendations based on her 

clinical status





Critical care time 38 minutes





I, the cosigning physician, performed a history & physical examination of the 

patient. Lungs sounds with coarse crackles in the bilateral bases.  Maintaining 

O2 saturations in the 90s on 50% FiO2 and a PEEP of 13.  I discussed the 

assessment and plan of care with my nurse practitioner, Leatha Rodriguez. I attest to 

the above note as dictated by her.

## 2021-04-09 NOTE — P.PN
Subjective


Progress Note Date: 04/09/21





CHIEF COMPLAINT: Shortness of breath





HISTORY OF PRESENT ILLNESS: Patient remains in the ICU intubated and sedated.  

She is being treated for Covid 19 pneumonia and respiratory failure.  Surgical 

service is following for possible tracheostomy and PEG tube placement.  At this 

time family has declined tracheostomy and PEG tube.  Afebrile.  WBC 6.6





Patient seen and examined with Dr. de la o





PHYSICAL EXAM: 


VITAL SIGNS: Reviewed.


GENERAL: Well-developed in no acute distress. 


HEENT:  No sclera icterus. Extraocular movements grossly intact.  Moist buccal 

mucosa. Head is atraumatic, normocephalic. 


ABDOMEN:  Soft.  Nondistended. Nontender. 


NEUROLOGIC: Intubated and sedated





ASSESSMENT: 


1.  Acute hypoxic respiratory failure secondary to Covid 19 pneumonia


2.  Severe protein calorie malnutrition


3.  Sinus bradycardia followed by cardiology 





PLAN: 


-Continue ICU management


-Continue supportive care


-Patient's family has declined tracheostomy and PEG tube placement at this time








Physician Assistant note has been reviewed by physician. Signing provider agrees

with the documented findings, assessment, and plan of care. 





Objective





- Vital Signs


Vital signs: 


                                   Vital Signs











Temp  98.1 F   04/09/21 08:00


 


Pulse  56 L  04/09/21 12:00


 


Resp  30 H  04/09/21 12:00


 


BP  124/62   04/09/21 12:00


 


Pulse Ox  91 L  04/09/21 12:00








                                 Intake & Output











 04/08/21 04/09/21 04/09/21





 18:59 06:59 18:59


 


Intake Total 1340 1496.466 367.168


 


Output Total 2440 1440 640


 


Balance -1100 56.466 -272.832


 


Weight  131.4 kg 131.4 kg


 


Intake:   


 


   516 243


 


    Sodium Chloride 0.9% 1, 480 480 240





    000 ml @ 40 mls/hr IV .   





    Q24H VIOLET Rx#:[id number]   


 


    pressure bag 36 36 3


 


  Intake, IV Titration 500 602.466 70.168





  Amount   


 


    fentaNYL (PF) 1,000 mcg 200 278.197 





    In Sodium Chloride 0.9%   





    80 ml @ Per Protocol IV .   





    Q0M VIOLET Rx#:[id number]   


 


    propofoL 1,000 mg In 300 324.269 70.168





    Empty Bag 1 bag @ Titrate   





    IV .Q0M VIOLET Rx#:   





    [id number]   


 


  Tube Feeding 264 288 54


 


  Other 60 90 


 


Output:   


 


  Urine 2440 1440 640


 


Other:   


 


  Voiding Method Indwelling Catheter Indwelling Catheter Indwelling Catheter








                       ABP, PAP, CO, CI - Last Documented











Arterial Blood Pressure        139/67

















- Labs


CBC & Chem 7: 


                                 04/09/21 04:25





                                 04/09/21 04:25


Labs: 


                  Abnormal Lab Results - Last 24 Hours (Table)











  04/08/21 04/09/21 04/09/21 Range/Units





  16:48 00:02 04:25 


 


ABG pH     (7.35-7.45)  


 


ABG pCO2     (35-45)  mmHg


 


ABG pO2     ()  mmHg


 


ABG HCO3     (21-25)  mmol/L


 


ABG Total CO2     (19-24)  mmol/L


 


Chloride    108 H  ()  mmol/L


 


BUN    18 H  (7-17)  mg/dL


 


Glucose    125 H  (74-99)  mg/dL


 


POC Glucose (mg/dL)  185 H  137 H   (75-99)  mg/dL


 


AST    83 H  (14-36)  U/L


 


ALT    110 H  (4-34)  U/L


 


Lactate Dehydrogenase    920 H  (313-618)  U/L


 


Total Protein    5.5 L  (6.3-8.2)  g/dL


 


Albumin    2.9 L  (3.5-5.0)  g/dL


 


Triglycerides    291 H  (<150)  mg/dL














  04/09/21 04/09/21 04/09/21 Range/Units





  04:27 11:44 12:38 


 


ABG pH  7.53 H    (7.35-7.45)  


 


ABG pCO2  31 L    (35-45)  mmHg


 


ABG pO2  76 L    ()  mmHg


 


ABG HCO3  26 H    (21-25)  mmol/L


 


ABG Total CO2  27 H    (19-24)  mmol/L


 


Chloride     ()  mmol/L


 


BUN     (7-17)  mg/dL


 


Glucose     (74-99)  mg/dL


 


POC Glucose (mg/dL)   157 H  182 H  (75-99)  mg/dL


 


AST     (14-36)  U/L


 


ALT     (4-34)  U/L


 


Lactate Dehydrogenase     (313-618)  U/L


 


Total Protein     (6.3-8.2)  g/dL


 


Albumin     (3.5-5.0)  g/dL


 


Triglycerides     (<150)  mg/dL








                      Microbiology - Last 24 Hours (Table)











 04/08/21 00:21 Gram Stain - Preliminary





 Sputum Sputum Culture - Preliminary

## 2021-04-09 NOTE — P.PN
Subjective





This is a pleasant 61 years old female with past medical history of atrial 

fibrillation not on anticoagulation and her cardiologist is Dr. Peck, 

hypertension, hyperlipidemia.  She is a patient of Dr. Ribeiro


Patient states that she was tested positive for covid On 03/26/2021.  And now 

she presents with dyspnea of one-day duration when started earlier.  Associated 

with cough on the patella brown phlegm.


She is also complaining of from chest pain without coughing , on the right side 

of the chest nonradiating about 5/10 in severity


She has no vomiting but diarrhea about twice per day





04/8/2021


Patient remains intubated and on mechanical ventilation, currently she needs 

high peep of 13,(was 10 yesterday and 16 on admission) with pulmonary/critical 

care team monitor her closely and help with vent management.


She is tachypneic and bradycardic since admission and she is currently on 

dopamine to keep heart rate in 40s existed of 20s.  Afebrile.  


Pulmonary team recommended tracheostomy and PEG tube placement, family wanted to

wait for now


Labs showing leukopenia with WBC 5.5K with lymphopenia resolved.  Rest of the 

CBC, INR unremarkable.  BMP is unremarkable with carbon dioxide is 25.  Lactic 

acid 1.2.  Liver enzymes slightly elevated with AST 53 and ALT 67 (since 

admission).


Lactate dehydrogenase is elevated at 860(improving), C-reactive protein is 5.9 

(within normal limits).


Chest x-ray Stable portable chest.  Bilateral infiltrates


She is still a normal sinus to 40 mL/h, she is on zinc, vitamin C, vitamin D, 

dexamethasone.  She status post tocilizumab.  Also she is on Lovenox.





04/09/2021


Patient remains intubated and sedated in the ICU with pulmonary/critical care 

team following the patient closely and help with vent management.  Today P Cameron

13 and FiO2 50%.


Chest x-ray: Interstitial changes and patchy bilateral lower lung opacity shows 

some improvement.


Patient looks reviewed, inflammatory markers slightly increased with ferritin 

674 and  while C-reactive protein is normal at 6.4


Patient currently on dexamethasone, normal saline at 40 and dopamine for 

bradycardia plus Lovenox 40 mg and multiple vitamins 4 coated


Family still to decide about tracheostomy and PEG tube placement





Review of systems: N/


                               Active Medications











Generic Name Dose Route Start Last Admin





  Trade Name Freq  PRN Reason Stop Dose Admin


 


Albuterol Sulfate  2 puff  04/03/21 16:00  04/09/21 19:11





  Albuterol Hfa Inhaler  INHALATION   2 puff





  RT-Q4H VIOLET   Administration


 


Alprazolam  0.25 mg  04/07/21 16:00  04/09/21 21:00





  Alprazolam 0.25 Mg Tab  PO   0.25 mg





  TID VIOLET   Administration


 


Amlodipine Besylate  10 mg  04/09/21 09:00  04/09/21 09:30





  Amlodipine 5 Mg Tab  PO   10 mg





  DAILY VIOLET   Administration


 


Artificial Tears  2 drops  04/01/21 20:00  04/09/21 20:59





  Artificial Tears-Hypromellose Drops 15 Ml Btl  BOTH EYES   2 drops





  Q4HR VIOLET   Administration


 


Ascorbic Acid  1,000 mg  03/31/21 09:00  04/09/21 09:31





  Ascorbic Acid 500 Mg Tab  PO   1,000 mg





  DAILY VIOLET   Administration


 


Aspirin  81 mg  03/31/21 09:00  04/09/21 09:34





  Aspirin 81 Mg  PO   81 mg





  DAILY VIOLET   Administration


 


Atorvastatin Calcium  40 mg  03/31/21 09:00  04/09/21 09:31





  Atorvastatin 40 Mg Tab  PO   40 mg





  DAILY VIOLET   Administration


 


Chlorhexidine Gluconate  15 ml  03/31/21 21:00  04/09/21 20:59





  Chlorhexidine Gluconate 15 Ml Cup  MUCOUS MEM   15 ml





  BID VIOLET   Administration


 


Cholecalciferol  50 mcg  03/31/21 09:00  04/09/21 09:31





  Cholecalciferol 25 Mcg (1000 Iu) Tablet  PO   50 mcg





  DAILY VIOLET   Administration


 


Citalopram Hydrobromide  40 mg  03/31/21 09:00  04/09/21 09:31





  Citalopram Hydrobromide 20 Mg Tab  PO   40 mg





  DAILY VIOLET   Administration


 


Dexamethasone Sodium Phosphate  6 mg  03/31/21 09:00  04/09/21 09:34





  Dexamethasone Sod Phosphate 10 Mg/Ml 1 Ml Vial  IV   6 mg





  DAILY VIOLET   Administration


 


Enoxaparin Sodium  40 mg  03/31/21 09:00  04/09/21 09:30





  Enoxaparin 40 Mg/0.4 Ml Syringe  SQ   40 mg





  DAILY VIOLET   Administration


 


Propofol 1,000 mg/ IV Solution  100 mls @ 0 mls/hr  03/31/21 14:00  04/09/21 

20:59





  IV   30 mcg/kg/min





  .Q0M VIOLET   23.652 mls/hr





    Administration





  Protocol  





  Titrate  


 


Fentanyl Citrate 1,000 mcg/  100 mls @ 0 mls/hr  03/31/21 14:30  04/09/21 17:45





  Sodium Chloride  IV   1.5 mcg/kg/hr





  .Q0M VIOLET   18.03 mls/hr





    Administration





  Protocol  





  Per Protocol  


 


Sodium Chloride  1,000 mls @ 40 mls/hr  03/31/21 16:45  04/09/21 20:59





  Saline 0.9%  IV   40 mls/hr





  .Q24H VIOLET   Administration


 


Dopamine HCl/Dextrose 800 mg/  250 mls @ 4.984 mls/hr  04/08/21 15:15  04/09/21 

20:41





  IV Solution  IV   Not Given





  .Q24H VIOLET  





  Protocol  





  2 MCG/KG/MIN  


 


Insulin Aspart  0 unit  04/01/21 00:00  04/09/21 18:06





  Insulin Aspart (Novolog) 100 Unit/Ml Vial  SQ   3 unit





  Q6H VIOLET   Administration





  Protocol  


 


Lisinopril  20 mg  04/08/21 09:30  04/09/21 09:31





  Lisinopril 10 Mg Tab  PO   20 mg





  DAILY VIOLET   Administration


 


Miscellaneous Information  1 each  04/06/21 14:29 





  Magnesium Replacement Protocol 1 Each Misc  MISCELLANE  





  DAILY PRN  





  Per Protocol  





  Protocol  


 


Miscellaneous Information  1 each  04/06/21 14:29 





  Potassium Replacement Protocol 1 Each Misc  MISCELLANE  





  DAILY PRN  





  Per Protocol  





  Protocol  


 


Miscellaneous Information  1 each  04/08/21 05:26 





  Potassium Replacement Protocol 1 Each Misc  MISCELLANE  





  DAILY PRN  





  Per Protocol  





  Protocol  


 


Naloxone HCl  0.2 mg  03/31/21 00:55 





  Naloxone 0.4 Mg/Ml 1 Ml Vial  IV  





  Q2M PRN  





  Opioid Reversal  


 


Ondansetron HCl  4 mg  03/31/21 00:55  03/31/21 01:59





  Ondansetron 4 Mg/2 Ml Vial  IVP   4 mg





  Q8HR PRN   Administration





  Nausea And Vomiting  


 


Pantoprazole Sodium  40 mg  04/01/21 09:00  04/09/21 09:30





  Pantoprazole 40 Mg/10 Ml Vial  IV   40 mg





  DAILY VIOLET   Administration


 


Spironolactone  12.5 mg  03/31/21 09:00  04/09/21 09:30





  Spironolactone 25 Mg Tab  PO   12.5 mg





  DAILY VIOLET   Administration


 


Zinc Sulfate  220 mg  03/31/21 09:00  04/09/21 09:31





  Zinc Sulfate 220 Mg Cap  PO   220 mg





  DAILY VIOLET   Administration














Objective





- Vital Signs


Vital signs: 


                                   Vital Signs











Temp  37.8 F L  04/09/21 15:00


 


Pulse  43 L  04/09/21 15:00


 


Resp  30 H  04/09/21 15:00


 


BP  125/64   04/09/21 15:00


 


Pulse Ox  94 L  04/09/21 15:59








                                 Intake & Output











 04/08/21 04/09/21 04/09/21





 18:59 06:59 18:59


 


Intake Total 1340 1496.466 547.168


 


Output Total 2440 1440 965


 


Balance -1100 56.466 -417.832


 


Weight  131.4 kg 131.4 kg


 


Intake:   


 


   516 323


 


    Sodium Chloride 0.9% 1, 480 480 320





    000 ml @ 40 mls/hr IV .   





    Q24H VIOLET Rx#:[id number]   


 


    pressure bag 36 36 3


 


  Intake, IV Titration 500 602.466 170.168





  Amount   


 


    fentaNYL (PF) 1,000 mcg 200 278.197 





    In Sodium Chloride 0.9%   





    80 ml @ Per Protocol IV .   





    Q0M VIOLET Rx#:[id number]   


 


    propofoL 1,000 mg In 300 324.269 170.168





    Empty Bag 1 bag @ Titrate   





    IV .Q0M VIOLET Rx#:   





    [id number]   


 


  Tube Feeding 264 288 54


 


  Other 60 90 


 


Output:   


 


  Urine 2440 1440 965


 


Other:   


 


  Voiding Method Indwelling Catheter Indwelling Catheter Indwelling Catheter








                       ABP, PAP, CO, CI - Last Documented











Arterial Blood Pressure        137/61

















- Exam





-GENERAL: The patient is intubated and sedated


HEENT: Pupils are round and equally reacting to light. EOMI. No scleral icterus.

No conjunctival pallor. Normocephalic, atraumatic. No pharyngeal erythema. No 

thyromegaly. 


CARDIOVASCULAR: S1 and S2 present. No murmurs, rubs, or gallops. 


PULMONARY: Chest is clear to auscultation, no wheezing or crackles. 


ABDOMEN: Soft, nontender, nondistended, normoactive bowel sounds. No palpable 

organomegaly. 


MUSCULOSKELETAL: No joint swelling or deformity. 


EXTREMITIES: No cyanosis, clubbing, or pedal edema. 


NEUROLOGICAL: Gross neurological examination did not reveal any focal deficits. 


SKIN: No rashes. no petechiae.





- Labs


CBC & Chem 7: 


                                 04/09/21 04:25





                                 04/09/21 04:25


Labs: 


                  Abnormal Lab Results - Last 24 Hours (Table)











  04/08/21 04/09/21 04/09/21 Range/Units





  16:48 00:02 04:25 


 


ABG pH     (7.35-7.45)  


 


ABG pCO2     (35-45)  mmHg


 


ABG pO2     ()  mmHg


 


ABG HCO3     (21-25)  mmol/L


 


ABG Total CO2     (19-24)  mmol/L


 


Chloride    108 H  ()  mmol/L


 


BUN    18 H  (7-17)  mg/dL


 


Glucose    125 H  (74-99)  mg/dL


 


POC Glucose (mg/dL)  185 H  137 H   (75-99)  mg/dL


 


Ferritin    674.3 H  (10.0-291.0)  ng/mL


 


AST    83 H  (14-36)  U/L


 


ALT    110 H  (4-34)  U/L


 


Lactate Dehydrogenase    920 H  (313-618)  U/L


 


Total Protein    5.5 L  (6.3-8.2)  g/dL


 


Albumin    2.9 L  (3.5-5.0)  g/dL


 


Triglycerides    291 H  (<150)  mg/dL














  04/09/21 04/09/21 04/09/21 Range/Units





  04:27 11:44 12:38 


 


ABG pH  7.53 H    (7.35-7.45)  


 


ABG pCO2  31 L    (35-45)  mmHg


 


ABG pO2  76 L    ()  mmHg


 


ABG HCO3  26 H    (21-25)  mmol/L


 


ABG Total CO2  27 H    (19-24)  mmol/L


 


Chloride     ()  mmol/L


 


BUN     (7-17)  mg/dL


 


Glucose     (74-99)  mg/dL


 


POC Glucose (mg/dL)   157 H  182 H  (75-99)  mg/dL


 


Ferritin     (10.0-291.0)  ng/mL


 


AST     (14-36)  U/L


 


ALT     (4-34)  U/L


 


Lactate Dehydrogenase     (313-618)  U/L


 


Total Protein     (6.3-8.2)  g/dL


 


Albumin     (3.5-5.0)  g/dL


 


Triglycerides     (<150)  mg/dL








                      Microbiology - Last 24 Hours (Table)











 04/08/21 00:21 Gram Stain - Preliminary





 Sputum Sputum Culture - Preliminary














Assessment and Plan


Assessment: 





Acute bilateral pneumonia, mostly secondary to Covid 19 infection


Acute hypoxic respiratory failure, needing intubation and mechanical ventilation


Increased inflammatory markers


Bradycardia secondary to Covid infection, need dopamine


Acute Covid gastroenteritis


Mild transaminitis, mostly secondary to Covid


Hypertension


Hyperlipidemia


Paroxysmal A. fib, currently heart rate controlled

















Plan: 





This is a pleasant 61 years old female who presents with respiratory distress 

mostly secondary to covid.  Continue with steroids, vitamin C, zinc, vitamin D, 

bronchodilator and oxygen as needed.  Lovenox for DVT prophylaxis and pulmonary 

consult.  Continue with vent management as per pulmonary team.


Labs and medication were reviewed..  Continue same treatment.  Continue with 

symptomatic treatment.  Resume home medication.  Monitor lytes and vitals.  DVT 

and GI prophylaxis.  Further recommendations depends on the clinical course of 

the patient


DVT prophylaxis: Subcutaneous Lovenox


GI Prophylaxis: Ppi


Prognosis is guarded

## 2021-04-10 LAB
ALBUMIN SERPL-MCNC: 3 G/DL (ref 3.5–5)
ALP SERPL-CCNC: 71 U/L (ref 38–126)
ALT SERPL-CCNC: 115 U/L (ref 4–34)
ANION GAP SERPL CALC-SCNC: 6 MMOL/L
AST SERPL-CCNC: 77 U/L (ref 14–36)
BUN SERPL-SCNC: 23 MG/DL (ref 7–17)
CALCIUM SPEC-MCNC: 8.6 MG/DL (ref 8.4–10.2)
CHLORIDE SERPL-SCNC: 107 MMOL/L (ref 98–107)
CO2 BLDA-SCNC: 27 MMOL/L (ref 19–24)
CO2 SERPL-SCNC: 25 MMOL/L (ref 22–30)
ERYTHROCYTE [DISTWIDTH] IN BLOOD BY AUTOMATED COUNT: 4.07 M/UL (ref 3.8–5.4)
ERYTHROCYTE [DISTWIDTH] IN BLOOD: 14.4 % (ref 11.5–15.5)
FERRITIN SERPL-MCNC: 694.8 NG/ML (ref 10–291)
GLUCOSE BLD-MCNC: 124 MG/DL (ref 75–99)
GLUCOSE BLD-MCNC: 192 MG/DL (ref 75–99)
GLUCOSE BLD-MCNC: 195 MG/DL (ref 75–99)
GLUCOSE SERPL-MCNC: 132 MG/DL (ref 74–99)
HCO3 BLDA-SCNC: 26 MMOL/L (ref 21–25)
HCT VFR BLD AUTO: 36.2 % (ref 34–46)
HGB BLD-MCNC: 12.7 GM/DL (ref 11.4–16)
LDH SPEC-CCNC: 868 U/L (ref 313–618)
MAGNESIUM SPEC-SCNC: 2.3 MG/DL (ref 1.6–2.3)
MCH RBC QN AUTO: 31.2 PG (ref 25–35)
MCHC RBC AUTO-ENTMCNC: 35.1 G/DL (ref 31–37)
MCV RBC AUTO: 88.9 FL (ref 80–100)
PCO2 BLDA: 38 MMHG (ref 35–45)
PH BLDA: 7.45 [PH] (ref 7.35–7.45)
PLATELET # BLD AUTO: 254 K/UL (ref 150–450)
PO2 BLDA: 81 MMHG (ref 83–108)
POTASSIUM SERPL-SCNC: 4.2 MMOL/L (ref 3.5–5.1)
POTASSIUM SERPL-SCNC: 4.2 MMOL/L (ref 3.5–5.1)
PROT SERPL-MCNC: 5.5 G/DL (ref 6.3–8.2)
SODIUM SERPL-SCNC: 138 MMOL/L (ref 137–145)
WBC # BLD AUTO: 7.8 K/UL (ref 3.8–10.6)

## 2021-04-10 RX ADMIN — INSULIN ASPART SCH: 100 INJECTION, SOLUTION INTRAVENOUS; SUBCUTANEOUS at 01:09

## 2021-04-10 RX ADMIN — OXYCODONE HYDROCHLORIDE AND ACETAMINOPHEN SCH MG: 500 TABLET ORAL at 09:06

## 2021-04-10 RX ADMIN — CHLORHEXIDINE GLUCONATE SCH ML: 1.2 RINSE ORAL at 09:08

## 2021-04-10 RX ADMIN — DEXTRAN 70 AND HYPROMELLOSE 2910 SCH DROPS: 1; 3 SOLUTION/ DROPS OPHTHALMIC at 19:39

## 2021-04-10 RX ADMIN — CITALOPRAM HYDROBROMIDE SCH MG: 20 TABLET ORAL at 09:06

## 2021-04-10 RX ADMIN — CHLORHEXIDINE GLUCONATE SCH ML: 1.2 RINSE ORAL at 19:39

## 2021-04-10 RX ADMIN — DEXTRAN 70 AND HYPROMELLOSE 2910 SCH DROPS: 1; 3 SOLUTION/ DROPS OPHTHALMIC at 00:38

## 2021-04-10 RX ADMIN — SODIUM CHLORIDE SCH MLS/HR: 900 INJECTION, SOLUTION INTRAVENOUS at 09:51

## 2021-04-10 RX ADMIN — CEFAZOLIN SCH MLS/HR: 330 INJECTION, POWDER, FOR SOLUTION INTRAMUSCULAR; INTRAVENOUS at 19:19

## 2021-04-10 RX ADMIN — INSULIN ASPART SCH UNIT: 100 INJECTION, SOLUTION INTRAVENOUS; SUBCUTANEOUS at 06:12

## 2021-04-10 RX ADMIN — ALBUTEROL SULFATE SCH PUFF: 90 AEROSOL, METERED RESPIRATORY (INHALATION) at 11:02

## 2021-04-10 RX ADMIN — DEXTRAN 70 AND HYPROMELLOSE 2910 SCH DROPS: 1; 3 SOLUTION/ DROPS OPHTHALMIC at 15:55

## 2021-04-10 RX ADMIN — SODIUM CHLORIDE SCH MLS/HR: 900 INJECTION, SOLUTION INTRAVENOUS at 04:18

## 2021-04-10 RX ADMIN — ALBUTEROL SULFATE SCH PUFF: 90 AEROSOL, METERED RESPIRATORY (INHALATION) at 07:11

## 2021-04-10 RX ADMIN — DOPAMINE HYDROCHLORIDE IN DEXTROSE SCH MLS/HR: 3.2 INJECTION, SOLUTION INTRAVENOUS at 11:25

## 2021-04-10 RX ADMIN — DEXTRAN 70 AND HYPROMELLOSE 2910 SCH DROPS: 1; 3 SOLUTION/ DROPS OPHTHALMIC at 12:20

## 2021-04-10 RX ADMIN — ENOXAPARIN SODIUM SCH MG: 40 INJECTION SUBCUTANEOUS at 09:08

## 2021-04-10 RX ADMIN — SODIUM CHLORIDE SCH MLS/HR: 900 INJECTION, SOLUTION INTRAVENOUS at 19:16

## 2021-04-10 RX ADMIN — SPIRONOLACTONE SCH MG: 25 TABLET, FILM COATED ORAL at 09:06

## 2021-04-10 RX ADMIN — DEXTRAN 70 AND HYPROMELLOSE 2910 SCH DROPS: 1; 3 SOLUTION/ DROPS OPHTHALMIC at 09:08

## 2021-04-10 RX ADMIN — INSULIN ASPART SCH UNIT: 100 INJECTION, SOLUTION INTRAVENOUS; SUBCUTANEOUS at 12:20

## 2021-04-10 RX ADMIN — Medication SCH MG: at 09:07

## 2021-04-10 RX ADMIN — Medication SCH MCG: at 09:06

## 2021-04-10 RX ADMIN — ALBUTEROL SULFATE SCH PUFF: 90 AEROSOL, METERED RESPIRATORY (INHALATION) at 04:20

## 2021-04-10 RX ADMIN — DEXTROSE SCH MG: 50 INJECTION, SOLUTION INTRAVENOUS at 09:07

## 2021-04-10 RX ADMIN — ALBUTEROL SULFATE SCH PUFF: 90 AEROSOL, METERED RESPIRATORY (INHALATION) at 14:57

## 2021-04-10 RX ADMIN — ASPIRIN 81 MG CHEWABLE TABLET SCH MG: 81 TABLET CHEWABLE at 09:06

## 2021-04-10 RX ADMIN — ALBUTEROL SULFATE SCH PUFF: 90 AEROSOL, METERED RESPIRATORY (INHALATION) at 23:39

## 2021-04-10 RX ADMIN — DEXTRAN 70 AND HYPROMELLOSE 2910 SCH DROPS: 1; 3 SOLUTION/ DROPS OPHTHALMIC at 03:31

## 2021-04-10 RX ADMIN — INSULIN ASPART SCH UNIT: 100 INJECTION, SOLUTION INTRAVENOUS; SUBCUTANEOUS at 17:23

## 2021-04-10 RX ADMIN — PANTOPRAZOLE SODIUM SCH MG: 40 INJECTION, POWDER, FOR SOLUTION INTRAVENOUS at 09:06

## 2021-04-10 RX ADMIN — ATORVASTATIN CALCIUM SCH MG: 40 TABLET, FILM COATED ORAL at 09:07

## 2021-04-10 RX ADMIN — SODIUM CHLORIDE SCH MLS/HR: 900 INJECTION, SOLUTION INTRAVENOUS at 23:57

## 2021-04-10 RX ADMIN — ALBUTEROL SULFATE SCH PUFF: 90 AEROSOL, METERED RESPIRATORY (INHALATION) at 19:41

## 2021-04-10 RX ADMIN — SODIUM CHLORIDE SCH MLS/HR: 900 INJECTION, SOLUTION INTRAVENOUS at 14:57

## 2021-04-10 NOTE — P.PN
Subjective


Progress Note Date: 04/10/21


Principal diagnosis: 





Sinus bradycardia





This is a 61-year-old female patient who was admitted to the hospital with acute

hypoxic respiratory failure secondary to pneumonia and we consulted to see the 

patient because of sinus bradycardia.  The patient was on Toprol-XL which was st

opped.  Yesterday she was started on dopamine at a small dose.





The patient was seen today April 10 of 2021.  She continues to be intubated and 

she is on mechanical ventilation.  Unfortunately she is not doing well from the 

pulmonary/critical care standpoint overview.  Beside that the patient continues 

to be on dopamine to support a heart rate.  Her heart rate continues to be in 

the 40s.  Her pressure has been fluctuating but for the last few hours it has 

been stable.  We'll continue the current medical regimen including the current 

dose of dopamine and continue monitor the heart rate as well as blood pressure.











Objective





- Vital Signs


Vital signs: 


                                   Vital Signs











Temp  98.5 F   04/10/21 04:00


 


Pulse  46 L  04/10/21 05:00


 


Resp  24   04/10/21 05:00


 


BP  119/58   04/10/21 05:00


 


Pulse Ox  95   04/10/21 05:00








                                 Intake & Output











 04/09/21 04/09/21 04/10/21





 06:59 18:59 06:59


 


Intake Total 1496.466 795.247 4411.013


 


Output Total 1440 1865 1170


 


Balance 56.466 -964.125 207.013


 


Weight 131.4 kg 131.4 kg 129.5 kg


 


Intake:   


 


   483 430


 


    Sodium Chloride 0.9% 1, 480 480 400





    000 ml @ 40 mls/hr IV .   





    Q24H VIOLET Rx#:[id number]   


 


    pressure bag 36 3 30


 


  Intake, IV Titration 602.466 330.875 560.013





  Amount   


 


    DOPamine DRIP 800 mg In   177.844





    Dextrose/Water 1 250ml.   





    bag @ 2 MCG/KG/MIN 4.984   





    mls/hr IV .Q24H VIOLET Rx#:   





    [id number]   


 


    fentaNYL (PF) 1,000 mcg 278.197 100 196.628





    In Sodium Chloride 0.9%   





    80 ml @ Per Protocol IV .   





    Q0M VIOLET Rx#:[id number]   


 


    propofoL 1,000 mg In 324.269 230.875 185.541





    Empty Bag 1 bag @ Titrate   





    IV .Q0M VIOLET Rx#:   





    [id number]   


 


  Tube Feeding 288 87 297


 


  Other 90  90


 


Output:   


 


  Urine 1440 1865 1170


 


Other:   


 


  Voiding Method Indwelling Catheter Indwelling Catheter Indwelling Catheter








                       ABP, PAP, CO, CI - Last Documented











Arterial Blood Pressure        142/56

















- Labs


CBC & Chem 7: 


                                 04/10/21 04:35





                                 04/10/21 04:35


Labs: 


                  Abnormal Lab Results - Last 24 Hours (Table)











  04/09/21 04/09/21 04/09/21 Range/Units





  04:25 11:44 12:38 


 


BUN     (7-17)  mg/dL


 


Glucose     (74-99)  mg/dL


 


POC Glucose (mg/dL)   157 H  182 H  (75-99)  mg/dL


 


Ferritin  674.3 H    (10.0-291.0)  ng/mL


 


AST     (14-36)  U/L


 


ALT     (4-34)  U/L


 


Lactate Dehydrogenase     (313-618)  U/L


 


Total Protein     (6.3-8.2)  g/dL


 


Albumin     (3.5-5.0)  g/dL














  04/09/21 04/09/21 04/10/21 Range/Units





  17:27 18:12 01:05 


 


BUN     (7-17)  mg/dL


 


Glucose     (74-99)  mg/dL


 


POC Glucose (mg/dL)  180 H  172 H  124 H  (75-99)  mg/dL


 


Ferritin     (10.0-291.0)  ng/mL


 


AST     (14-36)  U/L


 


ALT     (4-34)  U/L


 


Lactate Dehydrogenase     (313-618)  U/L


 


Total Protein     (6.3-8.2)  g/dL


 


Albumin     (3.5-5.0)  g/dL














  04/10/21 Range/Units





  04:35 


 


BUN  23 H  (7-17)  mg/dL


 


Glucose  132 H  (74-99)  mg/dL


 


POC Glucose (mg/dL)   (75-99)  mg/dL


 


Ferritin   (10.0-291.0)  ng/mL


 


AST  77 H  (14-36)  U/L


 


ALT  115 H  (4-34)  U/L


 


Lactate Dehydrogenase  868 H  (313-618)  U/L


 


Total Protein  5.5 L  (6.3-8.2)  g/dL


 


Albumin  3.0 L  (3.5-5.0)  g/dL








                      Microbiology - Last 24 Hours (Table)











 04/08/21 00:21 Gram Stain - Preliminary





 Sputum Sputum Culture - Preliminary














Assessment and Plan


Assessment: 





assessment


Acute hypoxic respiratory failure secondary to pneumonia


Sinus bradycardia


Hypertension





Plan


Continue the current dose of lisinopril and Norvasc


Continue dopamine to support the heart rate


Follow-up with the patient

## 2021-04-10 NOTE — P.PN
Subjective


Progress Note Date: 04/10/21


Principal diagnosis: 





Hypoxemic respiratory failure secondary to CoVID 19 pneumonia





61-year-old female, who hasn't been feeling well for about 10-11 days.  She 

tested positive for COVID 19 on March 24.  She's been sick for 10-11 days old.  

She complains of TYPICAL symptoms including weakness, fever, diarrhea, cough, 

and shortness of breath.  She's quite hypoxemic.  On AIRVO 100%, and a 

nonrebreather mask, her saturations are right around 80-83%.  Any movement, and 

she desaturates even further.  I just called the intensive care unit, and we'll 

plan on putting her in the intensive care unit.  She's currently on saline at 75

mL an hour.  She can barely speak without becoming short of breath.  The patient

is not a candidate for REM, but could get TOCI.  Sodium 138, potassium 3.8, 

chlorides 108, CO2 21, anion gap 9, BUN 19, creatinine 0.7.  AST 94, ALTs 60, 

LDH 1471, C-reactive protein 88.1.  Again, the patient's been sick for at least 

10-11 days and maybe a bit longer.  Her oral intake has been poor as well.  The 

patient's chest x-ray shows diffuse bilateral infiltrates consistent with 

coronavirus infection.





The patient is seen today 04/01/2021 in follow-up in the intensive care unit.  

Shortly after arriving to the ICU yesterday she required intubation and mechan

ical ventilatory support injury to acute hypoxemic respiratory failure.  She is 

currently on the ventilator and assist control mode with a rate of 34, tidal on 

450, FiO2 70%, PEEP of 16.  Morning blood gases reveal pO2 of 70, pCO2 33, P 

87.38.  Fentanyl drip at 0.5 mcg/kg per hour, propofol at 15 mcg/kg/m, Nimbex at

1 mcg/min per hour.  0.9 normal saline at 130 ML's per hour.  She did receive 

Tociluzimab.  She remains on Decadron 6 mg IV daily.  Lovenox 40 mg subcutaneous

daily.  Tube feedings will be initiated for nutritional support.  Blood cultures

reveal no growth.  White count 2.9.  Hemoglobin 11.3 and platelets 188.  

Lymphocytes 0.8.  She is continued on Lovenox, dexamethasone, vitamin 

supplements.





The patient is seen today 04/02/2021 in follow-up in the intensive care unit.  

She remains intubated on the mechanical ventilator.  Assist-control mode.  Rate 

of 30, tidal volume 450, FiO2 60% and a PEEP of 16.  She is currently sedated on

propofol at 40 mcg/kg/m, Nimbex at 1 mcg/kg/m, fentanyl at 0.5 mcg/kg per hour. 

She has 0.9 normal saline at 130 ML's per hour.  She is being nourished with 

vital HP at 20 ML's per hour.  She did receive tocilizumab.  He remains on 

dexamethasone, Lovenox, vitamin supplements.  Chest x-ray showing persistent but

improving patchy bilateral airspace disease.  Sputum and blood cultures are 

pending.  White count 3.9.  Hemoglobin 11.4.  Lymphocytes 0.7.  D-dimer 1.19.  

Sodium 139.  Potassium 4.4.  Bicarb 20.  Creatinine 0.64.  Glucose 174.





The patient is seen today 04/03/2020 in follow-up in the intensive care unit.  

She remains intubated on mechanical ventilator.  Current settings are assist-

control mode.  Rate of 34, tidal volume 450, FiO2 50% and a PEEP of 16.  Morning

blood gases reveal a pO2 of 117, pCO2 30, pH 7.40.  She remains sedated on 

propofol at 55 mcg/kg/m.  Fentanyl and 1 mcg/kg/hr.  She is being nourished with

vital HP at 33 ML's per hour.  0.9 normal saline at 130 ML's per hour.  She 

remains on dexamethasone, Lovenox, bronchodilators.  White count 4.0.  

Hemoglobin 11.8.  D-dimer 1.81.  Sodium 138.  Potassium 4.2.  Bicarb 19.  LDH 

1133.  C-reactive protein 15.5.  Asked x-ray continues to show moderate 

scattered interstitial and partial airspace opacities.  





The patient is seen today 04/04/2021 in follow-up in the intensive care unit.  

She remains intubated, sedated.  Current settings are assist control at a rate 

of 30, tidal volume 450, FiO2 50% and a PEEP of 15.  She is sedated on propofol 

at 60 mcg/kg/m, fentanyl at 1 mcg/kg per hour, 0.9 normal saline 130 ML's per 

hour.  Vital HP at 24 mls per hour.  She did receive Actemra.  Blood culture 

reveals no growth.  Sputum culture reveals no growth.  White count 5.3.  

Hemoglobin 12.0.  D-dimer 2.0.  Sodium 139.  Potassium 4.0.  Creatinine 0.59.  

LDH 1082.  C reactive protein 8.6.  Chest x-ray continues to show no interval 

change, continues with persistent moderate patchy opacities bilaterally.  

Endotracheal tube to be advanced to 2 cms.  Continued on vitamin supplements, 

Lovenox, IV Solu-Medrol. 





Progress note dated 04/05/2021.





This is a patient was admitted on March 31 for COVID 19 pneumonia.  The patient 

came to the ICU on March 31, and was intubated on the same day.  She apparently 

tested positive back on March 24.  The patient remains on the mechanical 

ventilator.  She is on the volume assist control mode, rate of 30, tidal volume 

450, FiO2 50%, PEEP of 15 to be dropped to 10.  Blood gases show pO2 of 66, pCO2

33, and a pH 7.41.  The patient's on propofol at 60 mcg/kg/m, fentanyl 1 

mcg/kg/h, normal saline at 130 mL an hour and vital high protein at 24, which is

goal.  The patient did receive TOCI.   White count is 6.2, hemoglobin 11.8, 

hematocrit 33.1, platelet count 272,000.  Sodium 137, potassium 3.8, chlorides 

114, CO2 20, anion gap 3, BUN 18, creatinine 0.53.  LDH is 956.  Chest x-ray 

shows worsened bilateral infiltrates.  Small effusions are noted.





Progress note dated 04/06/2021.





61-year-old female, admitted on March 31 for COVID 19 pneumonia.  She came to 

the ICU on March 31 and was intubated on the same day.  She tested positive back

on March 24.  The patient remains on mechanical ventilation.  She is on the 

volume assist control mode, rate 36, tidal volume 450, FiO2 50%, and PEEP is 13.

 Arterial blood gases show a PaO2 of 74, pCO2 31, and a pH of 7.52.  Blood gases

are consistent with relative hypoxemia, and a combined respiratory and metabolic

alkalosis.  The patient is getting propofol 60 mcg/kg/m, fentanyl 1 emily per 

kilogram per hour, saline at 40 mL an hour, dopamine at 2 mcg/kg/m, and vital 

high protein at goal, which is 24 mL an hour.  The cardiologist wanted to DC the

dopamine.  We are going to try to lower PEEP from 13 down to 10.  Today's CBC is

normal.  Sodium 138, potassium 3.4, chlorides 109, CO2 24, anion gap 5, BUN 17, 

and creatinine 0.53.  Chest x-ray shows improving bibasilar infiltrates.





The patient is seen today 04/07/2021 and follow-up in the intensive care unit.  

She remains intubated and on the mechanical ventilator.  Current mode assist 

control with a rate of 30, tidal volume 450, FiO2 50% and a PEEP of 10.  Morning

blood gases reveal a P O2 of 58, pCO2 32, pH 7.51.  She remains sedated on 

propofol at 60 mcg/kg/m.  Fentanyl at 1 mcg/kg/h, 0.9#at 40 ML's per hour.  She 

is being nourished with vital HP at 24 ML's per hour which is goal.  She is 

currently afebrile.  Hemodynamically stable.  She is receive daily interruption 

of sedation without success.  The plan will be for trach and PEG later this 

week.  Blood and sputum cultures revealed no growth.  WBC 6.4.  Hemoglobin 12.8.

 Sodium 137.  Potassium 3.5.  Creatinine 0.60.  Glucose 120.  AST 53.  ALT 69.  

Chest x-ray continues to show patchy perihilar and basilar infiltrates with 

interval progression.





The patient is seen today 04/08/2021 in follow-up in the intensive care unit.  

She remains intubated on mechanical ventilator in assist control mode at a rate 

of 30, tight around 450, FiO2 50% and a PEEP of 10.  Morning blood gases reveal 

a P O2 of 58, pCO2 30, pH 7.54.  She remains sedated on propofol at 50 mcg/kg/m.

 Fentanyl at 2 mcg/kg/h.  0.9 normal saline at 40 ML's per hour.  She was 

initiated back on dopamine at 2 mcg/kg/m due to significant bradycardia with 

heart rate in the 20s temporarily.  Currently heart rate in the 40s.  She has 

remained hypertensive.  Chest x-ray continues to show bilateral patchy 

infiltrates.  Stable compared to previous.  Blood and sputum cultures revealed 

no growth.  White count 5.5.  Hemoglobin 11.8.  D-dimer 1.59.  Sodium 135.  

Potassium 3.9.  Creatinine 0.61.  AST 53.  ALT 67.  .  C-reactive protein

5.9.  She remains on Lovenox, dexamethasone, vitamin supplements.





The patient is seen today 04/09/2021 in follow-up in the intensive care unit.  

She remains intubated and on mechanical ventilator.  Current settings are 

assist-control mode.  Rate of 30, tidal volume 450.  FiO2 50% and a PEEP of 13. 

Morning blood gases reveal a pO2 of 76, pCO2 31, pH 7.53.  She remains sedated 

on propofol at 40 mcg/kg/m, dopamine at 2 mcg/kg/m.  Fentanyl at 1.5 mg/kg per 

hour.  0.9 normal saline at 40 miles per hour.  She is being nourished with 

vital HP at 24 ML's per hour which is goal.  Chest x-ray reveals bilateral 

airspace disease slightly improved.  Blood, sputum cultures reveal no growth.  

White count 6.6.  Hemoglobin 12.8.  Sodium 139.  Potassium 4.0.  Chloride 108.  

Bicarb 25.  Creatinine 0.52.  .  C-reactive protein 6.4.  She remains on 

vitamin supplements, Lovenox, Decadron.





The patient is seen today 04/10/2021 in follow-up in the intensive care unit.  

She remains intubated, sedated and on mechanical ventilator.  Current settings 

assist-control mode at a rate of 24, tidal volume 450, FiO2 50% and a PEEP of 

13.  Morning blood gases revealed a pO2 80, pCO2 37, pH 7.44.  She is sedated on

propofol at 30 mcg/kg/m.  Fentanyl at 1.5 mcg/kg per hour.  Remains on dopamine 

at 2.5 mcg/kg/m.  0.9 normal saline at 40 ML's per hour.  She is being nourished

with vital HP at 33,'s per hour which is goal.  Chest x-ray continues to 

revealed bibasilar infiltrates with small left pleural effusion.  Blood and 

sputum cultures reveal no growth.  White count 7.8.  Hemoglobin 12.7.  Platelet 

count 254.  Sodium 130.  Potassium 4.2.  Creatinine 0.61.  AST 77.  .  

.  C-reactive protein 6.8.  She remains on dexamethasone, Lovenox, vi

tamin supplements.





Objective





- Vital Signs


Vital signs: 


                                   Vital Signs











Temp  98.9 F   04/10/21 08:00


 


Pulse  47 L  04/10/21 10:00


 


Resp  24   04/10/21 10:00


 


BP  116/52   04/10/21 10:00


 


Pulse Ox  94 L  04/10/21 10:00








                                 Intake & Output











 04/09/21 04/10/21 04/10/21





 18:59 06:59 18:59


 


Intake Total 417.851 8706.839 410


 


Output Total 1865 1345 500


 


Balance -964.125 119.839 -90


 


Weight 131.4 kg 129.5 kg 


 


Intake:   


 


   473 215


 


    Sodium Chloride 0.9% 1, 480 440 200





    000 ml @ 40 mls/hr IV .   





    Q24H VIOLET Rx#:[id number]   


 


    pressure bag 3 33 15


 


  Intake, IV Titration 330.875 571.839 





  Amount   


 


    DOPamine DRIP 800 mg In  177.844 





    Dextrose/Water 1 250ml.   





    bag @ 2 MCG/KG/MIN 4.984   





    mls/hr IV .Q24H [facility]IOLET Rx#:   





    [id number]   


 


    fentaNYL (PF) 1,000 mcg 100 196.628 





    In Sodium Chloride 0.9%   





    80 ml @ Per Protocol IV .   





    Q0M [facility]IOLET Rx#:[id number]   


 


    propofoL 1,000 mg In 230.875 197.367 





    Empty Bag 1 bag @ Titrate   





    IV .Q0M [facility]IOLET Rx#:   





    [id number]   


 


  Tube Feeding 87 330 165


 


  Other  90 30


 


Output:   


 


  Urine 1865 1345 500


 


Other:   


 


  Voiding Method Indwelling Catheter Indwelling Catheter 








                       ABP, PAP, CO, CI - Last Documented











Arterial Blood Pressure        130/48

















- Exam





GENERAL EXAM: Intubated, sedated, 61-year-old female patient, comfortable in no 

apparent distress, synchronous with the vent.


HEAD: Normocephalic.


EYES: Normal reaction of pupils, equal size.


NOSE: Clear with pink turbinates.


THROAT: No erythema or exudates.


NECK: No masses, no JVD.


CHEST: No chest wall deformity.


LUNGS: Equal air entry with crackles in the bilateral posterior bases.


CVS: S1 and S2 normal with no audible murmur, regular rhythm.


ABDOMEN: No hepatosplenomegaly, normal bowel sounds, no guarding or rigidity.


SPINE: No scoliosis or deformity


SKIN: No rashes


CENTRAL NERVOUS SYSTEM: Sedated, tone is normal in all 4 extremities.


EXTREMITIES: There is no peripheral edema.  No clubbing, no cyanosis.  

Peripheral pulses are intact.





- Labs


CBC & Chem 7: 


                                 04/10/21 04:35





                                 04/10/21 04:35


Labs: 


                  Abnormal Lab Results - Last 24 Hours (Table)











  04/09/21 04/09/21 04/09/21 Range/Units





  04:25 11:44 12:38 


 


ABG pO2     ()  mmHg


 


ABG HCO3     (21-25)  mmol/L


 


ABG Total CO2     (19-24)  mmol/L


 


BUN     (7-17)  mg/dL


 


Glucose     (74-99)  mg/dL


 


POC Glucose (mg/dL)   157 H  182 H  (75-99)  mg/dL


 


Ferritin  674.3 H    (10.0-291.0)  ng/mL


 


AST     (14-36)  U/L


 


ALT     (4-34)  U/L


 


Lactate Dehydrogenase     (313-618)  U/L


 


Total Protein     (6.3-8.2)  g/dL


 


Albumin     (3.5-5.0)  g/dL














  04/09/21 04/09/21 04/10/21 Range/Units





  17:27 18:12 01:05 


 


ABG pO2     ()  mmHg


 


ABG HCO3     (21-25)  mmol/L


 


ABG Total CO2     (19-24)  mmol/L


 


BUN     (7-17)  mg/dL


 


Glucose     (74-99)  mg/dL


 


POC Glucose (mg/dL)  180 H  172 H  124 H  (75-99)  mg/dL


 


Ferritin     (10.0-291.0)  ng/mL


 


AST     (14-36)  U/L


 


ALT     (4-34)  U/L


 


Lactate Dehydrogenase     (313-618)  U/L


 


Total Protein     (6.3-8.2)  g/dL


 


Albumin     (3.5-5.0)  g/dL














  04/10/21 04/10/21 Range/Units





  04:35 05:31 


 


ABG pO2   81 L  ()  mmHg


 


ABG HCO3   26 H  (21-25)  mmol/L


 


ABG Total CO2   27 H  (19-24)  mmol/L


 


BUN  23 H   (7-17)  mg/dL


 


Glucose  132 H   (74-99)  mg/dL


 


POC Glucose (mg/dL)    (75-99)  mg/dL


 


Ferritin    (10.0-291.0)  ng/mL


 


AST  77 H   (14-36)  U/L


 


ALT  115 H   (4-34)  U/L


 


Lactate Dehydrogenase  868 H   (313-618)  U/L


 


Total Protein  5.5 L   (6.3-8.2)  g/dL


 


Albumin  3.0 L   (3.5-5.0)  g/dL








                      Microbiology - Last 24 Hours (Table)











 04/08/21 00:21 Gram Stain - Final





 Sputum Sputum Culture - Final














Assessment and Plan


Assessment: 





1 Acute hypoxemic respiratory failure secondary to CoVID 19 

pneumonia/pneumonitis requiring intubation mechanical ventilatory support on 

03/31/2021.  Outside the window for Remdesivir. Received tocilizumab.





2 Hypertension, currently off Cleviprex





3 Paroxysmal atrial fibrillation





4 Hyperlipidemia





5 Bradycardia, requiring dopamine drip





Plan:





The patient was seen and evaluated by Dr. Ruiz


Chest x-ray, ABGs and labs reviewed


Decrease the PEEP to 10


Continue Decadron, Lovenox, vitamin supplements


Continue nutritional support


We'll continue with daily interruption of sedation.


Recommend tracheostomy and PEG tube placement 


Awaiting family decision


We will continue to follow and make further recommendations based on her 

clinical status





Critical care time 36 minutes





I, the cosigning physician, performed a history & physical examination of the 

patient. Lungs sounds with coarse crackles in the bilateral bases.  Maintaining 

O2 saturations in the 90s on 50% FiO2 and a PEEP of 10.  I discussed the 

assessment and plan of care with my nurse practitioner, Leatha Rodriguez. I attest to 

the above note as dictated by her.

## 2021-04-10 NOTE — P.PN
Subjective





This is a pleasant 61 years old female with past medical history of atrial 

fibrillation not on anticoagulation and her cardiologist is Dr. Peck, 

hypertension, hyperlipidemia.  She is a patient of Dr. Ribeiro


Patient states that she was tested positive for covid On 03/26/2021.  And now 

she presents with dyspnea of one-day duration when started earlier.  Associated 

with cough on the patella brown phlegm.


She is also complaining of from chest pain without coughing , on the right side 

of the chest nonradiating about 5/10 in severity


She has no vomiting but diarrhea about twice per day





04/8/2021


Patient remains intubated and on mechanical ventilation, currently she needs 

high peep of 13,(was 10 yesterday and 16 on admission) with pulmonary/critical 

care team monitor her closely and help with vent management.


She is tachypneic and bradycardic since admission and she is currently on 

dopamine to keep heart rate in 40s existed of 20s.  Afebrile.  


Pulmonary team recommended tracheostomy and PEG tube placement, family wanted to

wait for now


Labs showing leukopenia with WBC 5.5K with lymphopenia resolved.  Rest of the 

CBC, INR unremarkable.  BMP is unremarkable with carbon dioxide is 25.  Lactic 

acid 1.2.  Liver enzymes slightly elevated with AST 53 and ALT 67 (since 

admission).


Lactate dehydrogenase is elevated at 860(improving), C-reactive protein is 5.9 

(within normal limits).


Chest x-ray Stable portable chest.  Bilateral infiltrates


She is still a normal sinus to 40 mL/h, she is on zinc, vitamin C, vitamin D, 

dexamethasone.  She status post tocilizumab.  Also she is on Lovenox.





04/09/2021


Patient remains intubated and sedated in the ICU with pulmonary/critical care 

team following the patient closely and help with vent management.  Today P Cameron

13 and FiO2 50%.


Chest x-ray: Interstitial changes and patchy bilateral lower lung opacity shows 

some improvement.


Patient looks reviewed, inflammatory markers slightly increased with ferritin 

674 and  while C-reactive protein is normal at 6.4


Patient currently on dexamethasone, normal saline at 40 and dopamine for 

bradycardia plus Lovenox 40 mg and multiple vitamins 4 coated


Family still to decide about tracheostomy and PEG tube placement





04/10/2021


pt remains in icu sedated and intubated , with pulmonary critical care team are 

following the pt closely and help with vent management per PEEP was lowered 

today from 13 down to 10.


Patient is  undergoing sedation holiday.  However pulmonary/critical care team 

still recommending PEG tube and t tracheostomy placement pending family decision

regarding this after critical care team discussed with them.


Cardiologist on the case and recommended to continue with dopamine and and 

Norvasc.


Chest x-ray showing bilateral infiltrate


Labs look stable area


Today at Dr. the daughter Corina who is an ICU nurse and all her questions were 

answered.  Also she was updated with the patient condition.














Review of systems: N/


                               Active Medications











Generic Name Dose Route Start Last Admin





  Trade Name Freq  PRN Reason Stop Dose Admin


 


Albuterol Sulfate  2 puff  04/03/21 16:00  04/10/21 19:41





  Albuterol Hfa Inhaler  INHALATION   2 puff





  RT-Q4H VIOLET   Administration


 


Alprazolam  0.25 mg  04/07/21 16:00  04/10/21 21:54





  Alprazolam 0.25 Mg Tab  PO   0.25 mg





  TID VIOLET   Administration


 


Amlodipine Besylate  10 mg  04/09/21 09:00  04/10/21 09:06





  Amlodipine 5 Mg Tab  PO   10 mg





  DAILY VIOLET   Administration


 


Artificial Tears  2 drops  04/01/21 20:00  04/10/21 19:39





  Artificial Tears-Hypromellose Drops 15 Ml Btl  BOTH EYES   2 drops





  Q4HR VIOLET   Administration


 


Ascorbic Acid  1,000 mg  03/31/21 09:00  04/10/21 09:06





  Ascorbic Acid 500 Mg Tab  PO   1,000 mg





  DAILY VIOLET   Administration


 


Aspirin  81 mg  03/31/21 09:00  04/10/21 09:06





  Aspirin 81 Mg  PO   81 mg





  DAILY VIOLET   Administration


 


Atorvastatin Calcium  40 mg  03/31/21 09:00  04/10/21 09:07





  Atorvastatin 40 Mg Tab  PO   40 mg





  DAILY VIOLET   Administration


 


Chlorhexidine Gluconate  15 ml  03/31/21 21:00  04/10/21 19:39





  Chlorhexidine Gluconate 15 Ml Cup  MUCOUS MEM   15 ml





  BID VIOLET   Administration


 


Cholecalciferol  50 mcg  03/31/21 09:00  04/10/21 09:06





  Cholecalciferol 25 Mcg (1000 Iu) Tablet  PO   50 mcg





  DAILY VIOLET   Administration


 


Citalopram Hydrobromide  40 mg  03/31/21 09:00  04/10/21 09:06





  Citalopram Hydrobromide 20 Mg Tab  PO   40 mg





  DAILY VIOLET   Administration


 


Dexamethasone Sodium Phosphate  6 mg  03/31/21 09:00  04/10/21 09:07





  Dexamethasone Sod Phosphate 10 Mg/Ml 1 Ml Vial  IV   6 mg





  DAILY VIOLET   Administration


 


Enoxaparin Sodium  40 mg  03/31/21 09:00  04/10/21 09:08





  Enoxaparin 40 Mg/0.4 Ml Syringe  SQ   40 mg





  DAILY VIOLET   Administration


 


Propofol 1,000 mg/ IV Solution  100 mls @ 0 mls/hr  03/31/21 14:00  04/10/21 

19:39





  IV   30 mcg/kg/min





  .Q0M VIOLET   23.31 mls/hr





    Administration





  Protocol  





  Titrate  


 


Fentanyl Citrate 1,000 mcg/  100 mls @ 0 mls/hr  03/31/21 14:30  04/10/21 19:16





  Sodium Chloride  IV   1.5 mcg/kg/hr





  .Q0M VIOLET   18.03 mls/hr





    Administration





  Protocol  





  Per Protocol  


 


Sodium Chloride  1,000 mls @ 40 mls/hr  03/31/21 16:45  04/10/21 19:19





  Saline 0.9%  IV   40 mls/hr





  .Q24H VIOLET   Administration


 


Dopamine HCl/Dextrose 800 mg/  250 mls @ 4.984 mls/hr  04/08/21 15:15  04/10/21 

11:25





  IV Solution  IV   2.5 mcg/kg/min





  .Q24H VIOLET   6.23 mls/hr





    Administration





  Protocol  





  2 MCG/KG/MIN  


 


Insulin Aspart  0 unit  04/01/21 00:00  04/10/21 17:23





  Insulin Aspart (Novolog) 100 Unit/Ml Vial  SQ   3 unit





  Q6H VIOLET   Administration





  Protocol  


 


Lisinopril  20 mg  04/08/21 09:30  04/10/21 09:06





  Lisinopril 10 Mg Tab  PO   20 mg





  DAILY VIOLET   Administration


 


Miscellaneous Information  1 each  04/06/21 14:29 





  Magnesium Replacement Protocol 1 Each Misc  MISCELLANE  





  DAILY PRN  





  Per Protocol  





  Protocol  


 


Miscellaneous Information  1 each  04/06/21 14:29 





  Potassium Replacement Protocol 1 Each Misc  MISCELLANE  





  DAILY PRN  





  Per Protocol  





  Protocol  


 


Miscellaneous Information  1 each  04/08/21 05:26 





  Potassium Replacement Protocol 1 Each Misc  MISCELLANE  





  DAILY PRN  





  Per Protocol  





  Protocol  


 


Naloxone HCl  0.2 mg  03/31/21 00:55 





  Naloxone 0.4 Mg/Ml 1 Ml Vial  IV  





  Q2M PRN  





  Opioid Reversal  


 


Ondansetron HCl  4 mg  03/31/21 00:55  03/31/21 01:59





  Ondansetron 4 Mg/2 Ml Vial  IVP   4 mg





  Q8HR PRN   Administration





  Nausea And Vomiting  


 


Pantoprazole Sodium  40 mg  04/01/21 09:00  04/10/21 09:06





  Pantoprazole 40 Mg/10 Ml Vial  IV   40 mg





  DAILY VIOLET   Administration


 


Spironolactone  12.5 mg  03/31/21 09:00  04/10/21 09:06





  Spironolactone 25 Mg Tab  PO   12.5 mg





  DAILY VIOLET   Administration


 


Zinc Sulfate  220 mg  03/31/21 09:00  04/10/21 09:07





  Zinc Sulfate 220 Mg Cap  PO   220 mg





  DAILY VIOLET   Administration














Objective





- Vital Signs


Vital signs: 


                                   Vital Signs











Temp  98.8 F   04/10/21 12:00


 


Pulse  59 L  04/10/21 12:00


 


Resp  24   04/10/21 12:00


 


BP  119/54   04/10/21 12:00


 


Pulse Ox  92 L  04/10/21 12:00








                                 Intake & Output











 04/09/21 04/10/21 04/10/21





 18:59 06:59 18:59


 


Intake Total 676.671 5731.839 838.840


 


Output Total 1865 1345 800


 


Balance -964.125 119.839 38.840


 


Weight 131.4 kg 129.5 kg 


 


Intake:   


 


   473 301


 


    Sodium Chloride 0.9% 1, 480 440 280





    000 ml @ 40 mls/hr IV .   





    Q24H VIOLET Rx#:[id number]   


 


    pressure bag 3 33 21


 


  Intake, IV Titration 330.875 571.839 246.840





  Amount   


 


    DOPamine DRIP 800 mg In  177.844 58.666





    Dextrose/Water 1 250ml.   





    bag @ 2 MCG/KG/MIN 4.984   





    mls/hr IV .Q24H VIOLET Rx#:   





    [id number]   


 


    fentaNYL (PF) 1,000 mcg 100 196.628 100





    In Sodium Chloride 0.9%   





    80 ml @ Per Protocol IV .   





    Q0M VIOLET Rx#:[id number]   


 


    propofoL 1,000 mg In 230.875 197.367 88.174





    Empty Bag 1 bag @ Titrate   





    IV .Q0M VIOLET Rx#:   





    [id number]   


 


  Tube Feeding 87 330 231


 


  Other  90 60


 


Output:   


 


  Urine 1865 1345 800


 


Other:   


 


  Voiding Method Indwelling Catheter Indwelling Catheter Indwelling Catheter








                       ABP, PAP, CO, CI - Last Documented











Arterial Blood Pressure        154/60

















- Exam





-GENERAL: The patient is intubated and sedated


HEENT: Pupils are round and equally reacting to light. EOMI. No scleral icterus.

No conjunctival pallor. Normocephalic, atraumatic. No pharyngeal erythema. No 

thyromegaly. 


CARDIOVASCULAR: S1 and S2 present. No murmurs, rubs, or gallops. 


PULMONARY: Chest is clear to auscultation, no wheezing or crackles. 


ABDOMEN: Soft, nontender, nondistended, normoactive bowel sounds. No palpable 

organomegaly. 


MUSCULOSKELETAL: No joint swelling or deformity. 


EXTREMITIES: No cyanosis, clubbing, or pedal edema. 


NEUROLOGICAL: Gross neurological examination did not reveal any focal deficits. 


SKIN: No rashes. no petechiae.





- Labs


CBC & Chem 7: 


                                 04/10/21 04:35





                                 04/10/21 04:35


Labs: 


                  Abnormal Lab Results - Last 24 Hours (Table)











  04/09/21 04/09/21 04/09/21 Range/Units





  04:25 17:27 18:12 


 


ABG pO2     ()  mmHg


 


ABG HCO3     (21-25)  mmol/L


 


ABG Total CO2     (19-24)  mmol/L


 


BUN     (7-17)  mg/dL


 


Glucose     (74-99)  mg/dL


 


POC Glucose (mg/dL)   180 H  172 H  (75-99)  mg/dL


 


Ferritin  674.3 H    (10.0-291.0)  ng/mL


 


AST     (14-36)  U/L


 


ALT     (4-34)  U/L


 


Lactate Dehydrogenase     (313-618)  U/L


 


Total Protein     (6.3-8.2)  g/dL


 


Albumin     (3.5-5.0)  g/dL














  04/10/21 04/10/21 04/10/21 Range/Units





  01:05 04:35 05:31 


 


ABG pO2    81 L  ()  mmHg


 


ABG HCO3    26 H  (21-25)  mmol/L


 


ABG Total CO2    27 H  (19-24)  mmol/L


 


BUN   23 H   (7-17)  mg/dL


 


Glucose   132 H   (74-99)  mg/dL


 


POC Glucose (mg/dL)  124 H    (75-99)  mg/dL


 


Ferritin   694.8 H   (10.0-291.0)  ng/mL


 


AST   77 H   (14-36)  U/L


 


ALT   115 H   (4-34)  U/L


 


Lactate Dehydrogenase   868 H   (313-618)  U/L


 


Total Protein   5.5 L   (6.3-8.2)  g/dL


 


Albumin   3.0 L   (3.5-5.0)  g/dL














  04/10/21 Range/Units





  12:07 


 


ABG pO2   ()  mmHg


 


ABG HCO3   (21-25)  mmol/L


 


ABG Total CO2   (19-24)  mmol/L


 


BUN   (7-17)  mg/dL


 


Glucose   (74-99)  mg/dL


 


POC Glucose (mg/dL)  192 H  (75-99)  mg/dL


 


Ferritin   (10.0-291.0)  ng/mL


 


AST   (14-36)  U/L


 


ALT   (4-34)  U/L


 


Lactate Dehydrogenase   (313-618)  U/L


 


Total Protein   (6.3-8.2)  g/dL


 


Albumin   (3.5-5.0)  g/dL








                      Microbiology - Last 24 Hours (Table)











 04/08/21 00:21 Gram Stain - Final





 Sputum Sputum Culture - Final














Assessment and Plan


Assessment: 





Acute bilateral pneumonia, mostly secondary to Covid 19 infection


Acute hypoxic respiratory failure, needing intubation and mechanical ventilation


Increased inflammatory markers


Bradycardia secondary to Covid infection, need dopamine


Acute Covid gastroenteritis


Mild transaminitis, mostly secondary to Covid


Hypertension


Hyperlipidemia


Paroxysmal A. fib, currently heart rate controlled

















Plan: 





This is a pleasant 61 years old female who presents with respiratory distress 

mostly secondary to covid.  Continue with steroids, vitamin C, zinc, vitamin D, 

bronchodilator and oxygen as needed.  Lovenox for DVT prophylaxis and pulmonary 

consult.  Continue with vent management as per pulmonary team.


Cardiologist on the case.


Labs and medication were reviewed..  Continue same treatment.  Continue with 

symptomatic treatment.  Resume home medication.  Monitor lytes and vitals.  DVT 

and GI prophylaxis.  Further recommendations depends on the clinical course of 

the patient


DVT prophylaxis: Subcutaneous Lovenox


GI Prophylaxis: Ppi


Prognosis is guarded

## 2021-04-10 NOTE — XR
EXAMINATION TYPE: XR chest 1V portable

 

DATE OF EXAM: 4/10/2021

 

COMPARISON: 4/9/2021

 

INDICATION: Covid

 

TECHNIQUE: Single frontal view of the chest is obtained.

 

FINDINGS:  

The heart size is normal.  

The pulmonary vasculature is normal.  

Bibasilar infiltrates are present. Small left pleural effusion may be present.  

Endotracheal tube tip is above the ranjan. Nasogastric tube transverses the thorax with the tip in th
e left upper quadrant. This could be advanced slightly and appears somewhat pulled back from prior st
udy.

 

IMPRESSION:  

1. Bibasilar infiltrates with small left pleural effusion.

2. Lines and catheters discussed above.

3. The nasogastric tube could be advanced somewhat for better seating.

## 2021-04-11 LAB
ALBUMIN SERPL-MCNC: 3 G/DL (ref 3.5–5)
ALP SERPL-CCNC: 70 U/L (ref 38–126)
ALT SERPL-CCNC: 99 U/L (ref 4–34)
ANION GAP SERPL CALC-SCNC: 9 MMOL/L
AST SERPL-CCNC: 61 U/L (ref 14–36)
BASOPHILS # BLD AUTO: 0 K/UL (ref 0–0.2)
BASOPHILS NFR BLD AUTO: 0 %
BUN SERPL-SCNC: 22 MG/DL (ref 7–17)
CALCIUM SPEC-MCNC: 8.9 MG/DL (ref 8.4–10.2)
CHLORIDE SERPL-SCNC: 106 MMOL/L (ref 98–107)
CO2 BLDA-SCNC: 27 MMOL/L (ref 19–24)
CO2 SERPL-SCNC: 24 MMOL/L (ref 22–30)
EOSINOPHIL # BLD AUTO: 0.1 K/UL (ref 0–0.7)
EOSINOPHIL NFR BLD AUTO: 1 %
ERYTHROCYTE [DISTWIDTH] IN BLOOD BY AUTOMATED COUNT: 4.27 M/UL (ref 3.8–5.4)
ERYTHROCYTE [DISTWIDTH] IN BLOOD: 14.8 % (ref 11.5–15.5)
GLUCOSE BLD-MCNC: 126 MG/DL (ref 75–99)
GLUCOSE BLD-MCNC: 139 MG/DL (ref 75–99)
GLUCOSE BLD-MCNC: 158 MG/DL (ref 75–99)
GLUCOSE BLD-MCNC: 184 MG/DL (ref 75–99)
GLUCOSE SERPL-MCNC: 131 MG/DL (ref 74–99)
HCO3 BLDA-SCNC: 26 MMOL/L (ref 21–25)
HCT VFR BLD AUTO: 38.6 % (ref 34–46)
HGB BLD-MCNC: 12.7 GM/DL (ref 11.4–16)
LYMPHOCYTES # SPEC AUTO: 1.7 K/UL (ref 1–4.8)
LYMPHOCYTES NFR SPEC AUTO: 28 %
MCH RBC QN AUTO: 29.8 PG (ref 25–35)
MCHC RBC AUTO-ENTMCNC: 32.9 G/DL (ref 31–37)
MCV RBC AUTO: 90.4 FL (ref 80–100)
MONOCYTES # BLD AUTO: 0.5 K/UL (ref 0–1)
MONOCYTES NFR BLD AUTO: 9 %
NEUTROPHILS # BLD AUTO: 3.5 K/UL (ref 1.3–7.7)
NEUTROPHILS NFR BLD AUTO: 59 %
PCO2 BLDA: 37 MMHG (ref 35–45)
PH BLDA: 7.46 [PH] (ref 7.35–7.45)
PLATELET # BLD AUTO: 247 K/UL (ref 150–450)
PO2 BLDA: 79 MMHG (ref 83–108)
POTASSIUM SERPL-SCNC: 3.8 MMOL/L (ref 3.5–5.1)
PROT SERPL-MCNC: 5.5 G/DL (ref 6.3–8.2)
SODIUM SERPL-SCNC: 139 MMOL/L (ref 137–145)
WBC # BLD AUTO: 5.9 K/UL (ref 3.8–10.6)

## 2021-04-11 RX ADMIN — ALBUTEROL SULFATE SCH PUFF: 90 AEROSOL, METERED RESPIRATORY (INHALATION) at 07:51

## 2021-04-11 RX ADMIN — DEXTRAN 70 AND HYPROMELLOSE 2910 SCH DROPS: 1; 3 SOLUTION/ DROPS OPHTHALMIC at 08:37

## 2021-04-11 RX ADMIN — DEXTRAN 70 AND HYPROMELLOSE 2910 SCH DROPS: 1; 3 SOLUTION/ DROPS OPHTHALMIC at 13:54

## 2021-04-11 RX ADMIN — ALBUTEROL SULFATE SCH PUFF: 90 AEROSOL, METERED RESPIRATORY (INHALATION) at 03:30

## 2021-04-11 RX ADMIN — CHLORHEXIDINE GLUCONATE SCH ML: 1.2 RINSE ORAL at 08:39

## 2021-04-11 RX ADMIN — ATORVASTATIN CALCIUM SCH MG: 40 TABLET, FILM COATED ORAL at 08:38

## 2021-04-11 RX ADMIN — ASPIRIN 81 MG CHEWABLE TABLET SCH MG: 81 TABLET CHEWABLE at 08:38

## 2021-04-11 RX ADMIN — PANTOPRAZOLE SODIUM SCH MG: 40 INJECTION, POWDER, FOR SOLUTION INTRAVENOUS at 08:39

## 2021-04-11 RX ADMIN — SPIRONOLACTONE SCH MG: 25 TABLET, FILM COATED ORAL at 08:40

## 2021-04-11 RX ADMIN — CITALOPRAM HYDROBROMIDE SCH MG: 20 TABLET ORAL at 08:39

## 2021-04-11 RX ADMIN — DEXTRAN 70 AND HYPROMELLOSE 2910 SCH DROPS: 1; 3 SOLUTION/ DROPS OPHTHALMIC at 16:52

## 2021-04-11 RX ADMIN — INSULIN ASPART SCH UNIT: 100 INJECTION, SOLUTION INTRAVENOUS; SUBCUTANEOUS at 06:35

## 2021-04-11 RX ADMIN — ENOXAPARIN SODIUM SCH MG: 40 INJECTION SUBCUTANEOUS at 08:39

## 2021-04-11 RX ADMIN — INSULIN ASPART SCH UNIT: 100 INJECTION, SOLUTION INTRAVENOUS; SUBCUTANEOUS at 00:56

## 2021-04-11 RX ADMIN — ALBUTEROL SULFATE SCH PUFF: 90 AEROSOL, METERED RESPIRATORY (INHALATION) at 11:47

## 2021-04-11 RX ADMIN — SODIUM CHLORIDE SCH MLS/HR: 900 INJECTION, SOLUTION INTRAVENOUS at 05:12

## 2021-04-11 RX ADMIN — INSULIN ASPART SCH: 100 INJECTION, SOLUTION INTRAVENOUS; SUBCUTANEOUS at 13:55

## 2021-04-11 RX ADMIN — OXYCODONE HYDROCHLORIDE AND ACETAMINOPHEN SCH MG: 500 TABLET ORAL at 08:38

## 2021-04-11 RX ADMIN — Medication SCH MG: at 08:40

## 2021-04-11 RX ADMIN — ALBUTEROL SULFATE SCH PUFF: 90 AEROSOL, METERED RESPIRATORY (INHALATION) at 19:25

## 2021-04-11 RX ADMIN — INSULIN ASPART SCH: 100 INJECTION, SOLUTION INTRAVENOUS; SUBCUTANEOUS at 23:12

## 2021-04-11 RX ADMIN — DEXTROSE SCH MG: 50 INJECTION, SOLUTION INTRAVENOUS at 08:40

## 2021-04-11 RX ADMIN — DEXTRAN 70 AND HYPROMELLOSE 2910 SCH DROPS: 1; 3 SOLUTION/ DROPS OPHTHALMIC at 23:12

## 2021-04-11 RX ADMIN — DEXTRAN 70 AND HYPROMELLOSE 2910 SCH DROPS: 1; 3 SOLUTION/ DROPS OPHTHALMIC at 00:57

## 2021-04-11 RX ADMIN — SODIUM CHLORIDE SCH MLS/HR: 900 INJECTION, SOLUTION INTRAVENOUS at 10:51

## 2021-04-11 RX ADMIN — SODIUM CHLORIDE SCH MLS/HR: 900 INJECTION, SOLUTION INTRAVENOUS at 23:59

## 2021-04-11 RX ADMIN — DEXTRAN 70 AND HYPROMELLOSE 2910 SCH DROPS: 1; 3 SOLUTION/ DROPS OPHTHALMIC at 04:57

## 2021-04-11 RX ADMIN — ALBUTEROL SULFATE SCH PUFF: 90 AEROSOL, METERED RESPIRATORY (INHALATION) at 15:48

## 2021-04-11 RX ADMIN — DEXTRAN 70 AND HYPROMELLOSE 2910 SCH DROPS: 1; 3 SOLUTION/ DROPS OPHTHALMIC at 20:29

## 2021-04-11 RX ADMIN — DOPAMINE HYDROCHLORIDE IN DEXTROSE SCH: 3.2 INJECTION, SOLUTION INTRAVENOUS at 15:14

## 2021-04-11 RX ADMIN — SODIUM CHLORIDE SCH MLS/HR: 900 INJECTION, SOLUTION INTRAVENOUS at 17:41

## 2021-04-11 RX ADMIN — INSULIN ASPART SCH UNIT: 100 INJECTION, SOLUTION INTRAVENOUS; SUBCUTANEOUS at 16:51

## 2021-04-11 RX ADMIN — CHLORHEXIDINE GLUCONATE SCH ML: 1.2 RINSE ORAL at 20:52

## 2021-04-11 RX ADMIN — Medication SCH: at 13:54

## 2021-04-11 RX ADMIN — CEFAZOLIN SCH MLS/HR: 330 INJECTION, POWDER, FOR SOLUTION INTRAMUSCULAR; INTRAVENOUS at 06:12

## 2021-04-11 NOTE — P.PN
Subjective


Progress Note Date: 04/11/21


Principal diagnosis: 





Hypoxemic respiratory failure secondary to CoVID 19 pneumonia





61-year-old female, who hasn't been feeling well for about 10-11 days.  She 

tested positive for COVID 19 on March 24.  She's been sick for 10-11 days old.  

She complains of TYPICAL symptoms including weakness, fever, diarrhea, cough, 

and shortness of breath.  She's quite hypoxemic.  On AIRVO 100%, and a 

nonrebreather mask, her saturations are right around 80-83%.  Any movement, and 

she desaturates even further.  I just called the intensive care unit, and we'll 

plan on putting her in the intensive care unit.  She's currently on saline at 75

mL an hour.  She can barely speak without becoming short of breath.  The patient

is not a candidate for REM, but could get TOCI.  Sodium 138, potassium 3.8, 

chlorides 108, CO2 21, anion gap 9, BUN 19, creatinine 0.7.  AST 94, ALTs 60, 

LDH 1471, C-reactive protein 88.1.  Again, the patient's been sick for at least 

10-11 days and maybe a bit longer.  Her oral intake has been poor as well.  The 

patient's chest x-ray shows diffuse bilateral infiltrates consistent with 

coronavirus infection.





The patient is seen today 04/01/2021 in follow-up in the intensive care unit.  

Shortly after arriving to the ICU yesterday she required intubation and mechan

ical ventilatory support injury to acute hypoxemic respiratory failure.  She is 

currently on the ventilator and assist control mode with a rate of 34, tidal on 

450, FiO2 70%, PEEP of 16.  Morning blood gases reveal pO2 of 70, pCO2 33, P 

87.38.  Fentanyl drip at 0.5 mcg/kg per hour, propofol at 15 mcg/kg/m, Nimbex at

1 mcg/min per hour.  0.9 normal saline at 130 ML's per hour.  She did receive 

Tociluzimab.  She remains on Decadron 6 mg IV daily.  Lovenox 40 mg subcutaneous

daily.  Tube feedings will be initiated for nutritional support.  Blood cultures

reveal no growth.  White count 2.9.  Hemoglobin 11.3 and platelets 188.  

Lymphocytes 0.8.  She is continued on Lovenox, dexamethasone, vitamin 

supplements.





The patient is seen today 04/02/2021 in follow-up in the intensive care unit.  

She remains intubated on the mechanical ventilator.  Assist-control mode.  Rate 

of 30, tidal volume 450, FiO2 60% and a PEEP of 16.  She is currently sedated on

propofol at 40 mcg/kg/m, Nimbex at 1 mcg/kg/m, fentanyl at 0.5 mcg/kg per hour. 

She has 0.9 normal saline at 130 ML's per hour.  She is being nourished with 

vital HP at 20 ML's per hour.  She did receive tocilizumab.  He remains on 

dexamethasone, Lovenox, vitamin supplements.  Chest x-ray showing persistent but

improving patchy bilateral airspace disease.  Sputum and blood cultures are 

pending.  White count 3.9.  Hemoglobin 11.4.  Lymphocytes 0.7.  D-dimer 1.19.  

Sodium 139.  Potassium 4.4.  Bicarb 20.  Creatinine 0.64.  Glucose 174.





The patient is seen today 04/03/2020 in follow-up in the intensive care unit.  

She remains intubated on mechanical ventilator.  Current settings are assist-

control mode.  Rate of 34, tidal volume 450, FiO2 50% and a PEEP of 16.  Morning

blood gases reveal a pO2 of 117, pCO2 30, pH 7.40.  She remains sedated on 

propofol at 55 mcg/kg/m.  Fentanyl and 1 mcg/kg/hr.  She is being nourished with

vital HP at 33 ML's per hour.  0.9 normal saline at 130 ML's per hour.  She 

remains on dexamethasone, Lovenox, bronchodilators.  White count 4.0.  

Hemoglobin 11.8.  D-dimer 1.81.  Sodium 138.  Potassium 4.2.  Bicarb 19.  LDH 

1133.  C-reactive protein 15.5.  Asked x-ray continues to show moderate 

scattered interstitial and partial airspace opacities.  





The patient is seen today 04/04/2021 in follow-up in the intensive care unit.  

She remains intubated, sedated.  Current settings are assist control at a rate 

of 30, tidal volume 450, FiO2 50% and a PEEP of 15.  She is sedated on propofol 

at 60 mcg/kg/m, fentanyl at 1 mcg/kg per hour, 0.9 normal saline 130 ML's per 

hour.  Vital HP at 24 mls per hour.  She did receive Actemra.  Blood culture 

reveals no growth.  Sputum culture reveals no growth.  White count 5.3.  

Hemoglobin 12.0.  D-dimer 2.0.  Sodium 139.  Potassium 4.0.  Creatinine 0.59.  

LDH 1082.  C reactive protein 8.6.  Chest x-ray continues to show no interval 

change, continues with persistent moderate patchy opacities bilaterally.  

Endotracheal tube to be advanced to 2 cms.  Continued on vitamin supplements, 

Lovenox, IV Solu-Medrol. 





Progress note dated 04/05/2021.





This is a patient was admitted on March 31 for COVID 19 pneumonia.  The patient 

came to the ICU on March 31, and was intubated on the same day.  She apparently 

tested positive back on March 24.  The patient remains on the mechanical 

ventilator.  She is on the volume assist control mode, rate of 30, tidal volume 

450, FiO2 50%, PEEP of 15 to be dropped to 10.  Blood gases show pO2 of 66, pCO2

33, and a pH 7.41.  The patient's on propofol at 60 mcg/kg/m, fentanyl 1 

mcg/kg/h, normal saline at 130 mL an hour and vital high protein at 24, which is

goal.  The patient did receive TOCI.   White count is 6.2, hemoglobin 11.8, 

hematocrit 33.1, platelet count 272,000.  Sodium 137, potassium 3.8, chlorides 

114, CO2 20, anion gap 3, BUN 18, creatinine 0.53.  LDH is 956.  Chest x-ray 

shows worsened bilateral infiltrates.  Small effusions are noted.





Progress note dated 04/06/2021.





61-year-old female, admitted on March 31 for COVID 19 pneumonia.  She came to 

the ICU on March 31 and was intubated on the same day.  She tested positive back

on March 24.  The patient remains on mechanical ventilation.  She is on the 

volume assist control mode, rate 36, tidal volume 450, FiO2 50%, and PEEP is 13.

 Arterial blood gases show a PaO2 of 74, pCO2 31, and a pH of 7.52.  Blood gases

are consistent with relative hypoxemia, and a combined respiratory and metabolic

alkalosis.  The patient is getting propofol 60 mcg/kg/m, fentanyl 1 emily per 

kilogram per hour, saline at 40 mL an hour, dopamine at 2 mcg/kg/m, and vital 

high protein at goal, which is 24 mL an hour.  The cardiologist wanted to DC the

dopamine.  We are going to try to lower PEEP from 13 down to 10.  Today's CBC is

normal.  Sodium 138, potassium 3.4, chlorides 109, CO2 24, anion gap 5, BUN 17, 

and creatinine 0.53.  Chest x-ray shows improving bibasilar infiltrates.





The patient is seen today 04/07/2021 and follow-up in the intensive care unit.  

She remains intubated and on the mechanical ventilator.  Current mode assist 

control with a rate of 30, tidal volume 450, FiO2 50% and a PEEP of 10.  Morning

blood gases reveal a P O2 of 58, pCO2 32, pH 7.51.  She remains sedated on 

propofol at 60 mcg/kg/m.  Fentanyl at 1 mcg/kg/h, 0.9#at 40 ML's per hour.  She 

is being nourished with vital HP at 24 ML's per hour which is goal.  She is 

currently afebrile.  Hemodynamically stable.  She is receive daily interruption 

of sedation without success.  The plan will be for trach and PEG later this 

week.  Blood and sputum cultures revealed no growth.  WBC 6.4.  Hemoglobin 12.8.

 Sodium 137.  Potassium 3.5.  Creatinine 0.60.  Glucose 120.  AST 53.  ALT 69.  

Chest x-ray continues to show patchy perihilar and basilar infiltrates with 

interval progression.





The patient is seen today 04/08/2021 in follow-up in the intensive care unit.  

She remains intubated on mechanical ventilator in assist control mode at a rate 

of 30, tight around 450, FiO2 50% and a PEEP of 10.  Morning blood gases reveal 

a P O2 of 58, pCO2 30, pH 7.54.  She remains sedated on propofol at 50 mcg/kg/m.

 Fentanyl at 2 mcg/kg/h.  0.9 normal saline at 40 ML's per hour.  She was 

initiated back on dopamine at 2 mcg/kg/m due to significant bradycardia with 

heart rate in the 20s temporarily.  Currently heart rate in the 40s.  She has 

remained hypertensive.  Chest x-ray continues to show bilateral patchy 

infiltrates.  Stable compared to previous.  Blood and sputum cultures revealed 

no growth.  White count 5.5.  Hemoglobin 11.8.  D-dimer 1.59.  Sodium 135.  

Potassium 3.9.  Creatinine 0.61.  AST 53.  ALT 67.  .  C-reactive protein

5.9.  She remains on Lovenox, dexamethasone, vitamin supplements.





The patient is seen today 04/09/2021 in follow-up in the intensive care unit.  

She remains intubated and on mechanical ventilator.  Current settings are 

assist-control mode.  Rate of 30, tidal volume 450.  FiO2 50% and a PEEP of 13. 

Morning blood gases reveal a pO2 of 76, pCO2 31, pH 7.53.  She remains sedated 

on propofol at 40 mcg/kg/m, dopamine at 2 mcg/kg/m.  Fentanyl at 1.5 mg/kg per 

hour.  0.9 normal saline at 40 miles per hour.  She is being nourished with 

vital HP at 24 ML's per hour which is goal.  Chest x-ray reveals bilateral 

airspace disease slightly improved.  Blood, sputum cultures reveal no growth.  

White count 6.6.  Hemoglobin 12.8.  Sodium 139.  Potassium 4.0.  Chloride 108.  

Bicarb 25.  Creatinine 0.52.  .  C-reactive protein 6.4.  She remains on 

vitamin supplements, Lovenox, Decadron.





The patient is seen today 04/10/2021 in follow-up in the intensive care unit.  

She remains intubated, sedated and on mechanical ventilator.  Current settings 

assist-control mode at a rate of 24, tidal volume 450, FiO2 50% and a PEEP of 

13.  Morning blood gases revealed a pO2 80, pCO2 37, pH 7.44.  She is sedated on

propofol at 30 mcg/kg/m.  Fentanyl at 1.5 mcg/kg per hour.  Remains on dopamine 

at 2.5 mcg/kg/m.  0.9 normal saline at 40 ML's per hour.  She is being nourished

with vital HP at 33,'s per hour which is goal.  Chest x-ray continues to 

revealed bibasilar infiltrates with small left pleural effusion.  Blood and 

sputum cultures reveal no growth.  White count 7.8.  Hemoglobin 12.7.  Platelet 

count 254.  Sodium 130.  Potassium 4.2.  Creatinine 0.61.  AST 77.  .  

.  C-reactive protein 6.8.  She remains on dexamethasone, Lovenox, vi

tamin supplements.





The patient is seen today 04/11/2021 in follow-up in the intensive care unit.  S

he remains intubated, sedated and on the mechanical ventilator.  Currently 

assist-control mode at 24, tidal volume 450, FiO2 50% and a PEEP of 10.  Morning

blood gases reveal a P O2 of 79, pCO2 37, pH 7.46.  She remains on propofol at 

30 mcg/kg/m, fentanyl at 1 g 1.5 mcg/kg/h.  Dopamine at 2.5 mcg/kg/m.  0.9 

normal saline at 40 ML's per hour.  Being nourished with vital HP at 33 ML's per

hour which is goal.  Chest x-ray continues to show bilateral patchy infiltrates 

with a small left pleural effusion.  Blood and sputum cultures reveal no growth.

 White count 5.9.  Hemoglobin 12.7.  Sodium 139.  Potassium 3.8.  Creatinine 

0.5.  AST 61.  ALT 99.  She remains on Lovenox, dexamethasone, vitamin 

supplements.








Objective





- Vital Signs


Vital signs: 


                                   Vital Signs











Temp  36.4 F L  04/11/21 06:00


 


Pulse  47 L  04/11/21 06:00


 


Resp  24   04/11/21 08:00


 


BP  139/58   04/11/21 05:00


 


Pulse Ox  90 L  04/11/21 06:00








                                 Intake & Output











 04/10/21 04/11/21 04/11/21





 18:59 06:59 18:59


 


Intake Total 4841.718 3331.480 581.403


 


Output Total 1800 1910 525


 


Balance -331.911 -525.520 56.403


 


Weight  126.099 kg 


 


Intake:   


 


   494 215


 


    Sodium Chloride 0.9% 1, 480 440 200





    000 ml @ 40 mls/hr IV .   





    Q24H [facility] Rx#:[id number]   


 


    pressure bag 36 54 15


 


  Intake, IV Titration 476.089 527.480 201.403





  Amount   


 


    DOPamine DRIP 800 mg In 58.666  





    Dextrose/Water 1 250ml.   





    bag @ 2 MCG/KG/MIN 4.984   





    mls/hr IV .Q24H [facility] Rx#:   





    [id number]   


 


    fentaNYL (PF) 1,000 mcg 191.953 256.928 101.502





    In Sodium Chloride 0.9%   





    80 ml @ Per Protocol IV .   





    Q0M [facility] Rx#:[id number]   


 


    propofoL 1,000 mg In 225.470 270.552 99.901





    Empty Bag 1 bag @ Titrate   





    IV .Q0M [facility] Rx#:   





    [id number]   


 


  Tube Feeding 396 363 165


 


  Other 80  


 


Output:   


 


  Urine 1800 1910 525


 


Other:   


 


  Voiding Method Indwelling Catheter Indwelling Catheter Indwelling Catheter








                       ABP, PAP, CO, CI - Last Documented











Arterial Blood Pressure        147/55

















- Exam





GENERAL EXAM: Intubated, sedated, 61-year-old female patient, comfortable in no 

apparent distress, synchronous with the vent.


HEAD: Normocephalic.


EYES: Normal reaction of pupils, equal size.


NOSE: Clear with pink turbinates.


THROAT: Oral endotracheal and gastric tube secured in place. No erythema or 

exudates.


NECK: No masses, no JVD.


CHEST: No chest wall deformity.


LUNGS: Equal air entry with crackles in the bilateral posterior bases.


CVS: S1 and S2 normal with no audible murmur, regular rhythm.


ABDOMEN: No hepatosplenomegaly, normal bowel sounds, no guarding or rigidity.


SPINE: No scoliosis or deformity


SKIN: No rashes


CENTRAL NERVOUS SYSTEM: Sedated, tone is normal in all 4 extremities.


EXTREMITIES: There is no peripheral edema.  No clubbing, no cyanosis.  

Peripheral pulses are intact.





- Labs


CBC & Chem 7: 


                                 04/11/21 04:36





                                 04/11/21 04:36


Labs: 


                  Abnormal Lab Results - Last 24 Hours (Table)











  04/10/21 04/10/21 04/10/21 Range/Units





  04:35 12:07 17:04 


 


ABG pH     (7.35-7.45)  


 


ABG pO2     ()  mmHg


 


ABG HCO3     (21-25)  mmol/L


 


ABG Total CO2     (19-24)  mmol/L


 


BUN     (7-17)  mg/dL


 


Creatinine     (0.52-1.04)  mg/dL


 


Glucose     (74-99)  mg/dL


 


POC Glucose (mg/dL)   192 H  195 H  (75-99)  mg/dL


 


Ferritin  694.8 H    (10.0-291.0)  ng/mL


 


AST     (14-36)  U/L


 


ALT     (4-34)  U/L


 


Total Protein     (6.3-8.2)  g/dL


 


Albumin     (3.5-5.0)  g/dL














  04/11/21 04/11/21 04/11/21 Range/Units





  00:30 04:36 05:57 


 


ABG pH    7.46 H  (7.35-7.45)  


 


ABG pO2    79 L  ()  mmHg


 


ABG HCO3    26 H  (21-25)  mmol/L


 


ABG Total CO2    27 H  (19-24)  mmol/L


 


BUN   22 H   (7-17)  mg/dL


 


Creatinine   0.50 L   (0.52-1.04)  mg/dL


 


Glucose   131 H   (74-99)  mg/dL


 


POC Glucose (mg/dL)  158 H    (75-99)  mg/dL


 


Ferritin     (10.0-291.0)  ng/mL


 


AST   61 H   (14-36)  U/L


 


ALT   99 H   (4-34)  U/L


 


Total Protein   5.5 L   (6.3-8.2)  g/dL


 


Albumin   3.0 L   (3.5-5.0)  g/dL














  04/11/21 Range/Units





  06:23 


 


ABG pH   (7.35-7.45)  


 


ABG pO2   ()  mmHg


 


ABG HCO3   (21-25)  mmol/L


 


ABG Total CO2   (19-24)  mmol/L


 


BUN   (7-17)  mg/dL


 


Creatinine   (0.52-1.04)  mg/dL


 


Glucose   (74-99)  mg/dL


 


POC Glucose (mg/dL)  139 H  (75-99)  mg/dL


 


Ferritin   (10.0-291.0)  ng/mL


 


AST   (14-36)  U/L


 


ALT   (4-34)  U/L


 


Total Protein   (6.3-8.2)  g/dL


 


Albumin   (3.5-5.0)  g/dL








                      Microbiology - Last 24 Hours (Table)











 04/08/21 00:21 Gram Stain - Final





 Sputum Sputum Culture - Final














Assessment and Plan


Assessment: 





1 Acute hypoxemic respiratory failure secondary to CoVID 19 pneumonia/pneumonit

is requiring intubation mechanical ventilatory support on 03/31/2021.  Outside 

the window for Remdesivir. Received tocilizumab.





2 Hypertension, currently off Cleviprex





3 Paroxysmal atrial fibrillation





4 Hyperlipidemia





5 Bradycardia, requiring dopamine drip





Plan:





The patient was seen and evaluated by Dr. Ruiz


Chest x-ray, ABGs and labs reviewed


Daily interruption of sedation


Continue Decadron, Lovenox, vitamin supplements


Recommend tracheostomy and PEG tube placement 


Repeat chest x-ray, inflammatory markers in the a.m.


We will continue to follow and make further recommendations based on her 

clinical status





Critical care time 37 minutes





I, the cosigning physician, performed a history & physical examination of the 

patient. Lungs sounds with coarse crackles in the bilateral bases.  Maintaining 

O2 saturations in the 90s on 50% FiO2 and a PEEP of 10.  I discussed the 

assessment and plan of care with my nurse practitioner, Leatha Rodriguez. I attest to 

the above note as dictated by her.

## 2021-04-11 NOTE — P.PN
Subjective


Progress Note Date: 04/11/21


Principal diagnosis: 





Sinus bradycardia





This is a 61-year-old female patient who was admitted to the hospital with acute

hypoxic respiratory failure secondary to pneumonia secondary to COVID-19 and we 

consulted to see the patient because of sinus bradycardia.  The patient was on 

Toprol-XL which was stopped.  She has been on dopamine drip for the last several

days.





The patient was seen today April 11 of 2021.  The patient continues to be 

intubated on mechanical ventilation and she has not been doing good from that 

standpoint overview.  The chest x-ray continues to show finding with bibasilar 

infiltrate with a small left pleural effusion.  She is currently on PEEP of 12. 

She continues to be on dopamine to enhance the heart rate.  The bradycardia is 

likely related to the sedation.  The pressure has been within normal limits.  

Currently she is on lisinopril as well as Norvasc.  Blood work was reviewed 

today with a normal kidney function and normal CBC.











Objective





- Vital Signs


Vital signs: 


                                   Vital Signs











Temp  36.4 F L  04/11/21 06:00


 


Pulse  47 L  04/11/21 06:00


 


Resp  24   04/11/21 06:00


 


BP  139/58   04/11/21 05:00


 


Pulse Ox  90 L  04/11/21 06:00








                                 Intake & Output











 04/10/21 04/11/21 04/11/21





 18:59 06:59 18:59


 


Intake Total 4075.812 6808.480 65.268


 


Output Total 1800 1910 


 


Balance -331.911 -525.520 65.268


 


Weight  126.099 kg 


 


Intake:   


 


   494 


 


    Sodium Chloride 0.9% 1, 480 440 





    000 ml @ 40 mls/hr IV .   





    Q24H [facility] Rx#:[id number]   


 


    pressure bag 36 54 


 


  Intake, IV Titration 476.089 527.480 65.268





  Amount   


 


    DOPamine DRIP 800 mg In 58.666  





    Dextrose/Water 1 250ml.   





    bag @ 2 MCG/KG/MIN 4.984   





    mls/hr IV .Q24H [facility] Rx#:   





    [id number]   


 


    fentaNYL (PF) 1,000 mcg 191.953 256.928 





    In Sodium Chloride 0.9%   





    80 ml @ Per Protocol IV .   





    Q0M [facility] Rx#:[id number]   


 


    propofoL 1,000 mg In 225.470 270.552 65.268





    Empty Bag 1 bag @ Titrate   





    IV .Q0M [facility] Rx#:   





    [id number]   


 


  Tube Feeding 396 363 


 


  Other 80  


 


Output:   


 


  Urine 1800 1910 


 


Other:   


 


  Voiding Method Indwelling Catheter Indwelling Catheter 








                       ABP, PAP, CO, CI - Last Documented











Arterial Blood Pressure        147/55

















- Constitutional


General appearance: Present: no acute distress





- Labs


CBC & Chem 7: 


                                 04/11/21 04:36





                                 04/11/21 04:36


Labs: 


                  Abnormal Lab Results - Last 24 Hours (Table)











  04/10/21 04/10/21 04/10/21 Range/Units





  04:35 12:07 17:04 


 


ABG pH     (7.35-7.45)  


 


ABG pO2     ()  mmHg


 


ABG HCO3     (21-25)  mmol/L


 


ABG Total CO2     (19-24)  mmol/L


 


BUN     (7-17)  mg/dL


 


Creatinine     (0.52-1.04)  mg/dL


 


Glucose     (74-99)  mg/dL


 


POC Glucose (mg/dL)   192 H  195 H  (75-99)  mg/dL


 


Ferritin  694.8 H    (10.0-291.0)  ng/mL


 


AST     (14-36)  U/L


 


ALT     (4-34)  U/L


 


Total Protein     (6.3-8.2)  g/dL


 


Albumin     (3.5-5.0)  g/dL














  04/11/21 04/11/21 04/11/21 Range/Units





  00:30 04:36 05:57 


 


ABG pH    7.46 H  (7.35-7.45)  


 


ABG pO2    79 L  ()  mmHg


 


ABG HCO3    26 H  (21-25)  mmol/L


 


ABG Total CO2    27 H  (19-24)  mmol/L


 


BUN   22 H   (7-17)  mg/dL


 


Creatinine   0.50 L   (0.52-1.04)  mg/dL


 


Glucose   131 H   (74-99)  mg/dL


 


POC Glucose (mg/dL)  158 H    (75-99)  mg/dL


 


Ferritin     (10.0-291.0)  ng/mL


 


AST   61 H   (14-36)  U/L


 


ALT   99 H   (4-34)  U/L


 


Total Protein   5.5 L   (6.3-8.2)  g/dL


 


Albumin   3.0 L   (3.5-5.0)  g/dL














  04/11/21 Range/Units





  06:23 


 


ABG pH   (7.35-7.45)  


 


ABG pO2   ()  mmHg


 


ABG HCO3   (21-25)  mmol/L


 


ABG Total CO2   (19-24)  mmol/L


 


BUN   (7-17)  mg/dL


 


Creatinine   (0.52-1.04)  mg/dL


 


Glucose   (74-99)  mg/dL


 


POC Glucose (mg/dL)  139 H  (75-99)  mg/dL


 


Ferritin   (10.0-291.0)  ng/mL


 


AST   (14-36)  U/L


 


ALT   (4-34)  U/L


 


Total Protein   (6.3-8.2)  g/dL


 


Albumin   (3.5-5.0)  g/dL








                      Microbiology - Last 24 Hours (Table)











 04/08/21 00:21 Gram Stain - Final





 Sputum Sputum Culture - Final














Assessment and Plan


Assessment: 





assessment


Acute hypoxic respiratory failure secondary to pneumonia


Sinus bradycardia


Hypertension





Plan


Continue the current dose of lisinopril and Norvasc


Continue dopamine to support the heart rate


Follow-up with the patient

## 2021-04-11 NOTE — XR
EXAMINATION TYPE: XR chest 1V

 

DATE OF EXAM: 4/11/2021

 

COMPARISON: 4/10/2021

 

INDICATION: Intubated, difficulty breathing

 

TECHNIQUE: Single frontal view of the chest is obtained.

 

FINDINGS:  

The heart size is normal.  

The pulmonary vasculature is normal.  

Basilar infiltrates are present.  

Endotracheal tube tip is above the ranjan. Nasogastric tube transverses the thorax.

 

IMPRESSION:  

1. Stable portable chest semiupright view

## 2021-04-11 NOTE — P.PN
Subjective





This is a pleasant 61 years old female with past medical history of atrial 

fibrillation not on anticoagulation and her cardiologist is Dr. Peck, 

hypertension, hyperlipidemia.  She is a patient of Dr. Ribeiro


Patient states that she was tested positive for covid On 03/26/2021.  And now 

she presents with dyspnea of one-day duration when started earlier.  Associated 

with cough on the patella brown phlegm.


She is also complaining of from chest pain without coughing , on the right side 

of the chest nonradiating about 5/10 in severity


She has no vomiting but diarrhea about twice per day





04/8/2021


Patient remains intubated and on mechanical ventilation, currently she needs 

high peep of 13,(was 10 yesterday and 16 on admission) with pulmonary/critical 

care team monitor her closely and help with vent management.


She is tachypneic and bradycardic since admission and she is currently on 

dopamine to keep heart rate in 40s existed of 20s.  Afebrile.  


Pulmonary team recommended tracheostomy and PEG tube placement, family wanted to

wait for now


Labs showing leukopenia with WBC 5.5K with lymphopenia resolved.  Rest of the 

CBC, INR unremarkable.  BMP is unremarkable with carbon dioxide is 25.  Lactic 

acid 1.2.  Liver enzymes slightly elevated with AST 53 and ALT 67 (since 

admission).


Lactate dehydrogenase is elevated at 860(improving), C-reactive protein is 5.9 

(within normal limits).


Chest x-ray Stable portable chest.  Bilateral infiltrates


She is still a normal sinus to 40 mL/h, she is on zinc, vitamin C, vitamin D, 

dexamethasone.  She status post tocilizumab.  Also she is on Lovenox.





04/09/2021


Patient remains intubated and sedated in the ICU with pulmonary/critical care 

team following the patient closely and help with vent management.  Today P Cameron

13 and FiO2 50%.


Chest x-ray: Interstitial changes and patchy bilateral lower lung opacity shows 

some improvement.


Patient looks reviewed, inflammatory markers slightly increased with ferritin 

674 and  while C-reactive protein is normal at 6.4


Patient currently on dexamethasone, normal saline at 40 and dopamine for 

bradycardia plus Lovenox 40 mg and multiple vitamins 4 coated


Family still to decide about tracheostomy and PEG tube placement





04/10/2021


pt remains in icu sedated and intubated , with pulmonary critical care team are 

following the pt closely and help with vent management per PEEP was lowered 

today from 13 down to 10.


Patient is  undergoing sedation holiday.  However pulmonary/critical care team 

still recommending PEG tube and t tracheostomy placement pending family decision

regarding this after critical care team discussed with them.


Cardiologist on the case and recommended to continue with dopamine and and 

Norvasc.


Chest x-ray showing bilateral infiltrate


Labs look stable area


Today at Dr. the daughter Corina who is an ICU nurse and all her questions were 

answered.  Also she was updated with the patient condition.





04/11/2021


Patient still in the ICU needing mechanical ventilation, she is undergoing 

sedation holiday by critical care team.  Her PEEP is still 10 today


PEG tube and tracheostomy is recommended by pulmonary/critical care team as 

well.


Patient remains on dopamine drip and heart rate is around 50.  Cartilage team on

the case


Repeat chest x-ray and inflammatory markers in the morning








Review of systems: N/


                               Active Medications











Generic Name Dose Route Start Last Admin





  Trade Name Freq  PRN Reason Stop Dose Admin


 


Albuterol Sulfate  2 puff  04/03/21 16:00  04/11/21 19:25





  Albuterol Hfa Inhaler  INHALATION   2 puff





  RT-Q4H VIOLET   Administration


 


Alprazolam  0.25 mg  04/07/21 16:00  04/11/21 20:53





  Alprazolam 0.25 Mg Tab  PO   0.25 mg





  TID VIOLET   Administration


 


Amlodipine Besylate  10 mg  04/09/21 09:00  04/11/21 08:38





  Amlodipine 5 Mg Tab  PO   10 mg





  DAILY VIOLET   Administration


 


Artificial Tears  2 drops  04/01/21 20:00  04/11/21 23:12





  Artificial Tears-Hypromellose Drops 15 Ml Btl  BOTH EYES   2 drops





  Q4HR VIOLET   Administration


 


Ascorbic Acid  1,000 mg  03/31/21 09:00  04/11/21 08:38





  Ascorbic Acid 500 Mg Tab  PO   1,000 mg





  DAILY VIOLET   Administration


 


Aspirin  81 mg  03/31/21 09:00  04/11/21 08:38





  Aspirin 81 Mg  PO   81 mg





  DAILY VIOLET   Administration


 


Atorvastatin Calcium  40 mg  03/31/21 09:00  04/11/21 08:38





  Atorvastatin 40 Mg Tab  PO   40 mg





  DAILY VIOLET   Administration


 


Chlorhexidine Gluconate  15 ml  03/31/21 21:00  04/11/21 20:52





  Chlorhexidine Gluconate 15 Ml Cup  MUCOUS MEM   15 ml





  BID VIOLET   Administration


 


Cholecalciferol  50 mcg  03/31/21 09:00  04/11/21 13:54





  Cholecalciferol 25 Mcg (1000 Iu) Tablet  PO   Not Given





  DAILY VIOLET  


 


Citalopram Hydrobromide  40 mg  03/31/21 09:00  04/11/21 08:39





  Citalopram Hydrobromide 20 Mg Tab  PO   40 mg





  DAILY VIOLET   Administration


 


Dexamethasone Sodium Phosphate  6 mg  03/31/21 09:00  04/11/21 08:40





  Dexamethasone Sod Phosphate 10 Mg/Ml 1 Ml Vial  IV   6 mg





  DAILY VIOLET   Administration


 


Enoxaparin Sodium  40 mg  03/31/21 09:00  04/11/21 08:39





  Enoxaparin 40 Mg/0.4 Ml Syringe  SQ   40 mg





  DAILY VIOLET   Administration


 


Propofol 1,000 mg/ IV Solution  100 mls @ 0 mls/hr  03/31/21 14:00  04/11/21 

22:13





  IV   20 mcg/kg/min





  .Q0M VIOLET   15.132 mls/hr





    Titration





  Protocol  





  Titrate  


 


Fentanyl Citrate 1,000 mcg/  100 mls @ 0 mls/hr  03/31/21 14:30  04/11/21 23:13





  Sodium Chloride  IV   1.2 mcg/kg/hr





  .Q0M VIOLET   14.424 mls/hr





    Titration





  Protocol  





  Per Protocol  


 


Sodium Chloride  1,000 mls @ 40 mls/hr  03/31/21 16:45  04/11/21 06:12





  Saline 0.9%  IV   40 mls/hr





  .Q24H VIOLET   Administration


 


Dopamine HCl/Dextrose 800 mg/  250 mls @ 4.984 mls/hr  04/08/21 15:15  04/11/21 

15:14





  IV Solution  IV   Not Given





  .Q24H VIOLET  





  Protocol  





  2 MCG/KG/MIN  


 


Insulin Aspart  0 unit  04/01/21 00:00  04/11/21 23:12





  Insulin Aspart (Novolog) 100 Unit/Ml Vial  SQ   Not Given





  Q6H VIOLET  





  Protocol  


 


Lisinopril  20 mg  04/08/21 09:30  04/11/21 08:39





  Lisinopril 10 Mg Tab  PO   20 mg





  DAILY VIOLET   Administration


 


Miscellaneous Information  1 each  04/06/21 14:29 





  Magnesium Replacement Protocol 1 Each Misc  MISCELLANE  





  DAILY PRN  





  Per Protocol  





  Protocol  


 


Miscellaneous Information  1 each  04/06/21 14:29 





  Potassium Replacement Protocol 1 Each Misc  MISCELLANE  





  DAILY PRN  





  Per Protocol  





  Protocol  


 


Miscellaneous Information  1 each  04/08/21 05:26 





  Potassium Replacement Protocol 1 Each [facility]  MISCELLANE  





  DAILY PRN  





  Per Protocol  





  Protocol  


 


Naloxone HCl  0.2 mg  03/31/21 00:55 





  Naloxone 0.4 Mg/Ml 1 Ml Vial  IV  





  Q2M PRN  





  Opioid Reversal  


 


Ondansetron HCl  4 mg  03/31/21 00:55  03/31/21 01:59





  Ondansetron 4 Mg/2 Ml Vial  IVP   4 mg





  Q8HR PRN   Administration





  Nausea And Vomiting  


 


Pantoprazole Sodium  40 mg  04/01/21 09:00  04/11/21 08:39





  Pantoprazole 40 Mg/10 Ml Vial  IV   40 mg





  DAILY VIOLET   Administration


 


Spironolactone  12.5 mg  03/31/21 09:00  04/11/21 08:40





  Spironolactone 25 Mg Tab  PO   12.5 mg





  DAILY VIOLET   Administration


 


Zinc Sulfate  220 mg  03/31/21 09:00  04/11/21 08:40





  Zinc Sulfate 220 Mg Cap  PO   220 mg





  DAILY VIOLET   Administration














Objective





- Vital Signs


Vital signs: 


                                   Vital Signs











Temp  99.6 F   04/11/21 13:10


 


Pulse  68   04/11/21 13:38


 


Resp  24   04/11/21 13:38


 


BP  128/60   04/11/21 13:38


 


Pulse Ox  94 L  04/11/21 13:38








                                 Intake & Output











 04/10/21 04/11/21 04/11/21





 18:59 06:59 18:59


 


Intake Total 5439.287 0688.480 666.042


 


Output Total 1800 1910 1125


 


Balance -331.911 -525.520 -458.958


 


Weight  126.099 kg 


 


Intake:   


 


   494 255


 


    Sodium Chloride 0.9% 1, 480 440 240





    000 ml @ 40 mls/hr IV .   





    Q24H VIOLET Rx#:[id number]   


 


    pressure bag 36 54 15


 


  Intake, IV Titration 476.089 527.480 246.042





  Amount   


 


    DOPamine DRIP 800 mg In 58.666  





    Dextrose/Water 1 250ml.   





    bag @ 2 MCG/KG/MIN 4.984   





    mls/hr IV .Q24H VIOLET Rx#:   





    [id number]   


 


    fentaNYL (PF) 1,000 mcg 191.953 256.928 101.502





    In Sodium Chloride 0.9%   





    80 ml @ Per Protocol IV .   





    Q0M VIOLET Rx#:[id number]   


 


    propofoL 1,000 mg In 225.470 270.552 144.540





    Empty Bag 1 bag @ Titrate   





    IV .Q0M VIOLET Rx#:   





    [id number]   


 


  Tube Feeding 396 363 165


 


  Other 80  


 


Output:   


 


  Urine 1800 1910 1125


 


Other:   


 


  Voiding Method Indwelling Catheter Indwelling Catheter Indwelling Catheter








                       ABP, PAP, CO, CI - Last Documented











Arterial Blood Pressure        149/61

















- Exam





-GENERAL: The patient is intubated and sedated


HEENT: Pupils are round and equally reacting to light. EOMI. No scleral icterus.

No conjunctival pallor. Normocephalic, atraumatic. No pharyngeal erythema. No 

thyromegaly. 


CARDIOVASCULAR: S1 and S2 present. No murmurs, rubs, or gallops. 


PULMONARY: Chest is clear to auscultation, no wheezing or crackles. 


ABDOMEN: Soft, nontender, nondistended, normoactive bowel sounds. No palpable 

organomegaly. 


MUSCULOSKELETAL: No joint swelling or deformity. 


EXTREMITIES: No cyanosis, clubbing, or pedal edema. 


NEUROLOGICAL: Gross neurological examination did not reveal any focal deficits. 


SKIN: No rashes. no petechiae.





- Labs


CBC & Chem 7: 


                                 04/11/21 04:36





                                 04/11/21 04:36


Labs: 


                  Abnormal Lab Results - Last 24 Hours (Table)











  04/10/21 04/11/21 04/11/21 Range/Units





  17:04 00:30 04:36 


 


ABG pH     (7.35-7.45)  


 


ABG pO2     ()  mmHg


 


ABG HCO3     (21-25)  mmol/L


 


ABG Total CO2     (19-24)  mmol/L


 


BUN    22 H  (7-17)  mg/dL


 


Creatinine    0.50 L  (0.52-1.04)  mg/dL


 


Glucose    131 H  (74-99)  mg/dL


 


POC Glucose (mg/dL)  195 H  158 H   (75-99)  mg/dL


 


AST    61 H  (14-36)  U/L


 


ALT    99 H  (4-34)  U/L


 


Total Protein    5.5 L  (6.3-8.2)  g/dL


 


Albumin    3.0 L  (3.5-5.0)  g/dL














  04/11/21 04/11/21 Range/Units





  05:57 06:23 


 


ABG pH  7.46 H   (7.35-7.45)  


 


ABG pO2  79 L   ()  mmHg


 


ABG HCO3  26 H   (21-25)  mmol/L


 


ABG Total CO2  27 H   (19-24)  mmol/L


 


BUN    (7-17)  mg/dL


 


Creatinine    (0.52-1.04)  mg/dL


 


Glucose    (74-99)  mg/dL


 


POC Glucose (mg/dL)   139 H  (75-99)  mg/dL


 


AST    (14-36)  U/L


 


ALT    (4-34)  U/L


 


Total Protein    (6.3-8.2)  g/dL


 


Albumin    (3.5-5.0)  g/dL














Assessment and Plan


Assessment: 





Acute bilateral pneumonia, mostly secondary to Covid 19 infection


Acute hypoxic respiratory failure, needing intubation and mechanical ventilation


Increased inflammatory markers


Bradycardia secondary to Covid infection, need dopamine


Acute Covid gastroenteritis


Mild transaminitis, mostly secondary to Covid


Hypertension


Hyperlipidemia


Paroxysmal A. fib, currently heart rate controlled

















Plan: 





This is a pleasant 61 years old female who presents with respiratory distress 

mostly secondary to covid.  Continue with steroids, vitamin C, zinc, vitamin D, 

bronchodilator and oxygen as needed.  Lovenox for DVT prophylaxis and pulmonary 

consult.  Continue with vent management as per pulmonary team.


Cardiologist on the case.


Labs and medication were reviewed..  Continue same treatment.  Continue with 

symptomatic treatment.  Resume home medication.  Monitor lytes and vitals.  DVT 

and GI prophylaxis.  Further recommendations depends on the clinical course of 

the patient


DVT prophylaxis: Subcutaneous Lovenox


GI Prophylaxis: Ppi


Prognosis is guarded

## 2021-04-12 LAB
ANION GAP SERPL CALC-SCNC: 3 MMOL/L
BASOPHILS # BLD AUTO: 0 K/UL (ref 0–0.2)
BASOPHILS NFR BLD AUTO: 0 %
BUN SERPL-SCNC: 21 MG/DL (ref 7–17)
CALCIUM SPEC-MCNC: 8.7 MG/DL (ref 8.4–10.2)
CHLORIDE SERPL-SCNC: 106 MMOL/L (ref 98–107)
CO2 BLDA-SCNC: 29 MMOL/L (ref 19–24)
CO2 SERPL-SCNC: 28 MMOL/L (ref 22–30)
EOSINOPHIL # BLD AUTO: 0.1 K/UL (ref 0–0.7)
EOSINOPHIL NFR BLD AUTO: 2 %
ERYTHROCYTE [DISTWIDTH] IN BLOOD BY AUTOMATED COUNT: 4.14 M/UL (ref 3.8–5.4)
ERYTHROCYTE [DISTWIDTH] IN BLOOD: 14.1 % (ref 11.5–15.5)
GLUCOSE BLD-MCNC: 123 MG/DL (ref 75–99)
GLUCOSE BLD-MCNC: 127 MG/DL (ref 75–99)
GLUCOSE BLD-MCNC: 162 MG/DL (ref 75–99)
GLUCOSE BLD-MCNC: 166 MG/DL (ref 75–99)
GLUCOSE BLD-MCNC: 183 MG/DL (ref 75–99)
GLUCOSE SERPL-MCNC: 126 MG/DL (ref 74–99)
HCO3 BLDA-SCNC: 28 MMOL/L (ref 21–25)
HCT VFR BLD AUTO: 36.1 % (ref 34–46)
HGB BLD-MCNC: 12.7 GM/DL (ref 11.4–16)
LDH SPEC-CCNC: 815 U/L (ref 313–618)
LYMPHOCYTES # SPEC AUTO: 1.9 K/UL (ref 1–4.8)
LYMPHOCYTES NFR SPEC AUTO: 28 %
MCH RBC QN AUTO: 30.6 PG (ref 25–35)
MCHC RBC AUTO-ENTMCNC: 35.1 G/DL (ref 31–37)
MCV RBC AUTO: 87.2 FL (ref 80–100)
MONOCYTES # BLD AUTO: 0.7 K/UL (ref 0–1)
MONOCYTES NFR BLD AUTO: 10 %
NEUTROPHILS # BLD AUTO: 3.9 K/UL (ref 1.3–7.7)
NEUTROPHILS NFR BLD AUTO: 58 %
PCO2 BLDA: 35 MMHG (ref 35–45)
PH BLDA: 7.51 [PH] (ref 7.35–7.45)
PLATELET # BLD AUTO: 240 K/UL (ref 150–450)
PO2 BLDA: 91 MMHG (ref 83–108)
POTASSIUM SERPL-SCNC: 3.9 MMOL/L (ref 3.5–5.1)
SODIUM SERPL-SCNC: 137 MMOL/L (ref 137–145)
WBC # BLD AUTO: 6.6 K/UL (ref 3.8–10.6)

## 2021-04-12 RX ADMIN — SPIRONOLACTONE SCH MG: 25 TABLET, FILM COATED ORAL at 09:02

## 2021-04-12 RX ADMIN — ALBUTEROL SULFATE SCH PUFF: 90 AEROSOL, METERED RESPIRATORY (INHALATION) at 19:21

## 2021-04-12 RX ADMIN — CEFAZOLIN SCH MLS/HR: 330 INJECTION, POWDER, FOR SOLUTION INTRAMUSCULAR; INTRAVENOUS at 01:58

## 2021-04-12 RX ADMIN — INSULIN ASPART SCH: 100 INJECTION, SOLUTION INTRAVENOUS; SUBCUTANEOUS at 05:16

## 2021-04-12 RX ADMIN — SODIUM CHLORIDE SCH MLS/HR: 900 INJECTION, SOLUTION INTRAVENOUS at 08:05

## 2021-04-12 RX ADMIN — ALBUTEROL SULFATE SCH PUFF: 90 AEROSOL, METERED RESPIRATORY (INHALATION) at 15:18

## 2021-04-12 RX ADMIN — OXYCODONE HYDROCHLORIDE AND ACETAMINOPHEN SCH MG: 500 TABLET ORAL at 09:03

## 2021-04-12 RX ADMIN — ASPIRIN 81 MG CHEWABLE TABLET SCH MG: 81 TABLET CHEWABLE at 09:02

## 2021-04-12 RX ADMIN — ATORVASTATIN CALCIUM SCH MG: 40 TABLET, FILM COATED ORAL at 09:02

## 2021-04-12 RX ADMIN — PANTOPRAZOLE SODIUM SCH MG: 40 INJECTION, POWDER, FOR SOLUTION INTRAVENOUS at 09:02

## 2021-04-12 RX ADMIN — ALBUTEROL SULFATE SCH PUFF: 90 AEROSOL, METERED RESPIRATORY (INHALATION) at 23:07

## 2021-04-12 RX ADMIN — CHLORHEXIDINE GLUCONATE SCH ML: 1.2 RINSE ORAL at 09:01

## 2021-04-12 RX ADMIN — ALBUTEROL SULFATE SCH PUFF: 90 AEROSOL, METERED RESPIRATORY (INHALATION) at 11:59

## 2021-04-12 RX ADMIN — DOPAMINE HYDROCHLORIDE IN DEXTROSE SCH MLS/HR: 3.2 INJECTION, SOLUTION INTRAVENOUS at 03:42

## 2021-04-12 RX ADMIN — DEXTRAN 70 AND HYPROMELLOSE 2910 SCH DROPS: 1; 3 SOLUTION/ DROPS OPHTHALMIC at 17:02

## 2021-04-12 RX ADMIN — ALBUTEROL SULFATE SCH PUFF: 90 AEROSOL, METERED RESPIRATORY (INHALATION) at 04:31

## 2021-04-12 RX ADMIN — DEXTRAN 70 AND HYPROMELLOSE 2910 SCH DROPS: 1; 3 SOLUTION/ DROPS OPHTHALMIC at 08:07

## 2021-04-12 RX ADMIN — ALBUTEROL SULFATE SCH PUFF: 90 AEROSOL, METERED RESPIRATORY (INHALATION) at 00:05

## 2021-04-12 RX ADMIN — DEXTRAN 70 AND HYPROMELLOSE 2910 SCH DROPS: 1; 3 SOLUTION/ DROPS OPHTHALMIC at 13:18

## 2021-04-12 RX ADMIN — ENOXAPARIN SODIUM SCH MG: 40 INJECTION SUBCUTANEOUS at 09:01

## 2021-04-12 RX ADMIN — CITALOPRAM HYDROBROMIDE SCH MG: 20 TABLET ORAL at 09:01

## 2021-04-12 RX ADMIN — SODIUM CHLORIDE SCH MLS/HR: 900 INJECTION, SOLUTION INTRAVENOUS at 18:00

## 2021-04-12 RX ADMIN — DEXTROSE SCH MG: 50 INJECTION, SOLUTION INTRAVENOUS at 09:01

## 2021-04-12 RX ADMIN — SODIUM CHLORIDE SCH MLS/HR: 900 INJECTION, SOLUTION INTRAVENOUS at 23:04

## 2021-04-12 RX ADMIN — INSULIN ASPART SCH UNIT: 100 INJECTION, SOLUTION INTRAVENOUS; SUBCUTANEOUS at 13:18

## 2021-04-12 RX ADMIN — DEXTRAN 70 AND HYPROMELLOSE 2910 SCH DROPS: 1; 3 SOLUTION/ DROPS OPHTHALMIC at 20:32

## 2021-04-12 RX ADMIN — CHLORHEXIDINE GLUCONATE SCH ML: 1.2 RINSE ORAL at 20:59

## 2021-04-12 RX ADMIN — ALBUTEROL SULFATE SCH PUFF: 90 AEROSOL, METERED RESPIRATORY (INHALATION) at 07:28

## 2021-04-12 RX ADMIN — Medication SCH: at 11:49

## 2021-04-12 RX ADMIN — Medication SCH MG: at 09:02

## 2021-04-12 RX ADMIN — DEXTRAN 70 AND HYPROMELLOSE 2910 SCH DROPS: 1; 3 SOLUTION/ DROPS OPHTHALMIC at 03:58

## 2021-04-12 RX ADMIN — INSULIN ASPART SCH UNIT: 100 INJECTION, SOLUTION INTRAVENOUS; SUBCUTANEOUS at 17:58

## 2021-04-12 NOTE — P.PN
Subjective


Progress Note Date: 04/12/21





CHIEF COMPLAINT: Shortness of breath





HISTORY OF PRESENT ILLNESS: Patient remains in the ICU intubated.  She is being 

treated for Covid 19 pneumonia and respiratory failure.  Surgical service is 

following for possible tracheostomy and PEG tube placement.  At this time family

has declined tracheostomy and PEG tube.  Patient is undergoing weaning trial 

today.  Afebrile.  WBC 6.6





Patient seen and examined with Dr. de la o





PHYSICAL EXAM: 


VITAL SIGNS: Reviewed.


GENERAL: Well-developed in no acute distress. 


HEENT:  No sclera icterus. Extraocular movements grossly intact.  Moist buccal 

mucosa. Head is atraumatic, normocephalic. 


ABDOMEN:  Soft.  Nondistended. Nontender. 


NEUROLOGIC: Intubated and sedated





ASSESSMENT: 


1.  Acute hypoxic respiratory failure secondary to Covid 19 pneumonia


2.  Severe protein calorie malnutrition


3.  Sinus bradycardia followed by cardiology 





PLAN: 


-Continue ICU management


-Continue supportive care


-Patient's family has declined tracheostomy and PEG tube placement at this time








Physician Assistant note has been reviewed by physician. Signing provider agrees

with the documented findings, assessment, and plan of care. 





Objective





- Vital Signs


Vital signs: 


                                   Vital Signs











Temp  98.4 F   04/12/21 07:00


 


Pulse  48 L  04/12/21 11:00


 


Resp  24   04/12/21 11:00


 


BP  106/56   04/12/21 09:00


 


Pulse Ox  93 L  04/12/21 11:00








                                 Intake & Output











 04/11/21 04/12/21 04/12/21





 18:59 06:59 18:59


 


Intake Total 1615.951 5827.405 237.007


 


Output Total 2525 1875 375


 


Balance -1125.000 -323.595 -137.993


 


Weight  126.8 kg 


 


Intake:   


 


   495 125


 


    Sodium Chloride 0.9% 1, 520 440 120





    000 ml @ 40 mls/hr IV .   





    Q24H VIOLET Rx#:[id number]   


 


    pressure bag 15 55 5


 


  Intake, IV Titration 388.000 623.405 34.007





  Amount   


 


    DOPamine DRIP 800 mg In  250 22.532





    Dextrose/Water 1 250ml.   





    bag @ 2 MCG/KG/MIN 4.984   





    mls/hr IV .Q24H VIOLET Rx#:   





    [id number]   


 


    fentaNYL (PF) 1,000 mcg 187.345 177.569 





    In Sodium Chloride 0.9%   





    80 ml @ Per Protocol IV .   





    Q0M VIOLET Rx#:[id number]   


 


    propofoL 1,000 mg In 200.655 195.836 11.475





    Empty Bag 1 bag @ Titrate   





    IV .Q0M VIOLET Rx#:   





    [id number]   


 


  Tube Feeding 387 343 28


 


  Other 90 90 50


 


Output:   


 


  Urine 2525 1875 375


 


Other:   


 


  Voiding Method Indwelling Catheter Indwelling Catheter Indwelling Catheter








                       ABP, PAP, CO, CI - Last Documented











Arterial Blood Pressure        141/55

















- Labs


CBC & Chem 7: 


                                 04/12/21 03:50





                                 04/12/21 03:50


Labs: 


                  Abnormal Lab Results - Last 24 Hours (Table)











  04/11/21 04/11/21 04/12/21 Range/Units





  16:47 23:09 03:50 


 


D-Dimer    1.42 H  (<0.60)  mg/L FEU


 


ABG pH     (7.35-7.45)  


 


ABG HCO3     (21-25)  mmol/L


 


ABG Total CO2     (19-24)  mmol/L


 


ABG O2 Saturation     (94-97)  %


 


BUN     (7-17)  mg/dL


 


Creatinine     (0.52-1.04)  mg/dL


 


Glucose     (74-99)  mg/dL


 


POC Glucose (mg/dL)  184 H  126 H   (75-99)  mg/dL


 


Lactate Dehydrogenase     (313-618)  U/L














  04/12/21 04/12/21 04/12/21 Range/Units





  03:50 03:50 05:09 


 


D-Dimer     (<0.60)  mg/L FEU


 


ABG pH     (7.35-7.45)  


 


ABG HCO3     (21-25)  mmol/L


 


ABG Total CO2     (19-24)  mmol/L


 


ABG O2 Saturation     (94-97)  %


 


BUN   21 H   (7-17)  mg/dL


 


Creatinine   0.50 L   (0.52-1.04)  mg/dL


 


Glucose   126 H   (74-99)  mg/dL


 


POC Glucose (mg/dL)    127 H  (75-99)  mg/dL


 


Lactate Dehydrogenase  815 H    (313-618)  U/L














  04/12/21 04/12/21 Range/Units





  05:10 12:25 


 


D-Dimer    (<0.60)  mg/L FEU


 


ABG pH  7.51 H   (7.35-7.45)  


 


ABG HCO3  28 H   (21-25)  mmol/L


 


ABG Total CO2  29 H   (19-24)  mmol/L


 


ABG O2 Saturation  97.8 H   (94-97)  %


 


BUN    (7-17)  mg/dL


 


Creatinine    (0.52-1.04)  mg/dL


 


Glucose    (74-99)  mg/dL


 


POC Glucose (mg/dL)   162 H  (75-99)  mg/dL


 


Lactate Dehydrogenase    (313-618)  U/L

## 2021-04-12 NOTE — P.PN
Subjective


Progress Note Date: 04/12/21











61-year-old female, who hasn't been feeling well for about 10-11 days.  She 

tested positive for COVID 19 on March 24.  She's been sick for 10-11 days old.  

She complains of TYPICAL symptoms including weakness, fever, diarrhea, cough, 

and shortness of breath.  She's quite hypoxemic.  On AIRVO 100%, and a 

nonrebreather mask, her saturations are right around 80-83%.  Any movement, and 

she desaturates even further.  I just called the intensive care unit, and we'll 

plan on putting her in the intensive care unit.  She's currently on saline at 75

mL an hour.  She can barely speak without becoming short of breath.  The patient

is not a candidate for REM, but could get TOCI.  Sodium 138, potassium 3.8, c

hlorides 108, CO2 21, anion gap 9, BUN 19, creatinine 0.7.  AST 94, ALTs 60, LDH

1471, C-reactive protein 88.1.  Again, the patient's been sick for at least 10-

11 days and maybe a bit longer.  Her oral intake has been poor as well.  The 

patient's chest x-ray shows diffuse bilateral infiltrates consistent with 

coronavirus infection.





The patient is seen today 04/01/2021 in follow-up in the intensive care unit.  

Shortly after arriving to the ICU yesterday she required intubation and 

mechanical ventilatory support injury to acute hypoxemic respiratory failure.  

She is currently on the ventilator and assist control mode with a rate of 34, 

tidal on 450, FiO2 70%, PEEP of 16.  Morning blood gases reveal pO2 of 70, pCO2 

33, P 87.38.  Fentanyl drip at 0.5 mcg/kg per hour, propofol at 15 mcg/kg/m, 

Nimbex at 1 mcg/min per hour.  0.9 normal saline at 130 ML's per hour.  She did 

receive Tociluzimab.  She remains on Decadron 6 mg IV daily.  Lovenox 40 mg 

subcutaneous daily.  Tube feedings will be initiated for nutritional support.  

Blood cultures reveal no growth.  White count 2.9.  Hemoglobin 11.3 and 

platelets 188.  Lymphocytes 0.8.  She is continued on Lovenox, dexamethasone, 

vitamin supplements.





The patient is seen today 04/02/2021 in follow-up in the intensive care unit.  

She remains intubated on the mechanical ventilator.  Assist-control mode.  Rate 

of 30, tidal volume 450, FiO2 60% and a PEEP of 16.  She is currently sedated on

propofol at 40 mcg/kg/m, Nimbex at 1 mcg/kg/m, fentanyl at 0.5 mcg/kg per hour. 

She has 0.9 normal saline at 130 ML's per hour.  She is being nourished with 

vital HP at 20 ML's per hour.  She did receive tocilizumab.  He remains on 

dexamethasone, Lovenox, vitamin supplements.  Chest x-ray showing persistent but

improving patchy bilateral airspace disease.  Sputum and blood cultures are 

pending.  White count 3.9.  Hemoglobin 11.4.  Lymphocytes 0.7.  D-dimer 1.19.  

Sodium 139.  Potassium 4.4.  Bicarb 20.  Creatinine 0.64.  Glucose 174.





The patient is seen today 04/03/2020 in follow-up in the intensive care unit.  

She remains intubated on mechanical ventilator.  Current settings are assist-

control mode.  Rate of 34, tidal volume 450, FiO2 50% and a PEEP of 16.  Morning

blood gases reveal a pO2 of 117, pCO2 30, pH 7.40.  She remains sedated on 

propofol at 55 mcg/kg/m.  Fentanyl and 1 mcg/kg/hr.  She is being nourished with

vital HP at 33 ML's per hour.  0.9 normal saline at 130 ML's per hour.  She 

remains on dexamethasone, Lovenox, bronchodilators.  White count 4.0.  

Hemoglobin 11.8.  D-dimer 1.81.  Sodium 138.  Potassium 4.2.  Bicarb 19.  LDH 

1133.  C-reactive protein 15.5.  Asked x-ray continues to show moderate 

scattered interstitial and partial airspace opacities.  





The patient is seen today 04/04/2021 in follow-up in the intensive care unit.  

She remains intubated, sedated.  Current settings are assist control at a rate 

of 30, tidal volume 450, FiO2 50% and a PEEP of 15.  She is sedated on propofol 

at 60 mcg/kg/m, fentanyl at 1 mcg/kg per hour, 0.9 normal saline 130 ML's per 

hour.  Vital HP at 24 mls per hour.  She did receive Actemra.  Blood culture 

reveals no growth.  Sputum culture reveals no growth.  White count 5.3.  

Hemoglobin 12.0.  D-dimer 2.0.  Sodium 139.  Potassium 4.0.  Creatinine 0.59.  

LDH 1082.  C reactive protein 8.6.  Chest x-ray continues to show no interval 

change, continues with persistent moderate patchy opacities bilaterally.  

Endotracheal tube to be advanced to 2 cms.  Continued on vitamin supplements, 

Lovenox, IV Solu-Medrol. 





Progress note dated 04/05/2021.





This is a patient was admitted on March 31 for COVID 19 pneumonia.  The patient 

came to the ICU on March 31, and was intubated on the same day.  She apparently 

tested positive back on March 24.  The patient remains on the mechanical 

ventilator.  She is on the volume assist control mode, rate of 30, tidal volume 

450, FiO2 50%, PEEP of 15 to be dropped to 10.  Blood gases show pO2 of 66, pCO2

33, and a pH 7.41.  The patient's on propofol at 60 mcg/kg/m, fentanyl 1 

mcg/kg/h, normal saline at 130 mL an hour and vital high protein at 24, which is

goal.  The patient did receive TOCI.   White count is 6.2, hemoglobin 11.8, hema

tocrit 33.1, platelet count 272,000.  Sodium 137, potassium 3.8, chlorides 114, 

CO2 20, anion gap 3, BUN 18, creatinine 0.53.  LDH is 956.  Chest x-ray shows 

worsened bilateral infiltrates.  Small effusions are noted.





Progress note dated 04/06/2021.





61-year-old female, admitted on March 31 for COVID 19 pneumonia.  She came to 

the ICU on March 31 and was intubated on the same day.  She tested positive back

on March 24.  The patient remains on mechanical ventilation.  She is on the 

volume assist control mode, rate 36, tidal volume 450, FiO2 50%, and PEEP is 13.

 Arterial blood gases show a PaO2 of 74, pCO2 31, and a pH of 7.52.  Blood gases

are consistent with relative hypoxemia, and a combined respiratory and metabolic

alkalosis.  The patient is getting propofol 60 mcg/kg/m, fentanyl 1 emily per 

kilogram per hour, saline at 40 mL an hour, dopamine at 2 mcg/kg/m, and vital 

high protein at goal, which is 24 mL an hour.  The cardiologist wanted to DC the

dopamine.  We are going to try to lower PEEP from 13 down to 10.  Today's CBC is

normal.  Sodium 138, potassium 3.4, chlorides 109, CO2 24, anion gap 5, BUN 17, 

and creatinine 0.53.  Chest x-ray shows improving bibasilar infiltrates.





The patient is seen today 04/07/2021 and follow-up in the intensive care unit.  

She remains intubated and on the mechanical ventilator.  Current mode assist 

control with a rate of 30, tidal volume 450, FiO2 50% and a PEEP of 10.  Morning

blood gases reveal a P O2 of 58, pCO2 32, pH 7.51.  She remains sedated on 

propofol at 60 mcg/kg/m.  Fentanyl at 1 mcg/kg/h, 0.9#at 40 ML's per hour.  She 

is being nourished with vital HP at 24 ML's per hour which is goal.  She is 

currently afebrile.  Hemodynamically stable.  She is receive daily interruption 

of sedation without success.  The plan will be for trach and PEG later this 

week.  Blood and sputum cultures revealed no growth.  WBC 6.4.  Hemoglobin 12.8.

 Sodium 137.  Potassium 3.5.  Creatinine 0.60.  Glucose 120.  AST 53.  ALT 69.  

Chest x-ray continues to show patchy perihilar and basilar infiltrates with 

interval progression.





The patient is seen today 04/08/2021 in follow-up in the intensive care unit.  

She remains intubated on mechanical ventilator in assist control mode at a rate 

of 30, tight around 450, FiO2 50% and a PEEP of 10.  Morning blood gases reveal 

a P O2 of 58, pCO2 30, pH 7.54.  She remains sedated on propofol at 50 mcg/kg/m.

 Fentanyl at 2 mcg/kg/h.  0.9 normal saline at 40 ML's per hour.  She was 

initiated back on dopamine at 2 mcg/kg/m due to significant bradycardia with 

heart rate in the 20s temporarily.  Currently heart rate in the 40s.  She has 

remained hypertensive.  Chest x-ray continues to show bilateral patchy 

infiltrates.  Stable compared to previous.  Blood and sputum cultures revealed 

no growth.  White count 5.5.  Hemoglobin 11.8.  D-dimer 1.59.  Sodium 135.  

Potassium 3.9.  Creatinine 0.61.  AST 53.  ALT 67.  .  C-reactive protein

5.9.  She remains on Lovenox, dexamethasone, vitamin supplements.





The patient is seen today 04/09/2021 in follow-up in the intensive care unit.  

She remains intubated and on mechanical ventilator.  Current settings are 

assist-control mode.  Rate of 30, tidal volume 450.  FiO2 50% and a PEEP of 13. 

Morning blood gases reveal a pO2 of 76, pCO2 31, pH 7.53.  She remains sedated 

on propofol at 40 mcg/kg/m, dopamine at 2 mcg/kg/m.  Fentanyl at 1.5 mg/kg per 

hour.  0.9 normal saline at 40 miles per hour.  She is being nourished with 

vital HP at 24 ML's per hour which is goal.  Chest x-ray reveals bilateral 

airspace disease slightly improved.  Blood, sputum cultures reveal no growth.  

White count 6.6.  Hemoglobin 12.8.  Sodium 139.  Potassium 4.0.  Chloride 108.  

Bicarb 25.  Creatinine 0.52.  .  C-reactive protein 6.4.  She remains on 

vitamin supplements, Lovenox, Decadron.





The patient is seen today 04/10/2021 in follow-up in the intensive care unit.  

She remains intubated, sedated and on mechanical ventilator.  Current settings 

assist-control mode at a rate of 24, tidal volume 450, FiO2 50% and a PEEP of 

13.  Morning blood gases revealed a pO2 80, pCO2 37, pH 7.44.  She is sedated on

propofol at 30 mcg/kg/m.  Fentanyl at 1.5 mcg/kg per hour.  Remains on dopamine 

at 2.5 mcg/kg/m.  0.9 normal saline at 40 ML's per hour.  She is being nourished

with vital HP at 33,'s per hour which is goal.  Chest x-ray continues to 

revealed bibasilar infiltrates with small left pleural effusion.  Blood and 

sputum cultures reveal no growth.  White count 7.8.  Hemoglobin 12.7.  Platelet 

count 254.  Sodium 130.  Potassium 4.2.  Creatinine 0.61.  AST 77.  .  

.  C-reactive protein 6.8.  She remains on dexamethasone, Lovenox, 

vitamin supplements.





The patient is seen today 04/11/2021 in follow-up in the intensive care unit.  

She remains intubated, sedated and on the mechanical ventilator.  Currently 

assist-control mode at 24, tidal volume 450, FiO2 50% and a PEEP of 10.  Morning

blood gases reveal a P O2 of 79, pCO2 37, pH 7.46.  She remains on propofol at 

30 mcg/kg/m, fentanyl at 1 g 1.5 mcg/kg/h.  Dopamine at 2.5 mcg/kg/m.  0.9 

normal saline at 40 ML's per hour.  Being nourished with vital HP at 33 ML's per

hour which is goal.  Chest x-ray continues to show bilateral patchy infiltrates 

with a small left pleural effusion.  Blood and sputum cultures reveal no growth.

 White count 5.9.  Hemoglobin 12.7.  Sodium 139.  Potassium 3.8.  Creatinine 

0.5.  AST 61.  ALT 99.  She remains on Lovenox, dexamethasone, vitamin supplem

ents.








 


On  04/12/2021, the patient is being seen in follow-up in the intensive care 

unit.  The patient has been diagnosed having COVID 19 pneumonia and the patient 

urgently tested positive on 03/24/2024.  Subsequently, the patient came to the 

hospital approximately 11 days ago with respiratory failure initially placed on 

high flow oxygen and subsequently the patient failed and the patient had to be 

intubated and placed on a mechanical ventilator on 03/31/2021 and the patient 

has been mechanical ventilator since.  During the course, the patient received a

combination of treatment including Decadron,Tocilizumab, and Lovenox.  The 

patient for now remains on a mechanical ventilator.  On today's evaluation, the 

patient on assist control mode volume cycle with a rate of 24, tidal volume of 

450, FiO2 of 50% with a PEEP of 10.  The patient is sedated with propofol 

running at 25 mg/kg per minute and fentanyl at 1.2 mcg/kilogram/hour.  IV fluids

running at 40 mL an hour.  The patient is receiving vital high protein at the 

rate of 33 mL an hour.  Chest x-ray showing changes patchy bilateral pulmonary 

infiltrates.  ET tube is in a good location.  There is a small left pleural 

effusion.  Blood cultures of been negative.  Sputum cultures of been negative.  

The most recent inflammatory markers showed an LDH level of 868 on 04/10/2021 

and a CRP of 0.3.  The d-dimer is 1.42.  The blood gases from today showed a pH 

of 7.51 with a pCO2 of 35 and pO2 of 91.  The chest x-ray showing lower lobe at 

the pulmonary infiltrates and ET tube is in a good location.  The patient also 

has a orogastric tube in place.  Patient's triglyceride level on 04/09/2021 was 

291.  The patient is also on dopamine to enhance the hard data the patient had 

some underlying sinus bradycardia.  As for blood pressure control, the patient 

is on a combination of lisinopril and Norvasc.  The patient is also on Cleviprex

for a tighter blood pressure control which is being gradually titrated and 

weaned off.  She is currently on dopamine at 2.5 mcg kilogram per minute for 

bradycardia and cardiac cause.  The patient is currently in a normal sinus 

rhythm.  Peak airway pressures around 24-26





Objective





- Vital Signs


Vital signs: 


                                   Vital Signs











Temp  98.4 F   04/12/21 07:00


 


Pulse  50 L  04/12/21 07:00


 


Resp  24   04/12/21 07:00


 


BP  137/74   04/12/21 07:00


 


Pulse Ox  95   04/12/21 07:00








                                 Intake & Output











 04/11/21 04/12/21 04/12/21





 18:59 06:59 18:59


 


Intake Total 8047.467 3919.405 93


 


Output Total 2525 1875 100


 


Balance -1125.000 -423.595 -7


 


Weight  126.8 kg 


 


Intake:   


 


   495 45


 


    Sodium Chloride 0.9% 1, 520 440 40





    000 ml @ 40 mls/hr IV .   





    Q24H VIOLET Rx#:[id number]   


 


    pressure bag 15 55 5


 


  Intake, IV Titration 388.000 523.405 





  Amount   


 


    DOPamine DRIP 800 mg In  250 





    Dextrose/Water 1 250ml.   





    bag @ 2 MCG/KG/MIN 4.984   





    mls/hr IV .Q24H VIOLET Rx#:   





    [id number]   


 


    fentaNYL (PF) 1,000 mcg 187.345 77.569 





    In Sodium Chloride 0.9%   





    80 ml @ Per Protocol IV .   





    Q0M VIOLET Rx#:[id number]   


 


    propofoL 1,000 mg In 200.655 195.836 





    Empty Bag 1 bag @ Titrate   





    IV .Q0M VIOLET Rx#:   





    [id number]   


 


  Tube Feeding 387 343 28


 


  Other 90 90 20


 


Output:   


 


  Urine 2525 1875 100


 


Other:   


 


  Voiding Method Indwelling Catheter Indwelling Catheter 








                       ABP, PAP, CO, CI - Last Documented











Arterial Blood Pressure        179/61

















- Exam








GENERAL EXAM: Intubated, sedated, 61-year-old female patient, comfortable in no 

apparent distress, synchronous with the vent.


HEAD: Normocephalic.


EYES: Normal reaction of pupils, equal size.


NOSE: Clear with pink turbinates.


THROAT: Oral endotracheal and gastric tube secured in place. No erythema or 

exudates.


NECK: No masses, no JVD.


CHEST: No chest wall deformity.


LUNGS: Equal air entry with crackles in the bilateral posterior bases.


CVS: S1 and S2 normal with no audible murmur, regular rhythm.


ABDOMEN: No hepatosplenomegaly, normal bowel sounds, no guarding or rigidity.


SPINE: No scoliosis or deformity


SKIN: No rashes


CENTRAL NERVOUS SYSTEM: Sedated, tone is normal in all 4 extremities.


EXTREMITIES: There is no peripheral edema.  No clubbing, no cyanosis.  

Peripheral pulses are intact.





- Labs


CBC & Chem 7: 


                                 04/12/21 03:50





                                 04/12/21 03:50


Labs: 


                  Abnormal Lab Results - Last 24 Hours (Table)











  04/11/21 04/11/21 04/12/21 Range/Units





  16:47 23:09 03:50 


 


D-Dimer    1.42 H  (<0.60)  mg/L FEU


 


ABG pH     (7.35-7.45)  


 


ABG HCO3     (21-25)  mmol/L


 


ABG Total CO2     (19-24)  mmol/L


 


ABG O2 Saturation     (94-97)  %


 


BUN     (7-17)  mg/dL


 


Creatinine     (0.52-1.04)  mg/dL


 


Glucose     (74-99)  mg/dL


 


POC Glucose (mg/dL)  184 H  126 H   (75-99)  mg/dL


 


Lactate Dehydrogenase     (313-618)  U/L














  04/12/21 04/12/21 04/12/21 Range/Units





  03:50 03:50 05:09 


 


D-Dimer     (<0.60)  mg/L FEU


 


ABG pH     (7.35-7.45)  


 


ABG HCO3     (21-25)  mmol/L


 


ABG Total CO2     (19-24)  mmol/L


 


ABG O2 Saturation     (94-97)  %


 


BUN   21 H   (7-17)  mg/dL


 


Creatinine   0.50 L   (0.52-1.04)  mg/dL


 


Glucose   126 H   (74-99)  mg/dL


 


POC Glucose (mg/dL)    127 H  (75-99)  mg/dL


 


Lactate Dehydrogenase  815 H    (313-618)  U/L














  04/12/21 Range/Units





  05:10 


 


D-Dimer   (<0.60)  mg/L FEU


 


ABG pH  7.51 H  (7.35-7.45)  


 


ABG HCO3  28 H  (21-25)  mmol/L


 


ABG Total CO2  29 H  (19-24)  mmol/L


 


ABG O2 Saturation  97.8 H  (94-97)  %


 


BUN   (7-17)  mg/dL


 


Creatinine   (0.52-1.04)  mg/dL


 


Glucose   (74-99)  mg/dL


 


POC Glucose (mg/dL)   (75-99)  mg/dL


 


Lactate Dehydrogenase   (313-618)  U/L














Assessment and Plan


Plan: 











1 Acute hypoxemic respiratory failure secondary to CoVID 19 

pneumonia/pneumonitis requiring intubation mechanical ventilatory support on 

03/31/2021.  Outside the window for Remdesivir. Received tocilizumab.  The 

patient remains on a mechanical ventilator.  The patient is in a volume cycled. 

Chest x-ray was noted.  Blood gases was noted.  The patient had prolonged 

respiratory failure.  Recommendations were done yesterday was tracheostomy and 

PEG tube insertion for ongoing care of the patient had prolonged respiratory 

failure requiring intubation mechanical ventilation.  The patient remains se

dated with propofol.





2 Hypertension, currently off Cleviprex and the patient is currently on a comb

ination of lisinopril and Norvasc for blood pressure control.





3 Paroxysmal atrial fibrillation, current rhythm is sinus





4 Hyperlipidemia





5 sinus Bradycardia, requiring dopamine drip for cardiac rhythm enhancement.  No

significant arrhythmias have been noted.





6 enteral feeding for nutritional support





Plan:


 





Continue Decadron 6 mg on a daily basis


Lovenox 40 mg subcu on a daily basis


Continue vent support, will drop the FiO2 down to 40%, DrJackie PEEP down to 8, and 

if the saturations stays above 92%, with  PEEP down to 6.


The patient prolonged respiratory failure and the recommendations were made 

worse tracheostomy and PEG tube insertion for ongoing care


Surgical consultation for a tracheostomy tube insertion and PEG tube insertion 

if the family is agreeable


Continue enteral feeding for nutritional support


Keep dopamine for now and gradually wean it off 


Repeat chest x-ray, inflammatory markers in the a.m.


Discontinue dopamine as this patient has not seen a significant rate improvement

with dopamine and the blood pressure is running higher.  We'll continue the 

lisinopril and Norvasc for now for blood pressure control and the patient is 

currently off Cleviprex.  The patient is also in a normal sinus rhythm for now.


We will continue to follow and make further recommendations based on her 

clinical status


Critical care evaluation that was done more than 30 minutes


Time with Patient: Greater than 30

## 2021-04-12 NOTE — P.PN
Subjective





This is a pleasant 61-year-old female past medical history significant for 

hypertension, dyslipidemia, mild non-obstructive CAD with 30% disease in the 

proximal LAD and apical ballooning syndrome. She follows in the office with Dr. Peck.  She is currently maintained on mechanical ventilation.  Blood 

pressure is 137/74 heart rate 50 afebrile.  Laboratory data reviewed, CBC 

unremarkable, d-dimer 1.42, sodium 137, potassium 3.9, creatinine 0.5.  

Currently maintained on amlodipine 10 mg daily, aspirin 81 mg daily, 

atorvastatin 40 mg daily, lisinopril 20 mg daily and Aldactone 12.5 mg daily.  

Echocardiogram obtained on this admission revealed preserved LV systolic 

function with ejection fraction 55-60%.





GENERAL: No acute distress.





ASSESSMENT


Sinus bradycardia


Covid 19


Acute hypoxic respiratory failure


Hypertension


Dyslipidemia


Nonobstructive coronary artery disease


History of apical ballooning syndrome





PLAN


Continue current medical regimen and care per pulmonary care team. 


No beta blockers or heart rate lowering medications. 


We will follow along as needed, please call with further questions or concerns. 


Follow up upon discharge with Dr. Peck. 





Nurse Practitioner note has been reviewed, I agree with a documented findings 

and plan of care.  Patient was seen and examined.








Objective





- Vital Signs


Vital signs: 


                                   Vital Signs











Temp  98.4 F   04/12/21 07:00


 


Pulse  50 L  04/12/21 07:00


 


Resp  24   04/12/21 07:00


 


BP  137/74   04/12/21 07:00


 


Pulse Ox  95   04/12/21 07:00








                                 Intake & Output











 04/11/21 04/12/21 04/12/21





 18:59 06:59 18:59


 


Intake Total 1626.936 1310.405 127.007


 


Output Total 2525 1875 100


 


Balance -1125.000 -323.595 27.007


 


Weight  126.8 kg 


 


Intake:   


 


   495 45


 


    Sodium Chloride 0.9% 1, 520 440 40





    000 ml @ 40 mls/hr IV .   





    Q24H VIOLET Rx#:[id number]   


 


    pressure bag 15 55 5


 


  Intake, IV Titration 388.000 623.405 34.007





  Amount   


 


    DOPamine DRIP 800 mg In  250 22.532





    Dextrose/Water 1 250ml.   





    bag @ 2 MCG/KG/MIN 4.984   





    mls/hr IV .Q24H VIOLET Rx#:   





    [id number]   


 


    fentaNYL (PF) 1,000 mcg 187.345 177.569 





    In Sodium Chloride 0.9%   





    80 ml @ Per Protocol IV .   





    Q0M VIOLET Rx#:[id number]   


 


    propofoL 1,000 mg In 200.655 195.836 11.475





    Empty Bag 1 bag @ Titrate   





    IV .Q0M VIOLET Rx#:   





    [id number]   


 


  Tube Feeding 387 343 28


 


  Other 90 90 20


 


Output:   


 


  Urine 2525 1875 100


 


Other:   


 


  Voiding Method Indwelling Catheter Indwelling Catheter 








                       ABP, PAP, CO, CI - Last Documented











Arterial Blood Pressure        179/61

















- Labs


CBC & Chem 7: 


                                 04/12/21 03:50





                                 04/12/21 03:50


Labs: 


                  Abnormal Lab Results - Last 24 Hours (Table)











  04/11/21 04/11/21 04/12/21 Range/Units





  16:47 23:09 03:50 


 


D-Dimer    1.42 H  (<0.60)  mg/L FEU


 


ABG pH     (7.35-7.45)  


 


ABG HCO3     (21-25)  mmol/L


 


ABG Total CO2     (19-24)  mmol/L


 


ABG O2 Saturation     (94-97)  %


 


BUN     (7-17)  mg/dL


 


Creatinine     (0.52-1.04)  mg/dL


 


Glucose     (74-99)  mg/dL


 


POC Glucose (mg/dL)  184 H  126 H   (75-99)  mg/dL


 


Lactate Dehydrogenase     (313-618)  U/L














  04/12/21 04/12/21 04/12/21 Range/Units





  03:50 03:50 05:09 


 


D-Dimer     (<0.60)  mg/L FEU


 


ABG pH     (7.35-7.45)  


 


ABG HCO3     (21-25)  mmol/L


 


ABG Total CO2     (19-24)  mmol/L


 


ABG O2 Saturation     (94-97)  %


 


BUN   21 H   (7-17)  mg/dL


 


Creatinine   0.50 L   (0.52-1.04)  mg/dL


 


Glucose   126 H   (74-99)  mg/dL


 


POC Glucose (mg/dL)    127 H  (75-99)  mg/dL


 


Lactate Dehydrogenase  815 H    (313-618)  U/L














  04/12/21 Range/Units





  05:10 


 


D-Dimer   (<0.60)  mg/L FEU


 


ABG pH  7.51 H  (7.35-7.45)  


 


ABG HCO3  28 H  (21-25)  mmol/L


 


ABG Total CO2  29 H  (19-24)  mmol/L


 


ABG O2 Saturation  97.8 H  (94-97)  %


 


BUN   (7-17)  mg/dL


 


Creatinine   (0.52-1.04)  mg/dL


 


Glucose   (74-99)  mg/dL


 


POC Glucose (mg/dL)   (75-99)  mg/dL


 


Lactate Dehydrogenase   (313-618)  U/L

## 2021-04-12 NOTE — XR
EXAMINATION TYPE: XR chest 1V portable

 

DATE OF EXAM: 4/12/2021

 

CLINICAL HISTORY: Difficulty breathing progress study.  

 

TECHNIQUE: Single AP portable semiupright view of the chest is obtained.

 

COMPARISON: Chest x-ray from one day earlier and older studies.

 

FINDINGS:  Stable endotracheal and orogastric tubes. 

 

Stable cardiomegaly. Persistent bilateral multifocal mid and lower lung opacities. Persistent silhoue
tting of right and left hemidiaphragms. Osseous structures are intact.

 

IMPRESSION: Persistent cardiomegaly with bilateral multifocal mid to lower lung infiltrates consisten
t with covid-19 infection. No significant change from most recent x-ray.

## 2021-04-13 LAB
ANION GAP SERPL CALC-SCNC: 1 MMOL/L
BASOPHILS # BLD AUTO: 0 K/UL (ref 0–0.2)
BASOPHILS NFR BLD AUTO: 0 %
BUN SERPL-SCNC: 20 MG/DL (ref 7–17)
CALCIUM SPEC-MCNC: 8.6 MG/DL (ref 8.4–10.2)
CHLORIDE SERPL-SCNC: 107 MMOL/L (ref 98–107)
CO2 BLDA-SCNC: 28 MMOL/L (ref 19–24)
CO2 SERPL-SCNC: 28 MMOL/L (ref 22–30)
EOSINOPHIL # BLD AUTO: 0.1 K/UL (ref 0–0.7)
EOSINOPHIL NFR BLD AUTO: 1 %
ERYTHROCYTE [DISTWIDTH] IN BLOOD BY AUTOMATED COUNT: 3.67 M/UL (ref 3.8–5.4)
ERYTHROCYTE [DISTWIDTH] IN BLOOD: 14.2 % (ref 11.5–15.5)
GLUCOSE BLD-MCNC: 125 MG/DL (ref 75–99)
GLUCOSE BLD-MCNC: 162 MG/DL (ref 75–99)
GLUCOSE BLD-MCNC: 173 MG/DL (ref 75–99)
GLUCOSE SERPL-MCNC: 123 MG/DL (ref 74–99)
HCO3 BLDA-SCNC: 27 MMOL/L (ref 21–25)
HCT VFR BLD AUTO: 32 % (ref 34–46)
HGB BLD-MCNC: 11.2 GM/DL (ref 11.4–16)
LYMPHOCYTES # SPEC AUTO: 2 K/UL (ref 1–4.8)
LYMPHOCYTES NFR SPEC AUTO: 41 %
MCH RBC QN AUTO: 30.6 PG (ref 25–35)
MCHC RBC AUTO-ENTMCNC: 35.1 G/DL (ref 31–37)
MCV RBC AUTO: 87.2 FL (ref 80–100)
MONOCYTES # BLD AUTO: 0.5 K/UL (ref 0–1)
MONOCYTES NFR BLD AUTO: 10 %
NEUTROPHILS # BLD AUTO: 2.3 K/UL (ref 1.3–7.7)
NEUTROPHILS NFR BLD AUTO: 47 %
PCO2 BLDA: 31 MMHG (ref 35–45)
PH BLDA: 7.54 [PH] (ref 7.35–7.45)
PLATELET # BLD AUTO: 203 K/UL (ref 150–450)
PO2 BLDA: 76 MMHG (ref 83–108)
POTASSIUM SERPL-SCNC: 3.5 MMOL/L (ref 3.5–5.1)
SODIUM SERPL-SCNC: 136 MMOL/L (ref 137–145)
WBC # BLD AUTO: 5 K/UL (ref 3.8–10.6)

## 2021-04-13 RX ADMIN — POTASSIUM BICARBONATE SCH MEQ: 782 TABLET, EFFERVESCENT ORAL at 09:27

## 2021-04-13 RX ADMIN — CITALOPRAM HYDROBROMIDE SCH MG: 20 TABLET ORAL at 09:28

## 2021-04-13 RX ADMIN — INSULIN ASPART SCH UNIT: 100 INJECTION, SOLUTION INTRAVENOUS; SUBCUTANEOUS at 11:50

## 2021-04-13 RX ADMIN — PANTOPRAZOLE SODIUM SCH MG: 40 INJECTION, POWDER, FOR SOLUTION INTRAVENOUS at 09:30

## 2021-04-13 RX ADMIN — OXYCODONE HYDROCHLORIDE AND ACETAMINOPHEN SCH MG: 500 TABLET ORAL at 09:29

## 2021-04-13 RX ADMIN — GUAIFENESIN SCH MG: 200 SOLUTION ORAL at 21:08

## 2021-04-13 RX ADMIN — CHLORHEXIDINE GLUCONATE SCH ML: 1.2 RINSE ORAL at 09:27

## 2021-04-13 RX ADMIN — ALBUTEROL SULFATE SCH PUFF: 90 AEROSOL, METERED RESPIRATORY (INHALATION) at 19:06

## 2021-04-13 RX ADMIN — GUAIFENESIN SCH MG: 200 SOLUTION ORAL at 16:05

## 2021-04-13 RX ADMIN — CLEVIPIDINE SCH MLS/HR: 0.5 EMULSION INTRAVENOUS at 13:07

## 2021-04-13 RX ADMIN — ATORVASTATIN CALCIUM SCH MG: 40 TABLET, FILM COATED ORAL at 09:31

## 2021-04-13 RX ADMIN — SODIUM CHLORIDE SCH MLS/HR: 900 INJECTION, SOLUTION INTRAVENOUS at 04:42

## 2021-04-13 RX ADMIN — POTASSIUM BICARBONATE SCH MEQ: 782 TABLET, EFFERVESCENT ORAL at 06:16

## 2021-04-13 RX ADMIN — ENOXAPARIN SODIUM SCH MG: 40 INJECTION SUBCUTANEOUS at 09:27

## 2021-04-13 RX ADMIN — ASPIRIN 81 MG CHEWABLE TABLET SCH MG: 81 TABLET CHEWABLE at 09:28

## 2021-04-13 RX ADMIN — DEXTRAN 70 AND HYPROMELLOSE 2910 SCH: 1; 3 SOLUTION/ DROPS OPHTHALMIC at 14:19

## 2021-04-13 RX ADMIN — INSULIN ASPART SCH UNIT: 100 INJECTION, SOLUTION INTRAVENOUS; SUBCUTANEOUS at 18:02

## 2021-04-13 RX ADMIN — CEFAZOLIN SCH MLS/HR: 330 INJECTION, POWDER, FOR SOLUTION INTRAMUSCULAR; INTRAVENOUS at 01:08

## 2021-04-13 RX ADMIN — POTASSIUM BICARBONATE SCH MEQ: 782 TABLET, EFFERVESCENT ORAL at 18:19

## 2021-04-13 RX ADMIN — Medication SCH MCG: at 09:28

## 2021-04-13 RX ADMIN — ALBUTEROL SULFATE SCH PUFF: 90 AEROSOL, METERED RESPIRATORY (INHALATION) at 23:05

## 2021-04-13 RX ADMIN — DEXTRAN 70 AND HYPROMELLOSE 2910 SCH DROPS: 1; 3 SOLUTION/ DROPS OPHTHALMIC at 04:42

## 2021-04-13 RX ADMIN — ALBUTEROL SULFATE SCH PUFF: 90 AEROSOL, METERED RESPIRATORY (INHALATION) at 07:56

## 2021-04-13 RX ADMIN — SPIRONOLACTONE SCH MG: 25 TABLET, FILM COATED ORAL at 09:28

## 2021-04-13 RX ADMIN — DEXTRAN 70 AND HYPROMELLOSE 2910 SCH DROPS: 1; 3 SOLUTION/ DROPS OPHTHALMIC at 01:00

## 2021-04-13 RX ADMIN — ALBUTEROL SULFATE SCH PUFF: 90 AEROSOL, METERED RESPIRATORY (INHALATION) at 11:15

## 2021-04-13 RX ADMIN — CEFAZOLIN SCH MLS/HR: 330 INJECTION, POWDER, FOR SOLUTION INTRAMUSCULAR; INTRAVENOUS at 08:00

## 2021-04-13 RX ADMIN — ALBUTEROL SULFATE SCH PUFF: 90 AEROSOL, METERED RESPIRATORY (INHALATION) at 17:26

## 2021-04-13 RX ADMIN — Medication SCH MG: at 09:29

## 2021-04-13 RX ADMIN — DEXTRAN 70 AND HYPROMELLOSE 2910 SCH DROPS: 1; 3 SOLUTION/ DROPS OPHTHALMIC at 07:54

## 2021-04-13 RX ADMIN — SODIUM CHLORIDE SCH MLS/HR: 900 INJECTION, SOLUTION INTRAVENOUS at 16:06

## 2021-04-13 RX ADMIN — SODIUM CHLORIDE SCH MLS/HR: 900 INJECTION, SOLUTION INTRAVENOUS at 10:32

## 2021-04-13 RX ADMIN — INSULIN ASPART SCH: 100 INJECTION, SOLUTION INTRAVENOUS; SUBCUTANEOUS at 06:49

## 2021-04-13 RX ADMIN — CHLORHEXIDINE GLUCONATE SCH ML: 1.2 RINSE ORAL at 21:07

## 2021-04-13 RX ADMIN — DEXTROSE SCH MG: 50 INJECTION, SOLUTION INTRAVENOUS at 09:29

## 2021-04-13 RX ADMIN — ALBUTEROL SULFATE SCH PUFF: 90 AEROSOL, METERED RESPIRATORY (INHALATION) at 03:17

## 2021-04-13 RX ADMIN — INSULIN ASPART SCH: 100 INJECTION, SOLUTION INTRAVENOUS; SUBCUTANEOUS at 00:59

## 2021-04-13 NOTE — P.PN
Subjective


Progress Note Date: 04/13/21














61-year-old female, who hasn't been feeling well for about 10-11 days.  She 

tested positive for COVID 19 on March 24.  She's been sick for 10-11 days old.  

She complains of TYPICAL symptoms including weakness, fever, diarrhea, cough, 

and shortness of breath.  She's quite hypoxemic.  On AIRVO 100%, and a 

nonrebreather mask, her saturations are right around 80-83%.  Any movement, and 

she desaturates even further.  I just called the intensive care unit, and we'll 

plan on putting her in the intensive care unit.  She's currently on saline at 75

mL an hour.  She can barely speak without becoming short of breath.  The patient

is not a candidate for REM, but could get TOCI.  Sodium 138, potassium 3.8, 

chlorides 108, CO2 21, anion gap 9, BUN 19, creatinine 0.7.  AST 94, ALTs 60, 

LDH 1471, C-reactive protein 88.1.  Again, the patient's been sick for at least 

10-11 days and maybe a bit longer.  Her oral intake has been poor as well.  The 

patient's chest x-ray shows diffuse bilateral infiltrates consistent with 

coronavirus infection.





The patient is seen today 04/01/2021 in follow-up in the intensive care unit.  

Shortly after arriving to the ICU yesterday she required intubation and 

mechanical ventilatory support injury to acute hypoxemic respiratory failure.  

She is currently on the ventilator and assist control mode with a rate of 34, 

tidal on 450, FiO2 70%, PEEP of 16.  Morning blood gases reveal pO2 of 70, pCO2 

33, P 87.38.  Fentanyl drip at 0.5 mcg/kg per hour, propofol at 15 mcg/kg/m, 

Nimbex at 1 mcg/min per hour.  0.9 normal saline at 130 ML's per hour.  She did 

receive Tociluzimab.  She remains on Decadron 6 mg IV daily.  Lovenox 40 mg 

subcutaneous daily.  Tube feedings will be initiated for nutritional support.  

Blood cultures reveal no growth.  White count 2.9.  Hemoglobin 11.3 and 

platelets 188.  Lymphocytes 0.8.  She is continued on Lovenox, dexamethasone, 

vitamin supplements.





The patient is seen today 04/02/2021 in follow-up in the intensive care unit.  

She remains intubated on the mechanical ventilator.  Assist-control mode.  Rate 

of 30, tidal volume 450, FiO2 60% and a PEEP of 16.  She is currently sedated on

propofol at 40 mcg/kg/m, Nimbex at 1 mcg/kg/m, fentanyl at 0.5 mcg/kg per hour. 

She has 0.9 normal saline at 130 ML's per hour.  She is being nourished with 

vital HP at 20 ML's per hour.  She did receive tocilizumab.  He remains on 

dexamethasone, Lovenox, vitamin supplements.  Chest x-ray showing persistent but

improving patchy bilateral airspace disease.  Sputum and blood cultures are 

pending.  White count 3.9.  Hemoglobin 11.4.  Lymphocytes 0.7.  D-dimer 1.19.  

Sodium 139.  Potassium 4.4.  Bicarb 20.  Creatinine 0.64.  Glucose 174.





The patient is seen today 04/03/2020 in follow-up in the intensive care unit.  

She remains intubated on mechanical ventilator.  Current settings are assist-

control mode.  Rate of 34, tidal volume 450, FiO2 50% and a PEEP of 16.  Morning

blood gases reveal a pO2 of 117, pCO2 30, pH 7.40.  She remains sedated on 

propofol at 55 mcg/kg/m.  Fentanyl and 1 mcg/kg/hr.  She is being nourished with

vital HP at 33 ML's per hour.  0.9 normal saline at 130 ML's per hour.  She 

remains on dexamethasone, Lovenox, bronchodilators.  White count 4.0.  

Hemoglobin 11.8.  D-dimer 1.81.  Sodium 138.  Potassium 4.2.  Bicarb 19.  LDH 

1133.  C-reactive protein 15.5.  Asked x-ray continues to show moderate 

scattered interstitial and partial airspace opacities.  





The patient is seen today 04/04/2021 in follow-up in the intensive care unit.  

She remains intubated, sedated.  Current settings are assist control at a rate 

of 30, tidal volume 450, FiO2 50% and a PEEP of 15.  She is sedated on propofol 

at 60 mcg/kg/m, fentanyl at 1 mcg/kg per hour, 0.9 normal saline 130 ML's per 

hour.  Vital HP at 24 mls per hour.  She did receive Actemra.  Blood culture 

reveals no growth.  Sputum culture reveals no growth.  White count 5.3.  

Hemoglobin 12.0.  D-dimer 2.0.  Sodium 139.  Potassium 4.0.  Creatinine 0.59.  

LDH 1082.  C reactive protein 8.6.  Chest x-ray continues to show no interval 

change, continues with persistent moderate patchy opacities bilaterally.  

Endotracheal tube to be advanced to 2 cms.  Continued on vitamin supplements, 

Lovenox, IV Solu-Medrol. 





Progress note dated 04/05/2021.





This is a patient was admitted on March 31 for COVID 19 pneumonia.  The patient 

came to the ICU on March 31, and was intubated on the same day.  She apparently 

tested positive back on March 24.  The patient remains on the mechanical 

ventilator.  She is on the volume assist control mode, rate of 30, tidal volume 

450, FiO2 50%, PEEP of 15 to be dropped to 10.  Blood gases show pO2 of 66, pCO2

33, and a pH 7.41.  The patient's on propofol at 60 mcg/kg/m, fentanyl 1 

mcg/kg/h, normal saline at 130 mL an hour and vital high protein at 24, which is

goal.  The patient did receive TOCI.   White count is 6.2, hemoglobin 11.8, hem

atocrit 33.1, platelet count 272,000.  Sodium 137, potassium 3.8, chlorides 114,

CO2 20, anion gap 3, BUN 18, creatinine 0.53.  LDH is 956.  Chest x-ray shows 

worsened bilateral infiltrates.  Small effusions are noted.





Progress note dated 04/06/2021.





61-year-old female, admitted on March 31 for COVID 19 pneumonia.  She came to 

the ICU on March 31 and was intubated on the same day.  She tested positive back

on March 24.  The patient remains on mechanical ventilation.  She is on the 

volume assist control mode, rate 36, tidal volume 450, FiO2 50%, and PEEP is 13.

 Arterial blood gases show a PaO2 of 74, pCO2 31, and a pH of 7.52.  Blood gases

are consistent with relative hypoxemia, and a combined respiratory and metabolic

alkalosis.  The patient is getting propofol 60 mcg/kg/m, fentanyl 1 emily per 

kilogram per hour, saline at 40 mL an hour, dopamine at 2 mcg/kg/m, and vital 

high protein at goal, which is 24 mL an hour.  The cardiologist wanted to DC the

dopamine.  We are going to try to lower PEEP from 13 down to 10.  Today's CBC is

normal.  Sodium 138, potassium 3.4, chlorides 109, CO2 24, anion gap 5, BUN 17, 

and creatinine 0.53.  Chest x-ray shows improving bibasilar infiltrates.





The patient is seen today 04/07/2021 and follow-up in the intensive care unit.  

She remains intubated and on the mechanical ventilator.  Current mode assist 

control with a rate of 30, tidal volume 450, FiO2 50% and a PEEP of 10.  Morning

blood gases reveal a P O2 of 58, pCO2 32, pH 7.51.  She remains sedated on 

propofol at 60 mcg/kg/m.  Fentanyl at 1 mcg/kg/h, 0.9#at 40 ML's per hour.  She 

is being nourished with vital HP at 24 ML's per hour which is goal.  She is 

currently afebrile.  Hemodynamically stable.  She is receive daily interruption 

of sedation without success.  The plan will be for trach and PEG later this 

week.  Blood and sputum cultures revealed no growth.  WBC 6.4.  Hemoglobin 12.8.

 Sodium 137.  Potassium 3.5.  Creatinine 0.60.  Glucose 120.  AST 53.  ALT 69.  

Chest x-ray continues to show patchy perihilar and basilar infiltrates with 

interval progression.





The patient is seen today 04/08/2021 in follow-up in the intensive care unit.  

She remains intubated on mechanical ventilator in assist control mode at a rate 

of 30, tight around 450, FiO2 50% and a PEEP of 10.  Morning blood gases reveal 

a P O2 of 58, pCO2 30, pH 7.54.  She remains sedated on propofol at 50 mcg/kg/m.

 Fentanyl at 2 mcg/kg/h.  0.9 normal saline at 40 ML's per hour.  She was 

initiated back on dopamine at 2 mcg/kg/m due to significant bradycardia with 

heart rate in the 20s temporarily.  Currently heart rate in the 40s.  She has 

remained hypertensive.  Chest x-ray continues to show bilateral patchy 

infiltrates.  Stable compared to previous.  Blood and sputum cultures revealed 

no growth.  White count 5.5.  Hemoglobin 11.8.  D-dimer 1.59.  Sodium 135.  

Potassium 3.9.  Creatinine 0.61.  AST 53.  ALT 67.  .  C-reactive protein

5.9.  She remains on Lovenox, dexamethasone, vitamin supplements.





The patient is seen today 04/09/2021 in follow-up in the intensive care unit.  

She remains intubated and on mechanical ventilator.  Current settings are 

assist-control mode.  Rate of 30, tidal volume 450.  FiO2 50% and a PEEP of 13. 

Morning blood gases reveal a pO2 of 76, pCO2 31, pH 7.53.  She remains sedated 

on propofol at 40 mcg/kg/m, dopamine at 2 mcg/kg/m.  Fentanyl at 1.5 mg/kg per 

hour.  0.9 normal saline at 40 miles per hour.  She is being nourished with 

vital HP at 24 ML's per hour which is goal.  Chest x-ray reveals bilateral 

airspace disease slightly improved.  Blood, sputum cultures reveal no growth.  

White count 6.6.  Hemoglobin 12.8.  Sodium 139.  Potassium 4.0.  Chloride 108.  

Bicarb 25.  Creatinine 0.52.  .  C-reactive protein 6.4.  She remains on 

vitamin supplements, Lovenox, Decadron.





The patient is seen today 04/10/2021 in follow-up in the intensive care unit.  

She remains intubated, sedated and on mechanical ventilator.  Current settings 

assist-control mode at a rate of 24, tidal volume 450, FiO2 50% and a PEEP of 

13.  Morning blood gases revealed a pO2 80, pCO2 37, pH 7.44.  She is sedated on

propofol at 30 mcg/kg/m.  Fentanyl at 1.5 mcg/kg per hour.  Remains on dopamine 

at 2.5 mcg/kg/m.  0.9 normal saline at 40 ML's per hour.  She is being nourished

with vital HP at 33,'s per hour which is goal.  Chest x-ray continues to 

revealed bibasilar infiltrates with small left pleural effusion.  Blood and 

sputum cultures reveal no growth.  White count 7.8.  Hemoglobin 12.7.  Platelet 

count 254.  Sodium 130.  Potassium 4.2.  Creatinine 0.61.  AST 77.  .  

.  C-reactive protein 6.8.  She remains on dexamethasone, Lovenox, 

vitamin supplements.





The patient is seen today 04/11/2021 in follow-up in the intensive care unit.  

She remains intubated, sedated and on the mechanical ventilator.  Currently 

assist-control mode at 24, tidal volume 450, FiO2 50% and a PEEP of 10.  Morning

blood gases reveal a P O2 of 79, pCO2 37, pH 7.46.  She remains on propofol at 

30 mcg/kg/m, fentanyl at 1 g 1.5 mcg/kg/h.  Dopamine at 2.5 mcg/kg/m.  0.9 

normal saline at 40 ML's per hour.  Being nourished with vital HP at 33 ML's per

hour which is goal.  Chest x-ray continues to show bilateral patchy infiltrates 

with a small left pleural effusion.  Blood and sputum cultures reveal no growth.

 White count 5.9.  Hemoglobin 12.7.  Sodium 139.  Potassium 3.8.  Creatinine 

0.5.  AST 61.  ALT 99.  She remains on Lovenox, dexamethasone, vitamin supple

ments.








 


On  04/12/2021, the patient is being seen in follow-up in the intensive care 

unit.  The patient has been diagnosed having COVID 19 pneumonia and the patient 

urgently tested positive on 03/24/2024.  Subsequently, the patient came to the 

hospital approximately 11 days ago with respiratory failure initially placed on 

high flow oxygen and subsequently the patient failed and the patient had to be 

intubated and placed on a mechanical ventilator on 03/31/2021 and the patient 

has been mechanical ventilator since.  During the course, the patient received a

combination of treatment including Decadron,Tocilizumab, and Lovenox.  The 

patient for now remains on a mechanical ventilator.  On today's evaluation, the 

patient on assist control mode volume cycle with a rate of 24, tidal volume of 4

50, FiO2 of 50% with a PEEP of 10.  The patient is sedated with propofol running

at 25 mg/kg per minute and fentanyl at 1.2 mcg/kilogram/hour.  IV fluids running

at 40 mL an hour.  The patient is receiving vital high protein at the rate of 33

mL an hour.  Chest x-ray showing changes patchy bilateral pulmonary infiltrates.

 ET tube is in a good location.  There is a small left pleural effusion.  Blood 

cultures of been negative.  Sputum cultures of been negative.  The most recent 

inflammatory markers showed an LDH level of 868 on 04/10/2021 and a CRP of 0.3. 

The d-dimer is 1.42.  The blood gases from today showed a pH of 7.51 with a pCO2

of 35 and pO2 of 91.  The chest x-ray showing lower lobe at the pulmonary 

infiltrates and ET tube is in a good location.  The patient also has a 

orogastric tube in place.  Patient's triglyceride level on 04/09/2021 was 291.  

The patient is also on dopamine to enhance the hard data the patient had some 

underlying sinus bradycardia.  As for blood pressure control, the patient is on 

a combination of lisinopril and Norvasc.  The patient is also on Cleviprex for a

tighter blood pressure control which is being gradually titrated and weaned off.

 She is currently on dopamine at 2.5 mcg kilogram per minute for bradycardia and

cardiac cause.  The patient is currently in a normal sinus rhythm.  Peak airway 

pressures around 24-26





Today's evaluation of 04/13/2021 the patient is being seen for a follow-up.  

This is a 59-year-old female patient with Covid 19 related pneumonia was been 

intubated and then on the mechanical ventilator since 03/31/2021.  The patient 

has been aggressively treated during the course of the hospitalization.  This 

morning, the patient has been on propofol which is running at 25 mcg/kg per 

minute and fentanyl is running at 1.5 mcg/kg/h.  The patient is on no paralysis 

for now.  The patient is an assist-control mode at the rate of 24 with a tidal 

volume of 450 and FiO2 of 40% with a PEEP of 6.  The chest x-ray from today is 

showing diffuse breath and pulmonary infiltrates more so in the lower lobes 

bilaterally.  ET tube is in a good location.  No major interval change compared 

to yesterday and the findings are essentially stable.  The peak airway pressure 

 and static pressures are 24 and 21 respectively.  The patient has an LDH of 

8:15, CRP of 5.9.  The patient remains on Decadron 6 mg IV every 24 hours and 

the patient is also on Lovenox 40 mg subcu daily.  The patient is currently off 

dopamine.  Her cardiac rhythm is sinus, bradycardia, rate of 52.  She is 

afebrile.  She is tolerating enteral feeding for nutritional support and she is 

currently on vital high protein at goal which is 53 mL an hour.  No other 

significant events overnight.  The patient is not having any significant 

orotracheal secretions.  Note that the patient was given a sedation holiday 

yesterday.  She was given a CPAP trial for a total of 20 minutes.  At the 

completion of this trial, the patient became tachypneic and quite restless.  We 

are concerned about extubation and we aborted the child and she is back on 

treatment and sedation.





Objective





- Vital Signs


Vital signs: 


                                   Vital Signs











Temp  98.3 F   04/13/21 04:00


 


Pulse  42 L  04/13/21 07:00


 


Resp  24   04/13/21 07:00


 


BP  113/53   04/13/21 07:00


 


Pulse Ox  94 L  04/13/21 07:00








                                 Intake & Output











 04/12/21 04/13/21 04/13/21





 18:59 06:59 18:59


 


Intake Total 180.426 4179.029 96


 


Output Total 1475 1460 100


 


Balance -681.853 -59.971 -4


 


Weight 126.8 kg 126.3 kg 


 


Intake:   


 


   473 43


 


    Sodium Chloride 0.9% 1, 320 440 40





    000 ml @ 40 mls/hr IV .   





    Q24H VIOLET Rx#:[id number]   


 


    pressure bag 5 33 3


 


  Intake, IV Titration 187.147 254.029 





  Amount   


 


    DOPamine DRIP 800 mg In 22.532  





    Dextrose/Water 1 250ml.   





    bag @ 2 MCG/KG/MIN 4.984   





    mls/hr IV .Q24H VIOLET Rx#:   





    [id number]   


 


    fentaNYL (PF) 1,000 mcg 74.524 39.466 





    In Sodium Chloride 0.9%   





    80 ml @ Per Protocol IV .   





    Q0M [facility]IOLET Rx#:[id number]   


 


    fentaNYL (PF). 1,000 mcg  100 





    In Sodium Chloride 0.9%   





    80 ml @ Per Protocol IV .   





    Q0M VIOLET Rx#:[id number]   


 


    propofoL 1,000 mg In 90.091 114.563 





    Empty Bag 1 bag @ Titrate   





    IV .Q0M VIOLET Rx#:   





    [id number]   


 


  Tube Feeding 171 583 53


 


  Other 110 90 


 


Output:   


 


  Urine 1475 1460 100


 


Other:   


 


  Voiding Method Indwelling Catheter Indwelling Catheter 


 


  # Bowel Movements  1 








                       ABP, PAP, CO, CI - Last Documented











Arterial Blood Pressure        127/56

















- Exam











GENERAL EXAM: Intubated, sedated, 61-year-old female patient, comfortable in no 

apparent distress, synchronous with the vent.


HEAD: Normocephalic.


EYES: Normal reaction of pupils, equal size.


NOSE: Clear with pink turbinates.


THROAT: Oral endotracheal and gastric tube secured in place. No erythema or 

exudates.


NECK: No masses, no JVD.


CHEST: No chest wall deformity.


LUNGS: Equal air entry with crackles in the bilateral posterior bases.


CVS: S1 and S2 normal with no audible murmur, regular rhythm.


ABDOMEN: No hepatosplenomegaly, normal bowel sounds, no guarding or rigidity.


SPINE: No scoliosis or deformity


SKIN: No rashes


CENTRAL NERVOUS SYSTEM: Sedated, tone is normal in all 4 extremities.


EXTREMITIES: There is no peripheral edema.  No clubbing, no cyanosis.  

Peripheral pulses are intact.








- Labs


CBC & Chem 7: 


                                 04/13/21 04:05





                                 04/13/21 04:05


Labs: 


                  Abnormal Lab Results - Last 24 Hours (Table)











  04/12/21 04/12/21 04/12/21 Range/Units





  12:25 13:11 17:29 


 


RBC     (3.80-5.40)  m/uL


 


Hgb     (11.4-16.0)  gm/dL


 


Hct     (34.0-46.0)  %


 


ABG pH     (7.35-7.45)  


 


ABG pCO2     (35-45)  mmHg


 


ABG pO2     ()  mmHg


 


ABG HCO3     (21-25)  mmol/L


 


ABG Total CO2     (19-24)  mmol/L


 


Sodium     (137-145)  mmol/L


 


BUN     (7-17)  mg/dL


 


Glucose     (74-99)  mg/dL


 


POC Glucose (mg/dL)  162 H  183 H  166 H  (75-99)  mg/dL














  04/12/21 04/13/21 04/13/21 Range/Units





  23:25 04:05 04:05 


 


RBC   3.67 L   (3.80-5.40)  m/uL


 


Hgb   11.2 L   (11.4-16.0)  gm/dL


 


Hct   32.0 L   (34.0-46.0)  %


 


ABG pH     (7.35-7.45)  


 


ABG pCO2     (35-45)  mmHg


 


ABG pO2     ()  mmHg


 


ABG HCO3     (21-25)  mmol/L


 


ABG Total CO2     (19-24)  mmol/L


 


Sodium    136 L  (137-145)  mmol/L


 


BUN    20 H  (7-17)  mg/dL


 


Glucose    123 H  (74-99)  mg/dL


 


POC Glucose (mg/dL)  123 H    (75-99)  mg/dL














  04/13/21 04/13/21 Range/Units





  04:40 06:35 


 


RBC    (3.80-5.40)  m/uL


 


Hgb    (11.4-16.0)  gm/dL


 


Hct    (34.0-46.0)  %


 


ABG pH  7.54 H   (7.35-7.45)  


 


ABG pCO2  31 L   (35-45)  mmHg


 


ABG pO2  76 L   ()  mmHg


 


ABG HCO3  27 H   (21-25)  mmol/L


 


ABG Total CO2  28 H   (19-24)  mmol/L


 


Sodium    (137-145)  mmol/L


 


BUN    (7-17)  mg/dL


 


Glucose    (74-99)  mg/dL


 


POC Glucose (mg/dL)   125 H  (75-99)  mg/dL














Assessment and Plan


Plan: 








1 Acute hypoxemic respiratory failure secondary to CoVID 19 

pneumonia/pneumonitis requiring intubation mechanical ventilatory support on 

03/31/2021.  Outside the window for Remdesivir. Received tocilizumab.  The 

patient remains on a mechanical ventilator.  The patient is in a volume cycled. 

Chest x-ray was noted.  Blood gases was noted.  The patient had prolonged 

respiratory failure.  Recommendations were done yesterday was tracheostomy and 

PEG tube insertion for ongoing care of the patient had prolonged respiratory 

failure requiring intubation mechanical ventilation.  Family has not made a 

final decision.  Meanwhile, the patient was given a spontaneous breathing trial 

and she was able to tolerate this point is breathing trial for a total of 20 

minutes yesterday.  Overall pulmonary status is stable.  Chest x-ray still 

showing dense but the pulmonary infiltrates.  Nevertheless, her peak and static 

pressures are low and the patient has adequate blood gas and she is 

hemodynamically stable.  We'll do another sedation holiday, weaning trial today.





2 Hypertension, currently off Cleviprex and the patient is currently on a 

combination of lisinopril and Norvasc for blood pressure control.





3 Paroxysmal atrial fibrillation, current rhythm is sinus





4 Hyperlipidemia





5 sinus Bradycardia, requiring dopamine drip for cardiac rhythm enhancement.  No

significant arrhythmias have been noted.  The patient is currently off dopamine





6 enteral feeding for nutritional support





Plan:


 


Sedation holiday


Check weaning parameters


Reduce PEEP down to 5


Give the patient   breathing trial once more awake.  My recommendation is to 

wean off the propofol and keep some fentanyl board for sedation


Use Cleviprex drip for blood pressure control if needed


Continue Decadron 6 mg on a daily basis


Lovenox 40 mg subcu on a daily basis.  The patient does of Lasix


Tracheostomy tube insertion and PEG tube insertion if the family is agreeable


Continue enteral feeding for nutritional support 


Repeat chest x-ray, inflammatory markers in the a.m.


Lasix 40 mg IV 1 


We will continue to follow and make further recommendations based on her 

clinical status


Critical care evaluation that was done more than 30 minutes


Time with Patient: Greater than 30

## 2021-04-13 NOTE — XR
EXAMINATION TYPE: XR chest 1V portable

 

DATE OF EXAM: 4/13/2021

 

Comparison: 4/12/2021

 

Clinical History: 61-year-old female chest congestion

 

Findings:

ET and NG tubes are satisfactory. Heart remains enlarged. Increasing vascular prominence and intersti
tial densities in the upper and midlungs. Continued moderate effusions with adjacent opacities in the
 lower lungs.

 

 

Impression:

Cardiomegaly with increasing patchy airspace disease, possible pulmonary edema. Continued moderate ef
fusions with adjacent atelectasis and/or consolidation.

## 2021-04-13 NOTE — P.PN
Subjective


Progress Note Date: 04/13/21





CHIEF COMPLAINT: Shortness of breath





HISTORY OF PRESENT ILLNESS: Patient remains in the ICU intubated.  She is being 

treated for Covid 19 pneumonia and respiratory failure.  Surgical service is 

following for possible tracheostomy and PEG tube placement.  At this time family

has declined tracheostomy and PEG tube.  Patient is undergoing another weaning 

trial today.  Afebrile.  WBC 5.0





Patient seen and examined with Dr. de la o





PHYSICAL EXAM: 


VITAL SIGNS: Reviewed.


GENERAL: Well-developed in no acute distress. 


HEENT:  No sclera icterus. Extraocular movements grossly intact.  Moist buccal 

mucosa. Head is atraumatic, normocephalic. 


ABDOMEN:  Soft.  Nondistended. Nontender. 


NEUROLOGIC: Intubated and sedated





ASSESSMENT: 


1.  Acute hypoxic respiratory failure secondary to Covid 19 pneumonia


2.  Severe protein calorie malnutrition


3.  Sinus bradycardia followed by cardiology 





PLAN: 


-Continue ICU management


-Continue supportive care


-Patient's family has declined tracheostomy and PEG tube placement at this time


-Surgical service will remain on standby





Physician Assistant note has been reviewed by physician. Signing provider agrees

with the documented findings, assessment, and plan of care. 





Objective





- Vital Signs


Vital signs: 


                                   Vital Signs











Temp  98.2 F   04/13/21 09:00


 


Pulse  56 L  04/13/21 09:00


 


Resp  24   04/13/21 09:00


 


BP  127/82   04/13/21 09:00


 


Pulse Ox  96   04/13/21 09:00








                                 Intake & Output











 04/12/21 04/13/21 04/13/21





 18:59 06:59 18:59


 


Intake Total 893.099 9171.029 611.283


 


Output Total 1475 1460 965


 


Balance -681.853 -59.971 -353.717


 


Weight 126.8 kg 126.3 kg 


 


Intake:   


 


   473 155


 


    Sodium Chloride 0.9% 1, 320 440 140





    000 ml @ 20 mls/hr IV .   





    Q24H [facility]LET Rx#:[id number]   


 


    pressure bag 5 33 15


 


  Intake, IV Titration 187.147 254.029 191.283





  Amount   


 


    DOPamine DRIP 800 mg In 22.532  





    Dextrose/Water 1 250ml.   





    bag @ 2 MCG/KG/MIN 4.984   





    mls/hr IV .Q24H [facility]IOLET Rx#:   





    [id number]   


 


    fentaNYL (PF) 1,000 mcg 74.524 39.466 





    In Sodium Chloride 0.9%   





    80 ml @ Per Protocol IV .   





    Q0M [facility]LET Rx#:[id number]   


 


    fentaNYL (PF). 1,000 mcg  100 100





    In Sodium Chloride 0.9%   





    80 ml @ Per Protocol IV .   





    Q0M [facility]IOLET Rx#:[id number]   


 


    propofoL 1,000 mg In 90.091 114.563 91.283





    Empty Bag 1 bag @ Titrate   





    IV .Q0M [facility]IOLET Rx#:   





    [id number]   


 


  Tube Feeding 171 583 265


 


  Other 110 90 


 


Output:   


 


  Urine 1475 1460 965


 


Other:   


 


  Voiding Method Indwelling Catheter Indwelling Catheter Indwelling Catheter


 


  # Bowel Movements  1 








                       ABP, PAP, CO, CI - Last Documented











Arterial Blood Pressure        147/58

















- Labs


CBC & Chem 7: 


                                 04/13/21 04:05





                                 04/13/21 04:05


Labs: 


                  Abnormal Lab Results - Last 24 Hours (Table)











  04/12/21 04/12/21 04/12/21 Range/Units





  12:25 13:11 17:29 


 


RBC     (3.80-5.40)  m/uL


 


Hgb     (11.4-16.0)  gm/dL


 


Hct     (34.0-46.0)  %


 


ABG pH     (7.35-7.45)  


 


ABG pCO2     (35-45)  mmHg


 


ABG pO2     ()  mmHg


 


ABG HCO3     (21-25)  mmol/L


 


ABG Total CO2     (19-24)  mmol/L


 


Sodium     (137-145)  mmol/L


 


BUN     (7-17)  mg/dL


 


Glucose     (74-99)  mg/dL


 


POC Glucose (mg/dL)  162 H  183 H  166 H  (75-99)  mg/dL














  04/12/21 04/13/21 04/13/21 Range/Units





  23:25 04:05 04:05 


 


RBC   3.67 L   (3.80-5.40)  m/uL


 


Hgb   11.2 L   (11.4-16.0)  gm/dL


 


Hct   32.0 L   (34.0-46.0)  %


 


ABG pH     (7.35-7.45)  


 


ABG pCO2     (35-45)  mmHg


 


ABG pO2     ()  mmHg


 


ABG HCO3     (21-25)  mmol/L


 


ABG Total CO2     (19-24)  mmol/L


 


Sodium    136 L  (137-145)  mmol/L


 


BUN    20 H  (7-17)  mg/dL


 


Glucose    123 H  (74-99)  mg/dL


 


POC Glucose (mg/dL)  123 H    (75-99)  mg/dL














  04/13/21 04/13/21 04/13/21 Range/Units





  04:40 06:35 11:32 


 


RBC     (3.80-5.40)  m/uL


 


Hgb     (11.4-16.0)  gm/dL


 


Hct     (34.0-46.0)  %


 


ABG pH  7.54 H    (7.35-7.45)  


 


ABG pCO2  31 L    (35-45)  mmHg


 


ABG pO2  76 L    ()  mmHg


 


ABG HCO3  27 H    (21-25)  mmol/L


 


ABG Total CO2  28 H    (19-24)  mmol/L


 


Sodium     (137-145)  mmol/L


 


BUN     (7-17)  mg/dL


 


Glucose     (74-99)  mg/dL


 


POC Glucose (mg/dL)   125 H  162 H  (75-99)  mg/dL

## 2021-04-14 LAB
ANION GAP SERPL CALC-SCNC: 7 MMOL/L
BASOPHILS # BLD AUTO: 0 K/UL (ref 0–0.2)
BASOPHILS NFR BLD AUTO: 1 %
BUN SERPL-SCNC: 23 MG/DL (ref 7–17)
CALCIUM SPEC-MCNC: 8.9 MG/DL (ref 8.4–10.2)
CHLORIDE SERPL-SCNC: 104 MMOL/L (ref 98–107)
CO2 BLDA-SCNC: 29 MMOL/L (ref 19–24)
CO2 BLDA-SCNC: 30 MMOL/L (ref 19–24)
CO2 SERPL-SCNC: 27 MMOL/L (ref 22–30)
EOSINOPHIL # BLD AUTO: 0.1 K/UL (ref 0–0.7)
EOSINOPHIL NFR BLD AUTO: 2 %
ERYTHROCYTE [DISTWIDTH] IN BLOOD BY AUTOMATED COUNT: 3.92 M/UL (ref 3.8–5.4)
ERYTHROCYTE [DISTWIDTH] IN BLOOD: 14.2 % (ref 11.5–15.5)
GLUCOSE BLD-MCNC: 134 MG/DL (ref 75–99)
GLUCOSE BLD-MCNC: 151 MG/DL (ref 75–99)
GLUCOSE BLD-MCNC: 176 MG/DL (ref 75–99)
GLUCOSE BLD-MCNC: 195 MG/DL (ref 75–99)
GLUCOSE SERPL-MCNC: 111 MG/DL (ref 74–99)
HCO3 BLDA-SCNC: 28 MMOL/L (ref 21–25)
HCO3 BLDA-SCNC: 29 MMOL/L (ref 21–25)
HCT VFR BLD AUTO: 34.6 % (ref 34–46)
HGB BLD-MCNC: 12.5 GM/DL (ref 11.4–16)
LDH SPEC-CCNC: 667 U/L (ref 313–618)
LYMPHOCYTES # SPEC AUTO: 2 K/UL (ref 1–4.8)
LYMPHOCYTES NFR SPEC AUTO: 40 %
MAGNESIUM SPEC-SCNC: 1.9 MG/DL (ref 1.6–2.3)
MCH RBC QN AUTO: 31.9 PG (ref 25–35)
MCHC RBC AUTO-ENTMCNC: 36.1 G/DL (ref 31–37)
MCV RBC AUTO: 88.3 FL (ref 80–100)
MONOCYTES # BLD AUTO: 0.5 K/UL (ref 0–1)
MONOCYTES NFR BLD AUTO: 9 %
NEUTROPHILS # BLD AUTO: 2.3 K/UL (ref 1.3–7.7)
NEUTROPHILS NFR BLD AUTO: 47 %
PCO2 BLDA: 37 MMHG (ref 35–45)
PCO2 BLDA: 42 MMHG (ref 35–45)
PH BLDA: 7.42 [PH] (ref 7.35–7.45)
PH BLDA: 7.51 [PH] (ref 7.35–7.45)
PLATELET # BLD AUTO: 230 K/UL (ref 150–450)
PO2 BLDA: 71 MMHG (ref 83–108)
PO2 BLDA: 72 MMHG (ref 83–108)
POTASSIUM SERPL-SCNC: 3.9 MMOL/L (ref 3.5–5.1)
SODIUM SERPL-SCNC: 138 MMOL/L (ref 137–145)
WBC # BLD AUTO: 5 K/UL (ref 3.8–10.6)

## 2021-04-14 RX ADMIN — ALBUTEROL SULFATE SCH PUFF: 90 AEROSOL, METERED RESPIRATORY (INHALATION) at 03:04

## 2021-04-14 RX ADMIN — OXYCODONE HYDROCHLORIDE AND ACETAMINOPHEN SCH MG: 500 TABLET ORAL at 09:23

## 2021-04-14 RX ADMIN — ALBUTEROL SULFATE SCH PUFF: 90 AEROSOL, METERED RESPIRATORY (INHALATION) at 08:39

## 2021-04-14 RX ADMIN — ASPIRIN 81 MG CHEWABLE TABLET SCH MG: 81 TABLET CHEWABLE at 09:23

## 2021-04-14 RX ADMIN — CLEVIPIDINE SCH MLS/HR: 0.5 EMULSION INTRAVENOUS at 13:19

## 2021-04-14 RX ADMIN — CHLORHEXIDINE GLUCONATE SCH ML: 1.2 RINSE ORAL at 11:29

## 2021-04-14 RX ADMIN — CEFAZOLIN SCH MLS/HR: 330 INJECTION, POWDER, FOR SOLUTION INTRAMUSCULAR; INTRAVENOUS at 11:28

## 2021-04-14 RX ADMIN — SPIRONOLACTONE SCH MG: 25 TABLET, FILM COATED ORAL at 09:26

## 2021-04-14 RX ADMIN — INSULIN ASPART SCH UNIT: 100 INJECTION, SOLUTION INTRAVENOUS; SUBCUTANEOUS at 18:52

## 2021-04-14 RX ADMIN — Medication SCH MCG: at 09:34

## 2021-04-14 RX ADMIN — CEFAZOLIN SCH: 330 INJECTION, POWDER, FOR SOLUTION INTRAMUSCULAR; INTRAVENOUS at 18:55

## 2021-04-14 RX ADMIN — NICARDIPINE HYDROCHLORIDE SCH MLS/HR: 2.5 INJECTION INTRAVENOUS at 18:57

## 2021-04-14 RX ADMIN — INSULIN ASPART SCH UNIT: 100 INJECTION, SOLUTION INTRAVENOUS; SUBCUTANEOUS at 16:46

## 2021-04-14 RX ADMIN — ENOXAPARIN SODIUM SCH MG: 40 INJECTION SUBCUTANEOUS at 09:29

## 2021-04-14 RX ADMIN — INSULIN ASPART SCH UNIT: 100 INJECTION, SOLUTION INTRAVENOUS; SUBCUTANEOUS at 06:23

## 2021-04-14 RX ADMIN — ALBUTEROL SULFATE SCH PUFF: 90 AEROSOL, METERED RESPIRATORY (INHALATION) at 19:32

## 2021-04-14 RX ADMIN — CITALOPRAM HYDROBROMIDE SCH MG: 20 TABLET ORAL at 09:23

## 2021-04-14 RX ADMIN — GUAIFENESIN SCH MG: 200 SOLUTION ORAL at 04:30

## 2021-04-14 RX ADMIN — CLEVIPIDINE SCH MLS/HR: 0.5 EMULSION INTRAVENOUS at 23:07

## 2021-04-14 RX ADMIN — ATORVASTATIN CALCIUM SCH MG: 40 TABLET, FILM COATED ORAL at 09:29

## 2021-04-14 RX ADMIN — CEFAZOLIN SCH: 330 INJECTION, POWDER, FOR SOLUTION INTRAMUSCULAR; INTRAVENOUS at 11:28

## 2021-04-14 RX ADMIN — CEFAZOLIN SCH: 330 INJECTION, POWDER, FOR SOLUTION INTRAMUSCULAR; INTRAVENOUS at 11:29

## 2021-04-14 RX ADMIN — SODIUM CHLORIDE SCH MLS/HR: 900 INJECTION, SOLUTION INTRAVENOUS at 07:43

## 2021-04-14 RX ADMIN — ALBUTEROL SULFATE SCH PUFF: 90 AEROSOL, METERED RESPIRATORY (INHALATION) at 23:31

## 2021-04-14 RX ADMIN — PANTOPRAZOLE SODIUM SCH MG: 40 INJECTION, POWDER, FOR SOLUTION INTRAVENOUS at 09:26

## 2021-04-14 RX ADMIN — ALBUTEROL SULFATE SCH PUFF: 90 AEROSOL, METERED RESPIRATORY (INHALATION) at 11:13

## 2021-04-14 RX ADMIN — SODIUM CHLORIDE SCH MLS/HR: 900 INJECTION, SOLUTION INTRAVENOUS at 00:02

## 2021-04-14 RX ADMIN — DEXTROSE SCH MG: 50 INJECTION, SOLUTION INTRAVENOUS at 09:21

## 2021-04-14 RX ADMIN — GUAIFENESIN SCH MG: 200 SOLUTION ORAL at 12:30

## 2021-04-14 RX ADMIN — INSULIN ASPART SCH UNIT: 100 INJECTION, SOLUTION INTRAVENOUS; SUBCUTANEOUS at 00:40

## 2021-04-14 RX ADMIN — GUAIFENESIN SCH: 200 SOLUTION ORAL at 18:27

## 2021-04-14 RX ADMIN — ALBUTEROL SULFATE SCH PUFF: 90 AEROSOL, METERED RESPIRATORY (INHALATION) at 15:48

## 2021-04-14 RX ADMIN — MAGNESIUM SULFATE IN DEXTROSE SCH MLS/HR: 10 INJECTION, SOLUTION INTRAVENOUS at 12:02

## 2021-04-14 RX ADMIN — MAGNESIUM SULFATE IN DEXTROSE SCH MLS/HR: 10 INJECTION, SOLUTION INTRAVENOUS at 17:13

## 2021-04-14 RX ADMIN — Medication SCH MG: at 09:27

## 2021-04-14 RX ADMIN — GUAIFENESIN SCH MG: 200 SOLUTION ORAL at 00:16

## 2021-04-14 RX ADMIN — GUAIFENESIN SCH MG: 200 SOLUTION ORAL at 09:28

## 2021-04-14 RX ADMIN — GUAIFENESIN SCH MG: 200 SOLUTION ORAL at 21:49

## 2021-04-14 NOTE — XR
EXAMINATION TYPE: XR chest 1V portable

 

DATE OF EXAM: 4/14/2021

 

COMPARISON: 4/13/2021

 

INDICATION: Covid

 

TECHNIQUE: Single frontal view of the chest is obtained.

 

FINDINGS:  

The heart size is normal.  

The pulmonary vasculature is normal.  

Bibasilar infiltrates are present. Mild left pleural fluid is present.  

Endotracheal tube tip is above the ranjan. Nasogastric tube transverses the thorax.

 

IMPRESSION:  

1. Bibasilar infiltrates with small left pleural effusion, improved from comparison.

2. Lines and catheters discussed above.

## 2021-04-14 NOTE — P.PN
Subjective


Progress Note Date: 04/14/21





CHIEF COMPLAINT: Shortness of breath





HISTORY OF PRESENT ILLNESS: Patient remains in the ICU intubated.  She is being 

treated for Covid 19 pneumonia and respiratory failure.  Surgical service is 

following for possible tracheostomy and PEG tube placement.  At this time family

has declined tracheostomy and PEG tube.  She is scheduled for sedation holiday 

today. Afebrile.  WBC 5.0





Patient seen and examined with Dr. de la o





PHYSICAL EXAM: 


VITAL SIGNS: Reviewed.


GENERAL: Well-developed in no acute distress. 


HEENT:  No sclera icterus. Extraocular movements grossly intact.  Moist buccal 

mucosa. Head is atraumatic, normocephalic. 


ABDOMEN:  Soft.  Nondistended. Nontender. 


NEUROLOGIC: Intubated and sedated





ASSESSMENT: 


1.  Acute hypoxic respiratory failure secondary to Covid 19 pneumonia


2.  Severe protein calorie malnutrition








PLAN: 


-Continue ICU management


-Continue supportive care


-Patient's family has declined tracheostomy and PEG tube placement at this time


-Surgical service will remain on standby





Physician Assistant note has been reviewed by physician. Signing provider agrees

with the documented findings, assessment, and plan of care. 





Objective





- Vital Signs


Vital signs: 


                                   Vital Signs











Temp  98.8 F   04/14/21 04:00


 


Pulse  46 L  04/14/21 09:00


 


Resp  24   04/14/21 09:00


 


BP  107/53   04/14/21 09:00


 


Pulse Ox  96   04/14/21 09:00








                                 Intake & Output











 04/13/21 04/14/21 04/14/21





 18:59 06:59 18:59


 


Intake Total 2244.405 6508.665 386.307


 


Output Total 3015 1745 92


 


Balance -1612.157 -408.335 294.307


 


Weight  125.8 kg 


 


Intake:   


 


   276 46


 


    Sodium Chloride 0.9% 1, 300 240 40





    000 ml @ 20 mls/hr IV .   





    Q24H [facility] Rx#:[id number]   


 


    pressure bag 39 36 6


 


  Intake, IV Titration 293.843 334.665 134.307





  Amount   


 


    Clevidipine Butyrate 25   37.267





    mg In Empty Bag 1 bag @ 1   





    MG/HR 2 mls/hr IV .Q24H   





    VIOLET Rx#:[id number]   


 


    fentaNYL (PF). 1,000 mcg 193.843 100 97.04





    In Sodium Chloride 0.9%   





    80 ml @ Per Protocol IV .   





    Q0M [facility] Rx#:[id number]   


 


    propofoL 1,000 mg In 100.000 234.665 





    Empty Bag 1 bag @ Titrate   





    IV .Q0M [facility] Rx#:   





    [id number]   


 


  Tube Feeding 620 636 106


 


  Other 150 90 100


 


Output:   


 


  Urine 3015 1745 92


 


Other:   


 


  Voiding Method Indwelling Catheter Indwelling Catheter Indwelling Catheter








                       ABP, PAP, CO, CI - Last Documented











Arterial Blood Pressure        102/43

















- Labs


CBC & Chem 7: 


                                 04/14/21 04:57





                                 04/14/21 04:57


Labs: 


                  Abnormal Lab Results - Last 24 Hours (Table)











  04/13/21 04/13/21 04/13/21 Range/Units





  11:32 16:30 17:34 


 


D-Dimer     (<0.60)  mg/L FEU


 


ABG pH     (7.35-7.45)  


 


ABG pO2     ()  mmHg


 


ABG HCO3     (21-25)  mmol/L


 


ABG Total CO2     (19-24)  mmol/L


 


Potassium   2.4 L*   (3.5-5.1)  mmol/L


 


BUN     (7-17)  mg/dL


 


Glucose     (74-99)  mg/dL


 


POC Glucose (mg/dL)  162 H   173 H  (75-99)  mg/dL


 


Magnesium     (1.6-2.3)  mg/dL


 


Lactate Dehydrogenase     (313-618)  U/L














  04/13/21 04/14/21 04/14/21 Range/Units





  17:39 00:29 04:57 


 


D-Dimer     (<0.60)  mg/L FEU


 


ABG pH     (7.35-7.45)  


 


ABG pO2     ()  mmHg


 


ABG HCO3     (21-25)  mmol/L


 


ABG Total CO2     (19-24)  mmol/L


 


Potassium     (3.5-5.1)  mmol/L


 


BUN     (7-17)  mg/dL


 


Glucose     (74-99)  mg/dL


 


POC Glucose (mg/dL)   151 H   (75-99)  mg/dL


 


Magnesium  1.1 L    (1.6-2.3)  mg/dL


 


Lactate Dehydrogenase    667 H  (313-618)  U/L














  04/14/21 04/14/21 04/14/21 Range/Units





  04:57 05:05 06:02 


 


D-Dimer     (<0.60)  mg/L FEU


 


ABG pH   7.51 H   (7.35-7.45)  


 


ABG pO2   72 L   ()  mmHg


 


ABG HCO3   29 H   (21-25)  mmol/L


 


ABG Total CO2   30 H   (19-24)  mmol/L


 


Potassium     (3.5-5.1)  mmol/L


 


BUN  23 H    (7-17)  mg/dL


 


Glucose  111 H    (74-99)  mg/dL


 


POC Glucose (mg/dL)    134 H  (75-99)  mg/dL


 


Magnesium     (1.6-2.3)  mg/dL


 


Lactate Dehydrogenase     (313-618)  U/L














  04/14/21 Range/Units





  06:05 


 


D-Dimer  1.60 H  (<0.60)  mg/L FEU


 


ABG pH   (7.35-7.45)  


 


ABG pO2   ()  mmHg


 


ABG HCO3   (21-25)  mmol/L


 


ABG Total CO2   (19-24)  mmol/L


 


Potassium   (3.5-5.1)  mmol/L


 


BUN   (7-17)  mg/dL


 


Glucose   (74-99)  mg/dL


 


POC Glucose (mg/dL)   (75-99)  mg/dL


 


Magnesium   (1.6-2.3)  mg/dL


 


Lactate Dehydrogenase   (313-618)  U/L

## 2021-04-14 NOTE — P.PN
Subjective


Progress Note Date: 04/14/21

















61-year-old female, who hasn't been feeling well for about 10-11 days.  She 

tested positive for COVID 19 on March 24.  She's been sick for 10-11 days old.  

She complains of TYPICAL symptoms including weakness, fever, diarrhea, cough, 

and shortness of breath.  She's quite hypoxemic.  On AIRVO 100%, and a 

nonrebreather mask, her saturations are right around 80-83%.  Any movement, and 

she desaturates even further.  I just called the intensive care unit, and we'll 

plan on putting her in the intensive care unit.  She's currently on saline at 75

mL an hour.  She can barely speak without becoming short of breath.  The patient

is not a candidate for REM, but could get TOCI.  Sodium 138, potassium 3.8, 

chlorides 108, CO2 21, anion gap 9, BUN 19, creatinine 0.7.  AST 94, ALTs 60, 

LDH 1471, C-reactive protein 88.1.  Again, the patient's been sick for at least 

10-11 days and maybe a bit longer.  Her oral intake has been poor as well.  The 

patient's chest x-ray shows diffuse bilateral infiltrates consistent with 

coronavirus infection.





The patient is seen today 04/01/2021 in follow-up in the intensive care unit.  

Shortly after arriving to the ICU yesterday she required intubation and 

mechanical ventilatory support injury to acute hypoxemic respiratory failure.  

She is currently on the ventilator and assist control mode with a rate of 34, 

tidal on 450, FiO2 70%, PEEP of 16.  Morning blood gases reveal pO2 of 70, pCO2 

33, P 87.38.  Fentanyl drip at 0.5 mcg/kg per hour, propofol at 15 mcg/kg/m, 

Nimbex at 1 mcg/min per hour.  0.9 normal saline at 130 ML's per hour.  She did 

receive Tociluzimab.  She remains on Decadron 6 mg IV daily.  Lovenox 40 mg 

subcutaneous daily.  Tube feedings will be initiated for nutritional support.  

Blood cultures reveal no growth.  White count 2.9.  Hemoglobin 11.3 and 

platelets 188.  Lymphocytes 0.8.  She is continued on Lovenox, dexamethasone, 

vitamin supplements.





The patient is seen today 04/02/2021 in follow-up in the intensive care unit.  

She remains intubated on the mechanical ventilator.  Assist-control mode.  Rate 

of 30, tidal volume 450, FiO2 60% and a PEEP of 16.  She is currently sedated on

propofol at 40 mcg/kg/m, Nimbex at 1 mcg/kg/m, fentanyl at 0.5 mcg/kg per hour. 

She has 0.9 normal saline at 130 ML's per hour.  She is being nourished with 

vital HP at 20 ML's per hour.  She did receive tocilizumab.  He remains on 

dexamethasone, Lovenox, vitamin supplements.  Chest x-ray showing persistent but

improving patchy bilateral airspace disease.  Sputum and blood cultures are 

pending.  White count 3.9.  Hemoglobin 11.4.  Lymphocytes 0.7.  D-dimer 1.19.  

Sodium 139.  Potassium 4.4.  Bicarb 20.  Creatinine 0.64.  Glucose 174.





The patient is seen today 04/03/2020 in follow-up in the intensive care unit.  

She remains intubated on mechanical ventilator.  Current settings are assist-

control mode.  Rate of 34, tidal volume 450, FiO2 50% and a PEEP of 16.  Morning

blood gases reveal a pO2 of 117, pCO2 30, pH 7.40.  She remains sedated on 

propofol at 55 mcg/kg/m.  Fentanyl and 1 mcg/kg/hr.  She is being nourished with

vital HP at 33 ML's per hour.  0.9 normal saline at 130 ML's per hour.  She 

remains on dexamethasone, Lovenox, bronchodilators.  White count 4.0.  

Hemoglobin 11.8.  D-dimer 1.81.  Sodium 138.  Potassium 4.2.  Bicarb 19.  LDH 

1133.  C-reactive protein 15.5.  Asked x-ray continues to show moderate 

scattered interstitial and partial airspace opacities.  





The patient is seen today 04/04/2021 in follow-up in the intensive care unit.  

She remains intubated, sedated.  Current settings are assist control at a rate 

of 30, tidal volume 450, FiO2 50% and a PEEP of 15.  She is sedated on propofol 

at 60 mcg/kg/m, fentanyl at 1 mcg/kg per hour, 0.9 normal saline 130 ML's per 

hour.  Vital HP at 24 mls per hour.  She did receive Actemra.  Blood culture 

reveals no growth.  Sputum culture reveals no growth.  White count 5.3.  

Hemoglobin 12.0.  D-dimer 2.0.  Sodium 139.  Potassium 4.0.  Creatinine 0.59.  

LDH 1082.  C reactive protein 8.6.  Chest x-ray continues to show no interval 

change, continues with persistent moderate patchy opacities bilaterally.  

Endotracheal tube to be advanced to 2 cms.  Continued on vitamin supplements, 

Lovenox, IV Solu-Medrol. 





Progress note dated 04/05/2021.





This is a patient was admitted on March 31 for COVID 19 pneumonia.  The patient 

came to the ICU on March 31, and was intubated on the same day.  She apparently 

tested positive back on March 24.  The patient remains on the mechanical 

ventilator.  She is on the volume assist control mode, rate of 30, tidal volume 

450, FiO2 50%, PEEP of 15 to be dropped to 10.  Blood gases show pO2 of 66, pCO2

33, and a pH 7.41.  The patient's on propofol at 60 mcg/kg/m, fentanyl 1 

mcg/kg/h, normal saline at 130 mL an hour and vital high protein at 24, which is

goal.  The patient did receive TOCI.   White count is 6.2, hemoglobin 11.8, he

matocrit 33.1, platelet count 272,000.  Sodium 137, potassium 3.8, chlorides 

114, CO2 20, anion gap 3, BUN 18, creatinine 0.53.  LDH is 956.  Chest x-ray 

shows worsened bilateral infiltrates.  Small effusions are noted.





Progress note dated 04/06/2021.





61-year-old female, admitted on March 31 for COVID 19 pneumonia.  She came to 

the ICU on March 31 and was intubated on the same day.  She tested positive back

on March 24.  The patient remains on mechanical ventilation.  She is on the 

volume assist control mode, rate 36, tidal volume 450, FiO2 50%, and PEEP is 13.

 Arterial blood gases show a PaO2 of 74, pCO2 31, and a pH of 7.52.  Blood gases

are consistent with relative hypoxemia, and a combined respiratory and metabolic

alkalosis.  The patient is getting propofol 60 mcg/kg/m, fentanyl 1 emily per 

kilogram per hour, saline at 40 mL an hour, dopamine at 2 mcg/kg/m, and vital 

high protein at goal, which is 24 mL an hour.  The cardiologist wanted to DC the

dopamine.  We are going to try to lower PEEP from 13 down to 10.  Today's CBC is

normal.  Sodium 138, potassium 3.4, chlorides 109, CO2 24, anion gap 5, BUN 17, 

and creatinine 0.53.  Chest x-ray shows improving bibasilar infiltrates.





The patient is seen today 04/07/2021 and follow-up in the intensive care unit.  

She remains intubated and on the mechanical ventilator.  Current mode assist 

control with a rate of 30, tidal volume 450, FiO2 50% and a PEEP of 10.  Morning

blood gases reveal a P O2 of 58, pCO2 32, pH 7.51.  She remains sedated on 

propofol at 60 mcg/kg/m.  Fentanyl at 1 mcg/kg/h, 0.9#at 40 ML's per hour.  She 

is being nourished with vital HP at 24 ML's per hour which is goal.  She is 

currently afebrile.  Hemodynamically stable.  She is receive daily interruption 

of sedation without success.  The plan will be for trach and PEG later this 

week.  Blood and sputum cultures revealed no growth.  WBC 6.4.  Hemoglobin 12.8.

 Sodium 137.  Potassium 3.5.  Creatinine 0.60.  Glucose 120.  AST 53.  ALT 69.  

Chest x-ray continues to show patchy perihilar and basilar infiltrates with 

interval progression.





The patient is seen today 04/08/2021 in follow-up in the intensive care unit.  

She remains intubated on mechanical ventilator in assist control mode at a rate 

of 30, tight around 450, FiO2 50% and a PEEP of 10.  Morning blood gases reveal 

a P O2 of 58, pCO2 30, pH 7.54.  She remains sedated on propofol at 50 mcg/kg/m.

 Fentanyl at 2 mcg/kg/h.  0.9 normal saline at 40 ML's per hour.  She was 

initiated back on dopamine at 2 mcg/kg/m due to significant bradycardia with 

heart rate in the 20s temporarily.  Currently heart rate in the 40s.  She has 

remained hypertensive.  Chest x-ray continues to show bilateral patchy 

infiltrates.  Stable compared to previous.  Blood and sputum cultures revealed 

no growth.  White count 5.5.  Hemoglobin 11.8.  D-dimer 1.59.  Sodium 135.  

Potassium 3.9.  Creatinine 0.61.  AST 53.  ALT 67.  .  C-reactive protein

5.9.  She remains on Lovenox, dexamethasone, vitamin supplements.





The patient is seen today 04/09/2021 in follow-up in the intensive care unit.  

She remains intubated and on mechanical ventilator.  Current settings are 

assist-control mode.  Rate of 30, tidal volume 450.  FiO2 50% and a PEEP of 13. 

Morning blood gases reveal a pO2 of 76, pCO2 31, pH 7.53.  She remains sedated 

on propofol at 40 mcg/kg/m, dopamine at 2 mcg/kg/m.  Fentanyl at 1.5 mg/kg per 

hour.  0.9 normal saline at 40 miles per hour.  She is being nourished with 

vital HP at 24 ML's per hour which is goal.  Chest x-ray reveals bilateral 

airspace disease slightly improved.  Blood, sputum cultures reveal no growth.  

White count 6.6.  Hemoglobin 12.8.  Sodium 139.  Potassium 4.0.  Chloride 108.  

Bicarb 25.  Creatinine 0.52.  .  C-reactive protein 6.4.  She remains on 

vitamin supplements, Lovenox, Decadron.





The patient is seen today 04/10/2021 in follow-up in the intensive care unit.  

She remains intubated, sedated and on mechanical ventilator.  Current settings 

assist-control mode at a rate of 24, tidal volume 450, FiO2 50% and a PEEP of 

13.  Morning blood gases revealed a pO2 80, pCO2 37, pH 7.44.  She is sedated on

propofol at 30 mcg/kg/m.  Fentanyl at 1.5 mcg/kg per hour.  Remains on dopamine 

at 2.5 mcg/kg/m.  0.9 normal saline at 40 ML's per hour.  She is being nourished

with vital HP at 33,'s per hour which is goal.  Chest x-ray continues to 

revealed bibasilar infiltrates with small left pleural effusion.  Blood and 

sputum cultures reveal no growth.  White count 7.8.  Hemoglobin 12.7.  Platelet 

count 254.  Sodium 130.  Potassium 4.2.  Creatinine 0.61.  AST 77.  .  

.  C-reactive protein 6.8.  She remains on dexamethasone, Lovenox, 

vitamin supplements.





The patient is seen today 04/11/2021 in follow-up in the intensive care unit.  

She remains intubated, sedated and on the mechanical ventilator.  Currently 

assist-control mode at 24, tidal volume 450, FiO2 50% and a PEEP of 10.  Morning

blood gases reveal a P O2 of 79, pCO2 37, pH 7.46.  She remains on propofol at 

30 mcg/kg/m, fentanyl at 1 g 1.5 mcg/kg/h.  Dopamine at 2.5 mcg/kg/m.  0.9 

normal saline at 40 ML's per hour.  Being nourished with vital HP at 33 ML's per

hour which is goal.  Chest x-ray continues to show bilateral patchy infiltrates 

with a small left pleural effusion.  Blood and sputum cultures reveal no growth.

 White count 5.9.  Hemoglobin 12.7.  Sodium 139.  Potassium 3.8.  Creatinine 

0.5.  AST 61.  ALT 99.  She remains on Lovenox, dexamethasone, vitamin suppl

ements.








 


On  04/12/2021, the patient is being seen in follow-up in the intensive care 

unit.  The patient has been diagnosed having COVID 19 pneumonia and the patient 

urgently tested positive on 03/24/2024.  Subsequently, the patient came to the 

hospital approximately 11 days ago with respiratory failure initially placed on 

high flow oxygen and subsequently the patient failed and the patient had to be 

intubated and placed on a mechanical ventilator on 03/31/2021 and the patient 

has been mechanical ventilator since.  During the course, the patient received a

combination of treatment including Decadron,Tocilizumab, and Lovenox.  The 

patient for now remains on a mechanical ventilator.  On today's evaluation, the 

patient on assist control mode volume cycle with a rate of 24, tidal volume of 

450, FiO2 of 50% with a PEEP of 10.  The patient is sedated with propofol 

running at 25 mg/kg per minute and fentanyl at 1.2 mcg/kilogram/hour.  IV fluids

running at 40 mL an hour.  The patient is receiving vital high protein at the 

rate of 33 mL an hour.  Chest x-ray showing changes patchy bilateral pulmonary 

infiltrates.  ET tube is in a good location.  There is a small left pleural 

effusion.  Blood cultures of been negative.  Sputum cultures of been negative.  

The most recent inflammatory markers showed an LDH level of 868 on 04/10/2021 

and a CRP of 0.3.  The d-dimer is 1.42.  The blood gases from today showed a pH 

of 7.51 with a pCO2 of 35 and pO2 of 91.  The chest x-ray showing lower lobe at 

the pulmonary infiltrates and ET tube is in a good location.  The patient also 

has a orogastric tube in place.  Patient's triglyceride level on 04/09/2021 was 

291.  The patient is also on dopamine to enhance the hard data the patient had 

some underlying sinus bradycardia.  As for blood pressure control, the patient 

is on a combination of lisinopril and Norvasc.  The patient is also on Cleviprex

for a tighter blood pressure control which is being gradually titrated and 

weaned off.  She is currently on dopamine at 2.5 mcg kilogram per minute for 

bradycardia and cardiac cause.  The patient is currently in a normal sinus 

rhythm.  Peak airway pressures around 24-26





Today's evaluation of 04/13/2021 the patient is being seen for a follow-up.  

This is a 59-year-old female patient with Covid 19 related pneumonia was been 

intubated and then on the mechanical ventilator since 03/31/2021.  The patient 

has been aggressively treated during the course of the hospitalization.  This 

morning, the patient has been on propofol which is running at 25 mcg/kg per 

minute and fentanyl is running at 1.5 mcg/kg/h.  The patient is on no paralysis 

for now.  The patient is an assist-control mode at the rate of 24 with a tidal 

volume of 450 and FiO2 of 40% with a PEEP of 6.  The chest x-ray from today is 

showing diffuse breath and pulmonary infiltrates more so in the lower lobes 

bilaterally.  ET tube is in a good location.  No major interval change compared 

to yesterday and the findings are essentially stable.  The peak airway pressure 

 and static pressures are 24 and 21 respectively.  The patient has an LDH of 

8:15, CRP of 5.9.  The patient remains on Decadron 6 mg IV every 24 hours and 

the patient is also on Lovenox 40 mg subcu daily.  The patient is currently off 

dopamine.  Her cardiac rhythm is sinus, bradycardia, rate of 52.  She is 

afebrile.  She is tolerating enteral feeding for nutritional support and she is 

currently on vital high protein at goal which is 53 mL an hour.  No other 

significant events overnight.  The patient is not having any significant 

orotracheal secretions.  Note that the patient was given a sedation holiday 

yesterday.  She was given a CPAP trial for a total of 20 minutes.  At the 

completion of this trial, the patient became tachypneic and quite restless.  We 

are concerned about extubation and we aborted the child and she is back on 

treatment and sedation.





Today's evaluation of 04/14/2021, the patient is being seen for a follow-up.  

The patient remains intubated on a mechanical ventilator.  On today's evaluation

she is on protocol for which is running at 25 mcg peak airway pressure is 25, 

static pressures around 22.  kilogram per minute and fentanyl is running at 1 

mcg/kg/h.  The patient is on a mechanical ventilator on today's vent setting 

include a tidal volume of 450, FiO2 of 40%, PEEP of 5 and a rate of 24.  The 

blood gases from today is showing EHL 7.51 with a pCO2 of 37 and pO2 of 72.  The

chest x-ray from today showing some further improvement in the infiltrates 

bilaterally and there is some improvement in the consolidation of the left lower

lobe.  Meanwhile, or tracheal tube is in place and orogastric tube is also in 

place.  The patient has had his respiratory secretions.  D-dimer today's at 1.6 

with a LDH of 667 and his CRP of 5.8.  Rest of the blood work is still pending 

for now.  Meanwhile, the patient is on Decadron 6 mg IV every 24 hours, Lovenox 

40 mg subcu every 24 hours and the patient is also on NovoLog per sliding scale 

coverage.  IV fluids are at 20 mL an hour and the patient has a net fluid 

balance of -700 mL over the past 24 hours.  Weight is down 125 kg.  She is still

on enteral feeding for nutritional support and the patient is receiving vital 

high protein at the rate of 53 mL an hour and she is able to tolerate.  In terms

of cardiac rhythm, the patient is still having some sinus bradycardia.  No card

iac causes has been noted.  DPs are not prolonged cardiac pauses and we have 

witnessed some pauses for around 3-4 seconds.  We have avoided using Precedex 

for that reason and the patient is still on a combination of propofol and 

fentanyl for now.  The peak airway pressures around 25 and a static pressure is 

22 at this point in time.  She is having increased rest or secretions.





Objective





- Vital Signs


Vital signs: 


                                   Vital Signs











Temp  98.8 F   04/14/21 04:00


 


Pulse  54 L  04/14/21 05:00


 


Resp  24   04/14/21 05:00


 


BP  108/55   04/14/21 04:00


 


Pulse Ox  96   04/14/21 05:00








                                 Intake & Output











 04/13/21 04/14/21 04/14/21





 18:59 06:59 18:59


 


Intake Total 8143.469 8353.665 


 


Output Total 3015 1635 


 


Balance -1612.157 -374.335 


 


Weight  125.8 kg 


 


Intake:   


 


   253 


 


    Sodium Chloride 0.9% 1, 300 220 





    000 ml @ 20 mls/hr IV .   





    Q24H VIOLET Rx#:[id number]   


 


    pressure bag 39 33 


 


  Intake, IV Titration 293.843 334.665 





  Amount   


 


    fentaNYL (PF). 1,000 mcg 193.843 100 





    In Sodium Chloride 0.9%   





    80 ml @ Per Protocol IV .   





    Q0M VIOLET Rx#:[id number]   


 


    propofoL 1,000 mg In 100.000 234.665 





    Empty Bag 1 bag @ Titrate   





    IV .Q0M VIOLET Rx#:   





    [id number]   


 


  Tube Feeding 620 583 


 


  Other 150 90 


 


Output:   


 


  Urine 3015 1635 


 


Other:   


 


  Voiding Method Indwelling Catheter Indwelling Catheter 








                       ABP, PAP, CO, CI - Last Documented











Arterial Blood Pressure        126/56

















- Exam








GENERAL EXAM: Intubated, sedated, 61-year-old female patient, comfortable in no 

apparent distress, synchronous with the vent.


HEAD: Normocephalic.


EYES: Normal reaction of pupils, equal size.


NOSE: Clear with pink turbinates.


THROAT: Oral endotracheal and gastric tube secured in place. No erythema or 

exudates.


NECK: No masses, no JVD.


CHEST: No chest wall deformity.


LUNGS: Equal air entry with crackles in the bilateral posterior bases.


CVS: S1 and S2 normal with no audible murmur, regular rhythm.


ABDOMEN: No hepatosplenomegaly, normal bowel sounds, no guarding or rigidity.


SPINE: No scoliosis or deformity


SKIN: No rashes


CENTRAL NERVOUS SYSTEM: Sedated, tone is normal in all 4 extremities.


EXTREMITIES: There is no peripheral edema.  No clubbing, no cyanosis.  

Peripheral pulses are intact.








- Labs


CBC & Chem 7: 


                                 04/13/21 04:05





                                 04/13/21 17:39


Labs: 


                  Abnormal Lab Results - Last 24 Hours (Table)











  04/13/21 04/13/21 04/13/21 Range/Units





  11:32 16:30 17:34 


 


D-Dimer     (<0.60)  mg/L FEU


 


ABG pH     (7.35-7.45)  


 


ABG pO2     ()  mmHg


 


ABG HCO3     (21-25)  mmol/L


 


ABG Total CO2     (19-24)  mmol/L


 


Potassium   2.4 L*   (3.5-5.1)  mmol/L


 


POC Glucose (mg/dL)  162 H   173 H  (75-99)  mg/dL


 


Magnesium     (1.6-2.3)  mg/dL


 


Lactate Dehydrogenase     (313-618)  U/L














  04/13/21 04/14/21 04/14/21 Range/Units





  17:39 00:29 04:57 


 


D-Dimer     (<0.60)  mg/L FEU


 


ABG pH     (7.35-7.45)  


 


ABG pO2     ()  mmHg


 


ABG HCO3     (21-25)  mmol/L


 


ABG Total CO2     (19-24)  mmol/L


 


Potassium     (3.5-5.1)  mmol/L


 


POC Glucose (mg/dL)   151 H   (75-99)  mg/dL


 


Magnesium  1.1 L    (1.6-2.3)  mg/dL


 


Lactate Dehydrogenase    667 H  (313-618)  U/L














  04/14/21 04/14/21 04/14/21 Range/Units





  05:05 06:02 06:05 


 


D-Dimer    1.60 H  (<0.60)  mg/L FEU


 


ABG pH  7.51 H    (7.35-7.45)  


 


ABG pO2  72 L    ()  mmHg


 


ABG HCO3  29 H    (21-25)  mmol/L


 


ABG Total CO2  30 H    (19-24)  mmol/L


 


Potassium     (3.5-5.1)  mmol/L


 


POC Glucose (mg/dL)   134 H   (75-99)  mg/dL


 


Magnesium     (1.6-2.3)  mg/dL


 


Lactate Dehydrogenase     (313-618)  U/L














Assessment and Plan


Plan: 








1 Acute hypoxemic respiratory failure secondary to CoVID 19 

pneumonia/pneumonitis requiring intubation mechanical ventilatory support on 

03/31/2021.  Outside the window for Remdesivir. Received tocilizumab.  The 

patient remains on a mechanical ventilator.  The patient is in a volume cycled. 

Chest x-ray was noted.  Blood gases was noted.  The patient had prolonged 

respiratory failure.  Recommendations were done yesterday was tracheostomy and 

PEG tube insertion for ongoing care of the patient had prolonged respiratory f

ailure requiring intubation mechanical ventilation.  Family has not made a final

decision.  In fact, the patient did not want PEG and trach for now.  Meanwhile, 

the patient is going to be given another sedation holiday and we are going to 

recheck her weaning parameters.  This will largely depend on her ability to 

follow commands and adequately recovered from sedation and performed  weaning 

trials.  The patient's chest x-ray stable.  There is improvement in the 

pulmonary consolidation.  She is on a PEEP of 5 with an FiO2 of 40%.  There are 

some increased secretions.





2 Hypertension, currently off Cleviprex and the patient is currently on a 

combination of lisinopril and Norvasc for blood pressure control.





3 Paroxysmal atrial fibrillation, current rhythm is sinus





4 Hyperlipidemia





5 sinus Bradycardia, requiring dopamine drip for cardiac rhythm enhancement.  No

significant arrhythmias have been noted.  The patient is currently off dopamine





6 enteral feeding for nutritional support





Plan:


 


Sedation holiday


Check weaning parameters


PEEP down to 5 mics it is at 40%


Give the patient   breathing trial once more awake.  My recommendation is to 

wean off the propofol and keep some fentanyl board for sedation and this was 

tried yesterday the same will be tried again today.  Unable to use Precedex as 

the patient is running some sinus bradycardia and occasional brief cardiac 

pauses.


Use Cleviprex drip for blood pressure control if needed


Continue Decadron 6 mg on a daily basis


Lovenox 40 mg subcu on a daily basis.  The patient does of Lasix


Tracheostomy tube insertion and PEG tube insertion if the family is agreeable, 

no plans for trach for now her family


Continue enteral feeding for nutritional support 


Repeat chest x-ray, inflammatory markers in the a.m.


Lasix 40 mg IV 1 , and additional dose of Lasix to be given today


We will continue to follow and make further recommendations based on her 

clinical status


Critical care evaluation that was done more than 30 minutes


Time with Patient: Greater than 30


Time with Patient: Greater than 30

## 2021-04-15 LAB
ALBUMIN SERPL-MCNC: 3.4 G/DL (ref 3.5–5)
ALP SERPL-CCNC: 79 U/L (ref 38–126)
ALT SERPL-CCNC: 93 U/L (ref 4–34)
ANION GAP SERPL CALC-SCNC: 8 MMOL/L
AST SERPL-CCNC: 70 U/L (ref 14–36)
BASOPHILS # BLD AUTO: 0.1 K/UL (ref 0–0.2)
BASOPHILS NFR BLD AUTO: 1 %
BUN SERPL-SCNC: 16 MG/DL (ref 7–17)
CALCIUM SPEC-MCNC: 9.1 MG/DL (ref 8.4–10.2)
CHLORIDE SERPL-SCNC: 98 MMOL/L (ref 98–107)
CO2 SERPL-SCNC: 28 MMOL/L (ref 22–30)
D DIMER PPP FEU-MCNC: 1.81 MG/L FEU (ref ?–0.6)
EOSINOPHIL # BLD AUTO: 0.1 K/UL (ref 0–0.7)
EOSINOPHIL NFR BLD AUTO: 1 %
ERYTHROCYTE [DISTWIDTH] IN BLOOD BY AUTOMATED COUNT: 4.14 M/UL (ref 3.8–5.4)
ERYTHROCYTE [DISTWIDTH] IN BLOOD: 14.3 % (ref 11.5–15.5)
FERRITIN SERPL-MCNC: 439.4 NG/ML (ref 10–291)
FIBRINOGEN PPP-MCNC: 485 MG/DL (ref 200–500)
GLUCOSE BLD-MCNC: 115 MG/DL (ref 75–99)
GLUCOSE BLD-MCNC: 117 MG/DL (ref 75–99)
GLUCOSE BLD-MCNC: 139 MG/DL (ref 75–99)
GLUCOSE BLD-MCNC: 182 MG/DL (ref 75–99)
GLUCOSE BLD-MCNC: 95 MG/DL (ref 75–99)
GLUCOSE SERPL-MCNC: 99 MG/DL (ref 74–99)
HCT VFR BLD AUTO: 36.3 % (ref 34–46)
HGB BLD-MCNC: 12.9 GM/DL (ref 11.4–16)
LYMPHOCYTES # SPEC AUTO: 2.3 K/UL (ref 1–4.8)
LYMPHOCYTES NFR SPEC AUTO: 34 %
MAGNESIUM SPEC-SCNC: 2 MG/DL (ref 1.6–2.3)
MCH RBC QN AUTO: 31.3 PG (ref 25–35)
MCHC RBC AUTO-ENTMCNC: 35.7 G/DL (ref 31–37)
MCV RBC AUTO: 87.6 FL (ref 80–100)
MONOCYTES # BLD AUTO: 0.4 K/UL (ref 0–1)
MONOCYTES NFR BLD AUTO: 6 %
NEUTROPHILS # BLD AUTO: 4 K/UL (ref 1.3–7.7)
NEUTROPHILS NFR BLD AUTO: 57 %
PLATELET # BLD AUTO: 262 K/UL (ref 150–450)
POTASSIUM SERPL-SCNC: 3.6 MMOL/L (ref 3.5–5.1)
PROT SERPL-MCNC: 6 G/DL (ref 6.3–8.2)
SODIUM SERPL-SCNC: 134 MMOL/L (ref 137–145)
WBC # BLD AUTO: 7 K/UL (ref 3.8–10.6)

## 2021-04-15 RX ADMIN — ALBUTEROL SULFATE SCH PUFF: 90 AEROSOL, METERED RESPIRATORY (INHALATION) at 07:09

## 2021-04-15 RX ADMIN — NICARDIPINE HYDROCHLORIDE SCH: 2.5 INJECTION INTRAVENOUS at 18:49

## 2021-04-15 RX ADMIN — INSULIN ASPART SCH: 100 INJECTION, SOLUTION INTRAVENOUS; SUBCUTANEOUS at 21:21

## 2021-04-15 RX ADMIN — INSULIN ASPART SCH: 100 INJECTION, SOLUTION INTRAVENOUS; SUBCUTANEOUS at 06:19

## 2021-04-15 RX ADMIN — ACETAMINOPHEN PRN MG: 325 TABLET, FILM COATED ORAL at 21:52

## 2021-04-15 RX ADMIN — ENOXAPARIN SODIUM SCH MG: 40 INJECTION SUBCUTANEOUS at 08:41

## 2021-04-15 RX ADMIN — GUAIFENESIN SCH: 200 SOLUTION ORAL at 20:53

## 2021-04-15 RX ADMIN — OXYCODONE HYDROCHLORIDE AND ACETAMINOPHEN SCH MG: 500 TABLET ORAL at 08:40

## 2021-04-15 RX ADMIN — SPIRONOLACTONE SCH MG: 25 TABLET, FILM COATED ORAL at 08:39

## 2021-04-15 RX ADMIN — Medication SCH MG: at 08:41

## 2021-04-15 RX ADMIN — DEXTROSE SCH MG: 50 INJECTION, SOLUTION INTRAVENOUS at 08:40

## 2021-04-15 RX ADMIN — Medication SCH MCG: at 08:41

## 2021-04-15 RX ADMIN — ALBUTEROL SULFATE SCH PUFF: 90 AEROSOL, METERED RESPIRATORY (INHALATION) at 19:40

## 2021-04-15 RX ADMIN — GUAIFENESIN SCH: 200 SOLUTION ORAL at 15:42

## 2021-04-15 RX ADMIN — GUAIFENESIN SCH: 200 SOLUTION ORAL at 00:19

## 2021-04-15 RX ADMIN — ALBUTEROL SULFATE SCH PUFF: 90 AEROSOL, METERED RESPIRATORY (INHALATION) at 15:19

## 2021-04-15 RX ADMIN — FUROSEMIDE SCH MG: 40 TABLET ORAL at 15:42

## 2021-04-15 RX ADMIN — CITALOPRAM HYDROBROMIDE SCH MG: 20 TABLET ORAL at 08:40

## 2021-04-15 RX ADMIN — ATORVASTATIN CALCIUM SCH MG: 40 TABLET, FILM COATED ORAL at 08:40

## 2021-04-15 RX ADMIN — GUAIFENESIN SCH: 200 SOLUTION ORAL at 08:06

## 2021-04-15 RX ADMIN — ASPIRIN 81 MG CHEWABLE TABLET SCH MG: 81 TABLET CHEWABLE at 08:41

## 2021-04-15 RX ADMIN — PANTOPRAZOLE SODIUM SCH MG: 40 INJECTION, POWDER, FOR SOLUTION INTRAVENOUS at 08:39

## 2021-04-15 RX ADMIN — GUAIFENESIN SCH: 200 SOLUTION ORAL at 11:48

## 2021-04-15 RX ADMIN — CLEVIPIDINE SCH MLS/HR: 0.5 EMULSION INTRAVENOUS at 03:12

## 2021-04-15 RX ADMIN — INSULIN ASPART SCH UNIT: 100 INJECTION, SOLUTION INTRAVENOUS; SUBCUTANEOUS at 16:55

## 2021-04-15 RX ADMIN — CLEVIPIDINE SCH MLS/HR: 0.5 EMULSION INTRAVENOUS at 17:28

## 2021-04-15 RX ADMIN — INSULIN ASPART SCH UNIT: 100 INJECTION, SOLUTION INTRAVENOUS; SUBCUTANEOUS at 11:50

## 2021-04-15 RX ADMIN — INSULIN ASPART SCH: 100 INJECTION, SOLUTION INTRAVENOUS; SUBCUTANEOUS at 00:19

## 2021-04-15 RX ADMIN — ALBUTEROL SULFATE SCH PUFF: 90 AEROSOL, METERED RESPIRATORY (INHALATION) at 03:15

## 2021-04-15 RX ADMIN — GUAIFENESIN SCH: 200 SOLUTION ORAL at 04:37

## 2021-04-15 RX ADMIN — ALBUTEROL SULFATE SCH PUFF: 90 AEROSOL, METERED RESPIRATORY (INHALATION) at 11:05

## 2021-04-15 NOTE — P.PN
Subjective


Progress Note Date: 04/15/21





CHIEF COMPLAINT: Shortness of breath





HISTORY OF PRESENT ILLNESS: Patient remains in the ICU intubated.  She is being 

treated for Covid 19 pneumonia and respiratory failure.  Patient was 

successfully extubated yesterday.  She is currently on 3 L satting at 98%.  

Afebrile.  WBC 7.0.  Patient has been started on a clear liquid diet





Patient seen and examined with Dr. de la o





PHYSICAL EXAM: 


VITAL SIGNS: Reviewed.


GENERAL: Well-developed in no acute distress. 


HEENT:  No sclera icterus. Extraocular movements grossly intact.  Moist buccal 

mucosa. Head is atraumatic, normocephalic. 


ABDOMEN:  Soft.  Nondistended. Nontender. 


NEUROLOGIC: Patient is awake and follows commands





ASSESSMENT: 


1.  Acute hypoxic respiratory failure secondary to Covid 19 pneumonia


2.  Severe protein calorie malnutrition








PLAN: 


-Continue ICU management


-Continue supportive care


-Surgical service will remain on standby





Physician Assistant note has been reviewed by physician. Signing provider agrees

with the documented findings, assessment, and plan of care. 





Objective





- Vital Signs


Vital signs: 


                                   Vital Signs











Temp  99.5 F   04/15/21 08:00


 


Pulse  73   04/15/21 09:00


 


Resp  25 H  04/15/21 09:00


 


BP  122/66   04/15/21 09:00


 


Pulse Ox  98   04/15/21 09:00








                                 Intake & Output











 04/14/21 04/15/21 04/15/21





 18:59 06:59 18:59


 


Intake Total 863.928 344.833 169


 


Output Total 1867 1370 180


 


Balance -1003.072 -1025.167 -11


 


Weight 125.8 kg 122.5 kg 


 


Intake:   


 


   276 69


 


    Sodium Chloride 0.9% 1, 220  





    000 ml @ 20 mls/hr IV .   





    Q24H [facility] Rx#:[id number]   


 


    Sodium Chloride 0.9% 500  240 60





    ml 500 ml @ 20 mls/hr IV   





    .Q24H [facility] Rx#:[id number]   


 


    pressure bag 33 36 9


 


  Intake, IV Titration 351.928 68.833 





  Amount   


 


    Clevidipine Butyrate 25 95.700 68.833 





    mg In Empty Bag 1 bag @ 1   





    MG/HR 2 mls/hr IV .Q24H   





    [facility] Rx#:[id number]   


 


    fentaNYL (PF). 1,000 mcg 176.398  





    In Sodium Chloride 0.9%   





    80 ml @ Per Protocol IV .   





    Q0M [facility] Rx#:[id number]   


 


    propofoL 1,000 mg In 79.830  





    Empty Bag 1 bag @ Titrate   





    IV .Q0M [facility] Rx#:   





    [id number]   


 


  Oral   100


 


  Tube Feeding 159  


 


  Other 100  


 


Output:   


 


  Urine 1867 1370 180


 


Other:   


 


  Voiding Method Indwelling Catheter Indwelling Catheter Indwelling Catheter








                       ABP, PAP, CO, CI - Last Documented











Arterial Blood Pressure        164/63

















- Labs


CBC & Chem 7: 


                                 04/15/21 05:10





                                 04/15/21 05:10


Labs: 


                  Abnormal Lab Results - Last 24 Hours (Table)











  04/14/21 04/14/21 04/14/21 Range/Units





  04:57 12:07 14:05 


 


D-Dimer     (<0.60)  mg/L FEU


 


ABG pO2    71 L  ()  mmHg


 


ABG HCO3    28 H  (21-25)  mmol/L


 


ABG Total CO2    29 H  (19-24)  mmol/L


 


ABG O2 Saturation    93.2 L  (94-97)  %


 


Sodium     (137-145)  mmol/L


 


POC Glucose (mg/dL)   195 H   (75-99)  mg/dL


 


Ferritin     (10.0-291.0)  ng/mL


 


AST     (14-36)  U/L


 


ALT     (4-34)  U/L


 


Total Protein     (6.3-8.2)  g/dL


 


Albumin     (3.5-5.0)  g/dL


 


Triglycerides  206 H    (<150)  mg/dL














  04/14/21 04/15/21 04/15/21 Range/Units





  18:40 00:05 05:10 


 


D-Dimer    1.81 H  (<0.60)  mg/L FEU


 


ABG pO2     ()  mmHg


 


ABG HCO3     (21-25)  mmol/L


 


ABG Total CO2     (19-24)  mmol/L


 


ABG O2 Saturation     (94-97)  %


 


Sodium     (137-145)  mmol/L


 


POC Glucose (mg/dL)  176 H  115 H   (75-99)  mg/dL


 


Ferritin     (10.0-291.0)  ng/mL


 


AST     (14-36)  U/L


 


ALT     (4-34)  U/L


 


Total Protein     (6.3-8.2)  g/dL


 


Albumin     (3.5-5.0)  g/dL


 


Triglycerides     (<150)  mg/dL














  04/15/21 Range/Units





  05:10 


 


D-Dimer   (<0.60)  mg/L FEU


 


ABG pO2   ()  mmHg


 


ABG HCO3   (21-25)  mmol/L


 


ABG Total CO2   (19-24)  mmol/L


 


ABG O2 Saturation   (94-97)  %


 


Sodium  134 L  (137-145)  mmol/L


 


POC Glucose (mg/dL)   (75-99)  mg/dL


 


Ferritin  439.4 H  (10.0-291.0)  ng/mL


 


AST  70 H  (14-36)  U/L


 


ALT  93 H  (4-34)  U/L


 


Total Protein  6.0 L  (6.3-8.2)  g/dL


 


Albumin  3.4 L  (3.5-5.0)  g/dL


 


Triglycerides   (<150)  mg/dL

## 2021-04-15 NOTE — P.PN
Subjective


Progress Note Date: 04/14/21


Principal diagnosis: 





Acute bilateral pneumonia, mostly secondary to Covid 19 infection


Acute hypoxic respiratory failure, needing intubation and mechanical ventilation





This is a pleasant 61 years old female with past medical history of atrial 

fibrillation not on anticoagulation and her cardiologist is Dr. Peck, 

hypertension, hyperlipidemia.  She is a patient of Dr. Ribeiro


Patient states that she was tested positive for covid On 03/26/2021.  And now 

she presents with dyspnea of one-day duration when started earlier.  Associated 

with cough on the patella brown phlegm.


She is also complaining of from chest pain without coughing , on the right side 

of the chest nonradiating about 5/10 in severity


She has no vomiting but diarrhea about twice per day





04/8/2021


Patient remains intubated and on mechanical ventilation, currently she needs 

high peep of 13,(was 10 yesterday and 16 on admission) with pulmonary/critical 

care team monitor her closely and help with vent management.


She is tachypneic and bradycardic since admission and she is currently on 

dopamine to keep heart rate in 40s existed of 20s.  Afebrile.  


Pulmonary team recommended tracheostomy and PEG tube placement, family wanted to

wait for now


Labs showing leukopenia with WBC 5.5K with lymphopenia resolved.  Rest of the 

CBC, INR unremarkable.  BMP is unremarkable with carbon dioxide is 25.  Lactic 

acid 1.2.  Liver enzymes slightly elevated with AST 53 and ALT 67 (since 

admission).


Lactate dehydrogenase is elevated at 860(improving), C-reactive protein is 5.9 

(within normal limits).


Chest x-ray Stable portable chest.  Bilateral infiltrates


She is still a normal sinus to 40 mL/h, she is on zinc, vitamin C, vitamin D, 

dexamethasone.  She status post tocilizumab.  Also she is on Lovenox.





04/09/2021


Patient remains intubated and sedated in the ICU with pulmonary/critical care 

team following the patient closely and help with vent management.  Today P Cameron

13 and FiO2 50%.


Chest x-ray: Interstitial changes and patchy bilateral lower lung opacity shows 

some improvement.


Patient looks reviewed, inflammatory markers slightly increased with ferritin 

674 and  while C-reactive protein is normal at 6.4


Patient currently on dexamethasone, normal saline at 40 and dopamine for 

bradycardia plus Lovenox 40 mg and multiple vitamins 4 coated


Family still to decide about tracheostomy and PEG tube placement





04/10/2021


pt remains in icu sedated and intubated , with pulmonary critical care team are 

following the pt closely and help with vent management per PEEP was lowered 

today from 13 down to 10.


Patient is  undergoing sedation holiday.  However pulmonary/critical care team 

still recommending PEG tube and t tracheostomy placement pending family decision

regarding this after critical care team discussed with them.


Cardiologist on the case and recommended to continue with dopamine and and 

Norvasc.


Chest x-ray showing bilateral infiltrate


Labs look stable area


Today at Dr. the daughter Corina who is an ICU nurse and all her questions were 

answered.  Also she was updated with the patient condition.





04/11/2021


Patient still in the ICU needing mechanical ventilation, she is undergoing 

sedation holiday by critical care team.  Her PEEP is still 10 today


PEG tube and tracheostomy is recommended by pulmonary/critical care team as 

well.


Patient remains on dopamine drip and heart rate is around 50.  Cartilage team on

the case


Repeat chest x-ray and inflammatory markers in the morning





4/13/2021


Patient is currently in the MICU intubated and on mechanical ventilator.  

Patient is also on sedation.  Patient is controlled with tidal volume 450, FiO2 

40% and PEEP of 6.  Patient is dopamine drip.


Patient has been afebrile.  Chest x-ray showed cardiomegaly with increasing 

patchy airspace disease possible pulmonary edema.  Continued moderate effusions 

with adjacent atelectasis and/or consolidation.


Laboratory data showed WBC 5.0 hemoglobin 11.2 platelets 203 sodium 136 

potassium 3.5 BUN 20 and creatinine 0.6 calcium 8.6


Patient is being continued on dexamethasone 6 mg daily and Lovenox 40 mg subcu 

daily.  Patient is tolerating tube feeding.





4/14/2021


Patient is currently in MICU.  Pulmonary is planning for extubation today.  

Patient is sedation.  Chest x-ray showed bibasilar infiltrates with small left 

pleural effusion, improved from comparison.  Laboratory data showed  CRP 

5.8 and magnesium 1.9


Patient is being continued on tube feeding.  Currently maintaining sinus rhythm.

 Blood pressure is controlled with medications.  Cardiology and pulmonary is on 

board.





Current medications reviewed.





Objective





- Vital Signs


Vital signs: 


                                   Vital Signs











Temp  98.8 F   04/14/21 16:00


 


Pulse  96   04/14/21 19:00


 


Resp  18   04/14/21 19:00


 


BP  145/66   04/14/21 18:00


 


Pulse Ox  97   04/14/21 19:34








                                 Intake & Output











 04/14/21 04/14/21 04/15/21





 06:59 18:59 06:59


 


Intake Total 1336.665 863.928 23


 


Output Total 1745 1867 150


 


Balance -408.335 -1003.072 -127


 


Weight 125.8 kg 125.8 kg 


 


Intake:   


 


   253 23


 


    Sodium Chloride 0.9% 1, 240 220 





    000 ml @ 20 mls/hr IV .   





    Q24H [facility] Rx#:[id number]   


 


    Sodium Chloride 0.9% 500   20





    ml 500 ml @ 20 mls/hr IV   





    .Q24H [facility] Rx#:[id number]   


 


    pressure bag 36 33 3


 


  Intake, IV Titration 334.665 351.928 





  Amount   


 


    Clevidipine Butyrate 25  95.700 





    mg In Empty Bag 1 bag @ 1   





    MG/HR 2 mls/hr IV .Q24H   





    [facility] Rx#:[id number]   


 


    fentaNYL (PF). 1,000 mcg 100 176.398 





    In Sodium Chloride 0.9%   





    80 ml @ Per Protocol IV .   





    Q0M [facility] Rx#:[id number]   


 


    propofoL 1,000 mg In 234.665 79.830 





    Empty Bag 1 bag @ Titrate   





    IV .Q0M [facility] Rx#:   





    [id number]   


 


  Tube Feeding 636 159 


 


  Other 90 100 


 


Output:   


 


  Urine 1745 1867 150


 


Other:   


 


  Voiding Method Indwelling Catheter Indwelling Catheter 








                       ABP, PAP, CO, CI - Last Documented











Arterial Blood Pressure        144/55

















- Exam





- Exam





-GENERAL: The patient is intubated and on mechanical ventilator


HEENT: Pupils are round and equally reacting to light. EOMI. No scleral icterus.

No conjunctival pallor. Normocephalic, atraumatic. No pharyngeal erythema. No 

thyromegaly. 


CARDIOVASCULAR: S1 and S2 present. No murmurs, rubs, or gallops. 


PULMONARY: Chest is clear to auscultation, no wheezing or crackles. 


ABDOMEN: Soft, nontender, nondistended, normoactive bowel sounds. No palpable 

organomegaly. 


MUSCULOSKELETAL: No joint swelling or deformity. 


EXTREMITIES: No cyanosis, clubbing, or pedal edema. 


NEUROLOGICAL: Gross neurological examination did not reveal any focal deficits. 


SKIN: No rashes. no petechiae.








- Labs


CBC & Chem 7: 


                                 04/15/21 05:10





                                 04/15/21 15:21


Labs: 


                  Abnormal Lab Results - Last 24 Hours (Table)











  04/14/21 04/14/21 04/14/21 Range/Units





  00:29 04:57 04:57 


 


D-Dimer     (<0.60)  mg/L FEU


 


ABG pH     (7.35-7.45)  


 


ABG pO2     ()  mmHg


 


ABG HCO3     (21-25)  mmol/L


 


ABG Total CO2     (19-24)  mmol/L


 


ABG O2 Saturation     (94-97)  %


 


BUN    23 H  (7-17)  mg/dL


 


Glucose    111 H  (74-99)  mg/dL


 


POC Glucose (mg/dL)  151 H    (75-99)  mg/dL


 


Lactate Dehydrogenase   667 H   (313-618)  U/L


 


Triglycerides     (<150)  mg/dL














  04/14/21 04/14/21 04/14/21 Range/Units





  04:57 05:05 06:02 


 


D-Dimer     (<0.60)  mg/L FEU


 


ABG pH   7.51 H   (7.35-7.45)  


 


ABG pO2   72 L   ()  mmHg


 


ABG HCO3   29 H   (21-25)  mmol/L


 


ABG Total CO2   30 H   (19-24)  mmol/L


 


ABG O2 Saturation     (94-97)  %


 


BUN     (7-17)  mg/dL


 


Glucose     (74-99)  mg/dL


 


POC Glucose (mg/dL)    134 H  (75-99)  mg/dL


 


Lactate Dehydrogenase     (313-618)  U/L


 


Triglycerides  206 H    (<150)  mg/dL














  04/14/21 04/14/21 04/14/21 Range/Units





  06:05 12:07 14:05 


 


D-Dimer  1.60 H    (<0.60)  mg/L FEU


 


ABG pH     (7.35-7.45)  


 


ABG pO2    71 L  ()  mmHg


 


ABG HCO3    28 H  (21-25)  mmol/L


 


ABG Total CO2    29 H  (19-24)  mmol/L


 


ABG O2 Saturation    93.2 L  (94-97)  %


 


BUN     (7-17)  mg/dL


 


Glucose     (74-99)  mg/dL


 


POC Glucose (mg/dL)   195 H   (75-99)  mg/dL


 


Lactate Dehydrogenase     (313-618)  U/L


 


Triglycerides     (<150)  mg/dL














  04/14/21 Range/Units





  18:40 


 


D-Dimer   (<0.60)  mg/L FEU


 


ABG pH   (7.35-7.45)  


 


ABG pO2   ()  mmHg


 


ABG HCO3   (21-25)  mmol/L


 


ABG Total CO2   (19-24)  mmol/L


 


ABG O2 Saturation   (94-97)  %


 


BUN   (7-17)  mg/dL


 


Glucose   (74-99)  mg/dL


 


POC Glucose (mg/dL)  176 H  (75-99)  mg/dL


 


Lactate Dehydrogenase   (313-618)  U/L


 


Triglycerides   (<150)  mg/dL














Assessment and Plan


Assessment: 





Acute bilateral pneumonia, mostly secondary to Covid 19 infection


Acute hypoxic respiratory failure,currently on mechanical ventilation


Increased inflammatory markers


Bradycardia secondary to Covid infection. off dopamine now.


Acute Covid gastroenteritis


Mild transaminitis, mostly secondary to Covid


Hypertension


Hyperlipidemia


Paroxysmal A. fib, currently heart rate controlled and is on sinus.








Plan: 





This is a pleasant 61 years old female who presents with respiratory distress 

mostly secondary to covid Pneumonia.  Continue with steroids, vitamin C, zinc, 

vitamin D, bronchodilator and oxygen as needed.  Lovenox for DVT prophylaxis and

pulmonary consult.  Continue with vent management as per pulmonary team.


Cardiologist on the case.Amlodipine, lisinopril, Aldactone..


Labs and medication were reviewed. Continue with symptomatic treatment.  Resume 

home medication.  Monitor lytes and vitals.  DVT and GI prophylaxis.  Further 

recommendations depends on the clinical course of the patient


DVT prophylaxis: Subcutaneous Lovenox


GI Prophylaxis: Ppi


Prognosis is guarded


Time with Patient: Greater than 30

## 2021-04-15 NOTE — CDI
Documentation Clarification Form



Date: 04/15/2021 02:13:00 PM

From: Corina Ruiz CCS, CCDS

Phone: [phone]

MRN: [id number]

Admit Date: 03/31/2021 12:56:00 AM

Patient Name: Gisele Burnham

Visit Number: [id number]

Discharge Date:  





ATTENTION: The Clinical Documentation Specialists (CDI) and [facility] Coding Staff 
appreciate your assistance in clarifying documentation. Please respond to the 
clarification below the line at the bottom and electronically sign. The CDI & 
[facility] Coding staff will review the response and follow-up if needed. Please note: 
Queries are made part of the Legal Health Record. If you have any questions, 
please contact the author of this message via ITS.



Dr. Kurt Odonnell or Dr. Nikolai Castro: 



The patient presented with the following clinical indicators. Additional 
clarification regarding the etiology/cause of the clinical indicators is 
requested.



History/Risk Factors per the 3/31 ED Note Past Medical History: Paroxysmal 
Atrial Fibrillation, Apical Ballooning Syndrome, CAD, Hypertension, 
Hypertension, Obesity, BMI >40. 



Clinical Indicators: Presented to the ED on 3/31 via EMS with SOB. In 
respiratory distress, known positive COVID with persistent fever & now 
increasing SOB, low O2 level. Admitted with COVID infection & Pneumonia due to 
COVID 19 virus.

Admitted to ICU, intubated on 3/31.  

Respiratory distress is documented beginning in the 3/31 ED Note, the 3/31 H/P 
and subsequent Progress Notes.

Per the 4/15 Pulmonary Progress Note: Blood gases are consistent with relative 
hypoxemia, and a combined respiratory and metabolic alkalosis.



3/31 VS: T 98.6, P 88, R 18 - 22 (SOB, labored, cough), /82, PO 90 15Lnrb,
advanced to High Flow & eventual BiPAP 100 % w/PO 72.

Follow up 3/31 VS: T 98.5, P 62, R 40 - 34, /50 - 78/48, PO 91 BiPAP - 86 
100% vent.

BMI 43.6

3/31 Blood Gas: ABG: pH 7.37, pCO2 41, pO2 54, pHCO3 24, Total CO2 25, O2 Sat 
86.7. 

4/7 Blood Gas: ABG: pH 7.51, pCO2 32, pO2 58, HCO3 25, Total CO2 27, O2 Sat 91.3

3/31 LAB: WBC 2.1, Pl Ct 101, Lymph 0.5, D Dimer 1.15, Lactic Acid 1.2, COVID 
POSITIVE.

4/1 LAB: WBC 2.9, RBC 3.62, Hgb 11.3, Hct 32.2, Lymph 0.8, Cl 114, CO2 21, BUN 
18, Gluc 191, Isai 7.5, AST 81, ALT 59, LDH 1497, Cr Kinase 190, CRP 61.8, Total 
Protein 4.8, Albumin 2.5. 

3/31 CXR: Multifocal patchy opacities in the mid to lower lungs bilaterally. 

4/5 CXR: Correlate for pulmonary edema, stable to slightly worsened from prior 
exam. Small-to-moderate effusions with adjacent atelectasis and/or consolidation
persists.



Treatment 3/31: Admit to ICU, intubated until 4/15, extubated to 4Lnc. Daily 
Blood Gases, 

3/31: INH Ventolin, IV Decadron, IV Toradol, IV Zofran, IV fluid 1,000 mls @ 999
mls/hr q1H, IV fluid 1,000 mls @ 130 mls/hr q7H, Vit C, Vit D3, Orazinc, IV 
Actemra, IV Propofol, IV Nimbex, IV Fentanyl, IV Levophed. 

 

In your professional opinion, please clarify if these findings signify one of 
the following conditions:



[  ] Sepsis POA

[  ] Sepsis, Not POA

[  ] Severe Sepsis with organ failure

[  ] Septic Shock

[  ] Other, please specify  _____________

[  ] Unable to determine





(Template Last Reviewed: February 2021)

___________________________________________________________________________

 Sepsis POA

MTDD

## 2021-04-15 NOTE — P.PN
Subjective


Progress Note Date: 04/13/21


Principal diagnosis: 





Acute bilateral pneumonia, mostly secondary to Covid 19 infection


Acute hypoxic respiratory failure, needing intubation and mechanical ventilation





This is a pleasant 61 years old female with past medical history of atrial 

fibrillation not on anticoagulation and her cardiologist is Dr. Peck, 

hypertension, hyperlipidemia.  She is a patient of Dr. Ribeiro


Patient states that she was tested positive for covid On 03/26/2021.  And now 

she presents with dyspnea of one-day duration when started earlier.  Associated 

with cough on the patella brown phlegm.


She is also complaining of from chest pain without coughing , on the right side 

of the chest nonradiating about 5/10 in severity


She has no vomiting but diarrhea about twice per day





04/8/2021


Patient remains intubated and on mechanical ventilation, currently she needs 

high peep of 13,(was 10 yesterday and 16 on admission) with pulmonary/critical 

care team monitor her closely and help with vent management.


She is tachypneic and bradycardic since admission and she is currently on 

dopamine to keep heart rate in 40s existed of 20s.  Afebrile.  


Pulmonary team recommended tracheostomy and PEG tube placement, family wanted to

wait for now


Labs showing leukopenia with WBC 5.5K with lymphopenia resolved.  Rest of the 

CBC, INR unremarkable.  BMP is unremarkable with carbon dioxide is 25.  Lactic 

acid 1.2.  Liver enzymes slightly elevated with AST 53 and ALT 67 (since 

admission).


Lactate dehydrogenase is elevated at 860(improving), C-reactive protein is 5.9 

(within normal limits).


Chest x-ray Stable portable chest.  Bilateral infiltrates


She is still a normal sinus to 40 mL/h, she is on zinc, vitamin C, vitamin D, 

dexamethasone.  She status post tocilizumab.  Also she is on Lovenox.





04/09/2021


Patient remains intubated and sedated in the ICU with pulmonary/critical care 

team following the patient closely and help with vent management.  Today P Cameron

13 and FiO2 50%.


Chest x-ray: Interstitial changes and patchy bilateral lower lung opacity shows 

some improvement.


Patient looks reviewed, inflammatory markers slightly increased with ferritin 

674 and  while C-reactive protein is normal at 6.4


Patient currently on dexamethasone, normal saline at 40 and dopamine for 

bradycardia plus Lovenox 40 mg and multiple vitamins 4 coated


Family still to decide about tracheostomy and PEG tube placement





04/10/2021


pt remains in icu sedated and intubated , with pulmonary critical care team are 

following the pt closely and help with vent management per PEEP was lowered 

today from 13 down to 10.


Patient is  undergoing sedation holiday.  However pulmonary/critical care team 

still recommending PEG tube and t tracheostomy placement pending family decision

regarding this after critical care team discussed with them.


Cardiologist on the case and recommended to continue with dopamine and and 

Norvasc.


Chest x-ray showing bilateral infiltrate


Labs look stable area


Today at Dr. the daughter Corina who is an ICU nurse and all her questions were 

answered.  Also she was updated with the patient condition.





04/11/2021


Patient still in the ICU needing mechanical ventilation, she is undergoing 

sedation holiday by critical care team.  Her PEEP is still 10 today


PEG tube and tracheostomy is recommended by pulmonary/critical care team as 

well.


Patient remains on dopamine drip and heart rate is around 50.  Cartilage team on

the case


Repeat chest x-ray and inflammatory markers in the morning





4/13/2021


Patient is currently in the MICU intubated and on mechanical ventilator.  

Patient is also on sedation.  Patient is controlled with tidal volume 450, FiO2 

40% and PEEP of 6.  Patient is dopamine drip.


Patient has been afebrile.  Chest x-ray showed cardiomegaly with increasing 

patchy airspace disease possible pulmonary edema.  Continued moderate effusions 

with adjacent atelectasis and/or consolidation.


Laboratory data showed WBC 5.0 hemoglobin 11.2 platelets 203 sodium 136 

potassium 3.5 BUN 20 and creatinine 0.6 calcium 8.6


Patient is being continued on dexamethasone 6 mg daily and Lovenox 40 mg subcu 

daily.  Patient is tolerating tube feeding.





Current medications reviewed.





Objective





- Vital Signs


Vital signs: 


                                   Vital Signs











Temp  97.9 F   04/13/21 12:00


 


Pulse  64   04/13/21 14:00


 


Resp  24   04/13/21 14:00


 


BP  137/66   04/13/21 13:00


 


Pulse Ox  94 L  04/13/21 14:00








                                 Intake & Output











 04/12/21 04/13/21 04/13/21





 18:59 06:59 18:59


 


Intake Total 908.401 1637.029 925.057


 


Output Total 1475 1460 1915


 


Balance -681.853 -59.971 -989.943


 


Weight 126.8 kg 126.3 kg 


 


Intake:   


 


   473 224


 


    Sodium Chloride 0.9% 1, 320 440 200





    000 ml @ 20 mls/hr IV .   





    Q24H VIOLET Rx#:[id number]   


 


    pressure bag 5 33 24


 


  Intake, IV Titration 187.147 254.029 270.057





  Amount   


 


    DOPamine DRIP 800 mg In 22.532  





    Dextrose/Water 1 250ml.   





    bag @ 2 MCG/KG/MIN 4.984   





    mls/hr IV .Q24H VIOLET Rx#:   





    [id number]   


 


    fentaNYL (PF) 1,000 mcg 74.524 39.466 





    In Sodium Chloride 0.9%   





    80 ml @ Per Protocol IV .   





    Q0M VIOLET Rx#:[id number]   


 


    fentaNYL (PF). 1,000 mcg  100 170.057





    In Sodium Chloride 0.9%   





    80 ml @ Per Protocol IV .   





    Q0M VIOLET Rx#:[id number]   


 


    propofoL 1,000 mg In 90.091 114.563 100.000





    Empty Bag 1 bag @ Titrate   





    IV .Q0M VIOLET Rx#:   





    [id number]   


 


  Tube Feeding 171 583 371


 


  Other 110 90 60


 


Output:   


 


  Urine 1475 1460 1915


 


Other:   


 


  Voiding Method Indwelling Catheter Indwelling Catheter Indwelling Catheter


 


  # Bowel Movements  1 








                       ABP, PAP, CO, CI - Last Documented











Arterial Blood Pressure        109/49

















- Exam





- Exam





-GENERAL: The patient is intubated and sedated


HEENT: Pupils are round and equally reacting to light. EOMI. No scleral icterus.

No conjunctival pallor. Normocephalic, atraumatic. No pharyngeal erythema. No 

thyromegaly. 


CARDIOVASCULAR: S1 and S2 present. No murmurs, rubs, or gallops. 


PULMONARY: Chest is clear to auscultation, no wheezing or crackles. 


ABDOMEN: Soft, nontender, nondistended, normoactive bowel sounds. No palpable 

organomegaly. 


MUSCULOSKELETAL: No joint swelling or deformity. 


EXTREMITIES: No cyanosis, clubbing, or pedal edema. 


NEUROLOGICAL: Gross neurological examination did not reveal any focal deficits. 


SKIN: No rashes. no petechiae.








- Labs


CBC & Chem 7: 


                                 04/15/21 05:10





                                 04/15/21 15:21


Labs: 


                  Abnormal Lab Results - Last 24 Hours (Table)











  04/12/21 04/12/21 04/13/21 Range/Units





  17:29 23:25 04:05 


 


RBC    3.67 L  (3.80-5.40)  m/uL


 


Hgb    11.2 L  (11.4-16.0)  gm/dL


 


Hct    32.0 L  (34.0-46.0)  %


 


ABG pH     (7.35-7.45)  


 


ABG pCO2     (35-45)  mmHg


 


ABG pO2     ()  mmHg


 


ABG HCO3     (21-25)  mmol/L


 


ABG Total CO2     (19-24)  mmol/L


 


Sodium     (137-145)  mmol/L


 


BUN     (7-17)  mg/dL


 


Glucose     (74-99)  mg/dL


 


POC Glucose (mg/dL)  166 H  123 H   (75-99)  mg/dL














  04/13/21 04/13/21 04/13/21 Range/Units





  04:05 04:40 06:35 


 


RBC     (3.80-5.40)  m/uL


 


Hgb     (11.4-16.0)  gm/dL


 


Hct     (34.0-46.0)  %


 


ABG pH   7.54 H   (7.35-7.45)  


 


ABG pCO2   31 L   (35-45)  mmHg


 


ABG pO2   76 L   ()  mmHg


 


ABG HCO3   27 H   (21-25)  mmol/L


 


ABG Total CO2   28 H   (19-24)  mmol/L


 


Sodium  136 L    (137-145)  mmol/L


 


BUN  20 H    (7-17)  mg/dL


 


Glucose  123 H    (74-99)  mg/dL


 


POC Glucose (mg/dL)    125 H  (75-99)  mg/dL














  04/13/21 Range/Units





  11:32 


 


RBC   (3.80-5.40)  m/uL


 


Hgb   (11.4-16.0)  gm/dL


 


Hct   (34.0-46.0)  %


 


ABG pH   (7.35-7.45)  


 


ABG pCO2   (35-45)  mmHg


 


ABG pO2   ()  mmHg


 


ABG HCO3   (21-25)  mmol/L


 


ABG Total CO2   (19-24)  mmol/L


 


Sodium   (137-145)  mmol/L


 


BUN   (7-17)  mg/dL


 


Glucose   (74-99)  mg/dL


 


POC Glucose (mg/dL)  162 H  (75-99)  mg/dL














Assessment and Plan


Assessment: 





Acute bilateral pneumonia, mostly secondary to Covid 19 infection


Acute hypoxic respiratory failure,currently on mechanical ventilation


Increased inflammatory markers


Bradycardia secondary to Covid infection, need dopamine


Acute Covid gastroenteritis


Mild transaminitis, mostly secondary to Covid


Hypertension


Hyperlipidemia


Paroxysmal A. fib, currently heart rate controlled








Plan: 





This is a pleasant 61 years old female who presents with respiratory distress 

mostly secondary to covid Pneumonia.  Continue with steroids, vitamin C, zinc, 

vitamin D, bronchodilator and oxygen as needed.  Lovenox for DVT prophylaxis and

pulmonary consult.  Continue with vent management as per pulmonary team.


Cardiologist on the case.Amlodipine, lisinopril, Aldactone and a dose of IV 

Lasix was given today.


Labs and medication were reviewed. Continue with symptomatic treatment.  Resume 

home medication.  Monitor lytes and vitals.  DVT and GI prophylaxis.  Further 

recommendations depends on the clinical course of the patient


DVT prophylaxis: Subcutaneous Lovenox


GI Prophylaxis: Ppi


Prognosis is guarded


Time with Patient: Greater than 30

## 2021-04-15 NOTE — P.PN
Subjective


Progress Note Date: 04/15/21


Principal diagnosis: 





Acute bilateral pneumonia, mostly secondary to Covid 19 infection


Acute hypoxic respiratory failure, needing intubation and mechanical ventilation





This is a pleasant 61 years old female with past medical history of atrial 

fibrillation not on anticoagulation and her cardiologist is Dr. Peck, 

hypertension, hyperlipidemia.  She is a patient of Dr. Ribeiro


Patient states that she was tested positive for covid On 03/26/2021.  And now 

she presents with dyspnea of one-day duration when started earlier.  Associated 

with cough on the patella brown phlegm.


She is also complaining of from chest pain without coughing , on the right side 

of the chest nonradiating about 5/10 in severity


She has no vomiting but diarrhea about twice per day





04/8/2021


Patient remains intubated and on mechanical ventilation, currently she needs 

high peep of 13,(was 10 yesterday and 16 on admission) with pulmonary/critical 

care team monitor her closely and help with vent management.


She is tachypneic and bradycardic since admission and she is currently on 

dopamine to keep heart rate in 40s existed of 20s.  Afebrile.  


Pulmonary team recommended tracheostomy and PEG tube placement, family wanted to

wait for now


Labs showing leukopenia with WBC 5.5K with lymphopenia resolved.  Rest of the 

CBC, INR unremarkable.  BMP is unremarkable with carbon dioxide is 25.  Lactic 

acid 1.2.  Liver enzymes slightly elevated with AST 53 and ALT 67 (since 

admission).


Lactate dehydrogenase is elevated at 860(improving), C-reactive protein is 5.9 

(within normal limits).


Chest x-ray Stable portable chest.  Bilateral infiltrates


She is still a normal sinus to 40 mL/h, she is on zinc, vitamin C, vitamin D, 

dexamethasone.  She status post tocilizumab.  Also she is on Lovenox.





04/09/2021


Patient remains intubated and sedated in the ICU with pulmonary/critical care 

team following the patient closely and help with vent management.  Today P Cameron

13 and FiO2 50%.


Chest x-ray: Interstitial changes and patchy bilateral lower lung opacity shows 

some improvement.


Patient looks reviewed, inflammatory markers slightly increased with ferritin 

674 and  while C-reactive protein is normal at 6.4


Patient currently on dexamethasone, normal saline at 40 and dopamine for 

bradycardia plus Lovenox 40 mg and multiple vitamins 4 coated


Family still to decide about tracheostomy and PEG tube placement





04/10/2021


pt remains in icu sedated and intubated , with pulmonary critical care team are 

following the pt closely and help with vent management per PEEP was lowered 

today from 13 down to 10.


Patient is  undergoing sedation holiday.  However pulmonary/critical care team 

still recommending PEG tube and t tracheostomy placement pending family decision

regarding this after critical care team discussed with them.


Cardiologist on the case and recommended to continue with dopamine and and 

Norvasc.


Chest x-ray showing bilateral infiltrate


Labs look stable area


Today at Dr. the daughter Corina who is an ICU nurse and all her questions were 

answered.  Also she was updated with the patient condition.





04/11/2021


Patient still in the ICU needing mechanical ventilation, she is undergoing 

sedation holiday by critical care team.  Her PEEP is still 10 today


PEG tube and tracheostomy is recommended by pulmonary/critical care team as 

well.


Patient remains on dopamine drip and heart rate is around 50.  Cartilage team on

the case


Repeat chest x-ray and inflammatory markers in the morning





4/13/2021


Patient is currently in the MICU intubated and on mechanical ventilator.  

Patient is also on sedation.  Patient is controlled with tidal volume 450, FiO2 

40% and PEEP of 6.  Patient is dopamine drip.


Patient has been afebrile.  Chest x-ray showed cardiomegaly with increasing 

patchy airspace disease possible pulmonary edema.  Continued moderate effusions 

with adjacent atelectasis and/or consolidation.


Laboratory data showed WBC 5.0 hemoglobin 11.2 platelets 203 sodium 136 

potassium 3.5 BUN 20 and creatinine 0.6 calcium 8.6


Patient is being continued on dexamethasone 6 mg daily and Lovenox 40 mg subcu 

daily.  Patient is tolerating tube feeding.





4/14/2021


Patient is currently in MICU.  Pulmonary was extubation yesterday.  Patient is 

off sedation.  Chest x-ray showed bibasilar infiltrates with small left pleural 

effusion, improved from comparison.  Laboratory data showed  CRP 5.8 and 

magnesium 1.9


Patient is being continued on tube feeding.  Currently maintaining sinus rhythm.

 Blood pressure is controlled with medications.  Cardiology and pulmonary is on 

board.





4/15/2021


Patient is currently is in ICU.  Patient was showing adequate weaning parameters

and was extubated today.  Currently on oxygen via nasal cannula at 2 L.  Patient

was started on liquid diet.  Patient is awake alert and seems comfortable.


Chest x-ray showed patchy bilateral lung infiltrates similar to improved 

compared to previous.


Patient is being continued Lasix 40 mg twice daily.  Blood pressure is 

controlled.


Laboratory data showed D-dimer 1.81, ferritin 439 AST 70 ALT CRP 5.9 and sodium 

134.


Patient remains in sinus rhythm.


Current dexamethasone, Lovenox and blood pressure medications and multivitamins.

 Cardiology and pulmonary is on board.





Current medications reviewed.





Objective





- Vital Signs


Vital signs: 


                                   Vital Signs











Temp  99.7 F H  04/15/21 12:00


 


Pulse  80   04/15/21 15:00


 


Resp  18   04/15/21 15:00


 


BP  144/60   04/15/21 12:00


 


Pulse Ox  95   04/15/21 15:00








                                 Intake & Output











 04/14/21 04/15/21 04/15/21





 18:59 06:59 18:59


 


Intake Total 863.928 344.833 407


 


Output Total 1867 1370 1585


 


Balance -1003.072 -1025.167 -1178


 


Weight 125.8 kg 122.5 kg 


 


Intake:   


 


   276 207


 


    Sodium Chloride 0.9% 1, 220  





    000 ml @ 20 mls/hr IV .   





    Q24H VIOLET Rx#:[id number]   


 


    Sodium Chloride 0.9% 500  240 180





    ml 500 ml @ 20 mls/hr IV   





    .Q24H VIOLET Rx#:[id number]   


 


    pressure bag 33 36 27


 


  Intake, IV Titration 351.928 68.833 0





  Amount   


 


    Clevidipine Butyrate 25 95.700 68.833 0





    mg In Empty Bag 1 bag @ 1   





    MG/HR 2 mls/hr IV .Q24H   





    VIOLET Rx#:[id number]   


 


    fentaNYL (PF). 1,000 mcg 176.398  





    In Sodium Chloride 0.9%   





    80 ml @ Per Protocol IV .   





    Q0M VIOLET Rx#:[id number]   


 


    propofoL 1,000 mg In 79.830  





    Empty Bag 1 bag @ Titrate   





    IV .Q0M VIOLET Rx#:   





    [id number]   


 


  Oral   200


 


  Tube Feeding 159  


 


  Other 100  


 


Output:   


 


  Urine 1867 1370 1585


 


Other:   


 


  Voiding Method Indwelling Catheter Indwelling Catheter Indwelling Catheter


 


  # Bowel Movements   1








                       ABP, PAP, CO, CI - Last Documented











Arterial Blood Pressure        142/56

















- Exam





- Exam





-GENERAL: The patient is Awake alert and oriented.  Feels weak.


HEENT: Pupils are round and equally reacting to light. EOMI. No scleral icterus.

No conjunctival pallor. Normocephalic, atraumatic. No pharyngeal erythema. No 

thyromegaly. 


CARDIOVASCULAR: S1 and S2 present. No murmurs, rubs, or gallops. 


PULMONARY:Bilateral coarse sounds and basilar diminished air entry. , no 

wheezing or crackles. 


ABDOMEN: Soft, nontender, nondistended, normoactive bowel sounds. No palpable 

organomegaly. 


MUSCULOSKELETAL: No joint swelling or deformity. 


EXTREMITIES: No cyanosis, clubbing, or pedal edema. 


NEUROLOGICAL: Gross neurological examination did not reveal any focal deficits. 


SKIN: No rashes. no petechiae.








- Labs


CBC & Chem 7: 


                                 04/15/21 05:10





                                 04/15/21 15:21


Labs: 


                  Abnormal Lab Results - Last 24 Hours (Table)











  04/14/21 04/15/21 04/15/21 Range/Units





  18:40 00:05 05:10 


 


D-Dimer    1.81 H  (<0.60)  mg/L FEU


 


Sodium     (137-145)  mmol/L


 


POC Glucose (mg/dL)  176 H  115 H   (75-99)  mg/dL


 


Ferritin     (10.0-291.0)  ng/mL


 


AST     (14-36)  U/L


 


ALT     (4-34)  U/L


 


Total Protein     (6.3-8.2)  g/dL


 


Albumin     (3.5-5.0)  g/dL














  04/15/21 04/15/21 Range/Units





  05:10 11:37 


 


D-Dimer    (<0.60)  mg/L FEU


 


Sodium  134 L   (137-145)  mmol/L


 


POC Glucose (mg/dL)   139 H  (75-99)  mg/dL


 


Ferritin  439.4 H   (10.0-291.0)  ng/mL


 


AST  70 H   (14-36)  U/L


 


ALT  93 H   (4-34)  U/L


 


Total Protein  6.0 L   (6.3-8.2)  g/dL


 


Albumin  3.4 L   (3.5-5.0)  g/dL














Assessment and Plan


Assessment: 





Acute bilateral pneumonia, mostly secondary to Covid 19 infection


Acute hypoxic respiratory failure, was on mechanical ventilation.  Status post 

extubation on 4/14/2021


Increased inflammatory markers


Bradycardia secondary to Covid infection. off dopamine now.


Acute Covid gastroenteritis


Mild transaminitis, mostly secondary to Covid


Hypertension


Hyperlipidemia


Paroxysmal A. fib, currently heart rate controlled and is on sinus.








Plan: 





This is a pleasant 61 years old female who presents with respiratory distress 

mostly secondary to covid Pneumonia.  Continue with steroids, vitamin C, zinc, 

vitamin D, bronchodilator and oxygen as needed.  Lovenox for DVT prophylaxis and

pulmonary is following. Patient is extubated.


Cardiologist on the case.c/ w Amlodipine, lisinopril, Aldactone and lasixwas 

added ..


Labs and medication were reviewed. Continue with symptomatic treatment.  Resume 

home medication.  Monitor lytes and vitals.  DVT and GI prophylaxis.  Further 

recommendations depends on the clinical course of the patient


DVT prophylaxis: Subcutaneous Lovenox


GI Prophylaxis: Ppi


Prognosis is guarded


Time with Patient: Greater than 30

## 2021-04-15 NOTE — XR
EXAMINATION TYPE: XR chest 1V portable

 

DATE OF EXAM: 4/15/2021

 

COMPARISON: 4/14/2021

 

INDICATION: Coated

 

TECHNIQUE: Single frontal view of the chest is obtained.

 

FINDINGS:  

The heart size is normal.  

The pulmonary vasculature is normal.  

Patchy bilateral lung infiltrates present.  

The lines and catheters been removed

 

IMPRESSION:  

1. Patchy bilateral lung infiltrates similar to improved compared to comparison

## 2021-04-15 NOTE — P.PN
Subjective


Progress Note Date: 04/15/21

















61-year-old female, who hasn't been feeling well for about 10-11 days.  She 

tested positive for COVID 19 on March 24.  She's been sick for 10-11 days old.  

She complains of TYPICAL symptoms including weakness, fever, diarrhea, cough, 

and shortness of breath.  She's quite hypoxemic.  On AIRVO 100%, and a 

nonrebreather mask, her saturations are right around 80-83%.  Any movement, and 

she desaturates even further.  I just called the intensive care unit, and we'll 

plan on putting her in the intensive care unit.  She's currently on saline at 75

mL an hour.  She can barely speak without becoming short of breath.  The patient

is not a candidate for REM, but could get TOCI.  Sodium 138, potassium 3.8, 

chlorides 108, CO2 21, anion gap 9, BUN 19, creatinine 0.7.  AST 94, ALTs 60, 

LDH 1471, C-reactive protein 88.1.  Again, the patient's been sick for at least 

10-11 days and maybe a bit longer.  Her oral intake has been poor as well.  The 

patient's chest x-ray shows diffuse bilateral infiltrates consistent with 

coronavirus infection.





The patient is seen today 04/01/2021 in follow-up in the intensive care unit.  

Shortly after arriving to the ICU yesterday she required intubation and 

mechanical ventilatory support injury to acute hypoxemic respiratory failure.  

She is currently on the ventilator and assist control mode with a rate of 34, 

tidal on 450, FiO2 70%, PEEP of 16.  Morning blood gases reveal pO2 of 70, pCO2 

33, P 87.38.  Fentanyl drip at 0.5 mcg/kg per hour, propofol at 15 mcg/kg/m, 

Nimbex at 1 mcg/min per hour.  0.9 normal saline at 130 ML's per hour.  She did 

receive Tociluzimab.  She remains on Decadron 6 mg IV daily.  Lovenox 40 mg 

subcutaneous daily.  Tube feedings will be initiated for nutritional support.  

Blood cultures reveal no growth.  White count 2.9.  Hemoglobin 11.3 and 

platelets 188.  Lymphocytes 0.8.  She is continued on Lovenox, dexamethasone, 

vitamin supplements.





The patient is seen today 04/02/2021 in follow-up in the intensive care unit.  

She remains intubated on the mechanical ventilator.  Assist-control mode.  Rate 

of 30, tidal volume 450, FiO2 60% and a PEEP of 16.  She is currently sedated on

propofol at 40 mcg/kg/m, Nimbex at 1 mcg/kg/m, fentanyl at 0.5 mcg/kg per hour. 

She has 0.9 normal saline at 130 ML's per hour.  She is being nourished with 

vital HP at 20 ML's per hour.  She did receive tocilizumab.  He remains on 

dexamethasone, Lovenox, vitamin supplements.  Chest x-ray showing persistent but

improving patchy bilateral airspace disease.  Sputum and blood cultures are 

pending.  White count 3.9.  Hemoglobin 11.4.  Lymphocytes 0.7.  D-dimer 1.19.  

Sodium 139.  Potassium 4.4.  Bicarb 20.  Creatinine 0.64.  Glucose 174.





The patient is seen today 04/03/2020 in follow-up in the intensive care unit.  

She remains intubated on mechanical ventilator.  Current settings are assist-

control mode.  Rate of 34, tidal volume 450, FiO2 50% and a PEEP of 16.  Morning

blood gases reveal a pO2 of 117, pCO2 30, pH 7.40.  She remains sedated on 

propofol at 55 mcg/kg/m.  Fentanyl and 1 mcg/kg/hr.  She is being nourished with

vital HP at 33 ML's per hour.  0.9 normal saline at 130 ML's per hour.  She 

remains on dexamethasone, Lovenox, bronchodilators.  White count 4.0.  

Hemoglobin 11.8.  D-dimer 1.81.  Sodium 138.  Potassium 4.2.  Bicarb 19.  LDH 

1133.  C-reactive protein 15.5.  Asked x-ray continues to show moderate 

scattered interstitial and partial airspace opacities.  





The patient is seen today 04/04/2021 in follow-up in the intensive care unit.  

She remains intubated, sedated.  Current settings are assist control at a rate 

of 30, tidal volume 450, FiO2 50% and a PEEP of 15.  She is sedated on propofol 

at 60 mcg/kg/m, fentanyl at 1 mcg/kg per hour, 0.9 normal saline 130 ML's per 

hour.  Vital HP at 24 mls per hour.  She did receive Actemra.  Blood culture 

reveals no growth.  Sputum culture reveals no growth.  White count 5.3.  

Hemoglobin 12.0.  D-dimer 2.0.  Sodium 139.  Potassium 4.0.  Creatinine 0.59.  

LDH 1082.  C reactive protein 8.6.  Chest x-ray continues to show no interval 

change, continues with persistent moderate patchy opacities bilaterally.  

Endotracheal tube to be advanced to 2 cms.  Continued on vitamin supplements, 

Lovenox, IV Solu-Medrol. 





Progress note dated 04/05/2021.





This is a patient was admitted on March 31 for COVID 19 pneumonia.  The patient 

came to the ICU on March 31, and was intubated on the same day.  She apparently 

tested positive back on March 24.  The patient remains on the mechanical 

ventilator.  She is on the volume assist control mode, rate of 30, tidal volume 

450, FiO2 50%, PEEP of 15 to be dropped to 10.  Blood gases show pO2 of 66, pCO2

33, and a pH 7.41.  The patient's on propofol at 60 mcg/kg/m, fentanyl 1 

mcg/kg/h, normal saline at 130 mL an hour and vital high protein at 24, which is

goal.  The patient did receive TOCI.   White count is 6.2, hemoglobin 11.8, he

matocrit 33.1, platelet count 272,000.  Sodium 137, potassium 3.8, chlorides 

114, CO2 20, anion gap 3, BUN 18, creatinine 0.53.  LDH is 956.  Chest x-ray 

shows worsened bilateral infiltrates.  Small effusions are noted.





Progress note dated 04/06/2021.





61-year-old female, admitted on March 31 for COVID 19 pneumonia.  She came to 

the ICU on March 31 and was intubated on the same day.  She tested positive back

on March 24.  The patient remains on mechanical ventilation.  She is on the 

volume assist control mode, rate 36, tidal volume 450, FiO2 50%, and PEEP is 13.

 Arterial blood gases show a PaO2 of 74, pCO2 31, and a pH of 7.52.  Blood gases

are consistent with relative hypoxemia, and a combined respiratory and metabolic

alkalosis.  The patient is getting propofol 60 mcg/kg/m, fentanyl 1 emily per 

kilogram per hour, saline at 40 mL an hour, dopamine at 2 mcg/kg/m, and vital 

high protein at goal, which is 24 mL an hour.  The cardiologist wanted to DC the

dopamine.  We are going to try to lower PEEP from 13 down to 10.  Today's CBC is

normal.  Sodium 138, potassium 3.4, chlorides 109, CO2 24, anion gap 5, BUN 17, 

and creatinine 0.53.  Chest x-ray shows improving bibasilar infiltrates.





The patient is seen today 04/07/2021 and follow-up in the intensive care unit.  

She remains intubated and on the mechanical ventilator.  Current mode assist 

control with a rate of 30, tidal volume 450, FiO2 50% and a PEEP of 10.  Morning

blood gases reveal a P O2 of 58, pCO2 32, pH 7.51.  She remains sedated on 

propofol at 60 mcg/kg/m.  Fentanyl at 1 mcg/kg/h, 0.9#at 40 ML's per hour.  She 

is being nourished with vital HP at 24 ML's per hour which is goal.  She is 

currently afebrile.  Hemodynamically stable.  She is receive daily interruption 

of sedation without success.  The plan will be for trach and PEG later this 

week.  Blood and sputum cultures revealed no growth.  WBC 6.4.  Hemoglobin 12.8.

 Sodium 137.  Potassium 3.5.  Creatinine 0.60.  Glucose 120.  AST 53.  ALT 69.  

Chest x-ray continues to show patchy perihilar and basilar infiltrates with 

interval progression.





The patient is seen today 04/08/2021 in follow-up in the intensive care unit.  

She remains intubated on mechanical ventilator in assist control mode at a rate 

of 30, tight around 450, FiO2 50% and a PEEP of 10.  Morning blood gases reveal 

a P O2 of 58, pCO2 30, pH 7.54.  She remains sedated on propofol at 50 mcg/kg/m.

 Fentanyl at 2 mcg/kg/h.  0.9 normal saline at 40 ML's per hour.  She was 

initiated back on dopamine at 2 mcg/kg/m due to significant bradycardia with 

heart rate in the 20s temporarily.  Currently heart rate in the 40s.  She has 

remained hypertensive.  Chest x-ray continues to show bilateral patchy 

infiltrates.  Stable compared to previous.  Blood and sputum cultures revealed 

no growth.  White count 5.5.  Hemoglobin 11.8.  D-dimer 1.59.  Sodium 135.  

Potassium 3.9.  Creatinine 0.61.  AST 53.  ALT 67.  .  C-reactive protein

5.9.  She remains on Lovenox, dexamethasone, vitamin supplements.





The patient is seen today 04/09/2021 in follow-up in the intensive care unit.  

She remains intubated and on mechanical ventilator.  Current settings are 

assist-control mode.  Rate of 30, tidal volume 450.  FiO2 50% and a PEEP of 13. 

Morning blood gases reveal a pO2 of 76, pCO2 31, pH 7.53.  She remains sedated 

on propofol at 40 mcg/kg/m, dopamine at 2 mcg/kg/m.  Fentanyl at 1.5 mg/kg per 

hour.  0.9 normal saline at 40 miles per hour.  She is being nourished with 

vital HP at 24 ML's per hour which is goal.  Chest x-ray reveals bilateral 

airspace disease slightly improved.  Blood, sputum cultures reveal no growth.  

White count 6.6.  Hemoglobin 12.8.  Sodium 139.  Potassium 4.0.  Chloride 108.  

Bicarb 25.  Creatinine 0.52.  .  C-reactive protein 6.4.  She remains on 

vitamin supplements, Lovenox, Decadron.





The patient is seen today 04/10/2021 in follow-up in the intensive care unit.  

She remains intubated, sedated and on mechanical ventilator.  Current settings 

assist-control mode at a rate of 24, tidal volume 450, FiO2 50% and a PEEP of 

13.  Morning blood gases revealed a pO2 80, pCO2 37, pH 7.44.  She is sedated on

propofol at 30 mcg/kg/m.  Fentanyl at 1.5 mcg/kg per hour.  Remains on dopamine 

at 2.5 mcg/kg/m.  0.9 normal saline at 40 ML's per hour.  She is being nourished

with vital HP at 33,'s per hour which is goal.  Chest x-ray continues to 

revealed bibasilar infiltrates with small left pleural effusion.  Blood and 

sputum cultures reveal no growth.  White count 7.8.  Hemoglobin 12.7.  Platelet 

count 254.  Sodium 130.  Potassium 4.2.  Creatinine 0.61.  AST 77.  .  

.  C-reactive protein 6.8.  She remains on dexamethasone, Lovenox, 

vitamin supplements.





The patient is seen today 04/11/2021 in follow-up in the intensive care unit.  

She remains intubated, sedated and on the mechanical ventilator.  Currently 

assist-control mode at 24, tidal volume 450, FiO2 50% and a PEEP of 10.  Morning

blood gases reveal a P O2 of 79, pCO2 37, pH 7.46.  She remains on propofol at 

30 mcg/kg/m, fentanyl at 1 g 1.5 mcg/kg/h.  Dopamine at 2.5 mcg/kg/m.  0.9 

normal saline at 40 ML's per hour.  Being nourished with vital HP at 33 ML's per

hour which is goal.  Chest x-ray continues to show bilateral patchy infiltrates 

with a small left pleural effusion.  Blood and sputum cultures reveal no growth.

 White count 5.9.  Hemoglobin 12.7.  Sodium 139.  Potassium 3.8.  Creatinine 

0.5.  AST 61.  ALT 99.  She remains on Lovenox, dexamethasone, vitamin suppl

ements.








 


On  04/12/2021, the patient is being seen in follow-up in the intensive care 

unit.  The patient has been diagnosed having COVID 19 pneumonia and the patient 

urgently tested positive on 03/24/2024.  Subsequently, the patient came to the 

hospital approximately 11 days ago with respiratory failure initially placed on 

high flow oxygen and subsequently the patient failed and the patient had to be 

intubated and placed on a mechanical ventilator on 03/31/2021 and the patient 

has been mechanical ventilator since.  During the course, the patient received a

combination of treatment including Decadron,Tocilizumab, and Lovenox.  The 

patient for now remains on a mechanical ventilator.  On today's evaluation, the 

patient on assist control mode volume cycle with a rate of 24, tidal volume of 

450, FiO2 of 50% with a PEEP of 10.  The patient is sedated with propofol 

running at 25 mg/kg per minute and fentanyl at 1.2 mcg/kilogram/hour.  IV fluids

running at 40 mL an hour.  The patient is receiving vital high protein at the 

rate of 33 mL an hour.  Chest x-ray showing changes patchy bilateral pulmonary 

infiltrates.  ET tube is in a good location.  There is a small left pleural 

effusion.  Blood cultures of been negative.  Sputum cultures of been negative.  

The most recent inflammatory markers showed an LDH level of 868 on 04/10/2021 

and a CRP of 0.3.  The d-dimer is 1.42.  The blood gases from today showed a pH 

of 7.51 with a pCO2 of 35 and pO2 of 91.  The chest x-ray showing lower lobe at 

the pulmonary infiltrates and ET tube is in a good location.  The patient also 

has a orogastric tube in place.  Patient's triglyceride level on 04/09/2021 was 

291.  The patient is also on dopamine to enhance the hard data the patient had 

some underlying sinus bradycardia.  As for blood pressure control, the patient 

is on a combination of lisinopril and Norvasc.  The patient is also on Cleviprex

for a tighter blood pressure control which is being gradually titrated and 

weaned off.  She is currently on dopamine at 2.5 mcg kilogram per minute for 

bradycardia and cardiac cause.  The patient is currently in a normal sinus 

rhythm.  Peak airway pressures around 24-26





Today's evaluation of 04/13/2021 the patient is being seen for a follow-up.  

This is a 59-year-old female patient with Covid 19 related pneumonia was been 

intubated and then on the mechanical ventilator since 03/31/2021.  The patient 

has been aggressively treated during the course of the hospitalization.  This 

morning, the patient has been on propofol which is running at 25 mcg/kg per 

minute and fentanyl is running at 1.5 mcg/kg/h.  The patient is on no paralysis 

for now.  The patient is an assist-control mode at the rate of 24 with a tidal 

volume of 450 and FiO2 of 40% with a PEEP of 6.  The chest x-ray from today is 

showing diffuse breath and pulmonary infiltrates more so in the lower lobes 

bilaterally.  ET tube is in a good location.  No major interval change compared 

to yesterday and the findings are essentially stable.  The peak airway pressure 

 and static pressures are 24 and 21 respectively.  The patient has an LDH of 

8:15, CRP of 5.9.  The patient remains on Decadron 6 mg IV every 24 hours and 

the patient is also on Lovenox 40 mg subcu daily.  The patient is currently off 

dopamine.  Her cardiac rhythm is sinus, bradycardia, rate of 52.  She is 

afebrile.  She is tolerating enteral feeding for nutritional support and she is 

currently on vital high protein at goal which is 53 mL an hour.  No other 

significant events overnight.  The patient is not having any significant 

orotracheal secretions.  Note that the patient was given a sedation holiday 

yesterday.  She was given a CPAP trial for a total of 20 minutes.  At the 

completion of this trial, the patient became tachypneic and quite restless.  We 

are concerned about extubation and we aborted the child and she is back on 

treatment and sedation.





Today's evaluation of 04/14/2021, the patient is being seen for a follow-up.  

The patient remains intubated on a mechanical ventilator.  On today's evaluation

she is on protocol for which is running at 25 mcg peak airway pressure is 25, 

static pressures around 22.  kilogram per minute and fentanyl is running at 1 

mcg/kg/h.  The patient is on a mechanical ventilator on today's vent setting 

include a tidal volume of 450, FiO2 of 40%, PEEP of 5 and a rate of 24.  The 

blood gases from today is showing EHL 7.51 with a pCO2 of 37 and pO2 of 72.  The

chest x-ray from today showing some further improvement in the infiltrates 

bilaterally and there is some improvement in the consolidation of the left lower

lobe.  Meanwhile, or tracheal tube is in place and orogastric tube is also in 

place.  The patient has had his respiratory secretions.  D-dimer today's at 1.6 

with a LDH of 667 and his CRP of 5.8.  Rest of the blood work is still pending 

for now.  Meanwhile, the patient is on Decadron 6 mg IV every 24 hours, Lovenox 

40 mg subcu every 24 hours and the patient is also on NovoLog per sliding scale 

coverage.  IV fluids are at 20 mL an hour and the patient has a net fluid 

balance of -700 mL over the past 24 hours.  Weight is down 125 kg.  She is still

on enteral feeding for nutritional support and the patient is receiving vital 

high protein at the rate of 53 mL an hour and she is able to tolerate.  In terms

of cardiac rhythm, the patient is still having some sinus bradycardia.  No card

iac causes has been noted.  DPs are not prolonged cardiac pauses and we have 

witnessed some pauses for around 3-4 seconds.  We have avoided using Precedex 

for that reason and the patient is still on a combination of propofol and 

fentanyl for now.  The peak airway pressures around 25 and a static pressure is 

22 at this point in time.  She is having increased rest or secretions.





04/15/2021, the patient is extubated and the patient is being seen in follow-up.

 Note that after being given a sedation holiday, the patient showed adequate 

mentation.  The patient showed adequate weaning parameters.  She was given a 

spontaneous breathing trial and she was able to fasten following that she was 

extubated today nasal cannula which is currently at 4 L per minute.  She is 

awake.  She is comfortable.  She is following commands.  She is profoundly weak.

 Respiratory status is stable.  No signs of any respiratory distress.  Chest x-

ray still showing some diffuse bilateral pulmonary infiltrates although there is

some tearing especially in the right lung base area over the right hemidiaphragm

as visualized much more clear.  She is hemodynamically stable.  She was on 

Cleviprex this medication was discontinued 5:00 this morning and her blood 

pressure currently is stable and is holding.  She passed a bedside swallow 

evaluation.  She hasn't been able to take anything orally as far as fluid 

intake.  She has not taken also any oral medications yet.  She remains on 

Decadron 6 mg IV every 24 hours.  Her fluid balance is -2 L over the past 24 

hours.  She is on Lovenox 40 mg subcu every 24 hours.  Was given Lasix yesterday

was facilitated a negative fluid balance.  She is afebrile.  No other signifi

cant events otherwise for now.  IV fluids are currently at KVO.  Awaiting the 

rest of the labs.  Physical therapy will be involved in the care of this 

patient.  





Objective





- Vital Signs


Vital signs: 


                                   Vital Signs











Temp  98.9 F   04/15/21 00:00


 


Pulse  69   04/15/21 05:00


 


Resp  20   04/15/21 05:00


 


BP  127/77   04/15/21 05:00


 


Pulse Ox  96   04/15/21 05:00








                                 Intake & Output











 04/14/21 04/14/21 04/15/21





 06:59 18:59 06:59


 


Intake Total 1336.665 863.928 321.833


 


Output Total 1745 1867 1325


 


Balance -408.335 -1003.072 -1003.167


 


Weight 125.8 kg 125.8 kg 122.5 kg


 


Intake:   


 


   253 253


 


    Sodium Chloride 0.9% 1, 240 220 





    000 ml @ 20 mls/hr IV .   





    Q24H [facility] Rx#:[id number]   


 


    Sodium Chloride 0.9% 500   220





    ml 500 ml @ 20 mls/hr IV   





    .Q24H [facility] Rx#:[id number]   


 


    pressure bag 36 33 33


 


  Intake, IV Titration 334.665 351.928 68.833





  Amount   


 


    Clevidipine Butyrate 25  95.700 68.833





    mg In Empty Bag 1 bag @ 1   





    MG/HR 2 mls/hr IV .Q24H   





    [facility] Rx#:[id number]   


 


    fentaNYL (PF). 1,000 mcg 100 176.398 





    In Sodium Chloride 0.9%   





    80 ml @ Per Protocol IV .   





    Q0M [facility] Rx#:[id number]   


 


    propofoL 1,000 mg In 234.665 79.830 





    Empty Bag 1 bag @ Titrate   





    IV .Q0M [facility] Rx#:   





    [id number]   


 


  Tube Feeding 636 159 


 


  Other 90 100 


 


Output:   


 


  Urine 1745 1867 1325


 


Other:   


 


  Voiding Method Indwelling Catheter Indwelling Catheter Indwelling Catheter








                       ABP, PAP, CO, CI - Last Documented











Arterial Blood Pressure        124/49

















- Exam








GENERAL EXAM: Patient has been extubated to 4 L nasal cannula


HEAD: Normocephalic.


EYES: Normal reaction of pupils, equal size.


NOSE: Clear with pink turbinates.


NECK: No masses, no JVD.


CHEST: No chest wall deformity.


LUNGS: Equal air entry with crackles in the bilateral posterior bases.


CVS: S1 and S2 normal with no audible murmur, regular rhythm.


ABDOMEN: No hepatosplenomegaly, normal bowel sounds, no guarding or rigidity.


SPINE: No scoliosis or deformity


SKIN: No rashes


CENTRAL NERVOUS SYSTEM: Awake, following commands, there is profound motor 

weakness in all 4 extremities.  No focal neurological deficits for now.


EXTREMITIES: There is no peripheral edema.  No clubbing, no cyanosis.  

Peripheral pulses are intact.








- Labs


CBC & Chem 7: 


                                 04/15/21 05:10





                                 04/14/21 04:57


Labs: 


                  Abnormal Lab Results - Last 24 Hours (Table)











  04/14/21 04/14/21 04/14/21 Range/Units





  04:57 04:57 04:57 


 


D-Dimer     (<0.60)  mg/L FEU


 


ABG pO2     ()  mmHg


 


ABG HCO3     (21-25)  mmol/L


 


ABG Total CO2     (19-24)  mmol/L


 


ABG O2 Saturation     (94-97)  %


 


BUN   23 H   (7-17)  mg/dL


 


Glucose   111 H   (74-99)  mg/dL


 


POC Glucose (mg/dL)     (75-99)  mg/dL


 


Lactate Dehydrogenase  667 H    (313-618)  U/L


 


Triglycerides    206 H  (<150)  mg/dL














  04/14/21 04/14/21 04/14/21 Range/Units





  06:02 06:05 12:07 


 


D-Dimer   1.60 H   (<0.60)  mg/L FEU


 


ABG pO2     ()  mmHg


 


ABG HCO3     (21-25)  mmol/L


 


ABG Total CO2     (19-24)  mmol/L


 


ABG O2 Saturation     (94-97)  %


 


BUN     (7-17)  mg/dL


 


Glucose     (74-99)  mg/dL


 


POC Glucose (mg/dL)  134 H   195 H  (75-99)  mg/dL


 


Lactate Dehydrogenase     (313-618)  U/L


 


Triglycerides     (<150)  mg/dL














  04/14/21 04/14/21 04/15/21 Range/Units





  14:05 18:40 00:05 


 


D-Dimer     (<0.60)  mg/L FEU


 


ABG pO2  71 L    ()  mmHg


 


ABG HCO3  28 H    (21-25)  mmol/L


 


ABG Total CO2  29 H    (19-24)  mmol/L


 


ABG O2 Saturation  93.2 L    (94-97)  %


 


BUN     (7-17)  mg/dL


 


Glucose     (74-99)  mg/dL


 


POC Glucose (mg/dL)   176 H  115 H  (75-99)  mg/dL


 


Lactate Dehydrogenase     (313-618)  U/L


 


Triglycerides     (<150)  mg/dL














Assessment and Plan


Plan: 








1 Acute hypoxemic respiratory failure secondary to CoVID 19 

pneumonia/pneumonitis requiring intubation mechanical ventilatory support on 

03/31/2021.  Outside the window for Remdesivir. Received tocilizumab.  The 

patient is extubated for now 4 L of oxygen by nasal cannula.  The patient was 

extubated on 04/14/2021.





2 Hypertension, currently off Cleviprex and the patient is currently on a 

combination of lisinopril and Norvasc for blood pressure control.  Patient also 

given diuresis with Lasix over the past 24 hours and currently the patient is 

also on Aldactone 12.5 mg by mouth daily





3 Paroxysmal atrial fibrillation, current rhythm is sinus





4 Hyperlipidemia





5 sinus Bradycardia, requiring dopamine drip for cardiac rhythm enhancement.  No

significant arrhythmias have been noted.  The patient is currently off dopamine.

 The patient's sinus bradycardia is also improving





6 morbid obesity with a BMI of 43.6





Plan:


 





Extubated to 4 L of oxygen by nasal cannula


Weaned off Cleviprex and discontinued


Continue Decadron 6 mg on a daily basis


Lovenox 40 mg subcu on a daily basis.  The patient does of Lasix


Resident physical therapy will be started today


Repeat chest x-ray, inflammatory markers in the a.m.


We'll try to advance diet as tolerated


Continue oral medications including the antihypertensive medications and  

Norvasc 10 mg by mouth daily in conjunction with lisinopril 20 mg


Avoid beta blockers for now based on her history of sinus bradycardia


Restart citalopram 40 mg by mouth daily


We will continue to follow and make further recommendations based on her 

clinical status


We'll continue to follow this patient very closely in ICU probably for another 

24 hours


Critical care evaluation that was done more than 30 minutes


 


Time with Patient: Greater than 30

## 2021-04-15 NOTE — CDI
Documentation Clarification Form



Date: 04/15/2021 01:51:13 PM

From: Corina Ruiz CCS, CCDS

Phone: [phone]

MRN: [id number]

Admit Date: 03/31/2021 12:56:00 AM

Patient Name: Gisele Burnham

Visit Number: [id number]

Discharge Date:  





ATTENTION: The Clinical Documentation Specialists (CDI) and [facility] Coding Staff 
appreciate your assistance in clarifying documentation. Please respond to the 
clarification below the line at the bottom and electronically sign. The CDI & 
[facility] Coding staff will review the response and follow-up if needed. Please note: 
Queries are made part of the Legal Health Record. If you have any questions, 
please contact the author of this message via ITS.



Dr. Ministerio Perez:



Respiratory distress is documented beginning in the 3/31 ED Note, the 3/31 H/P 
and subsequent Progress Notes.

Per the 4/15 Pulmonary Progress Note: Blood gases are consistent with relative 
hypoxemia, and a combined respiratory and metabolic alkalosis.

Additional clarification regarding the documented respiratory distress is 
requested.



History/Risk Factors per the 3/31 ED Note Past Medical History: Paroxysmal 
Atrial Fibrillation, Apical Ballooning Syndrome, CAD, Hypertension, 
Hypertension, Obesity, BMI >40. 



Clinical Indicators: Presented to the ED on 3/31 via EMS with SOB. In 
respiratory distress, known positive COVID with persistent fever & now 
increasing SOB, low O2 level. Admitted with COVID infection & Pneumonia due to 
COVID 19 virus.

Admitted to ICU, intubated on 3/31.  

3/31 VS: T 98.6, P 88, R 18 - 22 (SOB, labored, cough), /82, PO 90 15Lnrb,
advanced to High Flow & eventual BiPAP 100 % w/PO 72.

Follow up 3/31 VS: T 98.5, P 62, R 40 - 34, /50 - 78/48, PO 91 BiPAP - 86 
100% vent.

BMI 43.6

3/31 Blood Gas: ABG: pH 7.37, pCO2 41, pO2 54, pHCO3 24, Total CO2 25, O2 Sat 
86.7. 

4/7 Blood Gas: ABG: pH 7.51, pCO2 32, pO2 58, HCO3 25, Total CO2 27, O2 Sat 91.3



Treatment 3/31: Admit to ICU, intubated until 4/15, extubated to 4Lnc. Daily 
Blood Gases, 

3/31: INH Ventolin, IV Decadron, IV Toradol, IV Zofran, IV fluid 1,000 mls @ 999
mls/hr q1H, IV fluid 1,000 mls @ 130 mls/hr q7H, Vit C, Vit D3, Orazinc, IV 
Actemra, IV Propofol, IV Nimbex, IV Fentanyl, IV Levophed. 

   

Please further clarify the respiratory distress, if known:



[  ]  Acute Respiratory Distress Syndrome

      [  x]  Present on Admission

      [  ]  Not Present on Admission

[  ]  Other, please specify _______

[  ]  Unable to determine





(Template Last Revised: March 2021)

___________________________________________________________________________

MTDD

## 2021-04-16 LAB
ALBUMIN SERPL-MCNC: 3.7 G/DL (ref 3.5–5)
ALP SERPL-CCNC: 78 U/L (ref 38–126)
ALT SERPL-CCNC: 102 U/L (ref 4–34)
ANION GAP SERPL CALC-SCNC: 9 MMOL/L
AST SERPL-CCNC: 69 U/L (ref 14–36)
BASOPHILS # BLD AUTO: 0.1 K/UL (ref 0–0.2)
BASOPHILS NFR BLD AUTO: 1 %
BUN SERPL-SCNC: 18 MG/DL (ref 7–17)
CALCIUM SPEC-MCNC: 9.6 MG/DL (ref 8.4–10.2)
CHLORIDE SERPL-SCNC: 102 MMOL/L (ref 98–107)
CO2 SERPL-SCNC: 28 MMOL/L (ref 22–30)
D DIMER PPP FEU-MCNC: 2.34 MG/L FEU (ref ?–0.6)
EOSINOPHIL # BLD AUTO: 0.1 K/UL (ref 0–0.7)
EOSINOPHIL NFR BLD AUTO: 1 %
ERYTHROCYTE [DISTWIDTH] IN BLOOD BY AUTOMATED COUNT: 4.37 M/UL (ref 3.8–5.4)
ERYTHROCYTE [DISTWIDTH] IN BLOOD: 15.5 % (ref 11.5–15.5)
FERRITIN SERPL-MCNC: 523.9 NG/ML (ref 10–291)
FIBRINOGEN PPP-MCNC: 403 MG/DL (ref 200–500)
GLUCOSE BLD-MCNC: 104 MG/DL (ref 75–99)
GLUCOSE BLD-MCNC: 156 MG/DL (ref 75–99)
GLUCOSE BLD-MCNC: 162 MG/DL (ref 75–99)
GLUCOSE BLD-MCNC: 84 MG/DL (ref 75–99)
GLUCOSE SERPL-MCNC: 90 MG/DL (ref 74–99)
HCT VFR BLD AUTO: 39.7 % (ref 34–46)
HGB BLD-MCNC: 12.9 GM/DL (ref 11.4–16)
LYMPHOCYTES # SPEC AUTO: 2.6 K/UL (ref 1–4.8)
LYMPHOCYTES NFR SPEC AUTO: 40 %
MCH RBC QN AUTO: 29.5 PG (ref 25–35)
MCHC RBC AUTO-ENTMCNC: 32.4 G/DL (ref 31–37)
MCV RBC AUTO: 90.9 FL (ref 80–100)
MONOCYTES # BLD AUTO: 0.6 K/UL (ref 0–1)
MONOCYTES NFR BLD AUTO: 9 %
NEUTROPHILS # BLD AUTO: 3.1 K/UL (ref 1.3–7.7)
NEUTROPHILS NFR BLD AUTO: 47 %
PLATELET # BLD AUTO: 274 K/UL (ref 150–450)
POTASSIUM SERPL-SCNC: 3.8 MMOL/L (ref 3.5–5.1)
PROT SERPL-MCNC: 6.3 G/DL (ref 6.3–8.2)
SODIUM SERPL-SCNC: 139 MMOL/L (ref 137–145)
WBC # BLD AUTO: 6.5 K/UL (ref 3.8–10.6)

## 2021-04-16 RX ADMIN — INSULIN ASPART SCH UNIT: 100 INJECTION, SOLUTION INTRAVENOUS; SUBCUTANEOUS at 13:05

## 2021-04-16 RX ADMIN — ALBUTEROL SULFATE SCH PUFF: 90 AEROSOL, METERED RESPIRATORY (INHALATION) at 19:18

## 2021-04-16 RX ADMIN — DEXTROSE SCH MG: 50 INJECTION, SOLUTION INTRAVENOUS at 08:32

## 2021-04-16 RX ADMIN — FUROSEMIDE SCH MG: 40 TABLET ORAL at 08:33

## 2021-04-16 RX ADMIN — CEFAZOLIN SCH: 330 INJECTION, POWDER, FOR SOLUTION INTRAMUSCULAR; INTRAVENOUS at 11:26

## 2021-04-16 RX ADMIN — CITALOPRAM HYDROBROMIDE SCH MG: 20 TABLET ORAL at 08:33

## 2021-04-16 RX ADMIN — ACETAMINOPHEN PRN MG: 325 TABLET, FILM COATED ORAL at 05:33

## 2021-04-16 RX ADMIN — Medication SCH MG: at 08:34

## 2021-04-16 RX ADMIN — GUAIFENESIN SCH: 200 SOLUTION ORAL at 08:34

## 2021-04-16 RX ADMIN — FUROSEMIDE SCH MG: 40 TABLET ORAL at 17:14

## 2021-04-16 RX ADMIN — ALBUTEROL SULFATE SCH: 90 AEROSOL, METERED RESPIRATORY (INHALATION) at 10:51

## 2021-04-16 RX ADMIN — Medication SCH MCG: at 08:33

## 2021-04-16 RX ADMIN — ENOXAPARIN SODIUM SCH MG: 40 INJECTION SUBCUTANEOUS at 08:33

## 2021-04-16 RX ADMIN — GUAIFENESIN SCH: 200 SOLUTION ORAL at 02:22

## 2021-04-16 RX ADMIN — GUAIFENESIN SCH MG: 200 SOLUTION ORAL at 22:02

## 2021-04-16 RX ADMIN — CEFAZOLIN SCH: 330 INJECTION, POWDER, FOR SOLUTION INTRAMUSCULAR; INTRAVENOUS at 18:17

## 2021-04-16 RX ADMIN — GUAIFENESIN SCH: 200 SOLUTION ORAL at 16:34

## 2021-04-16 RX ADMIN — CEFAZOLIN SCH: 330 INJECTION, POWDER, FOR SOLUTION INTRAMUSCULAR; INTRAVENOUS at 18:18

## 2021-04-16 RX ADMIN — ASPIRIN 81 MG CHEWABLE TABLET SCH MG: 81 TABLET CHEWABLE at 08:34

## 2021-04-16 RX ADMIN — DEXTRAN 70 AND HYPROMELLOSE 2910 SCH: 1; 3 SOLUTION/ DROPS OPHTHALMIC at 11:25

## 2021-04-16 RX ADMIN — INSULIN ASPART SCH UNIT: 100 INJECTION, SOLUTION INTRAVENOUS; SUBCUTANEOUS at 17:15

## 2021-04-16 RX ADMIN — OXYCODONE HYDROCHLORIDE AND ACETAMINOPHEN SCH MG: 500 TABLET ORAL at 08:34

## 2021-04-16 RX ADMIN — ALBUTEROL SULFATE SCH PUFF: 90 AEROSOL, METERED RESPIRATORY (INHALATION) at 07:06

## 2021-04-16 RX ADMIN — INSULIN ASPART SCH: 100 INJECTION, SOLUTION INTRAVENOUS; SUBCUTANEOUS at 21:52

## 2021-04-16 RX ADMIN — PANTOPRAZOLE SODIUM SCH MG: 40 INJECTION, POWDER, FOR SOLUTION INTRAVENOUS at 08:33

## 2021-04-16 RX ADMIN — ALBUTEROL SULFATE SCH PUFF: 90 AEROSOL, METERED RESPIRATORY (INHALATION) at 03:53

## 2021-04-16 RX ADMIN — ALBUTEROL SULFATE SCH PUFF: 90 AEROSOL, METERED RESPIRATORY (INHALATION) at 00:05

## 2021-04-16 RX ADMIN — GUAIFENESIN SCH: 200 SOLUTION ORAL at 11:27

## 2021-04-16 RX ADMIN — ALBUTEROL SULFATE SCH PUFF: 90 AEROSOL, METERED RESPIRATORY (INHALATION) at 15:32

## 2021-04-16 RX ADMIN — SPIRONOLACTONE SCH MG: 25 TABLET, FILM COATED ORAL at 08:33

## 2021-04-16 RX ADMIN — POTASSIUM BICARBONATE SCH: 782 TABLET, EFFERVESCENT ORAL at 11:25

## 2021-04-16 RX ADMIN — GUAIFENESIN SCH: 200 SOLUTION ORAL at 04:49

## 2021-04-16 RX ADMIN — INSULIN ASPART SCH: 100 INJECTION, SOLUTION INTRAVENOUS; SUBCUTANEOUS at 06:33

## 2021-04-16 RX ADMIN — CEFAZOLIN SCH: 330 INJECTION, POWDER, FOR SOLUTION INTRAMUSCULAR; INTRAVENOUS at 11:25

## 2021-04-16 RX ADMIN — ATORVASTATIN CALCIUM SCH MG: 40 TABLET, FILM COATED ORAL at 08:33

## 2021-04-16 NOTE — P.PN
Subjective


Progress Note Date: 04/16/21


Principal diagnosis: 





Acute bilateral pneumonia, mostly secondary to Covid 19 infection


Acute hypoxic respiratory failure, needing intubation and mechanical ventilation





This is a pleasant 61 years old female with past medical history of atrial 

fibrillation not on anticoagulation and her cardiologist is Dr. Peck, 

hypertension, hyperlipidemia.  She is a patient of Dr. Ribeiro


Patient states that she was tested positive for covid On 03/26/2021.  And now 

she presents with dyspnea of one-day duration when started earlier.  Associated 

with cough on the patella brown phlegm.


She is also complaining of from chest pain without coughing , on the right side 

of the chest nonradiating about 5/10 in severity


She has no vomiting but diarrhea about twice per day





04/8/2021


Patient remains intubated and on mechanical ventilation, currently she needs 

high peep of 13,(was 10 yesterday and 16 on admission) with pulmonary/critical 

care team monitor her closely and help with vent management.


She is tachypneic and bradycardic since admission and she is currently on 

dopamine to keep heart rate in 40s existed of 20s.  Afebrile.  


Pulmonary team recommended tracheostomy and PEG tube placement, family wanted to

wait for now


Labs showing leukopenia with WBC 5.5K with lymphopenia resolved.  Rest of the 

CBC, INR unremarkable.  BMP is unremarkable with carbon dioxide is 25.  Lactic 

acid 1.2.  Liver enzymes slightly elevated with AST 53 and ALT 67 (since 

admission).


Lactate dehydrogenase is elevated at 860(improving), C-reactive protein is 5.9 

(within normal limits).


Chest x-ray Stable portable chest.  Bilateral infiltrates


She is still a normal sinus to 40 mL/h, she is on zinc, vitamin C, vitamin D, 

dexamethasone.  She status post tocilizumab.  Also she is on Lovenox.





04/09/2021


Patient remains intubated and sedated in the ICU with pulmonary/critical care 

team following the patient closely and help with vent management.  Today P Cameron

13 and FiO2 50%.


Chest x-ray: Interstitial changes and patchy bilateral lower lung opacity shows 

some improvement.


Patient looks reviewed, inflammatory markers slightly increased with ferritin 

674 and  while C-reactive protein is normal at 6.4


Patient currently on dexamethasone, normal saline at 40 and dopamine for 

bradycardia plus Lovenox 40 mg and multiple vitamins 4 coated


Family still to decide about tracheostomy and PEG tube placement





04/10/2021


pt remains in icu sedated and intubated , with pulmonary critical care team are 

following the pt closely and help with vent management per PEEP was lowered 

today from 13 down to 10.


Patient is  undergoing sedation holiday.  However pulmonary/critical care team 

still recommending PEG tube and t tracheostomy placement pending family decision

regarding this after critical care team discussed with them.


Cardiologist on the case and recommended to continue with dopamine and and 

Norvasc.


Chest x-ray showing bilateral infiltrate


Labs look stable area


Today at Dr. the daughter Corina who is an ICU nurse and all her questions were 

answered.  Also she was updated with the patient condition.





04/11/2021


Patient still in the ICU needing mechanical ventilation, she is undergoing 

sedation holiday by critical care team.  Her PEEP is still 10 today


PEG tube and tracheostomy is recommended by pulmonary/critical care team as 

well.


Patient remains on dopamine drip and heart rate is around 50.  Cartilage team on

the case


Repeat chest x-ray and inflammatory markers in the morning





4/13/2021


Patient is currently in the MICU intubated and on mechanical ventilator.  

Patient is also on sedation.  Patient is controlled with tidal volume 450, FiO2 

40% and PEEP of 6.  Patient is dopamine drip.


Patient has been afebrile.  Chest x-ray showed cardiomegaly with increasing 

patchy airspace disease possible pulmonary edema.  Continued moderate effusions 

with adjacent atelectasis and/or consolidation.


Laboratory data showed WBC 5.0 hemoglobin 11.2 platelets 203 sodium 136 

potassium 3.5 BUN 20 and creatinine 0.6 calcium 8.6


Patient is being continued on dexamethasone 6 mg daily and Lovenox 40 mg subcu 

daily.  Patient is tolerating tube feeding.





4/14/2021


Patient is currently in MICU.  Pulmonary was extubation yesterday.  Patient is 

off sedation.  Chest x-ray showed bibasilar infiltrates with small left pleural 

effusion, improved from comparison.  Laboratory data showed  CRP 5.8 and 

magnesium 1.9


Patient is being continued on tube feeding.  Currently maintaining sinus rhythm.

 Blood pressure is controlled with medications.  Cardiology and pulmonary is on 

board.





4/15/2021


Patient is currently is in ICU.  Patient was showing adequate weaning parameters

and was extubated today.  Currently on oxygen via nasal cannula at 2 L.  Patient

was started on liquid diet.  Patient is awake alert and seems comfortable.


Chest x-ray showed patchy bilateral lung infiltrates similar to improved 

compared to previous.


Patient is being continued Lasix 40 mg twice daily.  Blood pressure is 

controlled.


Laboratory data showed D-dimer 1.81, ferritin 439 AST 70 ALT CRP 5.9 and sodium 

134.


Patient remains in sinus rhythm.


Current dexamethasone, Lovenox and blood pressure medications and multivitamins.

 Cardiology and pulmonary is on board.





4/16/2021


Patient is transferred to medical floor today.  Lying in the bed comfortably but

lethargic and weak.  Somewhat restless.  Patient has been afebrile.  Currently 

requiring 2 L oxygen via nasal cannula.  Patient is being current on 

dexamethasone, Lovenox.  Chest x-ray showed hypoventilatory changes with diffuse

interstitial and patchy Covid pneumonia.  No significant change.


Laboratory data showed D-dimer level is 2.34 sodium 139 potassium 3.8 BUN 18 and

creatinine 0.69 ferritin 523 bilirubin level 1.6 AST 69  and CRP 5.5





Current medications reviewed.





Objective





- Vital Signs


Vital signs: 


                                   Vital Signs











Temp  98.2 F   04/16/21 12:00


 


Pulse  87   04/16/21 12:00


 


Resp  16   04/16/21 12:00


 


BP  157/67   04/16/21 12:00


 


Pulse Ox  93 L  04/16/21 12:00








                                 Intake & Output











 04/15/21 04/16/21 04/16/21





 18:59 06:59 18:59


 


Intake Total 588.4 276 3


 


Output Total 2960 1005 250


 


Balance -2371.6 -729 -247


 


Weight  143.2 kg 143.2 kg


 


Intake:   


 


   276 3


 


    Sodium Chloride 0.9% 500 240 240 





    ml 500 ml @ 20 mls/hr IV   





    .Q24H VIOLET Rx#:[id number]   


 


    pressure bag 36 36 3


 


  Intake, IV Titration 12.4  





  Amount   


 


    Clevidipine Butyrate 25 12.4  





    mg In Empty Bag 1 bag @ 1   





    MG/HR 2 mls/hr IV .Q24H   





    VIOLET Rx#:[id number]   


 


  Oral 300  0


 


Output:   


 


  Urine 2960 1005 250


 


Other:   


 


  Voiding Method Indwelling Catheter Indwelling Catheter Indwelling Catheter


 


  # Voids   1


 


  # Bowel Movements 1 1 1








                       ABP, PAP, CO, CI - Last Documented











Arterial Blood Pressure        173/72

















- Exam





- Exam





-GENERAL: The patient is Awake alert and oriented.  Feels weak.


HEENT: Pupils are round and equally reacting to light. EOMI. No scleral icterus.

No conjunctival pallor. Normocephalic, atraumatic. No pharyngeal erythema. No 

thyromegaly. 


CARDIOVASCULAR: S1 and S2 present. No murmurs, rubs, or gallops. 


PULMONARY:Bilateral coarse sounds and basilar diminished air entry. , no 

wheezing or crackles. 


ABDOMEN: Soft, nontender, nondistended, normoactive bowel sounds. No palpable 

organomegaly. 


MUSCULOSKELETAL: No joint swelling or deformity. 


EXTREMITIES: No cyanosis, clubbing, or pedal edema. 


NEUROLOGICAL: Gross neurological examination did not reveal any focal deficits. 


SKIN: No rashes. no petechiae.








- Labs


CBC & Chem 7: 


                                 04/16/21 04:30





                                 04/16/21 04:30


Labs: 


                  Abnormal Lab Results - Last 24 Hours (Table)











  04/15/21 04/15/21 04/16/21 Range/Units





  16:52 20:54 04:30 


 


D-Dimer    2.34 H  (<0.60)  mg/L FEU


 


BUN     (7-17)  mg/dL


 


POC Glucose (mg/dL)  182 H  117 H   (75-99)  mg/dL


 


Ferritin     (10.0-291.0)  ng/mL


 


Total Bilirubin     (0.2-1.3)  mg/dL


 


AST     (14-36)  U/L


 


ALT     (4-34)  U/L














  04/16/21 04/16/21 Range/Units





  04:30 12:52 


 


D-Dimer    (<0.60)  mg/L FEU


 


BUN  18 H   (7-17)  mg/dL


 


POC Glucose (mg/dL)   162 H  (75-99)  mg/dL


 


Ferritin  523.9 H   (10.0-291.0)  ng/mL


 


Total Bilirubin  1.6 H   (0.2-1.3)  mg/dL


 


AST  69 H   (14-36)  U/L


 


ALT  102 H   (4-34)  U/L














Assessment and Plan


Assessment: 





Acute bilateral pneumonia, mostly secondary to Covid 19 infection


Acute hypoxic respiratory failure, was on mechanical ventilation.  Status post 

extubation on 4/14/2021


Increased inflammatory markers


Bradycardia secondary to Covid infection. off dopamine now.


Acute Covid gastroenteritis


Mild transaminitis, mostly secondary to Covid


Hypertension


Hyperlipidemia


Paroxysmal A. fib, currently heart rate controlled and is on sinus.








Plan: 





This is a pleasant 61 years old female who presents with respiratory distress 

mostly secondary to covid Pneumonia.  Continue with steroids, vitamin C, zinc, 

vitamin D, bronchodilator and oxygen as needed.  Lovenox for DVT prophylaxis and

pulmonary is following. Patient is extubated on 4/14.Patient was transferred to 

medical floor on 4/16/2021


Cardiologist on the case. c/ w Amlodipine, lisinopril, Aldactone and lasix was 

added ..


Labs and medication were reviewed. Continue with symptomatic treatment.  Resume 

home medication.  Monitor lytes and vitals.  DVT and GI prophylaxis.  Further 

recommendations depends on the clinical course of the patient


DVT prophylaxis: Subcutaneous Lovenox


GI Prophylaxis: Ppi


Prognosis is guarded. 


Time with Patient: Greater than 30

## 2021-04-16 NOTE — P.PN
Subjective


Progress Note Date: 04/16/21

















61-year-old female, who hasn't been feeling well for about 10-11 days.  She 

tested positive for COVID 19 on March 24.  She's been sick for 10-11 days old.  

She complains of TYPICAL symptoms including weakness, fever, diarrhea, cough, 

and shortness of breath.  She's quite hypoxemic.  On AIRVO 100%, and a 

nonrebreather mask, her saturations are right around 80-83%.  Any movement, and 

she desaturates even further.  I just called the intensive care unit, and we'll 

plan on putting her in the intensive care unit.  She's currently on saline at 75

mL an hour.  She can barely speak without becoming short of breath.  The patient

is not a candidate for REM, but could get TOCI.  Sodium 138, potassium 3.8, 

chlorides 108, CO2 21, anion gap 9, BUN 19, creatinine 0.7.  AST 94, ALTs 60, 

LDH 1471, C-reactive protein 88.1.  Again, the patient's been sick for at least 

10-11 days and maybe a bit longer.  Her oral intake has been poor as well.  The 

patient's chest x-ray shows diffuse bilateral infiltrates consistent with 

coronavirus infection.





The patient is seen today 04/01/2021 in follow-up in the intensive care unit.  

Shortly after arriving to the ICU yesterday she required intubation and 

mechanical ventilatory support injury to acute hypoxemic respiratory failure.  

She is currently on the ventilator and assist control mode with a rate of 34, 

tidal on 450, FiO2 70%, PEEP of 16.  Morning blood gases reveal pO2 of 70, pCO2 

33, P 87.38.  Fentanyl drip at 0.5 mcg/kg per hour, propofol at 15 mcg/kg/m, 

Nimbex at 1 mcg/min per hour.  0.9 normal saline at 130 ML's per hour.  She did 

receive Tociluzimab.  She remains on Decadron 6 mg IV daily.  Lovenox 40 mg 

subcutaneous daily.  Tube feedings will be initiated for nutritional support.  

Blood cultures reveal no growth.  White count 2.9.  Hemoglobin 11.3 and 

platelets 188.  Lymphocytes 0.8.  She is continued on Lovenox, dexamethasone, 

vitamin supplements.





The patient is seen today 04/02/2021 in follow-up in the intensive care unit.  

She remains intubated on the mechanical ventilator.  Assist-control mode.  Rate 

of 30, tidal volume 450, FiO2 60% and a PEEP of 16.  She is currently sedated on

propofol at 40 mcg/kg/m, Nimbex at 1 mcg/kg/m, fentanyl at 0.5 mcg/kg per hour. 

She has 0.9 normal saline at 130 ML's per hour.  She is being nourished with 

vital HP at 20 ML's per hour.  She did receive tocilizumab.  He remains on 

dexamethasone, Lovenox, vitamin supplements.  Chest x-ray showing persistent but

improving patchy bilateral airspace disease.  Sputum and blood cultures are 

pending.  White count 3.9.  Hemoglobin 11.4.  Lymphocytes 0.7.  D-dimer 1.19.  

Sodium 139.  Potassium 4.4.  Bicarb 20.  Creatinine 0.64.  Glucose 174.





The patient is seen today 04/03/2020 in follow-up in the intensive care unit.  

She remains intubated on mechanical ventilator.  Current settings are assist-

control mode.  Rate of 34, tidal volume 450, FiO2 50% and a PEEP of 16.  Morning

blood gases reveal a pO2 of 117, pCO2 30, pH 7.40.  She remains sedated on 

propofol at 55 mcg/kg/m.  Fentanyl and 1 mcg/kg/hr.  She is being nourished with

vital HP at 33 ML's per hour.  0.9 normal saline at 130 ML's per hour.  She 

remains on dexamethasone, Lovenox, bronchodilators.  White count 4.0.  

Hemoglobin 11.8.  D-dimer 1.81.  Sodium 138.  Potassium 4.2.  Bicarb 19.  LDH 

1133.  C-reactive protein 15.5.  Asked x-ray continues to show moderate 

scattered interstitial and partial airspace opacities.  





The patient is seen today 04/04/2021 in follow-up in the intensive care unit.  

She remains intubated, sedated.  Current settings are assist control at a rate 

of 30, tidal volume 450, FiO2 50% and a PEEP of 15.  She is sedated on propofol 

at 60 mcg/kg/m, fentanyl at 1 mcg/kg per hour, 0.9 normal saline 130 ML's per 

hour.  Vital HP at 24 mls per hour.  She did receive Actemra.  Blood culture 

reveals no growth.  Sputum culture reveals no growth.  White count 5.3.  

Hemoglobin 12.0.  D-dimer 2.0.  Sodium 139.  Potassium 4.0.  Creatinine 0.59.  

LDH 1082.  C reactive protein 8.6.  Chest x-ray continues to show no interval 

change, continues with persistent moderate patchy opacities bilaterally.  

Endotracheal tube to be advanced to 2 cms.  Continued on vitamin supplements, 

Lovenox, IV Solu-Medrol. 





Progress note dated 04/05/2021.





This is a patient was admitted on March 31 for COVID 19 pneumonia.  The patient 

came to the ICU on March 31, and was intubated on the same day.  She apparently 

tested positive back on March 24.  The patient remains on the mechanical 

ventilator.  She is on the volume assist control mode, rate of 30, tidal volume 

450, FiO2 50%, PEEP of 15 to be dropped to 10.  Blood gases show pO2 of 66, pCO2

33, and a pH 7.41.  The patient's on propofol at 60 mcg/kg/m, fentanyl 1 

mcg/kg/h, normal saline at 130 mL an hour and vital high protein at 24, which is

goal.  The patient did receive TOCI.   White count is 6.2, hemoglobin 11.8, he

matocrit 33.1, platelet count 272,000.  Sodium 137, potassium 3.8, chlorides 

114, CO2 20, anion gap 3, BUN 18, creatinine 0.53.  LDH is 956.  Chest x-ray 

shows worsened bilateral infiltrates.  Small effusions are noted.





Progress note dated 04/06/2021.





61-year-old female, admitted on March 31 for COVID 19 pneumonia.  She came to 

the ICU on March 31 and was intubated on the same day.  She tested positive back

on March 24.  The patient remains on mechanical ventilation.  She is on the 

volume assist control mode, rate 36, tidal volume 450, FiO2 50%, and PEEP is 13.

 Arterial blood gases show a PaO2 of 74, pCO2 31, and a pH of 7.52.  Blood gases

are consistent with relative hypoxemia, and a combined respiratory and metabolic

alkalosis.  The patient is getting propofol 60 mcg/kg/m, fentanyl 1 emily per 

kilogram per hour, saline at 40 mL an hour, dopamine at 2 mcg/kg/m, and vital 

high protein at goal, which is 24 mL an hour.  The cardiologist wanted to DC the

dopamine.  We are going to try to lower PEEP from 13 down to 10.  Today's CBC is

normal.  Sodium 138, potassium 3.4, chlorides 109, CO2 24, anion gap 5, BUN 17, 

and creatinine 0.53.  Chest x-ray shows improving bibasilar infiltrates.





The patient is seen today 04/07/2021 and follow-up in the intensive care unit.  

She remains intubated and on the mechanical ventilator.  Current mode assist 

control with a rate of 30, tidal volume 450, FiO2 50% and a PEEP of 10.  Morning

blood gases reveal a P O2 of 58, pCO2 32, pH 7.51.  She remains sedated on 

propofol at 60 mcg/kg/m.  Fentanyl at 1 mcg/kg/h, 0.9#at 40 ML's per hour.  She 

is being nourished with vital HP at 24 ML's per hour which is goal.  She is 

currently afebrile.  Hemodynamically stable.  She is receive daily interruption 

of sedation without success.  The plan will be for trach and PEG later this 

week.  Blood and sputum cultures revealed no growth.  WBC 6.4.  Hemoglobin 12.8.

 Sodium 137.  Potassium 3.5.  Creatinine 0.60.  Glucose 120.  AST 53.  ALT 69.  

Chest x-ray continues to show patchy perihilar and basilar infiltrates with 

interval progression.





The patient is seen today 04/08/2021 in follow-up in the intensive care unit.  

She remains intubated on mechanical ventilator in assist control mode at a rate 

of 30, tight around 450, FiO2 50% and a PEEP of 10.  Morning blood gases reveal 

a P O2 of 58, pCO2 30, pH 7.54.  She remains sedated on propofol at 50 mcg/kg/m.

 Fentanyl at 2 mcg/kg/h.  0.9 normal saline at 40 ML's per hour.  She was 

initiated back on dopamine at 2 mcg/kg/m due to significant bradycardia with 

heart rate in the 20s temporarily.  Currently heart rate in the 40s.  She has 

remained hypertensive.  Chest x-ray continues to show bilateral patchy 

infiltrates.  Stable compared to previous.  Blood and sputum cultures revealed 

no growth.  White count 5.5.  Hemoglobin 11.8.  D-dimer 1.59.  Sodium 135.  

Potassium 3.9.  Creatinine 0.61.  AST 53.  ALT 67.  .  C-reactive protein

5.9.  She remains on Lovenox, dexamethasone, vitamin supplements.





The patient is seen today 04/09/2021 in follow-up in the intensive care unit.  

She remains intubated and on mechanical ventilator.  Current settings are 

assist-control mode.  Rate of 30, tidal volume 450.  FiO2 50% and a PEEP of 13. 

Morning blood gases reveal a pO2 of 76, pCO2 31, pH 7.53.  She remains sedated 

on propofol at 40 mcg/kg/m, dopamine at 2 mcg/kg/m.  Fentanyl at 1.5 mg/kg per 

hour.  0.9 normal saline at 40 miles per hour.  She is being nourished with 

vital HP at 24 ML's per hour which is goal.  Chest x-ray reveals bilateral 

airspace disease slightly improved.  Blood, sputum cultures reveal no growth.  

White count 6.6.  Hemoglobin 12.8.  Sodium 139.  Potassium 4.0.  Chloride 108.  

Bicarb 25.  Creatinine 0.52.  .  C-reactive protein 6.4.  She remains on 

vitamin supplements, Lovenox, Decadron.





The patient is seen today 04/10/2021 in follow-up in the intensive care unit.  

She remains intubated, sedated and on mechanical ventilator.  Current settings 

assist-control mode at a rate of 24, tidal volume 450, FiO2 50% and a PEEP of 

13.  Morning blood gases revealed a pO2 80, pCO2 37, pH 7.44.  She is sedated on

propofol at 30 mcg/kg/m.  Fentanyl at 1.5 mcg/kg per hour.  Remains on dopamine 

at 2.5 mcg/kg/m.  0.9 normal saline at 40 ML's per hour.  She is being nourished

with vital HP at 33,'s per hour which is goal.  Chest x-ray continues to 

revealed bibasilar infiltrates with small left pleural effusion.  Blood and 

sputum cultures reveal no growth.  White count 7.8.  Hemoglobin 12.7.  Platelet 

count 254.  Sodium 130.  Potassium 4.2.  Creatinine 0.61.  AST 77.  .  

.  C-reactive protein 6.8.  She remains on dexamethasone, Lovenox, 

vitamin supplements.





The patient is seen today 04/11/2021 in follow-up in the intensive care unit.  

She remains intubated, sedated and on the mechanical ventilator.  Currently 

assist-control mode at 24, tidal volume 450, FiO2 50% and a PEEP of 10.  Morning

blood gases reveal a P O2 of 79, pCO2 37, pH 7.46.  She remains on propofol at 

30 mcg/kg/m, fentanyl at 1 g 1.5 mcg/kg/h.  Dopamine at 2.5 mcg/kg/m.  0.9 

normal saline at 40 ML's per hour.  Being nourished with vital HP at 33 ML's per

hour which is goal.  Chest x-ray continues to show bilateral patchy infiltrates 

with a small left pleural effusion.  Blood and sputum cultures reveal no growth.

 White count 5.9.  Hemoglobin 12.7.  Sodium 139.  Potassium 3.8.  Creatinine 

0.5.  AST 61.  ALT 99.  She remains on Lovenox, dexamethasone, vitamin suppl

ements.








 


On  04/12/2021, the patient is being seen in follow-up in the intensive care 

unit.  The patient has been diagnosed having COVID 19 pneumonia and the patient 

urgently tested positive on 03/24/2024.  Subsequently, the patient came to the 

hospital approximately 11 days ago with respiratory failure initially placed on 

high flow oxygen and subsequently the patient failed and the patient had to be 

intubated and placed on a mechanical ventilator on 03/31/2021 and the patient 

has been mechanical ventilator since.  During the course, the patient received a

combination of treatment including Decadron,Tocilizumab, and Lovenox.  The 

patient for now remains on a mechanical ventilator.  On today's evaluation, the 

patient on assist control mode volume cycle with a rate of 24, tidal volume of 

450, FiO2 of 50% with a PEEP of 10.  The patient is sedated with propofol 

running at 25 mg/kg per minute and fentanyl at 1.2 mcg/kilogram/hour.  IV fluids

running at 40 mL an hour.  The patient is receiving vital high protein at the 

rate of 33 mL an hour.  Chest x-ray showing changes patchy bilateral pulmonary 

infiltrates.  ET tube is in a good location.  There is a small left pleural 

effusion.  Blood cultures of been negative.  Sputum cultures of been negative.  

The most recent inflammatory markers showed an LDH level of 868 on 04/10/2021 

and a CRP of 0.3.  The d-dimer is 1.42.  The blood gases from today showed a pH 

of 7.51 with a pCO2 of 35 and pO2 of 91.  The chest x-ray showing lower lobe at 

the pulmonary infiltrates and ET tube is in a good location.  The patient also 

has a orogastric tube in place.  Patient's triglyceride level on 04/09/2021 was 

291.  The patient is also on dopamine to enhance the hard data the patient had 

some underlying sinus bradycardia.  As for blood pressure control, the patient 

is on a combination of lisinopril and Norvasc.  The patient is also on Cleviprex

for a tighter blood pressure control which is being gradually titrated and 

weaned off.  She is currently on dopamine at 2.5 mcg kilogram per minute for 

bradycardia and cardiac cause.  The patient is currently in a normal sinus 

rhythm.  Peak airway pressures around 24-26





Today's evaluation of 04/13/2021 the patient is being seen for a follow-up.  

This is a 59-year-old female patient with Covid 19 related pneumonia was been 

intubated and then on the mechanical ventilator since 03/31/2021.  The patient 

has been aggressively treated during the course of the hospitalization.  This 

morning, the patient has been on propofol which is running at 25 mcg/kg per 

minute and fentanyl is running at 1.5 mcg/kg/h.  The patient is on no paralysis 

for now.  The patient is an assist-control mode at the rate of 24 with a tidal 

volume of 450 and FiO2 of 40% with a PEEP of 6.  The chest x-ray from today is 

showing diffuse breath and pulmonary infiltrates more so in the lower lobes 

bilaterally.  ET tube is in a good location.  No major interval change compared 

to yesterday and the findings are essentially stable.  The peak airway pressure 

 and static pressures are 24 and 21 respectively.  The patient has an LDH of 

8:15, CRP of 5.9.  The patient remains on Decadron 6 mg IV every 24 hours and 

the patient is also on Lovenox 40 mg subcu daily.  The patient is currently off 

dopamine.  Her cardiac rhythm is sinus, bradycardia, rate of 52.  She is 

afebrile.  She is tolerating enteral feeding for nutritional support and she is 

currently on vital high protein at goal which is 53 mL an hour.  No other 

significant events overnight.  The patient is not having any significant 

orotracheal secretions.  Note that the patient was given a sedation holiday 

yesterday.  She was given a CPAP trial for a total of 20 minutes.  At the 

completion of this trial, the patient became tachypneic and quite restless.  We 

are concerned about extubation and we aborted the child and she is back on 

treatment and sedation.





Today's evaluation of 04/14/2021, the patient is being seen for a follow-up.  

The patient remains intubated on a mechanical ventilator.  On today's evaluation

she is on protocol for which is running at 25 mcg peak airway pressure is 25, 

static pressures around 22.  kilogram per minute and fentanyl is running at 1 

mcg/kg/h.  The patient is on a mechanical ventilator on today's vent setting 

include a tidal volume of 450, FiO2 of 40%, PEEP of 5 and a rate of 24.  The 

blood gases from today is showing EHL 7.51 with a pCO2 of 37 and pO2 of 72.  The

chest x-ray from today showing some further improvement in the infiltrates 

bilaterally and there is some improvement in the consolidation of the left lower

lobe.  Meanwhile, or tracheal tube is in place and orogastric tube is also in 

place.  The patient has had his respiratory secretions.  D-dimer today's at 1.6 

with a LDH of 667 and his CRP of 5.8.  Rest of the blood work is still pending 

for now.  Meanwhile, the patient is on Decadron 6 mg IV every 24 hours, Lovenox 

40 mg subcu every 24 hours and the patient is also on NovoLog per sliding scale 

coverage.  IV fluids are at 20 mL an hour and the patient has a net fluid 

balance of -700 mL over the past 24 hours.  Weight is down 125 kg.  She is still

on enteral feeding for nutritional support and the patient is receiving vital 

high protein at the rate of 53 mL an hour and she is able to tolerate.  In terms

of cardiac rhythm, the patient is still having some sinus bradycardia.  No card

iac causes has been noted.  DPs are not prolonged cardiac pauses and we have 

witnessed some pauses for around 3-4 seconds.  We have avoided using Precedex 

for that reason and the patient is still on a combination of propofol and 

fentanyl for now.  The peak airway pressures around 25 and a static pressure is 

22 at this point in time.  She is having increased rest or secretions.





04/15/2021, the patient is extubated and the patient is being seen in follow-up.

 Note that after being given a sedation holiday, the patient showed adequate 

mentation.  The patient showed adequate weaning parameters.  She was given a 

spontaneous breathing trial and she was able to fasten following that she was 

extubated today nasal cannula which is currently at 4 L per minute.  She is 

awake.  She is comfortable.  She is following commands.  She is profoundly weak.

 Respiratory status is stable.  No signs of any respiratory distress.  Chest x-

ray still showing some diffuse bilateral pulmonary infiltrates although there is

some tearing especially in the right lung base area over the right hemidiaphragm

as visualized much more clear.  She is hemodynamically stable.  She was on 

Cleviprex this medication was discontinued 5:00 this morning and her blood 

pressure currently is stable and is holding.  She passed a bedside swallow 

evaluation.  She hasn't been able to take anything orally as far as fluid 

intake.  She has not taken also any oral medications yet.  She remains on 

Decadron 6 mg IV every 24 hours.  Her fluid balance is -2 L over the past 24 

hours.  She is on Lovenox 40 mg subcu every 24 hours.  Was given Lasix yesterday

was facilitated a negative fluid balance.  She is afebrile.  No other signifi

cant events otherwise for now.  IV fluids are currently at KVO.  Awaiting the 

rest of the labs.  Physical therapy will be involved in the care of this 

patient.  





04/16/2021, the patient remains extubated and she is currently on oxygen at 2 L.

 She is awake and alert and she is communicating and she is well rested.  She is

not requiring any sedation or anxiolytics.  Overall rest or status is stable.  

Chest x-ray from today is showing some bilateral pulmonary infiltrates, 

essentially unchanged compared to yesterday.  She remains on Decadron 6 mg IV 

every 24 hours.  She has been negative fluid balance.  She is also on Lovenox 40

mg subcu for DVT prophylaxis.  Her d-dimer currently is at 2.34.  Rest of the 

labs are normal.  Some mild transaminitis still present.  CBC is normal with a 

white cell count 6.5 with a hemoglobin of 12.9.  Earlier this morning, the 

patient had an hypertensive reaction with the blood pressure went up to systolic

of 180.  Based on that, the patient was given her morning medications which 

include Norvasc 10 mg by mouth daily and she is are currently off the Cleviprex.

 She remains on Lasix 40 mg by mouth twice a day and she is also on Aldactone.  

Most recent blood pressure is 176/70.  No other significant events pH remains 

quite weak and she is able to swallow.  Diet is being advanced as tolerated and 

currently the patient on clear liquid diet.  She could be potentially 

transferred out of the intensive care unit today.





Objective





- Vital Signs


Vital signs: 


                                   Vital Signs











Temp  99.1 F   04/16/21 04:00


 


Pulse  63   04/16/21 06:00


 


Resp  30 H  04/16/21 06:00


 


BP  127/57   04/16/21 06:00


 


Pulse Ox  95   04/16/21 06:00








                                 Intake & Output











 04/15/21 04/15/21 04/16/21





 06:59 18:59 06:59


 


Intake Total 344.833 588.4 276


 


Output Total 1370 2960 1005


 


Balance -1025.167 -2371.6 -729


 


Weight 122.5 kg  143.2 kg


 


Intake:   


 


   276 276


 


    Sodium Chloride 0.9% 500 240 240 240





    ml 500 ml @ 20 mls/hr IV   





    .Q24H VIOLET Rx#:[id number]   


 


    pressure bag 36 36 36


 


  Intake, IV Titration 68.833 12.4 





  Amount   


 


    Clevidipine Butyrate 25 68.833 12.4 





    mg In Empty Bag 1 bag @ 1   





    MG/HR 2 mls/hr IV .Q24H   





    VIOLET Rx#:[id number]   


 


  Oral  300 


 


Output:   


 


  Urine 1370 2960 1005


 


Other:   


 


  Voiding Method Indwelling Catheter Indwelling Catheter Indwelling Catheter


 


  # Bowel Movements  1 1








                       ABP, PAP, CO, CI - Last Documented











Arterial Blood Pressure        163/69

















- Exam








GENERAL EXAM: Patient has been extubated to 2 L nasal cannula


HEAD: Normocephalic.


EYES: Normal reaction of pupils, equal size.


NOSE: Clear with pink turbinates.


NECK: No masses, no JVD.


CHEST: No chest wall deformity.


LUNGS: Equal air entry with crackles in the bilateral posterior bases.


CVS: S1 and S2 normal with no audible murmur, regular rhythm.


ABDOMEN: No hepatosplenomegaly, normal bowel sounds, no guarding or rigidity.


SPINE: No scoliosis or deformity


SKIN: No rashes


CENTRAL NERVOUS SYSTEM: Awake, following commands, there is profound motor 

weakness in all 4 extremities.  No focal neurological deficits for now.


EXTREMITIES: There is no peripheral edema.  No clubbing, no cyanosis.  

Peripheral pulses are intact.








- Labs


CBC & Chem 7: 


                                 04/16/21 04:30





                                 04/16/21 04:30


Labs: 


                  Abnormal Lab Results - Last 24 Hours (Table)











  04/15/21 04/15/21 04/15/21 Range/Units





  05:10 11:37 16:52 


 


D-Dimer     (<0.60)  mg/L FEU


 


BUN     (7-17)  mg/dL


 


POC Glucose (mg/dL)   139 H  182 H  (75-99)  mg/dL


 


Ferritin  439.4 H    (10.0-291.0)  ng/mL


 


Total Bilirubin     (0.2-1.3)  mg/dL


 


AST     (14-36)  U/L


 


ALT     (4-34)  U/L














  04/15/21 04/16/21 04/16/21 Range/Units





  20:54 04:30 04:30 


 


D-Dimer   2.34 H   (<0.60)  mg/L FEU


 


BUN    18 H  (7-17)  mg/dL


 


POC Glucose (mg/dL)  117 H    (75-99)  mg/dL


 


Ferritin     (10.0-291.0)  ng/mL


 


Total Bilirubin    1.6 H  (0.2-1.3)  mg/dL


 


AST    69 H  (14-36)  U/L


 


ALT    102 H  (4-34)  U/L














Assessment and Plan


Plan: 








1 Acute hypoxemic respiratory failure secondary to CoVID 19 pneumoni

a/pneumonitis requiring intubation mechanical ventilatory support on 03/31/2021.

 Outside the window for Remdesivir. Received tocilizumab.  The patient is 

extubated for now 2L of oxygen by nasal cannula.  The patient was extubated on 

04/14/2021.  The chest x-ray findings are stable for now.





2 Hypertension, currently off Cleviprex and the patient is currently on a 

combination of lisinopril and Norvasc for blood pressure control.  Patient also 

given diuresis with Lasix over the past 24 hours and currently the patient is 

also on Aldactone 12.5 mg by mouth daily.  Currently the patient is on a co

mbination of Lasix 40 mg by mouth twice a day, Aldactone 12.5 mg by mouth daily 

and Norvasc 10 mg by mouth daily.  She is also on lisinopril 20 mg by mouth 

daily.





3 Paroxysmal atrial fibrillation, current rhythm is sinus





4 Hyperlipidemia





5 sinus Bradycardia, requiring dopamine drip for cardiac rhythm enhancement.  No

significant arrhythmias have been noted.  The patient is currently off dopamine.

 The patient's sinus bradycardia is also improving





6 morbid obesity with a BMI of 43.6





Plan:


 





Extubated to 2 L of oxygen by nasal cannula


Continue Decadron 6 mg on a daily basis


Lovenox 40 mg subcu on a daily basis.  The patient does of Lasix


Resident physical therapy will be started today


Repeat chest x-ray, inflammatory from this morning is stable


We'll try to advance diet as tolerated


Continue oral medications including the antihypertensive medications and  

Norvasc 10 mg by mouth daily in conjunction with lisinopril 20 mg


Avoid beta blockers for now based on her history of sinus bradycardia


citalopram 40 mg by mouth daily


Advance diet 


discontinue the arterial line





Transfer the patient out of the intensive care unit today.

## 2021-04-16 NOTE — P.PN
Subjective


Progress Note Date: 04/16/21





CHIEF COMPLAINT: Shortness of breath





HISTORY OF PRESENT ILLNESS:  Surgical service has been following for possible 

trach and PEG placement.  Patient is being treated for Covid 19 pneumonia and 

respiratory failure.  Patient was successfully extubated and has been on 2 L 

nasal cannula.  She is being transferred out of the ICU today to [facility].   

Her appetite is poor.  But she is tolerating the clear liquid diet.  Afebrile.  

WBC 6.5 Hgb 12.9 platelets 274





Patient seen and examined with Dr. de la o





PHYSICAL EXAM: 


VITAL SIGNS: Reviewed.


GENERAL: Well-developed in no acute distress. 


HEENT:  No sclera icterus. Extraocular movements grossly intact.  Moist buccal 

mucosa. Head is atraumatic, normocephalic. 


ABDOMEN:  Soft.  Nondistended. Nontender. 


NEUROLOGIC: Patient is awake and follows commands





ASSESSMENT: 


1.  Acute hypoxic respiratory failure secondary to Covid 19 pneumonia


2.  Severe protein calorie malnutrition








PLAN: 


-Continue supportive care


-Surgical service will sign off.  Please call if you have any questions.





Physician Assistant note has been reviewed by physician. Signing provider agrees

with the documented findings, assessment, and plan of care. 





Objective





- Vital Signs


Vital signs: 


                                   Vital Signs











Temp  99.6 F   04/16/21 08:00


 


Pulse  64   04/16/21 08:00


 


Resp  31 H  04/16/21 08:00


 


BP  140/71   04/16/21 08:00


 


Pulse Ox  94 L  04/16/21 08:00








                                 Intake & Output











 04/15/21 04/16/21 04/16/21





 18:59 06:59 18:59


 


Intake Total 588.4 276 3


 


Output Total 2960 1005 250


 


Balance -2371.6 -729 -247


 


Weight  143.2 kg 


 


Intake:   


 


   276 3


 


    Sodium Chloride 0.9% 500 240 240 





    ml 500 ml @ 20 mls/hr IV   





    .Q24H VIOLET Rx#:[id number]   


 


    pressure bag 36 36 3


 


  Intake, IV Titration 12.4  





  Amount   


 


    Clevidipine Butyrate 25 12.4  





    mg In Empty Bag 1 bag @ 1   





    MG/HR 2 mls/hr IV .Q24H   





    VIOLET Rx#:[id number]   


 


  Oral 300  


 


Output:   


 


  Urine 2960 1005 250


 


Other:   


 


  Voiding Method Indwelling Catheter Indwelling Catheter Indwelling Catheter


 


  # Voids   1


 


  # Bowel Movements 1 1 1








                       ABP, PAP, CO, CI - Last Documented











Arterial Blood Pressure        173/72

















- Labs


CBC & Chem 7: 


                                 04/16/21 04:30





                                 04/16/21 04:30


Labs: 


                  Abnormal Lab Results - Last 24 Hours (Table)











  04/15/21 04/15/21 04/15/21 Range/Units





  11:37 16:52 20:54 


 


D-Dimer     (<0.60)  mg/L FEU


 


BUN     (7-17)  mg/dL


 


POC Glucose (mg/dL)  139 H  182 H  117 H  (75-99)  mg/dL


 


Total Bilirubin     (0.2-1.3)  mg/dL


 


AST     (14-36)  U/L


 


ALT     (4-34)  U/L














  04/16/21 04/16/21 Range/Units





  04:30 04:30 


 


D-Dimer  2.34 H   (<0.60)  mg/L FEU


 


BUN   18 H  (7-17)  mg/dL


 


POC Glucose (mg/dL)    (75-99)  mg/dL


 


Total Bilirubin   1.6 H  (0.2-1.3)  mg/dL


 


AST   69 H  (14-36)  U/L


 


ALT   102 H  (4-34)  U/L

## 2021-04-16 NOTE — XR
EXAMINATION TYPE: XR chest 1V portable

 

DATE OF EXAM: 4/16/2021

 

Comparison: 4/15/2021

 

Clinical History: 61-year-old female covid

 

Findings:

Low lung volumes. Heart normal size. Interstitial and patchy bilateral opacities. The apices are obsc
ured by the patient's chin. No pleural effusion.

 

 

Impression:

Hypoventilatory changes with diffuse interstitial and patchy COVID pneumonia. No significant change.

## 2021-04-17 LAB
ANION GAP SERPL CALC-SCNC: 11 MMOL/L
BASOPHILS # BLD AUTO: 0.1 K/UL (ref 0–0.2)
BASOPHILS NFR BLD AUTO: 1 %
BUN SERPL-SCNC: 24 MG/DL (ref 7–17)
CALCIUM SPEC-MCNC: 9.9 MG/DL (ref 8.4–10.2)
CHLORIDE SERPL-SCNC: 100 MMOL/L (ref 98–107)
CO2 SERPL-SCNC: 27 MMOL/L (ref 22–30)
EOSINOPHIL # BLD AUTO: 0.2 K/UL (ref 0–0.7)
EOSINOPHIL NFR BLD AUTO: 3 %
ERYTHROCYTE [DISTWIDTH] IN BLOOD BY AUTOMATED COUNT: 4.82 M/UL (ref 3.8–5.4)
ERYTHROCYTE [DISTWIDTH] IN BLOOD: 14.8 % (ref 11.5–15.5)
GLUCOSE BLD-MCNC: 102 MG/DL (ref 75–99)
GLUCOSE BLD-MCNC: 125 MG/DL (ref 75–99)
GLUCOSE BLD-MCNC: 137 MG/DL (ref 75–99)
GLUCOSE BLD-MCNC: 179 MG/DL (ref 75–99)
GLUCOSE SERPL-MCNC: 122 MG/DL (ref 74–99)
HCT VFR BLD AUTO: 43.6 % (ref 34–46)
HGB BLD-MCNC: 14.8 GM/DL (ref 11.4–16)
LDH SERPL P TO L-CCNC: 280 U/L (ref 120–250)
LDH SPEC-CCNC: 1028 U/L (ref 313–618)
LDH1 CFR SERPL ELPH: 21 % (ref 19–38)
LDH2 CFR SERPL ELPH: 36 % (ref 30–43)
LDH3 CFR SERPL ELPH: 21 % (ref 16–26)
LDH4 CFR SERPL ELPH: 10 % (ref 3–12)
LDH5 CFR SERPL ELPH: 12 % (ref 3–14)
LYMPHOCYTES # SPEC AUTO: 1.2 K/UL (ref 1–4.8)
LYMPHOCYTES NFR SPEC AUTO: 17 %
MCH RBC QN AUTO: 30.7 PG (ref 25–35)
MCHC RBC AUTO-ENTMCNC: 33.9 G/DL (ref 31–37)
MCV RBC AUTO: 90.5 FL (ref 80–100)
MONOCYTES # BLD AUTO: 0.5 K/UL (ref 0–1)
MONOCYTES NFR BLD AUTO: 7 %
NEUTROPHILS # BLD AUTO: 5.1 K/UL (ref 1.3–7.7)
NEUTROPHILS NFR BLD AUTO: 70 %
PLATELET # BLD AUTO: 274 K/UL (ref 150–450)
POTASSIUM SERPL-SCNC: 3.6 MMOL/L (ref 3.5–5.1)
SODIUM SERPL-SCNC: 138 MMOL/L (ref 137–145)
WBC # BLD AUTO: 7.2 K/UL (ref 3.8–10.6)

## 2021-04-17 RX ADMIN — GUAIFENESIN SCH MG: 200 SOLUTION ORAL at 23:32

## 2021-04-17 RX ADMIN — GUAIFENESIN SCH: 200 SOLUTION ORAL at 05:49

## 2021-04-17 RX ADMIN — CEFAZOLIN SCH: 330 INJECTION, POWDER, FOR SOLUTION INTRAMUSCULAR; INTRAVENOUS at 08:15

## 2021-04-17 RX ADMIN — FUROSEMIDE SCH MG: 40 TABLET ORAL at 09:38

## 2021-04-17 RX ADMIN — FUROSEMIDE SCH MG: 40 TABLET ORAL at 17:13

## 2021-04-17 RX ADMIN — GUAIFENESIN SCH MG: 200 SOLUTION ORAL at 17:13

## 2021-04-17 RX ADMIN — GUAIFENESIN SCH MG: 200 SOLUTION ORAL at 20:15

## 2021-04-17 RX ADMIN — SPIRONOLACTONE SCH MG: 25 TABLET, FILM COATED ORAL at 09:38

## 2021-04-17 RX ADMIN — INSULIN ASPART SCH: 100 INJECTION, SOLUTION INTRAVENOUS; SUBCUTANEOUS at 06:06

## 2021-04-17 RX ADMIN — INSULIN ASPART SCH UNIT: 100 INJECTION, SOLUTION INTRAVENOUS; SUBCUTANEOUS at 17:13

## 2021-04-17 RX ADMIN — ALBUTEROL SULFATE SCH PUFF: 90 AEROSOL, METERED RESPIRATORY (INHALATION) at 12:40

## 2021-04-17 RX ADMIN — CEFAZOLIN SCH: 330 INJECTION, POWDER, FOR SOLUTION INTRAMUSCULAR; INTRAVENOUS at 08:12

## 2021-04-17 RX ADMIN — ALBUTEROL SULFATE SCH PUFF: 90 AEROSOL, METERED RESPIRATORY (INHALATION) at 16:15

## 2021-04-17 RX ADMIN — ATORVASTATIN CALCIUM SCH MG: 40 TABLET, FILM COATED ORAL at 09:40

## 2021-04-17 RX ADMIN — PANTOPRAZOLE SODIUM SCH MG: 40 INJECTION, POWDER, FOR SOLUTION INTRAVENOUS at 09:37

## 2021-04-17 RX ADMIN — INSULIN ASPART SCH: 100 INJECTION, SOLUTION INTRAVENOUS; SUBCUTANEOUS at 20:10

## 2021-04-17 RX ADMIN — ALBUTEROL SULFATE SCH PUFF: 90 AEROSOL, METERED RESPIRATORY (INHALATION) at 20:14

## 2021-04-17 RX ADMIN — Medication SCH MG: at 09:40

## 2021-04-17 RX ADMIN — ALBUTEROL SULFATE SCH PUFF: 90 AEROSOL, METERED RESPIRATORY (INHALATION) at 09:42

## 2021-04-17 RX ADMIN — GUAIFENESIN SCH MG: 200 SOLUTION ORAL at 09:37

## 2021-04-17 RX ADMIN — DEXTROSE SCH MG: 50 INJECTION, SOLUTION INTRAVENOUS at 09:37

## 2021-04-17 RX ADMIN — NICARDIPINE HYDROCHLORIDE SCH MLS/HR: 2.5 INJECTION INTRAVENOUS at 06:23

## 2021-04-17 RX ADMIN — Medication SCH MCG: at 09:39

## 2021-04-17 RX ADMIN — CEFAZOLIN SCH: 330 INJECTION, POWDER, FOR SOLUTION INTRAMUSCULAR; INTRAVENOUS at 14:51

## 2021-04-17 RX ADMIN — CEFAZOLIN SCH: 330 INJECTION, POWDER, FOR SOLUTION INTRAMUSCULAR; INTRAVENOUS at 08:14

## 2021-04-17 RX ADMIN — NICARDIPINE HYDROCHLORIDE SCH: 2.5 INJECTION INTRAVENOUS at 20:13

## 2021-04-17 RX ADMIN — OXYCODONE HYDROCHLORIDE AND ACETAMINOPHEN SCH MG: 500 TABLET ORAL at 09:39

## 2021-04-17 RX ADMIN — GUAIFENESIN SCH MG: 200 SOLUTION ORAL at 00:06

## 2021-04-17 RX ADMIN — ENOXAPARIN SODIUM SCH MG: 40 INJECTION SUBCUTANEOUS at 09:37

## 2021-04-17 RX ADMIN — INSULIN ASPART SCH UNIT: 100 INJECTION, SOLUTION INTRAVENOUS; SUBCUTANEOUS at 13:09

## 2021-04-17 RX ADMIN — GUAIFENESIN SCH: 200 SOLUTION ORAL at 13:09

## 2021-04-17 RX ADMIN — CEFAZOLIN SCH: 330 INJECTION, POWDER, FOR SOLUTION INTRAMUSCULAR; INTRAVENOUS at 14:50

## 2021-04-17 RX ADMIN — CITALOPRAM HYDROBROMIDE SCH MG: 20 TABLET ORAL at 09:38

## 2021-04-17 RX ADMIN — ASPIRIN 81 MG CHEWABLE TABLET SCH MG: 81 TABLET CHEWABLE at 09:38

## 2021-04-17 NOTE — P.PN
Subjective


Progress Note Date: 04/17/21

















61-year-old female, who hasn't been feeling well for about 10-11 days.  She 

tested positive for COVID 19 on March 24.  She's been sick for 10-11 days old.  

She complains of TYPICAL symptoms including weakness, fever, diarrhea, cough, 

and shortness of breath.  She's quite hypoxemic.  On AIRVO 100%, and a 

nonrebreather mask, her saturations are right around 80-83%.  Any movement, and 

she desaturates even further.  I just called the intensive care unit, and we'll 

plan on putting her in the intensive care unit.  She's currently on saline at 75

mL an hour.  She can barely speak without becoming short of breath.  The patient

is not a candidate for REM, but could get TOCI.  Sodium 138, potassium 3.8, 

chlorides 108, CO2 21, anion gap 9, BUN 19, creatinine 0.7.  AST 94, ALTs 60, 

LDH 1471, C-reactive protein 88.1.  Again, the patient's been sick for at least 

10-11 days and maybe a bit longer.  Her oral intake has been poor as well.  The 

patient's chest x-ray shows diffuse bilateral infiltrates consistent with 

coronavirus infection.





The patient is seen today 04/01/2021 in follow-up in the intensive care unit.  

Shortly after arriving to the ICU yesterday she required intubation and 

mechanical ventilatory support injury to acute hypoxemic respiratory failure.  

She is currently on the ventilator and assist control mode with a rate of 34, 

tidal on 450, FiO2 70%, PEEP of 16.  Morning blood gases reveal pO2 of 70, pCO2 

33, P 87.38.  Fentanyl drip at 0.5 mcg/kg per hour, propofol at 15 mcg/kg/m, 

Nimbex at 1 mcg/min per hour.  0.9 normal saline at 130 ML's per hour.  She did 

receive Tociluzimab.  She remains on Decadron 6 mg IV daily.  Lovenox 40 mg 

subcutaneous daily.  Tube feedings will be initiated for nutritional support.  

Blood cultures reveal no growth.  White count 2.9.  Hemoglobin 11.3 and 

platelets 188.  Lymphocytes 0.8.  She is continued on Lovenox, dexamethasone, 

vitamin supplements.





The patient is seen today 04/02/2021 in follow-up in the intensive care unit.  

She remains intubated on the mechanical ventilator.  Assist-control mode.  Rate 

of 30, tidal volume 450, FiO2 60% and a PEEP of 16.  She is currently sedated on

propofol at 40 mcg/kg/m, Nimbex at 1 mcg/kg/m, fentanyl at 0.5 mcg/kg per hour. 

She has 0.9 normal saline at 130 ML's per hour.  She is being nourished with 

vital HP at 20 ML's per hour.  She did receive tocilizumab.  He remains on 

dexamethasone, Lovenox, vitamin supplements.  Chest x-ray showing persistent but

improving patchy bilateral airspace disease.  Sputum and blood cultures are 

pending.  White count 3.9.  Hemoglobin 11.4.  Lymphocytes 0.7.  D-dimer 1.19.  

Sodium 139.  Potassium 4.4.  Bicarb 20.  Creatinine 0.64.  Glucose 174.





The patient is seen today 04/03/2020 in follow-up in the intensive care unit.  

She remains intubated on mechanical ventilator.  Current settings are assist-

control mode.  Rate of 34, tidal volume 450, FiO2 50% and a PEEP of 16.  Morning

blood gases reveal a pO2 of 117, pCO2 30, pH 7.40.  She remains sedated on 

propofol at 55 mcg/kg/m.  Fentanyl and 1 mcg/kg/hr.  She is being nourished with

vital HP at 33 ML's per hour.  0.9 normal saline at 130 ML's per hour.  She 

remains on dexamethasone, Lovenox, bronchodilators.  White count 4.0.  

Hemoglobin 11.8.  D-dimer 1.81.  Sodium 138.  Potassium 4.2.  Bicarb 19.  LDH 

1133.  C-reactive protein 15.5.  Asked x-ray continues to show moderate 

scattered interstitial and partial airspace opacities.  





The patient is seen today 04/04/2021 in follow-up in the intensive care unit.  

She remains intubated, sedated.  Current settings are assist control at a rate 

of 30, tidal volume 450, FiO2 50% and a PEEP of 15.  She is sedated on propofol 

at 60 mcg/kg/m, fentanyl at 1 mcg/kg per hour, 0.9 normal saline 130 ML's per 

hour.  Vital HP at 24 mls per hour.  She did receive Actemra.  Blood culture 

reveals no growth.  Sputum culture reveals no growth.  White count 5.3.  

Hemoglobin 12.0.  D-dimer 2.0.  Sodium 139.  Potassium 4.0.  Creatinine 0.59.  

LDH 1082.  C reactive protein 8.6.  Chest x-ray continues to show no interval 

change, continues with persistent moderate patchy opacities bilaterally.  

Endotracheal tube to be advanced to 2 cms.  Continued on vitamin supplements, 

Lovenox, IV Solu-Medrol. 





Progress note dated 04/05/2021.





This is a patient was admitted on March 31 for COVID 19 pneumonia.  The patient 

came to the ICU on March 31, and was intubated on the same day.  She apparently 

tested positive back on March 24.  The patient remains on the mechanical 

ventilator.  She is on the volume assist control mode, rate of 30, tidal volume 

450, FiO2 50%, PEEP of 15 to be dropped to 10.  Blood gases show pO2 of 66, pCO2

33, and a pH 7.41.  The patient's on propofol at 60 mcg/kg/m, fentanyl 1 

mcg/kg/h, normal saline at 130 mL an hour and vital high protein at 24, which is

goal.  The patient did receive TOCI.   White count is 6.2, hemoglobin 11.8, he

matocrit 33.1, platelet count 272,000.  Sodium 137, potassium 3.8, chlorides 

114, CO2 20, anion gap 3, BUN 18, creatinine 0.53.  LDH is 956.  Chest x-ray 

shows worsened bilateral infiltrates.  Small effusions are noted.





Progress note dated 04/06/2021.





61-year-old female, admitted on March 31 for COVID 19 pneumonia.  She came to 

the ICU on March 31 and was intubated on the same day.  She tested positive back

on March 24.  The patient remains on mechanical ventilation.  She is on the 

volume assist control mode, rate 36, tidal volume 450, FiO2 50%, and PEEP is 13.

 Arterial blood gases show a PaO2 of 74, pCO2 31, and a pH of 7.52.  Blood gases

are consistent with relative hypoxemia, and a combined respiratory and metabolic

alkalosis.  The patient is getting propofol 60 mcg/kg/m, fentanyl 1 emily per 

kilogram per hour, saline at 40 mL an hour, dopamine at 2 mcg/kg/m, and vital 

high protein at goal, which is 24 mL an hour.  The cardiologist wanted to DC the

dopamine.  We are going to try to lower PEEP from 13 down to 10.  Today's CBC is

normal.  Sodium 138, potassium 3.4, chlorides 109, CO2 24, anion gap 5, BUN 17, 

and creatinine 0.53.  Chest x-ray shows improving bibasilar infiltrates.





The patient is seen today 04/07/2021 and follow-up in the intensive care unit.  

She remains intubated and on the mechanical ventilator.  Current mode assist 

control with a rate of 30, tidal volume 450, FiO2 50% and a PEEP of 10.  Morning

blood gases reveal a P O2 of 58, pCO2 32, pH 7.51.  She remains sedated on 

propofol at 60 mcg/kg/m.  Fentanyl at 1 mcg/kg/h, 0.9#at 40 ML's per hour.  She 

is being nourished with vital HP at 24 ML's per hour which is goal.  She is 

currently afebrile.  Hemodynamically stable.  She is receive daily interruption 

of sedation without success.  The plan will be for trach and PEG later this 

week.  Blood and sputum cultures revealed no growth.  WBC 6.4.  Hemoglobin 12.8.

 Sodium 137.  Potassium 3.5.  Creatinine 0.60.  Glucose 120.  AST 53.  ALT 69.  

Chest x-ray continues to show patchy perihilar and basilar infiltrates with 

interval progression.





The patient is seen today 04/08/2021 in follow-up in the intensive care unit.  

She remains intubated on mechanical ventilator in assist control mode at a rate 

of 30, tight around 450, FiO2 50% and a PEEP of 10.  Morning blood gases reveal 

a P O2 of 58, pCO2 30, pH 7.54.  She remains sedated on propofol at 50 mcg/kg/m.

 Fentanyl at 2 mcg/kg/h.  0.9 normal saline at 40 ML's per hour.  She was 

initiated back on dopamine at 2 mcg/kg/m due to significant bradycardia with 

heart rate in the 20s temporarily.  Currently heart rate in the 40s.  She has 

remained hypertensive.  Chest x-ray continues to show bilateral patchy 

infiltrates.  Stable compared to previous.  Blood and sputum cultures revealed 

no growth.  White count 5.5.  Hemoglobin 11.8.  D-dimer 1.59.  Sodium 135.  

Potassium 3.9.  Creatinine 0.61.  AST 53.  ALT 67.  .  C-reactive protein

5.9.  She remains on Lovenox, dexamethasone, vitamin supplements.





The patient is seen today 04/09/2021 in follow-up in the intensive care unit.  

She remains intubated and on mechanical ventilator.  Current settings are 

assist-control mode.  Rate of 30, tidal volume 450.  FiO2 50% and a PEEP of 13. 

Morning blood gases reveal a pO2 of 76, pCO2 31, pH 7.53.  She remains sedated 

on propofol at 40 mcg/kg/m, dopamine at 2 mcg/kg/m.  Fentanyl at 1.5 mg/kg per 

hour.  0.9 normal saline at 40 miles per hour.  She is being nourished with 

vital HP at 24 ML's per hour which is goal.  Chest x-ray reveals bilateral 

airspace disease slightly improved.  Blood, sputum cultures reveal no growth.  

White count 6.6.  Hemoglobin 12.8.  Sodium 139.  Potassium 4.0.  Chloride 108.  

Bicarb 25.  Creatinine 0.52.  .  C-reactive protein 6.4.  She remains on 

vitamin supplements, Lovenox, Decadron.





The patient is seen today 04/10/2021 in follow-up in the intensive care unit.  

She remains intubated, sedated and on mechanical ventilator.  Current settings 

assist-control mode at a rate of 24, tidal volume 450, FiO2 50% and a PEEP of 

13.  Morning blood gases revealed a pO2 80, pCO2 37, pH 7.44.  She is sedated on

propofol at 30 mcg/kg/m.  Fentanyl at 1.5 mcg/kg per hour.  Remains on dopamine 

at 2.5 mcg/kg/m.  0.9 normal saline at 40 ML's per hour.  She is being nourished

with vital HP at 33,'s per hour which is goal.  Chest x-ray continues to 

revealed bibasilar infiltrates with small left pleural effusion.  Blood and 

sputum cultures reveal no growth.  White count 7.8.  Hemoglobin 12.7.  Platelet 

count 254.  Sodium 130.  Potassium 4.2.  Creatinine 0.61.  AST 77.  .  

.  C-reactive protein 6.8.  She remains on dexamethasone, Lovenox, 

vitamin supplements.





The patient is seen today 04/11/2021 in follow-up in the intensive care unit.  

She remains intubated, sedated and on the mechanical ventilator.  Currently 

assist-control mode at 24, tidal volume 450, FiO2 50% and a PEEP of 10.  Morning

blood gases reveal a P O2 of 79, pCO2 37, pH 7.46.  She remains on propofol at 

30 mcg/kg/m, fentanyl at 1 g 1.5 mcg/kg/h.  Dopamine at 2.5 mcg/kg/m.  0.9 

normal saline at 40 ML's per hour.  Being nourished with vital HP at 33 ML's per

hour which is goal.  Chest x-ray continues to show bilateral patchy infiltrates 

with a small left pleural effusion.  Blood and sputum cultures reveal no growth.

 White count 5.9.  Hemoglobin 12.7.  Sodium 139.  Potassium 3.8.  Creatinine 

0.5.  AST 61.  ALT 99.  She remains on Lovenox, dexamethasone, vitamin suppl

ements.








 


On  04/12/2021, the patient is being seen in follow-up in the intensive care 

unit.  The patient has been diagnosed having COVID 19 pneumonia and the patient 

urgently tested positive on 03/24/2024.  Subsequently, the patient came to the 

hospital approximately 11 days ago with respiratory failure initially placed on 

high flow oxygen and subsequently the patient failed and the patient had to be 

intubated and placed on a mechanical ventilator on 03/31/2021 and the patient 

has been mechanical ventilator since.  During the course, the patient received a

combination of treatment including Decadron,Tocilizumab, and Lovenox.  The 

patient for now remains on a mechanical ventilator.  On today's evaluation, the 

patient on assist control mode volume cycle with a rate of 24, tidal volume of 

450, FiO2 of 50% with a PEEP of 10.  The patient is sedated with propofol 

running at 25 mg/kg per minute and fentanyl at 1.2 mcg/kilogram/hour.  IV fluids

running at 40 mL an hour.  The patient is receiving vital high protein at the 

rate of 33 mL an hour.  Chest x-ray showing changes patchy bilateral pulmonary 

infiltrates.  ET tube is in a good location.  There is a small left pleural 

effusion.  Blood cultures of been negative.  Sputum cultures of been negative.  

The most recent inflammatory markers showed an LDH level of 868 on 04/10/2021 

and a CRP of 0.3.  The d-dimer is 1.42.  The blood gases from today showed a pH 

of 7.51 with a pCO2 of 35 and pO2 of 91.  The chest x-ray showing lower lobe at 

the pulmonary infiltrates and ET tube is in a good location.  The patient also 

has a orogastric tube in place.  Patient's triglyceride level on 04/09/2021 was 

291.  The patient is also on dopamine to enhance the hard data the patient had 

some underlying sinus bradycardia.  As for blood pressure control, the patient 

is on a combination of lisinopril and Norvasc.  The patient is also on Cleviprex

for a tighter blood pressure control which is being gradually titrated and 

weaned off.  She is currently on dopamine at 2.5 mcg kilogram per minute for 

bradycardia and cardiac cause.  The patient is currently in a normal sinus 

rhythm.  Peak airway pressures around 24-26





Today's evaluation of 04/13/2021 the patient is being seen for a follow-up.  

This is a 59-year-old female patient with Covid 19 related pneumonia was been 

intubated and then on the mechanical ventilator since 03/31/2021.  The patient 

has been aggressively treated during the course of the hospitalization.  This 

morning, the patient has been on propofol which is running at 25 mcg/kg per 

minute and fentanyl is running at 1.5 mcg/kg/h.  The patient is on no paralysis 

for now.  The patient is an assist-control mode at the rate of 24 with a tidal 

volume of 450 and FiO2 of 40% with a PEEP of 6.  The chest x-ray from today is 

showing diffuse breath and pulmonary infiltrates more so in the lower lobes 

bilaterally.  ET tube is in a good location.  No major interval change compared 

to yesterday and the findings are essentially stable.  The peak airway pressure 

 and static pressures are 24 and 21 respectively.  The patient has an LDH of 

8:15, CRP of 5.9.  The patient remains on Decadron 6 mg IV every 24 hours and 

the patient is also on Lovenox 40 mg subcu daily.  The patient is currently off 

dopamine.  Her cardiac rhythm is sinus, bradycardia, rate of 52.  She is 

afebrile.  She is tolerating enteral feeding for nutritional support and she is 

currently on vital high protein at goal which is 53 mL an hour.  No other 

significant events overnight.  The patient is not having any significant 

orotracheal secretions.  Note that the patient was given a sedation holiday 

yesterday.  She was given a CPAP trial for a total of 20 minutes.  At the 

completion of this trial, the patient became tachypneic and quite restless.  We 

are concerned about extubation and we aborted the child and she is back on 

treatment and sedation.





Today's evaluation of 04/14/2021, the patient is being seen for a follow-up.  

The patient remains intubated on a mechanical ventilator.  On today's evaluation

she is on protocol for which is running at 25 mcg peak airway pressure is 25, 

static pressures around 22.  kilogram per minute and fentanyl is running at 1 

mcg/kg/h.  The patient is on a mechanical ventilator on today's vent setting 

include a tidal volume of 450, FiO2 of 40%, PEEP of 5 and a rate of 24.  The 

blood gases from today is showing EHL 7.51 with a pCO2 of 37 and pO2 of 72.  The

chest x-ray from today showing some further improvement in the infiltrates 

bilaterally and there is some improvement in the consolidation of the left lower

lobe.  Meanwhile, or tracheal tube is in place and orogastric tube is also in 

place.  The patient has had his respiratory secretions.  D-dimer today's at 1.6 

with a LDH of 667 and his CRP of 5.8.  Rest of the blood work is still pending 

for now.  Meanwhile, the patient is on Decadron 6 mg IV every 24 hours, Lovenox 

40 mg subcu every 24 hours and the patient is also on NovoLog per sliding scale 

coverage.  IV fluids are at 20 mL an hour and the patient has a net fluid 

balance of -700 mL over the past 24 hours.  Weight is down 125 kg.  She is still

on enteral feeding for nutritional support and the patient is receiving vital 

high protein at the rate of 53 mL an hour and she is able to tolerate.  In terms

of cardiac rhythm, the patient is still having some sinus bradycardia.  No card

iac causes has been noted.  DPs are not prolonged cardiac pauses and we have 

witnessed some pauses for around 3-4 seconds.  We have avoided using Precedex 

for that reason and the patient is still on a combination of propofol and 

fentanyl for now.  The peak airway pressures around 25 and a static pressure is 

22 at this point in time.  She is having increased rest or secretions.





04/15/2021, the patient is extubated and the patient is being seen in follow-up.

 Note that after being given a sedation holiday, the patient showed adequate 

mentation.  The patient showed adequate weaning parameters.  She was given a 

spontaneous breathing trial and she was able to fasten following that she was 

extubated today nasal cannula which is currently at 4 L per minute.  She is 

awake.  She is comfortable.  She is following commands.  She is profoundly weak.

 Respiratory status is stable.  No signs of any respiratory distress.  Chest x-

ray still showing some diffuse bilateral pulmonary infiltrates although there is

some tearing especially in the right lung base area over the right hemidiaphragm

as visualized much more clear.  She is hemodynamically stable.  She was on 

Cleviprex this medication was discontinued 5:00 this morning and her blood 

pressure currently is stable and is holding.  She passed a bedside swallow 

evaluation.  She hasn't been able to take anything orally as far as fluid 

intake.  She has not taken also any oral medications yet.  She remains on 

Decadron 6 mg IV every 24 hours.  Her fluid balance is -2 L over the past 24 

hours.  She is on Lovenox 40 mg subcu every 24 hours.  Was given Lasix yesterday

was facilitated a negative fluid balance.  She is afebrile.  No other signifi

cant events otherwise for now.  IV fluids are currently at KVO.  Awaiting the 

rest of the labs.  Physical therapy will be involved in the care of this 

patient.  





04/16/2021, the patient remains extubated and she is currently on oxygen at 2 L.

 She is awake and alert and she is communicating and she is well rested.  She is

not requiring any sedation or anxiolytics.  Overall rest or status is stable.  

Chest x-ray from today is showing some bilateral pulmonary infiltrates, 

essentially unchanged compared to yesterday.  She remains on Decadron 6 mg IV 

every 24 hours.  She has been negative fluid balance.  She is also on Lovenox 40

mg subcu for DVT prophylaxis.  Her d-dimer currently is at 2.34.  Rest of the 

labs are normal.  Some mild transaminitis still present.  CBC is normal with a 

white cell count 6.5 with a hemoglobin of 12.9.  Earlier this morning, the 

patient had an hypertensive reaction with the blood pressure went up to systolic

of 180.  Based on that, the patient was given her morning medications which 

include Norvasc 10 mg by mouth daily and she is are currently off the Cleviprex.

 She remains on Lasix 40 mg by mouth twice a day and she is also on Aldactone.  

Most recent blood pressure is 176/70.  No other significant events pH remains 

quite weak and she is able to swallow.  Diet is being advanced as tolerated and 

currently the patient on clear liquid diet.  She could be potentially 

transferred out of the intensive care unit today.





04/17/2021 the patient remains extubated on 2 L and she is on a telemetry unit. 

She is a week.  She is communicating.  Her voice is hoarse as very soft.  No new

complaints.  She is moving all 4 extremities however she has generalized global 

weakness.  She remains on Decadron 6 mg IV every 24 hours.  She remains on Lasix

40 mg by mouth twice a day.  She is also on aspirin and Lovenox 40 mg subcu for 

DVT prophylaxis.  No new labs are available this morning.  No chest x-ray from 

this morning.  Hemodynamically stable.  Blood pressure is under better control.





Objective





- Vital Signs


Vital signs: 


                                   Vital Signs











Temp  98.3 F   04/17/21 04:00


 


Pulse  100   04/17/21 04:00


 


Resp  18   04/17/21 04:00


 


BP  133/72   04/17/21 04:00


 


Pulse Ox  93 L  04/17/21 04:00








                                 Intake & Output











 04/16/21 04/17/21 04/17/21





 18:59 06:59 18:59


 


Intake Total 3  118


 


Output Total 900 850 100


 


Balance -897 -850 18


 


Weight 143.2 kg 141 kg 


 


Intake:   


 


  IV 3  


 


    pressure bag 3  


 


  Oral 0  118


 


Output:   


 


  Urine 900 850 100


 


Other:   


 


  Voiding Method External Catheter External Catheter 


 


  # Voids 1 1 


 


  # Bowel Movements 1 1 








                       ABP, PAP, CO, CI - Last Documented











Arterial Blood Pressure        173/72

















- Exam








GENERAL EXAM: Patient has been extubated to 2 L nasal cannula


HEAD: Normocephalic.


EYES: Normal reaction of pupils, equal size.


NOSE: Clear with pink turbinates.


NECK: No masses, no JVD.


CHEST: No chest wall deformity.


LUNGS: Equal air entry with crackles in the bilateral posterior bases.


CVS: S1 and S2 normal with no audible murmur, regular rhythm.


ABDOMEN: No hepatosplenomegaly, normal bowel sounds, no guarding or rigidity.


SPINE: No scoliosis or deformity


SKIN: No rashes


CENTRAL NERVOUS SYSTEM: Awake, following commands, there is profound motor 

weakness in all 4 extremities.  No focal neurological deficits for now.


EXTREMITIES: There is no peripheral edema.  No clubbing, no cyanosis.  

Peripheral pulses are intact.  The patient is still having significant amount of

motor weakness pH is able to raise her arms against gravity.  She is able to 

cough..








- Labs


CBC & Chem 7: 


                                 04/16/21 04:30





                                 04/16/21 04:30


Labs: 


                  Abnormal Lab Results - Last 24 Hours (Table)











  04/16/21 04/16/21 04/16/21 Range/Units





  04:30 12:52 17:00 


 


POC Glucose (mg/dL)   162 H  156 H  (75-99)  mg/dL


 


Ferritin  523.9 H    (10.0-291.0)  ng/mL














  04/16/21 04/17/21 Range/Units





  21:51 06:04 


 


POC Glucose (mg/dL)  104 H  102 H  (75-99)  mg/dL


 


Ferritin    (10.0-291.0)  ng/mL














Assessment and Plan


Plan: 








1 Acute hypoxemic respiratory failure secondary to CoVID 19 

pneumonia/pneumonitis requiring intubation mechanical ventilatory support on 

03/31/2021.  Outside the window for Remdesivir. Received tocilizumab.  The 

patient is extubated for now 2L of oxygen by nasal cannula.  The patient was 

extubated on 04/14/2021.   She is having significant motor weakness due to 

prolonged intubation mechanical ventilation.  She has bilateral foot drop.  

Motor functions are limited and weak.  She'll need aggressive physical therapy.





2 Hypertension,   Currently the patient is on a combination of Lasix 40 mg by 

mouth twice a day, Aldactone 12.5 mg by mouth daily and Norvasc 10 mg by mouth 

daily.  She is also on lisinopril 20 mg by mouth daily.





3 Paroxysmal atrial fibrillation, current rhythm is sinus





4 Hyperlipidemia





5 sinus Bradycardia, requiring dopamine drip for cardiac rhythm enhancement.  No

significant arrhythmias have been noted.  The patient is currently off dopamine.

 The patient's sinus bradycardia is also improving





6 morbid obesity with a BMI of 43.6





Plan:


 





Extubated to 2 L of oxygen by nasal cannula


Continue Decadron 6 mg on a daily basis


Lovenox 40 mg subcu on a daily basis.  


physical therapy 


We'll try to advance diet as tolerated


Continue oral medications including the antihypertensive medications and  No

rvasc 10 mg by mouth daily in conjunction with lisinopril 20 mg


Avoid beta blockers for now based on her history of sinus bradycardia


citalopram 40 mg by mouth daily


Advance diet

## 2021-04-18 LAB
GLUCOSE BLD-MCNC: 126 MG/DL (ref 75–99)
GLUCOSE BLD-MCNC: 131 MG/DL (ref 75–99)
GLUCOSE BLD-MCNC: 151 MG/DL (ref 75–99)
GLUCOSE BLD-MCNC: 94 MG/DL (ref 75–99)

## 2021-04-18 RX ADMIN — ALBUTEROL SULFATE SCH PUFF: 90 AEROSOL, METERED RESPIRATORY (INHALATION) at 20:25

## 2021-04-18 RX ADMIN — ALBUTEROL SULFATE SCH PUFF: 90 AEROSOL, METERED RESPIRATORY (INHALATION) at 16:31

## 2021-04-18 RX ADMIN — FUROSEMIDE SCH MG: 40 TABLET ORAL at 08:49

## 2021-04-18 RX ADMIN — Medication SCH MCG: at 08:49

## 2021-04-18 RX ADMIN — ALBUTEROL SULFATE SCH PUFF: 90 AEROSOL, METERED RESPIRATORY (INHALATION) at 12:12

## 2021-04-18 RX ADMIN — INSULIN ASPART SCH: 100 INJECTION, SOLUTION INTRAVENOUS; SUBCUTANEOUS at 07:00

## 2021-04-18 RX ADMIN — ASPIRIN 81 MG CHEWABLE TABLET SCH MG: 81 TABLET CHEWABLE at 08:48

## 2021-04-18 RX ADMIN — GUAIFENESIN SCH MG: 200 SOLUTION ORAL at 21:13

## 2021-04-18 RX ADMIN — INSULIN ASPART SCH UNIT: 100 INJECTION, SOLUTION INTRAVENOUS; SUBCUTANEOUS at 18:58

## 2021-04-18 RX ADMIN — ALBUTEROL SULFATE SCH PUFF: 90 AEROSOL, METERED RESPIRATORY (INHALATION) at 08:11

## 2021-04-18 RX ADMIN — CEFAZOLIN SCH: 330 INJECTION, POWDER, FOR SOLUTION INTRAMUSCULAR; INTRAVENOUS at 17:00

## 2021-04-18 RX ADMIN — SPIRONOLACTONE SCH MG: 25 TABLET, FILM COATED ORAL at 08:49

## 2021-04-18 RX ADMIN — Medication SCH MG: at 08:48

## 2021-04-18 RX ADMIN — GUAIFENESIN SCH MG: 200 SOLUTION ORAL at 08:45

## 2021-04-18 RX ADMIN — CEFAZOLIN SCH: 330 INJECTION, POWDER, FOR SOLUTION INTRAMUSCULAR; INTRAVENOUS at 17:05

## 2021-04-18 RX ADMIN — INSULIN ASPART SCH: 100 INJECTION, SOLUTION INTRAVENOUS; SUBCUTANEOUS at 21:07

## 2021-04-18 RX ADMIN — ATORVASTATIN CALCIUM SCH MG: 40 TABLET, FILM COATED ORAL at 08:48

## 2021-04-18 RX ADMIN — DEXTROSE SCH MG: 50 INJECTION, SOLUTION INTRAVENOUS at 08:49

## 2021-04-18 RX ADMIN — GUAIFENESIN SCH MG: 200 SOLUTION ORAL at 16:04

## 2021-04-18 RX ADMIN — CEFAZOLIN SCH: 330 INJECTION, POWDER, FOR SOLUTION INTRAMUSCULAR; INTRAVENOUS at 17:01

## 2021-04-18 RX ADMIN — CEFAZOLIN SCH: 330 INJECTION, POWDER, FOR SOLUTION INTRAMUSCULAR; INTRAVENOUS at 17:04

## 2021-04-18 RX ADMIN — NICARDIPINE HYDROCHLORIDE SCH MLS/HR: 2.5 INJECTION INTRAVENOUS at 21:13

## 2021-04-18 RX ADMIN — GUAIFENESIN SCH MG: 200 SOLUTION ORAL at 03:19

## 2021-04-18 RX ADMIN — CITALOPRAM HYDROBROMIDE SCH MG: 20 TABLET ORAL at 08:48

## 2021-04-18 RX ADMIN — PANTOPRAZOLE SODIUM SCH MG: 40 INJECTION, POWDER, FOR SOLUTION INTRAVENOUS at 08:52

## 2021-04-18 RX ADMIN — CEFAZOLIN SCH: 330 INJECTION, POWDER, FOR SOLUTION INTRAMUSCULAR; INTRAVENOUS at 17:06

## 2021-04-18 RX ADMIN — INSULIN ASPART SCH: 100 INJECTION, SOLUTION INTRAVENOUS; SUBCUTANEOUS at 12:58

## 2021-04-18 RX ADMIN — GUAIFENESIN SCH: 200 SOLUTION ORAL at 23:14

## 2021-04-18 RX ADMIN — CEFAZOLIN SCH: 330 INJECTION, POWDER, FOR SOLUTION INTRAMUSCULAR; INTRAVENOUS at 17:07

## 2021-04-18 RX ADMIN — ENOXAPARIN SODIUM SCH MG: 40 INJECTION SUBCUTANEOUS at 08:51

## 2021-04-18 RX ADMIN — GUAIFENESIN SCH: 200 SOLUTION ORAL at 12:58

## 2021-04-18 RX ADMIN — OXYCODONE HYDROCHLORIDE AND ACETAMINOPHEN SCH MG: 500 TABLET ORAL at 08:48

## 2021-04-18 NOTE — P.PN
Subjective


Progress Note Date: 04/18/21

















61-year-old female, who hasn't been feeling well for about 10-11 days.  She 

tested positive for COVID 19 on March 24.  She's been sick for 10-11 days old.  

She complains of TYPICAL symptoms including weakness, fever, diarrhea, cough, 

and shortness of breath.  She's quite hypoxemic.  On AIRVO 100%, and a 

nonrebreather mask, her saturations are right around 80-83%.  Any movement, and 

she desaturates even further.  I just called the intensive care unit, and we'll 

plan on putting her in the intensive care unit.  She's currently on saline at 75

mL an hour.  She can barely speak without becoming short of breath.  The patient

is not a candidate for REM, but could get TOCI.  Sodium 138, potassium 3.8, 

chlorides 108, CO2 21, anion gap 9, BUN 19, creatinine 0.7.  AST 94, ALTs 60, 

LDH 1471, C-reactive protein 88.1.  Again, the patient's been sick for at least 

10-11 days and maybe a bit longer.  Her oral intake has been poor as well.  The 

patient's chest x-ray shows diffuse bilateral infiltrates consistent with 

coronavirus infection.





The patient is seen today 04/01/2021 in follow-up in the intensive care unit.  

Shortly after arriving to the ICU yesterday she required intubation and 

mechanical ventilatory support injury to acute hypoxemic respiratory failure.  

She is currently on the ventilator and assist control mode with a rate of 34, 

tidal on 450, FiO2 70%, PEEP of 16.  Morning blood gases reveal pO2 of 70, pCO2 

33, P 87.38.  Fentanyl drip at 0.5 mcg/kg per hour, propofol at 15 mcg/kg/m, 

Nimbex at 1 mcg/min per hour.  0.9 normal saline at 130 ML's per hour.  She did 

receive Tociluzimab.  She remains on Decadron 6 mg IV daily.  Lovenox 40 mg 

subcutaneous daily.  Tube feedings will be initiated for nutritional support.  

Blood cultures reveal no growth.  White count 2.9.  Hemoglobin 11.3 and 

platelets 188.  Lymphocytes 0.8.  She is continued on Lovenox, dexamethasone, 

vitamin supplements.





The patient is seen today 04/02/2021 in follow-up in the intensive care unit.  

She remains intubated on the mechanical ventilator.  Assist-control mode.  Rate 

of 30, tidal volume 450, FiO2 60% and a PEEP of 16.  She is currently sedated on

propofol at 40 mcg/kg/m, Nimbex at 1 mcg/kg/m, fentanyl at 0.5 mcg/kg per hour. 

She has 0.9 normal saline at 130 ML's per hour.  She is being nourished with 

vital HP at 20 ML's per hour.  She did receive tocilizumab.  He remains on 

dexamethasone, Lovenox, vitamin supplements.  Chest x-ray showing persistent but

improving patchy bilateral airspace disease.  Sputum and blood cultures are 

pending.  White count 3.9.  Hemoglobin 11.4.  Lymphocytes 0.7.  D-dimer 1.19.  

Sodium 139.  Potassium 4.4.  Bicarb 20.  Creatinine 0.64.  Glucose 174.





The patient is seen today 04/03/2020 in follow-up in the intensive care unit.  

She remains intubated on mechanical ventilator.  Current settings are assist-

control mode.  Rate of 34, tidal volume 450, FiO2 50% and a PEEP of 16.  Morning

blood gases reveal a pO2 of 117, pCO2 30, pH 7.40.  She remains sedated on 

propofol at 55 mcg/kg/m.  Fentanyl and 1 mcg/kg/hr.  She is being nourished with

vital HP at 33 ML's per hour.  0.9 normal saline at 130 ML's per hour.  She 

remains on dexamethasone, Lovenox, bronchodilators.  White count 4.0.  

Hemoglobin 11.8.  D-dimer 1.81.  Sodium 138.  Potassium 4.2.  Bicarb 19.  LDH 

1133.  C-reactive protein 15.5.  Asked x-ray continues to show moderate 

scattered interstitial and partial airspace opacities.  





The patient is seen today 04/04/2021 in follow-up in the intensive care unit.  

She remains intubated, sedated.  Current settings are assist control at a rate 

of 30, tidal volume 450, FiO2 50% and a PEEP of 15.  She is sedated on propofol 

at 60 mcg/kg/m, fentanyl at 1 mcg/kg per hour, 0.9 normal saline 130 ML's per 

hour.  Vital HP at 24 mls per hour.  She did receive Actemra.  Blood culture 

reveals no growth.  Sputum culture reveals no growth.  White count 5.3.  

Hemoglobin 12.0.  D-dimer 2.0.  Sodium 139.  Potassium 4.0.  Creatinine 0.59.  

LDH 1082.  C reactive protein 8.6.  Chest x-ray continues to show no interval 

change, continues with persistent moderate patchy opacities bilaterally.  

Endotracheal tube to be advanced to 2 cms.  Continued on vitamin supplements, 

Lovenox, IV Solu-Medrol. 





Progress note dated 04/05/2021.





This is a patient was admitted on March 31 for COVID 19 pneumonia.  The patient 

came to the ICU on March 31, and was intubated on the same day.  She apparently 

tested positive back on March 24.  The patient remains on the mechanical 

ventilator.  She is on the volume assist control mode, rate of 30, tidal volume 

450, FiO2 50%, PEEP of 15 to be dropped to 10.  Blood gases show pO2 of 66, pCO2

33, and a pH 7.41.  The patient's on propofol at 60 mcg/kg/m, fentanyl 1 

mcg/kg/h, normal saline at 130 mL an hour and vital high protein at 24, which is

goal.  The patient did receive TOCI.   White count is 6.2, hemoglobin 11.8, he

matocrit 33.1, platelet count 272,000.  Sodium 137, potassium 3.8, chlorides 

114, CO2 20, anion gap 3, BUN 18, creatinine 0.53.  LDH is 956.  Chest x-ray 

shows worsened bilateral infiltrates.  Small effusions are noted.





Progress note dated 04/06/2021.





61-year-old female, admitted on March 31 for COVID 19 pneumonia.  She came to 

the ICU on March 31 and was intubated on the same day.  She tested positive back

on March 24.  The patient remains on mechanical ventilation.  She is on the 

volume assist control mode, rate 36, tidal volume 450, FiO2 50%, and PEEP is 13.

 Arterial blood gases show a PaO2 of 74, pCO2 31, and a pH of 7.52.  Blood gases

are consistent with relative hypoxemia, and a combined respiratory and metabolic

alkalosis.  The patient is getting propofol 60 mcg/kg/m, fentanyl 1 emily per 

kilogram per hour, saline at 40 mL an hour, dopamine at 2 mcg/kg/m, and vital 

high protein at goal, which is 24 mL an hour.  The cardiologist wanted to DC the

dopamine.  We are going to try to lower PEEP from 13 down to 10.  Today's CBC is

normal.  Sodium 138, potassium 3.4, chlorides 109, CO2 24, anion gap 5, BUN 17, 

and creatinine 0.53.  Chest x-ray shows improving bibasilar infiltrates.





The patient is seen today 04/07/2021 and follow-up in the intensive care unit.  

She remains intubated and on the mechanical ventilator.  Current mode assist 

control with a rate of 30, tidal volume 450, FiO2 50% and a PEEP of 10.  Morning

blood gases reveal a P O2 of 58, pCO2 32, pH 7.51.  She remains sedated on 

propofol at 60 mcg/kg/m.  Fentanyl at 1 mcg/kg/h, 0.9#at 40 ML's per hour.  She 

is being nourished with vital HP at 24 ML's per hour which is goal.  She is 

currently afebrile.  Hemodynamically stable.  She is receive daily interruption 

of sedation without success.  The plan will be for trach and PEG later this 

week.  Blood and sputum cultures revealed no growth.  WBC 6.4.  Hemoglobin 12.8.

 Sodium 137.  Potassium 3.5.  Creatinine 0.60.  Glucose 120.  AST 53.  ALT 69.  

Chest x-ray continues to show patchy perihilar and basilar infiltrates with 

interval progression.





The patient is seen today 04/08/2021 in follow-up in the intensive care unit.  

She remains intubated on mechanical ventilator in assist control mode at a rate 

of 30, tight around 450, FiO2 50% and a PEEP of 10.  Morning blood gases reveal 

a P O2 of 58, pCO2 30, pH 7.54.  She remains sedated on propofol at 50 mcg/kg/m.

 Fentanyl at 2 mcg/kg/h.  0.9 normal saline at 40 ML's per hour.  She was 

initiated back on dopamine at 2 mcg/kg/m due to significant bradycardia with 

heart rate in the 20s temporarily.  Currently heart rate in the 40s.  She has 

remained hypertensive.  Chest x-ray continues to show bilateral patchy 

infiltrates.  Stable compared to previous.  Blood and sputum cultures revealed 

no growth.  White count 5.5.  Hemoglobin 11.8.  D-dimer 1.59.  Sodium 135.  

Potassium 3.9.  Creatinine 0.61.  AST 53.  ALT 67.  .  C-reactive protein

5.9.  She remains on Lovenox, dexamethasone, vitamin supplements.





The patient is seen today 04/09/2021 in follow-up in the intensive care unit.  

She remains intubated and on mechanical ventilator.  Current settings are 

assist-control mode.  Rate of 30, tidal volume 450.  FiO2 50% and a PEEP of 13. 

Morning blood gases reveal a pO2 of 76, pCO2 31, pH 7.53.  She remains sedated 

on propofol at 40 mcg/kg/m, dopamine at 2 mcg/kg/m.  Fentanyl at 1.5 mg/kg per 

hour.  0.9 normal saline at 40 miles per hour.  She is being nourished with 

vital HP at 24 ML's per hour which is goal.  Chest x-ray reveals bilateral 

airspace disease slightly improved.  Blood, sputum cultures reveal no growth.  

White count 6.6.  Hemoglobin 12.8.  Sodium 139.  Potassium 4.0.  Chloride 108.  

Bicarb 25.  Creatinine 0.52.  .  C-reactive protein 6.4.  She remains on 

vitamin supplements, Lovenox, Decadron.





The patient is seen today 04/10/2021 in follow-up in the intensive care unit.  

She remains intubated, sedated and on mechanical ventilator.  Current settings 

assist-control mode at a rate of 24, tidal volume 450, FiO2 50% and a PEEP of 

13.  Morning blood gases revealed a pO2 80, pCO2 37, pH 7.44.  She is sedated on

propofol at 30 mcg/kg/m.  Fentanyl at 1.5 mcg/kg per hour.  Remains on dopamine 

at 2.5 mcg/kg/m.  0.9 normal saline at 40 ML's per hour.  She is being nourished

with vital HP at 33,'s per hour which is goal.  Chest x-ray continues to 

revealed bibasilar infiltrates with small left pleural effusion.  Blood and 

sputum cultures reveal no growth.  White count 7.8.  Hemoglobin 12.7.  Platelet 

count 254.  Sodium 130.  Potassium 4.2.  Creatinine 0.61.  AST 77.  .  

.  C-reactive protein 6.8.  She remains on dexamethasone, Lovenox, 

vitamin supplements.





The patient is seen today 04/11/2021 in follow-up in the intensive care unit.  

She remains intubated, sedated and on the mechanical ventilator.  Currently 

assist-control mode at 24, tidal volume 450, FiO2 50% and a PEEP of 10.  Morning

blood gases reveal a P O2 of 79, pCO2 37, pH 7.46.  She remains on propofol at 

30 mcg/kg/m, fentanyl at 1 g 1.5 mcg/kg/h.  Dopamine at 2.5 mcg/kg/m.  0.9 

normal saline at 40 ML's per hour.  Being nourished with vital HP at 33 ML's per

hour which is goal.  Chest x-ray continues to show bilateral patchy infiltrates 

with a small left pleural effusion.  Blood and sputum cultures reveal no growth.

 White count 5.9.  Hemoglobin 12.7.  Sodium 139.  Potassium 3.8.  Creatinine 

0.5.  AST 61.  ALT 99.  She remains on Lovenox, dexamethasone, vitamin suppl

ements.








 


On  04/12/2021, the patient is being seen in follow-up in the intensive care 

unit.  The patient has been diagnosed having COVID 19 pneumonia and the patient 

urgently tested positive on 03/24/2024.  Subsequently, the patient came to the 

hospital approximately 11 days ago with respiratory failure initially placed on 

high flow oxygen and subsequently the patient failed and the patient had to be 

intubated and placed on a mechanical ventilator on 03/31/2021 and the patient 

has been mechanical ventilator since.  During the course, the patient received a

combination of treatment including Decadron,Tocilizumab, and Lovenox.  The 

patient for now remains on a mechanical ventilator.  On today's evaluation, the 

patient on assist control mode volume cycle with a rate of 24, tidal volume of 

450, FiO2 of 50% with a PEEP of 10.  The patient is sedated with propofol 

running at 25 mg/kg per minute and fentanyl at 1.2 mcg/kilogram/hour.  IV fluids

running at 40 mL an hour.  The patient is receiving vital high protein at the 

rate of 33 mL an hour.  Chest x-ray showing changes patchy bilateral pulmonary 

infiltrates.  ET tube is in a good location.  There is a small left pleural 

effusion.  Blood cultures of been negative.  Sputum cultures of been negative.  

The most recent inflammatory markers showed an LDH level of 868 on 04/10/2021 

and a CRP of 0.3.  The d-dimer is 1.42.  The blood gases from today showed a pH 

of 7.51 with a pCO2 of 35 and pO2 of 91.  The chest x-ray showing lower lobe at 

the pulmonary infiltrates and ET tube is in a good location.  The patient also 

has a orogastric tube in place.  Patient's triglyceride level on 04/09/2021 was 

291.  The patient is also on dopamine to enhance the hard data the patient had 

some underlying sinus bradycardia.  As for blood pressure control, the patient 

is on a combination of lisinopril and Norvasc.  The patient is also on Cleviprex

for a tighter blood pressure control which is being gradually titrated and 

weaned off.  She is currently on dopamine at 2.5 mcg kilogram per minute for 

bradycardia and cardiac cause.  The patient is currently in a normal sinus 

rhythm.  Peak airway pressures around 24-26





Today's evaluation of 04/13/2021 the patient is being seen for a follow-up.  

This is a 59-year-old female patient with Covid 19 related pneumonia was been 

intubated and then on the mechanical ventilator since 03/31/2021.  The patient 

has been aggressively treated during the course of the hospitalization.  This 

morning, the patient has been on propofol which is running at 25 mcg/kg per 

minute and fentanyl is running at 1.5 mcg/kg/h.  The patient is on no paralysis 

for now.  The patient is an assist-control mode at the rate of 24 with a tidal 

volume of 450 and FiO2 of 40% with a PEEP of 6.  The chest x-ray from today is 

showing diffuse breath and pulmonary infiltrates more so in the lower lobes 

bilaterally.  ET tube is in a good location.  No major interval change compared 

to yesterday and the findings are essentially stable.  The peak airway pressure 

 and static pressures are 24 and 21 respectively.  The patient has an LDH of 

8:15, CRP of 5.9.  The patient remains on Decadron 6 mg IV every 24 hours and 

the patient is also on Lovenox 40 mg subcu daily.  The patient is currently off 

dopamine.  Her cardiac rhythm is sinus, bradycardia, rate of 52.  She is 

afebrile.  She is tolerating enteral feeding for nutritional support and she is 

currently on vital high protein at goal which is 53 mL an hour.  No other 

significant events overnight.  The patient is not having any significant 

orotracheal secretions.  Note that the patient was given a sedation holiday 

yesterday.  She was given a CPAP trial for a total of 20 minutes.  At the 

completion of this trial, the patient became tachypneic and quite restless.  We 

are concerned about extubation and we aborted the child and she is back on 

treatment and sedation.





Today's evaluation of 04/14/2021, the patient is being seen for a follow-up.  

The patient remains intubated on a mechanical ventilator.  On today's evaluation

she is on protocol for which is running at 25 mcg peak airway pressure is 25, 

static pressures around 22.  kilogram per minute and fentanyl is running at 1 

mcg/kg/h.  The patient is on a mechanical ventilator on today's vent setting 

include a tidal volume of 450, FiO2 of 40%, PEEP of 5 and a rate of 24.  The 

blood gases from today is showing EHL 7.51 with a pCO2 of 37 and pO2 of 72.  The

chest x-ray from today showing some further improvement in the infiltrates 

bilaterally and there is some improvement in the consolidation of the left lower

lobe.  Meanwhile, or tracheal tube is in place and orogastric tube is also in 

place.  The patient has had his respiratory secretions.  D-dimer today's at 1.6 

with a LDH of 667 and his CRP of 5.8.  Rest of the blood work is still pending 

for now.  Meanwhile, the patient is on Decadron 6 mg IV every 24 hours, Lovenox 

40 mg subcu every 24 hours and the patient is also on NovoLog per sliding scale 

coverage.  IV fluids are at 20 mL an hour and the patient has a net fluid 

balance of -700 mL over the past 24 hours.  Weight is down 125 kg.  She is still

on enteral feeding for nutritional support and the patient is receiving vital 

high protein at the rate of 53 mL an hour and she is able to tolerate.  In terms

of cardiac rhythm, the patient is still having some sinus bradycardia.  No card

iac causes has been noted.  DPs are not prolonged cardiac pauses and we have 

witnessed some pauses for around 3-4 seconds.  We have avoided using Precedex 

for that reason and the patient is still on a combination of propofol and 

fentanyl for now.  The peak airway pressures around 25 and a static pressure is 

22 at this point in time.  She is having increased rest or secretions.





04/15/2021, the patient is extubated and the patient is being seen in follow-up.

 Note that after being given a sedation holiday, the patient showed adequate 

mentation.  The patient showed adequate weaning parameters.  She was given a 

spontaneous breathing trial and she was able to fasten following that she was 

extubated today nasal cannula which is currently at 4 L per minute.  She is 

awake.  She is comfortable.  She is following commands.  She is profoundly weak.

 Respiratory status is stable.  No signs of any respiratory distress.  Chest x-

ray still showing some diffuse bilateral pulmonary infiltrates although there is

some tearing especially in the right lung base area over the right hemidiaphragm

as visualized much more clear.  She is hemodynamically stable.  She was on 

Cleviprex this medication was discontinued 5:00 this morning and her blood 

pressure currently is stable and is holding.  She passed a bedside swallow 

evaluation.  She hasn't been able to take anything orally as far as fluid 

intake.  She has not taken also any oral medications yet.  She remains on 

Decadron 6 mg IV every 24 hours.  Her fluid balance is -2 L over the past 24 

hours.  She is on Lovenox 40 mg subcu every 24 hours.  Was given Lasix yesterday

was facilitated a negative fluid balance.  She is afebrile.  No other signifi

cant events otherwise for now.  IV fluids are currently at KVO.  Awaiting the 

rest of the labs.  Physical therapy will be involved in the care of this 

patient.  





04/16/2021, the patient remains extubated and she is currently on oxygen at 2 L.

 She is awake and alert and she is communicating and she is well rested.  She is

not requiring any sedation or anxiolytics.  Overall rest or status is stable.  

Chest x-ray from today is showing some bilateral pulmonary infiltrates, 

essentially unchanged compared to yesterday.  She remains on Decadron 6 mg IV 

every 24 hours.  She has been negative fluid balance.  She is also on Lovenox 40

mg subcu for DVT prophylaxis.  Her d-dimer currently is at 2.34.  Rest of the 

labs are normal.  Some mild transaminitis still present.  CBC is normal with a 

white cell count 6.5 with a hemoglobin of 12.9.  Earlier this morning, the 

patient had an hypertensive reaction with the blood pressure went up to systolic

of 180.  Based on that, the patient was given her morning medications which 

include Norvasc 10 mg by mouth daily and she is are currently off the Cleviprex.

 She remains on Lasix 40 mg by mouth twice a day and she is also on Aldactone.  

Most recent blood pressure is 176/70.  No other significant events pH remains 

quite weak and she is able to swallow.  Diet is being advanced as tolerated and 

currently the patient on clear liquid diet.  She could be potentially 

transferred out of the intensive care unit today.





04/17/2021 the patient remains extubated on 2 L and she is on a telemetry unit. 

She is a week.  She is communicating.  Her voice is hoarse as very soft.  No new

complaints.  She is moving all 4 extremities however she has generalized global 

weakness.  She remains on Decadron 6 mg IV every 24 hours.  She remains on Lasix

40 mg by mouth twice a day.  She is also on aspirin and Lovenox 40 mg subcu for 

DVT prophylaxis.  No new labs are available this morning.  No chest x-ray from 

this morning.  Hemodynamically stable.  Blood pressure is under better control.





04/18/2021, the patient remains on 2 L and she is communicating.  The only issue

for now as the profound weakness due to prolonged intubation mechanical 

ventilation.  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  She remains on Decadron 6 mg every 24 

hours.  She is also on Lovenox 40 mg subcu every 24 hours.  Chest x-ray still 

showing bilateral pulmonary infiltrates consistent with COVID-19 related 

pneumonia.  Noted the patient was intubated for more than a week and she was 

successfully extubated.  She is able to swallow.  She is trying to gradually 

advance her diet as tolerated.  She is on IV fluids at KVO at 20 mL an hour.  

She is back on Celexa 40 mg by mouth daily and she is also taking her routine 

home medications.  Lasix is currently at 40 mg by mouth twice a day.





Objective





- Vital Signs


Vital signs: 


                                   Vital Signs











Temp  98.4 F   04/18/21 03:49


 


Pulse  78   04/18/21 03:49


 


Resp  17   04/18/21 03:49


 


BP  127/58   04/18/21 03:49


 


Pulse Ox  95   04/18/21 03:49








                                 Intake & Output











 04/17/21 04/18/21 04/18/21





 18:59 06:59 18:59


 


Intake Total 236 180 


 


Output Total 500  


 


Balance -264 180 


 


Weight  141.5 kg 


 


Intake:   


 


  IV  80 


 


    Sodium Chloride 0.9% 500  80 





    ml 500 ml @ 20 mls/hr IV   





    .Q24H [facility] Rx#:[id number]   


 


  Oral 236 100 


 


Output:   


 


  Urine 500  


 


Other:   


 


  Voiding Method Bedpan Bedpan 


 


  # Voids  1 


 


  # Bowel Movements  1 








                       ABP, PAP, CO, CI - Last Documented











Arterial Blood Pressure        173/72

















- Exam








GENERAL EXAM: Patient has been extubated to 2 L nasal cannula


HEAD: Normocephalic.


EYES: Normal reaction of pupils, equal size.


NOSE: Clear with pink turbinates.


NECK: No masses, no JVD.


CHEST: No chest wall deformity.


LUNGS: Equal air entry with crackles in the bilateral posterior bases.


CVS: S1 and S2 normal with no audible murmur, regular rhythm.


ABDOMEN: No hepatosplenomegaly, normal bowel sounds, no guarding or rigidity.


SPINE: No scoliosis or deformity


SKIN: No rashes


CENTRAL NERVOUS SYSTEM: Awake, following commands, there is profound motor weak

ness in all 4 extremities.  No focal neurological deficits for now.


EXTREMITIES: There is no peripheral edema.  No clubbing, no cyanosis.  

Peripheral pulses are intact.  The patient is still having significant amount of

motor weakness  is able to raise her arms against gravity.  She is able to cough

.. Strength is gradually improving and the patient is working with physical 

therapy.








- Labs


CBC & Chem 7: 


                                 04/17/21 11:19





                                 04/17/21 11:19


Labs: 


                  Abnormal Lab Results - Last 24 Hours (Table)











  04/15/21 04/17/21 04/17/21 Range/Units





  05:10 11:19 11:19 


 


D-Dimer   2.02 H   (<0.60)  mg/L FEU


 


BUN    24 H  (7-17)  mg/dL


 


Glucose    122 H  (74-99)  mg/dL


 


POC Glucose (mg/dL)     (75-99)  mg/dL


 


Lactate Dehydrogenase    1028 H  (313-618)  U/L


 


LD Isoenzymes  280 H    (120-250)  U/L














  04/17/21 04/17/21 04/17/21 Range/Units





  11:54 16:57 20:04 


 


D-Dimer     (<0.60)  mg/L FEU


 


BUN     (7-17)  mg/dL


 


Glucose     (74-99)  mg/dL


 


POC Glucose (mg/dL)  137 H  179 H  125 H  (75-99)  mg/dL


 


Lactate Dehydrogenase     (313-618)  U/L


 


LD Isoenzymes     (120-250)  U/L














Assessment and Plan


Plan: 








1 Acute hypoxemic respiratory failure secondary to CoVID 19 pneumonia/pneumoniti

s requiring intubation mechanical ventilatory support on 03/31/2021.  Outside 

the window for Remdesivir. Received tocilizumab.  The patient is extubated for 

now 2L of oxygen by nasal cannula.  The patient was extubated on 04/14/2021.   

She is having significant motor weakness due to prolonged intubation mechanical 

ventilation.  She has bilateral foot drop.  Motor functions are limited and 

weak.  She'll need aggressive physical therapy.  Extraocular and daily basis.  

The Decadron dose will be tapered.





2 Hypertension,   Currently the patient is on a combination of Lasix 40 mg by 

mouth twice a day, Aldactone 12.5 mg by mouth daily and Norvasc 10 mg by mouth 

daily.  She is also on lisinopril 20 mg by mouth daily.





3 Paroxysmal atrial fibrillation, current rhythm is sinus





4 Hyperlipidemia





5 sinus Bradycardia, requiring dopamine drip for cardiac rhythm enhancement.  No

significant arrhythmias have been noted.  The patient is currently off dopamine.

 The patient's sinus bradycardia is also improving





6 morbid obesity with a BMI of 43.6





Plan:


 





Extubated to 2 L of oxygen by nasal cannula


Continue Decadron at a dose of 4 mg on a daily basis


Lovenox 40 mg subcu on a daily basis. 


Drop the Lasix to once a day 40 mg


Advance diet


Aggressive physical therapy 


We'll try to advance diet as tolerated


Continue oral medications including the antihypertensive medications and  

Norvasc 10 mg by mouth daily in conjunction with lisinopril 20 mg


Avoid beta blockers for now based on her history of sinus bradycardia


citalopram 40 mg by mouth daily


Discharge planning is in progress.  Pulmonary and critical services will sign 

off.

## 2021-04-19 LAB
ANION GAP SERPL CALC-SCNC: 12 MMOL/L
BASOPHILS # BLD AUTO: 0.1 K/UL (ref 0–0.2)
BASOPHILS NFR BLD AUTO: 1 %
BUN SERPL-SCNC: 24 MG/DL (ref 7–17)
CALCIUM SPEC-MCNC: 10 MG/DL (ref 8.4–10.2)
CHLORIDE SERPL-SCNC: 103 MMOL/L (ref 98–107)
CO2 SERPL-SCNC: 25 MMOL/L (ref 22–30)
EOSINOPHIL # BLD AUTO: 0.2 K/UL (ref 0–0.7)
EOSINOPHIL NFR BLD AUTO: 3 %
ERYTHROCYTE [DISTWIDTH] IN BLOOD BY AUTOMATED COUNT: 4.96 M/UL (ref 3.8–5.4)
ERYTHROCYTE [DISTWIDTH] IN BLOOD: 14.7 % (ref 11.5–15.5)
GLUCOSE BLD-MCNC: 103 MG/DL (ref 75–99)
GLUCOSE BLD-MCNC: 143 MG/DL (ref 75–99)
GLUCOSE BLD-MCNC: 161 MG/DL (ref 75–99)
GLUCOSE BLD-MCNC: 91 MG/DL (ref 75–99)
GLUCOSE SERPL-MCNC: 80 MG/DL (ref 74–99)
HCT VFR BLD AUTO: 44.9 % (ref 34–46)
HGB BLD-MCNC: 15.4 GM/DL (ref 11.4–16)
LYMPHOCYTES # SPEC AUTO: 2.3 K/UL (ref 1–4.8)
LYMPHOCYTES NFR SPEC AUTO: 37 %
MCH RBC QN AUTO: 31 PG (ref 25–35)
MCHC RBC AUTO-ENTMCNC: 34.3 G/DL (ref 31–37)
MCV RBC AUTO: 90.5 FL (ref 80–100)
MONOCYTES # BLD AUTO: 0.5 K/UL (ref 0–1)
MONOCYTES NFR BLD AUTO: 8 %
NEUTROPHILS # BLD AUTO: 3.1 K/UL (ref 1.3–7.7)
NEUTROPHILS NFR BLD AUTO: 49 %
PLATELET # BLD AUTO: 253 K/UL (ref 150–450)
POTASSIUM SERPL-SCNC: 3.7 MMOL/L (ref 3.5–5.1)
SODIUM SERPL-SCNC: 140 MMOL/L (ref 137–145)
WBC # BLD AUTO: 6.3 K/UL (ref 3.8–10.6)

## 2021-04-19 RX ADMIN — ALBUTEROL SULFATE SCH PUFF: 90 AEROSOL, METERED RESPIRATORY (INHALATION) at 16:09

## 2021-04-19 RX ADMIN — INSULIN ASPART SCH UNIT: 100 INJECTION, SOLUTION INTRAVENOUS; SUBCUTANEOUS at 20:39

## 2021-04-19 RX ADMIN — GUAIFENESIN SCH MG: 200 SOLUTION ORAL at 15:48

## 2021-04-19 RX ADMIN — CITALOPRAM HYDROBROMIDE SCH MG: 20 TABLET ORAL at 09:40

## 2021-04-19 RX ADMIN — GUAIFENESIN SCH: 200 SOLUTION ORAL at 12:33

## 2021-04-19 RX ADMIN — ALBUTEROL SULFATE SCH PUFF: 90 AEROSOL, METERED RESPIRATORY (INHALATION) at 20:23

## 2021-04-19 RX ADMIN — GUAIFENESIN SCH: 200 SOLUTION ORAL at 20:38

## 2021-04-19 RX ADMIN — NICARDIPINE HYDROCHLORIDE SCH: 2.5 INJECTION INTRAVENOUS at 20:39

## 2021-04-19 RX ADMIN — GUAIFENESIN SCH MG: 200 SOLUTION ORAL at 09:39

## 2021-04-19 RX ADMIN — Medication SCH MCG: at 09:40

## 2021-04-19 RX ADMIN — GUAIFENESIN SCH MG: 200 SOLUTION ORAL at 05:02

## 2021-04-19 RX ADMIN — ALBUTEROL SULFATE SCH PUFF: 90 AEROSOL, METERED RESPIRATORY (INHALATION) at 11:36

## 2021-04-19 RX ADMIN — ENOXAPARIN SODIUM SCH MG: 40 INJECTION SUBCUTANEOUS at 09:39

## 2021-04-19 RX ADMIN — PANTOPRAZOLE SODIUM SCH MG: 40 INJECTION, POWDER, FOR SOLUTION INTRAVENOUS at 09:39

## 2021-04-19 RX ADMIN — GUAIFENESIN SCH: 200 SOLUTION ORAL at 03:17

## 2021-04-19 RX ADMIN — ATORVASTATIN CALCIUM SCH MG: 40 TABLET, FILM COATED ORAL at 09:40

## 2021-04-19 RX ADMIN — INSULIN ASPART SCH: 100 INJECTION, SOLUTION INTRAVENOUS; SUBCUTANEOUS at 06:23

## 2021-04-19 RX ADMIN — INSULIN ASPART SCH UNIT: 100 INJECTION, SOLUTION INTRAVENOUS; SUBCUTANEOUS at 17:28

## 2021-04-19 RX ADMIN — Medication SCH MG: at 09:40

## 2021-04-19 RX ADMIN — FUROSEMIDE SCH MG: 40 TABLET ORAL at 09:41

## 2021-04-19 RX ADMIN — OXYCODONE HYDROCHLORIDE AND ACETAMINOPHEN SCH MG: 500 TABLET ORAL at 09:46

## 2021-04-19 RX ADMIN — ASPIRIN 81 MG CHEWABLE TABLET SCH MG: 81 TABLET CHEWABLE at 09:40

## 2021-04-19 RX ADMIN — INSULIN ASPART SCH: 100 INJECTION, SOLUTION INTRAVENOUS; SUBCUTANEOUS at 12:33

## 2021-04-19 RX ADMIN — ALBUTEROL SULFATE SCH PUFF: 90 AEROSOL, METERED RESPIRATORY (INHALATION) at 07:51

## 2021-04-19 RX ADMIN — SPIRONOLACTONE SCH MG: 25 TABLET, FILM COATED ORAL at 09:40

## 2021-04-19 NOTE — P.DS
Providers


Date of admission: 


03/31/21 00:56





Expected date of discharge: 04/19/21


Attending physician: 


Mushtaq Lerma





Consults: 





                                        





03/31/21 00:56


Consult Physician Routine 


   Consulting Provider: Ministerio Perez


   Consult Reason/Comments: hypoxia


   Do you want consulting provider notified?: Yes





04/05/21 12:01


Consult Physician Routine 


   Consulting Provider: Matthew Snow


   Consult Reason/Comments: bradycardia, pauses


   Do you want consulting provider notified?: Yes











Primary care physician: 


Laureen Ribeiro





Hospital Course: 





Discharge diagnosis


Acute bilateral pneumonia, mostly secondary to Covid 19 infection


Acute hypoxic respiratory failure, was on mechanical ventilation.  Status post 

extubation on 4/14/2021


Increased inflammatory markers.  Improved now.


Sinus Bradycardia secondary to Covid infection. off dopamine now.  Metoprolol 

has been discontinued.


Acute Covid gastroenteritis.  Resolved.


Mild transaminitis, mostly secondary to Covid


Uncontrolled Hypertension


Hyperlipidemia


Obesity with BMI 39.3





Hospital course 


This is a pleasant 61 years old female with past medical history of atrial 

fibrillation not on anticoagulation and her cardiologist is Dr. Peck, 

hypertension, hyperlipidemia.  She is a patient of Dr. Ribeiro


Patient states that she was tested positive for covid On 03/26/2021.  And now 

she presents with dyspnea of one-day duration when started earlier.  Associated 

with cough on the patella brown phlegm.


She is also complaining of from chest pain without coughing , on the right side 

of the chest nonradiating about 5/10 in severity


She has no vomiting but diarrhea about twice per day





04/8/2021


Patient remains intubated and on mechanical ventilation, currently she needs 

high peep of 13,(was 10 yesterday and 16 on admission) with pulmonary/critical 

care team monitor her closely and help with vent management.


She is tachypneic and bradycardic since admission and she is currently on 

dopamine to keep heart rate in 40s existed of 20s.  Afebrile.  


Pulmonary team recommended tracheostomy and PEG tube placement, family wanted to

wait for now


Labs showing leukopenia with WBC 5.5K with lymphopenia resolved.  Rest of the 

CBC, INR unremarkable.  BMP is unremarkable with carbon dioxide is 25.  Lactic 

acid 1.2.  Liver enzymes slightly elevated with AST 53 and ALT 67 (since 

admission).


Lactate dehydrogenase is elevated at 860(improving), C-reactive protein is 5.9 

(within normal limits).


Chest x-ray Stable portable chest.  Bilateral infiltrates


She is still a normal sinus to 40 mL/h, she is on zinc, vitamin C, vitamin D, 

dexamethasone.  She status post tocilizumab.  Also she is on Lovenox.





04/09/2021


Patient remains intubated and sedated in the ICU with pulmonary/critical care 

team following the patient closely and help with vent management.  Today P Cameron

13 and FiO2 50%.


Chest x-ray: Interstitial changes and patchy bilateral lower lung opacity shows 

some improvement.


Patient looks reviewed, inflammatory markers slightly increased with ferritin 

674 and  while C-reactive protein is normal at 6.4


Patient currently on dexamethasone, normal saline at 40 and dopamine for 

bradycardia plus Lovenox 40 mg and multiple vitamins 4 coated


Family still to decide about tracheostomy and PEG tube placement





04/10/2021


pt remains in icu sedated and intubated , with pulmonary critical care team are 

following the pt closely and help with vent management per PEEP was lowered 

today from 13 down to 10.


Patient is  undergoing sedation holiday.  However pulmonary/critical care team 

still recommending PEG tube and t tracheostomy placement pending family decision

regarding this after critical care team discussed with them.


Cardiologist on the case and recommended to continue with dopamine and and 

Norvasc.


Chest x-ray showing bilateral infiltrate


Labs look stable area


Today at Dr. the daughter Corina who is an ICU nurse and all her questions were 

answered.  Also she was updated with the patient condition.





04/11/2021


Patient still in the ICU needing mechanical ventilation, she is undergoing 

sedation holiday by critical care team.  Her PEEP is still 10 today


PEG tube and tracheostomy is recommended by pulmonary/critical care team as 

well.


Patient remains on dopamine drip and heart rate is around 50.  Cartilage team on

the case


Repeat chest x-ray and inflammatory markers in the morning





4/13/2021


Patient is currently in the MICU intubated and on mechanical ventilator.  

Patient is also on sedation.  Patient is controlled with tidal volume 450, FiO2 

40% and PEEP of 6.  Patient is dopamine drip.


Patient has been afebrile.  Chest x-ray showed cardiomegaly with increasing 

patchy airspace disease possible pulmonary edema.  Continued moderate effusions 

with adjacent atelectasis and/or consolidation.


Laboratory data showed WBC 5.0 hemoglobin 11.2 platelets 203 sodium 136 

potassium 3.5 BUN 20 and creatinine 0.6 calcium 8.6


Patient is being continued on dexamethasone 6 mg daily and Lovenox 40 mg subcu 

daily.  Patient is tolerating tube feeding.





4/14/2021


Patient is currently in MICU.  Pulmonary was extubation yesterday.  Patient is 

off sedation.  Chest x-ray showed bibasilar infiltrates with small left pleural 

effusion, improved from comparison.  Laboratory data showed  CRP 5.8 and 

magnesium 1.9


Patient is being continued on tube feeding.  Currently maintaining sinus rhythm.

 Blood pressure is controlled with medications.  Cardiology and pulmonary is on 

board.





4/15/2021


Patient is currently is in ICU.  Patient was showing adequate weaning parameters

and was extubated today.  Currently on oxygen via nasal cannula at 2 L.  Patient

was started on liquid diet.  Patient is awake alert and seems comfortable.


Chest x-ray showed patchy bilateral lung infiltrates similar to improved 

compared to previous.


Patient is being continued Lasix 40 mg twice daily.  Blood pressure is 

controlled.


Laboratory data showed D-dimer 1.81, ferritin 439 AST 70 ALT CRP 5.9 and sodium 

134.


Patient remains in sinus rhythm.


Current dexamethasone, Lovenox and blood pressure medications and multivitamins.

 Cardiology and pulmonary is on board.





4/16/2021


Patient is transferred to medical floor today.  Lying in the bed comfortably but

lethargic and weak.  Somewhat restless.  Patient has been afebrile.  Currently 

requiring 2 L oxygen via nasal cannula.  Patient is being current on 

dexamethasone, Lovenox.  Chest x-ray showed hypoventilatory changes with diffuse

interstitial and patchy Covid pneumonia.  No significant change.


Laboratory data showed D-dimer level is 2.34 sodium 139 potassium 3.8 BUN 18 and

creatinine 0.69 ferritin 523 bilirubin level 1.6 AST 69  and CRP 5.5





4/17/21


Patient is currently in the medical floor.  No complaints of chest pain.  Her 

shortness of breath is improving.  Patient is currently on 2 L oxygen with 

another cannula.  Patient is awake alert oriented 3.  Still lethargic and weak.

 Currently on Decadron 6 mg daily and Lasix 40 mg twice daily and is also on 

aspirin, Lovenox for DVT prophylaxis.  Next and patient remained on sinus 

rhythm.  No fever no chills.


No nausea vomiting or abdominal pain.


Blood Pressure Is Fairly Controlled.





04/18/2021


Patient is currently resting in the bed comfortably.  Awake and oriented 3.  

Patient is very weak but still wants to be discharged home.  PT OT was consulted

and follow tomorrow.


Otherwise blood pressure is controlled.  Lasix dose changed to by mouth daily 

and dexamethasone reduced to 4 mg daily.  Continued on Lovenox and multivitamins

and supportive care.


Patient has been afebrile.  Tolerating oral diet.  Continue with home 

medications including Celexa.


Anticipate discharge to rehab.





04/19/2021


Patient is currently awake alert oriented 3.  Lying in the bed comfortably.  

Able to participate in physical therapy.  PT r rehab.  Otherwise blood pressure 

is controlled.  No complaints chest pain currently saturating at 91% on 2 L 

nausea cannula oxygen.  Patient has been afebrile.  Continue with tapering 

course of dexamethasone and blood pressure medications.  Discharge medications 

regurgitation with him.  Metoprolol is on hold due to bradycardia requiring 

dopamine drip while in the ICU.


Follow with primary care physician in the next 3-5 days.





PHYSICAL EXAMINATION: 


Patient is lying in the bed comfortably, no acute distress, awake alert and 

oriented.  Feels weak. 


HEENT: Normocephalic. Neck is supple. Pupils reactive. Nostrils clear. Oral 

cavity is moist. Ears reveal no drainage. 


Neck reveals no JVD, carotid bruits, or thyromegaly. 


CHEST EXAMINATION: Trachea is central. Symmetrical expansion. Lung fields clear 

to auscultation and percussion. 


CARDIAC: Normal S1, S2 with no gallops. No murmurs 


ABDOMEN: Soft. Bowel sounds normal. No organomegaly. No abdominal bruits. 


Extremities: trace edema.  No clubbing or cyanosis


Neurologically awake, alert, oriented x3 with well-coordinated movements.  No 

focal deficits noted


Skin: No rash or skin lesions. 


Psychiatric: Coperative.  Nonsuicidal


Musculoskeletal: No joint swelling or deformity.  Normal range of motion.











                                   Vital Signs











  04/19/21





  04:00


 


Temperature 98.2 F


 


Pulse Rate [ 74





Cardiac Monitor 





] 


 


Respiratory 18





Rate 


 


Blood Pressure 112/74





[Left Arm] 


 


O2 Sat by Pulse 91 L





Oximetry 








Total time taken greater than 35 minutes including 18 minutes for counseling and

coordination of care.


Patient Condition at Discharge: Fair





Plan - Discharge Summary


Discharge Rx Participant: No


New Discharge Prescriptions: 


New


   Furosemide [Lasix] 40 mg PO DAILY #30 tab


   Albuterol Inhaler [Ventolin Hfa Inhaler] 2 puff INHALATION RT-QID PRN  puff


     PRN Reason: Shortness Of Breath Or Wheezing


   lisinopriL [Zestril] 20 mg PO DAILY #60 tab


   dexAMETHasone [Dexamethasone] See Taper PO DAILY #26 tablet





Continue


   amLODIPine [Norvasc] 5 mg PO DAILY


   Aspirin 81 mg PO DAILY #30 chew


   Atorvastatin [Lipitor] 40 mg PO DAILY


   Citalopram Hydrobromide [CeleXA] 40 mg PO DAILY


   Spironolactone [Aldactone] 12.5 mg PO DAILY





Discontinued


   Lisinopril [Prinivil] 10 mg PO DAILY


   Metoprolol Succinate (ER) [Toprol XL] 100 mg PO DAILY


Discharge Medication List





amLODIPine [Norvasc] 5 mg PO DAILY 05/29/18 [History]


Aspirin 81 mg PO DAILY #30 chew 06/01/18 [Rx]


Atorvastatin [Lipitor] 40 mg PO DAILY 03/31/21 [History]


Citalopram Hydrobromide [CeleXA] 40 mg PO DAILY 03/31/21 [History]


Spironolactone [Aldactone] 12.5 mg PO DAILY 03/31/21 [History]


Albuterol Inhaler [Ventolin Hfa Inhaler] 2 puff INHALATION RT-QID PRN  puff 

04/19/21 [Rx]


Furosemide [Lasix] 40 mg PO DAILY #30 tab 04/19/21 [Rx]


dexAMETHasone [Dexamethasone] See Taper PO DAILY #26 tablet 04/19/21 [Rx]


lisinopriL [Zestril] 20 mg PO DAILY #60 tab 04/19/21 [Rx]








Follow up Appointment(s)/Referral(s): 


Laureen Ribeiro DO [Primary Care Provider] - 1-2 days


Bennett Peck MD [STAFF PHYSICIAN] - 4 Weeks


Discharge Disposition: TRANSFER TO SNF/ECF

## 2021-04-19 NOTE — P.PN
Subjective


Progress Note Date: 04/17/21


Principal diagnosis: 





Acute bilateral pneumonia, mostly secondary to Covid 19 infection


Acute hypoxic respiratory failure, needing intubation and mechanical ventilation





This is a pleasant 61 years old female with past medical history of atrial 

fibrillation not on anticoagulation and her cardiologist is Dr. Peck, 

hypertension, hyperlipidemia.  She is a patient of Dr. Ribeiro


Patient states that she was tested positive for covid On 03/26/2021.  And now 

she presents with dyspnea of one-day duration when started earlier.  Associated 

with cough on the patella brown phlegm.


She is also complaining of from chest pain without coughing , on the right side 

of the chest nonradiating about 5/10 in severity


She has no vomiting but diarrhea about twice per day





04/8/2021


Patient remains intubated and on mechanical ventilation, currently she needs 

high peep of 13,(was 10 yesterday and 16 on admission) with pulmonary/critical 

care team monitor her closely and help with vent management.


She is tachypneic and bradycardic since admission and she is currently on 

dopamine to keep heart rate in 40s existed of 20s.  Afebrile.  


Pulmonary team recommended tracheostomy and PEG tube placement, family wanted to

wait for now


Labs showing leukopenia with WBC 5.5K with lymphopenia resolved.  Rest of the 

CBC, INR unremarkable.  BMP is unremarkable with carbon dioxide is 25.  Lactic 

acid 1.2.  Liver enzymes slightly elevated with AST 53 and ALT 67 (since 

admission).


Lactate dehydrogenase is elevated at 860(improving), C-reactive protein is 5.9 

(within normal limits).


Chest x-ray Stable portable chest.  Bilateral infiltrates


She is still a normal sinus to 40 mL/h, she is on zinc, vitamin C, vitamin D, 

dexamethasone.  She status post tocilizumab.  Also she is on Lovenox.





04/09/2021


Patient remains intubated and sedated in the ICU with pulmonary/critical care 

team following the patient closely and help with vent management.  Today P Cameron

13 and FiO2 50%.


Chest x-ray: Interstitial changes and patchy bilateral lower lung opacity shows 

some improvement.


Patient looks reviewed, inflammatory markers slightly increased with ferritin 

674 and  while C-reactive protein is normal at 6.4


Patient currently on dexamethasone, normal saline at 40 and dopamine for 

bradycardia plus Lovenox 40 mg and multiple vitamins 4 coated


Family still to decide about tracheostomy and PEG tube placement





04/10/2021


pt remains in icu sedated and intubated , with pulmonary critical care team are 

following the pt closely and help with vent management per PEEP was lowered 

today from 13 down to 10.


Patient is  undergoing sedation holiday.  However pulmonary/critical care team 

still recommending PEG tube and t tracheostomy placement pending family decision

regarding this after critical care team discussed with them.


Cardiologist on the case and recommended to continue with dopamine and and 

Norvasc.


Chest x-ray showing bilateral infiltrate


Labs look stable area


Today at Dr. the daughter Corina who is an ICU nurse and all her questions were 

answered.  Also she was updated with the patient condition.





04/11/2021


Patient still in the ICU needing mechanical ventilation, she is undergoing 

sedation holiday by critical care team.  Her PEEP is still 10 today


PEG tube and tracheostomy is recommended by pulmonary/critical care team as 

well.


Patient remains on dopamine drip and heart rate is around 50.  Cartilage team on

the case


Repeat chest x-ray and inflammatory markers in the morning





4/13/2021


Patient is currently in the MICU intubated and on mechanical ventilator.  

Patient is also on sedation.  Patient is controlled with tidal volume 450, FiO2 

40% and PEEP of 6.  Patient is dopamine drip.


Patient has been afebrile.  Chest x-ray showed cardiomegaly with increasing 

patchy airspace disease possible pulmonary edema.  Continued moderate effusions 

with adjacent atelectasis and/or consolidation.


Laboratory data showed WBC 5.0 hemoglobin 11.2 platelets 203 sodium 136 

potassium 3.5 BUN 20 and creatinine 0.6 calcium 8.6


Patient is being continued on dexamethasone 6 mg daily and Lovenox 40 mg subcu 

daily.  Patient is tolerating tube feeding.





4/14/2021


Patient is currently in MICU.  Pulmonary was extubation yesterday.  Patient is 

off sedation.  Chest x-ray showed bibasilar infiltrates with small left pleural 

effusion, improved from comparison.  Laboratory data showed  CRP 5.8 and 

magnesium 1.9


Patient is being continued on tube feeding.  Currently maintaining sinus rhythm.

 Blood pressure is controlled with medications.  Cardiology and pulmonary is on 

board.





4/15/2021


Patient is currently is in ICU.  Patient was showing adequate weaning parameters

and was extubated today.  Currently on oxygen via nasal cannula at 2 L.  Patient

was started on liquid diet.  Patient is awake alert and seems comfortable.


Chest x-ray showed patchy bilateral lung infiltrates similar to improved 

compared to previous.


Patient is being continued Lasix 40 mg twice daily.  Blood pressure is 

controlled.


Laboratory data showed D-dimer 1.81, ferritin 439 AST 70 ALT CRP 5.9 and sodium 

134.


Patient remains in sinus rhythm.


Current dexamethasone, Lovenox and blood pressure medications and multivitamins.

 Cardiology and pulmonary is on board.





4/16/2021


Patient is transferred to medical floor today.  Lying in the bed comfortably but

lethargic and weak.  Somewhat restless.  Patient has been afebrile.  Currently 

requiring 2 L oxygen via nasal cannula.  Patient is being current on 

dexamethasone, Lovenox.  Chest x-ray showed hypoventilatory changes with diffuse

interstitial and patchy Covid pneumonia.  No significant change.


Laboratory data showed D-dimer level is 2.34 sodium 139 potassium 3.8 BUN 18 and

creatinine 0.69 ferritin 523 bilirubin level 1.6 AST 69  and CRP 5.5





4/17/21


Patient is currently in the medical floor.  No complaints of chest pain.  Her 

shortness of breath is improving.  Patient is currently on 2 L oxygen with 

another cannula.  Patient is awake alert oriented 3.  Still lethargic and weak.

 Currently on Decadron 6 mg daily and Lasix 40 mg twice daily and is also on 

aspirin, Lovenox for DVT prophylaxis.  Next and patient remained on sinus rh

ythm.  No fever no chills.


No nausea vomiting or abdominal pain.


Blood Pressure Is Fairly Controlled.





Current medications reviewed.











Objective





- Vital Signs


Vital signs: 


                                   Vital Signs











Temp  98.3 F   04/17/21 12:15


 


Pulse  78   04/17/21 12:15


 


Resp  18   04/17/21 12:15


 


BP  124/75   04/17/21 12:15


 


Pulse Ox  92 L  04/17/21 12:15








                                 Intake & Output











 04/16/21 04/17/21 04/17/21





 18:59 06:59 18:59


 


Intake Total 3  118


 


Output Total 900 850 100


 


Balance -897 -850 18


 


Weight 143.2 kg 141 kg 


 


Intake:   


 


  IV 3  


 


    pressure bag 3  


 


  Oral 0  118


 


Output:   


 


  Urine 900 850 100


 


Other:   


 


  Voiding Method External Catheter External Catheter External Catheter


 


  # Voids 1 1 


 


  # Bowel Movements 1 1 








                       ABP, PAP, CO, CI - Last Documented











Arterial Blood Pressure        173/72

















- Exam





- Exam





-GENERAL: The patient is Awake alert and oriented.  Feels weak.


HEENT: Pupils are round and equally reacting to light. EOMI. No scleral icterus.

No conjunctival pallor. Normocephalic, atraumatic. No pharyngeal erythema. No 

thyromegaly. 


CARDIOVASCULAR: S1 and S2 present. No murmurs, rubs, or gallops. 


PULMONARY:Bilateral coarse sounds and basilar diminished air entry. , no whee

zing or crackles. 


ABDOMEN: Soft, nontender, nondistended, normoactive bowel sounds. No palpable 

organomegaly. 


MUSCULOSKELETAL: No joint swelling or deformity. 


EXTREMITIES: No cyanosis, clubbing, or pedal edema. 


NEUROLOGICAL: Gross neurological examination did not reveal any focal deficits. 


SKIN: No rashes. no petechiae.








- Labs


CBC & Chem 7: 


                                 04/19/21 07:34





                                 04/19/21 07:34


Labs: 


                  Abnormal Lab Results - Last 24 Hours (Table)











  04/16/21 04/16/21 04/17/21 Range/Units





  17:00 21:51 06:04 


 


D-Dimer     (<0.60)  mg/L FEU


 


BUN     (7-17)  mg/dL


 


Glucose     (74-99)  mg/dL


 


POC Glucose (mg/dL)  156 H  104 H  102 H  (75-99)  mg/dL


 


Lactate Dehydrogenase     (313-618)  U/L














  04/17/21 04/17/21 04/17/21 Range/Units





  11:19 11:19 11:54 


 


D-Dimer  2.02 H    (<0.60)  mg/L FEU


 


BUN   24 H   (7-17)  mg/dL


 


Glucose   122 H   (74-99)  mg/dL


 


POC Glucose (mg/dL)    137 H  (75-99)  mg/dL


 


Lactate Dehydrogenase   1028 H   (313-618)  U/L














Assessment and Plan


Assessment: 





Acute bilateral pneumonia, mostly secondary to Covid 19 infection


Acute hypoxic respiratory failure, was on mechanical ventilation.  Status post 

extubation on 4/14/2021


Increased inflammatory markers


Sinus Bradycardia secondary to Covid infection. off dopamine now.  Metoprolol 

has been discontinued.


Acute Covid gastroenteritis


Mild transaminitis, mostly secondary to Covid


Uncontrolled Hypertension


Hyperlipidemia








Plan: 





This is a pleasant 61 years old female who presents with respiratory distress 

mostly secondary to covid Pneumonia.  Continue with steroids, vitamin C, zinc, 

vitamin D, bronchodilator and oxygen as needed.  Lovenox for DVT prophylaxis and

pulmonary is following. Patient is extubated on 4/14.Patient was transferred to 

medical floor on 4/16/2021


Cardiologist on the case. c/ w Amlodipine, lisinopril, Aldactone and lasix was 

added .. Metoprolol is on hold due to bradycardia.


Labs and medication were reviewed. Continue with symptomatic treatment.  Resume 

home medication.  Monitor lytes and vitals.  DVT and GI prophylaxis.  Further 

recommendations depends on the clinical course of the patient


DVT prophylaxis: Subcutaneous Lovenox


GI Prophylaxis: Ppi


Prognosis is guarded. 


Time with Patient: Greater than 30

## 2021-04-19 NOTE — P.PN
Subjective


Progress Note Date: 04/18/21


Principal diagnosis: 





Acute bilateral pneumonia, mostly secondary to Covid 19 infection


Acute hypoxic respiratory failure, needing intubation and mechanical ventilation





This is a pleasant 61 years old female with past medical history of atrial 

fibrillation not on anticoagulation and her cardiologist is Dr. Peck, 

hypertension, hyperlipidemia.  She is a patient of Dr. Ribeiro


Patient states that she was tested positive for covid On 03/26/2021.  And now 

she presents with dyspnea of one-day duration when started earlier.  Associated 

with cough on the patella brown phlegm.


She is also complaining of from chest pain without coughing , on the right side 

of the chest nonradiating about 5/10 in severity


She has no vomiting but diarrhea about twice per day





04/8/2021


Patient remains intubated and on mechanical ventilation, currently she needs 

high peep of 13,(was 10 yesterday and 16 on admission) with pulmonary/critical 

care team monitor her closely and help with vent management.


She is tachypneic and bradycardic since admission and she is currently on 

dopamine to keep heart rate in 40s existed of 20s.  Afebrile.  


Pulmonary team recommended tracheostomy and PEG tube placement, family wanted to

wait for now


Labs showing leukopenia with WBC 5.5K with lymphopenia resolved.  Rest of the 

CBC, INR unremarkable.  BMP is unremarkable with carbon dioxide is 25.  Lactic 

acid 1.2.  Liver enzymes slightly elevated with AST 53 and ALT 67 (since 

admission).


Lactate dehydrogenase is elevated at 860(improving), C-reactive protein is 5.9 

(within normal limits).


Chest x-ray Stable portable chest.  Bilateral infiltrates


She is still a normal sinus to 40 mL/h, she is on zinc, vitamin C, vitamin D, 

dexamethasone.  She status post tocilizumab.  Also she is on Lovenox.





04/09/2021


Patient remains intubated and sedated in the ICU with pulmonary/critical care 

team following the patient closely and help with vent management.  Today P Cameron

13 and FiO2 50%.


Chest x-ray: Interstitial changes and patchy bilateral lower lung opacity shows 

some improvement.


Patient looks reviewed, inflammatory markers slightly increased with ferritin 

674 and  while C-reactive protein is normal at 6.4


Patient currently on dexamethasone, normal saline at 40 and dopamine for 

bradycardia plus Lovenox 40 mg and multiple vitamins 4 coated


Family still to decide about tracheostomy and PEG tube placement





04/10/2021


pt remains in icu sedated and intubated , with pulmonary critical care team are 

following the pt closely and help with vent management per PEEP was lowered 

today from 13 down to 10.


Patient is  undergoing sedation holiday.  However pulmonary/critical care team 

still recommending PEG tube and t tracheostomy placement pending family decision

regarding this after critical care team discussed with them.


Cardiologist on the case and recommended to continue with dopamine and and 

Norvasc.


Chest x-ray showing bilateral infiltrate


Labs look stable area


Today at Dr. the daughter Corina who is an ICU nurse and all her questions were 

answered.  Also she was updated with the patient condition.





04/11/2021


Patient still in the ICU needing mechanical ventilation, she is undergoing 

sedation holiday by critical care team.  Her PEEP is still 10 today


PEG tube and tracheostomy is recommended by pulmonary/critical care team as 

well.


Patient remains on dopamine drip and heart rate is around 50.  Cartilage team on

the case


Repeat chest x-ray and inflammatory markers in the morning





4/13/2021


Patient is currently in the MICU intubated and on mechanical ventilator.  

Patient is also on sedation.  Patient is controlled with tidal volume 450, FiO2 

40% and PEEP of 6.  Patient is dopamine drip.


Patient has been afebrile.  Chest x-ray showed cardiomegaly with increasing 

patchy airspace disease possible pulmonary edema.  Continued moderate effusions 

with adjacent atelectasis and/or consolidation.


Laboratory data showed WBC 5.0 hemoglobin 11.2 platelets 203 sodium 136 

potassium 3.5 BUN 20 and creatinine 0.6 calcium 8.6


Patient is being continued on dexamethasone 6 mg daily and Lovenox 40 mg subcu 

daily.  Patient is tolerating tube feeding.





4/14/2021


Patient is currently in MICU.  Pulmonary was extubation yesterday.  Patient is 

off sedation.  Chest x-ray showed bibasilar infiltrates with small left pleural 

effusion, improved from comparison.  Laboratory data showed  CRP 5.8 and 

magnesium 1.9


Patient is being continued on tube feeding.  Currently maintaining sinus rhythm.

 Blood pressure is controlled with medications.  Cardiology and pulmonary is on 

board.





4/15/2021


Patient is currently is in ICU.  Patient was showing adequate weaning parameters

and was extubated today.  Currently on oxygen via nasal cannula at 2 L.  Patient

was started on liquid diet.  Patient is awake alert and seems comfortable.


Chest x-ray showed patchy bilateral lung infiltrates similar to improved 

compared to previous.


Patient is being continued Lasix 40 mg twice daily.  Blood pressure is 

controlled.


Laboratory data showed D-dimer 1.81, ferritin 439 AST 70 ALT CRP 5.9 and sodium 

134.


Patient remains in sinus rhythm.


Current dexamethasone, Lovenox and blood pressure medications and multivitamins.

 Cardiology and pulmonary is on board.





4/16/2021


Patient is transferred to medical floor today.  Lying in the bed comfortably but

lethargic and weak.  Somewhat restless.  Patient has been afebrile.  Currently 

requiring 2 L oxygen via nasal cannula.  Patient is being current on 

dexamethasone, Lovenox.  Chest x-ray showed hypoventilatory changes with diffuse

interstitial and patchy Covid pneumonia.  No significant change.


Laboratory data showed D-dimer level is 2.34 sodium 139 potassium 3.8 BUN 18 and

creatinine 0.69 ferritin 523 bilirubin level 1.6 AST 69  and CRP 5.5





4/17/21


Patient is currently in the medical floor.  No complaints of chest pain.  Her 

shortness of breath is improving.  Patient is currently on 2 L oxygen with 

another cannula.  Patient is awake alert oriented 3.  Still lethargic and weak.

 Currently on Decadron 6 mg daily and Lasix 40 mg twice daily and is also on 

aspirin, Lovenox for DVT prophylaxis.  Next and patient remained on sinus rh

ythm.  No fever no chills.


No nausea vomiting or abdominal pain.


Blood Pressure Is Fairly Controlled.





04/18/2021


Patient is currently resting in the bed comfortably.  Awake and oriented 3.  

Patient is very weak but still wants to be discharged home.  PT OT was consulted

and follow tomorrow.


Otherwise blood pressure is controlled.  Lasix dose changed to by mouth daily 

and dexamethasone reduced to 4 mg daily.  Continued on Lovenox and multivitamins

and supportive care.


Patient has been afebrile.  Tolerating oral diet.  Continue with home 

medications including Celexa.


Anticipate discharge to rehab.





Current medications reviewed.











Objective





- Vital Signs


Vital signs: 


                                   Vital Signs











Temp  97.9 F   04/18/21 08:40


 


Pulse  80   04/18/21 08:40


 


Resp  18   04/18/21 08:40


 


BP  141/69   04/18/21 08:40


 


Pulse Ox  94 L  04/18/21 08:40








                                 Intake & Output











 04/17/21 04/18/21 04/18/21





 18:59 06:59 18:59


 


Intake Total 236 180 240


 


Output Total 500  500


 


Balance -264 180 -260


 


Weight  141.5 kg 


 


Intake:   


 


  IV  80 


 


    Sodium Chloride 0.9% 500  80 





    ml 500 ml @ 20 mls/hr IV   





    .Q24H VIOLET Rx#:[id number]   


 


  Oral 236 100 240


 


Output:   


 


  Urine 500  500


 


Other:   


 


  Voiding Method Bedpan Bedpan Bedpan


 


  # Voids  1 


 


  # Bowel Movements  1 








                       ABP, PAP, CO, CI - Last Documented











Arterial Blood Pressure        173/72

















- Exam





- Exam





-GENERAL: The patient is Awake alert and oriented.  Feels weak.


HEENT: Pupils are round and equally reacting to light. EOMI. No scleral icterus.

No conjunctival pallor. Normocephalic, atraumatic. No pharyngeal erythema. No 

thyromegaly. 


CARDIOVASCULAR: S1 and S2 present. No murmurs, rubs, or gallops. 


PULMONARY:Bilateral coarse sounds and basilar diminished air entry. , no 

wheezing or crackles. 


ABDOMEN: Soft, nontender, nondistended, normoactive bowel sounds. No palpable 

organomegaly. 


MUSCULOSKELETAL: No joint swelling or deformity. 


EXTREMITIES: No cyanosis, clubbing, or pedal edema. 


NEUROLOGICAL: Gross neurological examination did not reveal any focal deficits. 


SKIN: No rashes. no petechiae.








- Labs


CBC & Chem 7: 


                                 04/19/21 07:34





                                 04/19/21 07:34


Labs: 


                  Abnormal Lab Results - Last 24 Hours (Table)











  04/15/21 04/17/21 04/17/21 Range/Units





  05:10 16:57 20:04 


 


POC Glucose (mg/dL)   179 H  125 H  (75-99)  mg/dL


 


LD Isoenzymes  280 H    (120-250)  U/L














  04/18/21 Range/Units





  11:47 


 


POC Glucose (mg/dL)  131 H  (75-99)  mg/dL


 


LD Isoenzymes   (120-250)  U/L














Assessment and Plan


Assessment: 





Acute bilateral pneumonia, mostly secondary to Covid 19 infection


Acute hypoxic respiratory failure, was on mechanical ventilation.  Status post 

extubation on 4/14/2021


Increased inflammatory markers


Sinus Bradycardia secondary to Covid infection. off dopamine now.  Metoprolol 

has been discontinued.


Acute Covid gastroenteritis


Mild transaminitis, mostly secondary to Covid


Uncontrolled Hypertension


Hyperlipidemia








Plan: 





This is a pleasant 61 years old female who presents with respiratory distress 

mostly secondary to covid Pneumonia.  Continue with steroids, vitamin C, zinc, 

vitamin D, bronchodilator and oxygen as needed.  Lovenox for DVT prophylaxis and

pulmonary is following. Patient is extubated on 4/14.Patient was transferred to 

medical floor on 4/16/2021


Cardiologist on the case. c/ w Amlodipine, lisinopril, Aldactone and lasix was 

added .. Metoprolol is on hold due to bradycardia.


Labs and medication were reviewed. Continue with symptomatic treatment.  Resume 

home medication.  Monitor lytes and vitals.  DVT and GI prophylaxis.  Further 

recommendations depends on the clinical course of the patient


DVT prophylaxis: Subcutaneous Lovenox


GI Prophylaxis: Ppi


Prognosis is guarded. 


Time with Patient: Greater than 30

## 2021-04-20 LAB
GLUCOSE BLD-MCNC: 125 MG/DL (ref 75–99)
GLUCOSE BLD-MCNC: 137 MG/DL (ref 75–99)
GLUCOSE BLD-MCNC: 172 MG/DL (ref 75–99)
GLUCOSE BLD-MCNC: 89 MG/DL (ref 75–99)
LDH SERPL P TO L-CCNC: 256 U/L (ref 120–250)
LDH1 CFR SERPL ELPH: 25 % (ref 19–38)
LDH2 CFR SERPL ELPH: 41 % (ref 30–43)
LDH3 CFR SERPL ELPH: 19 % (ref 16–26)
LDH4 CFR SERPL ELPH: 7 % (ref 3–12)
LDH5 CFR SERPL ELPH: 8 % (ref 3–14)

## 2021-04-20 RX ADMIN — CEFAZOLIN SCH: 330 INJECTION, POWDER, FOR SOLUTION INTRAMUSCULAR; INTRAVENOUS at 07:38

## 2021-04-20 RX ADMIN — INSULIN ASPART SCH UNIT: 100 INJECTION, SOLUTION INTRAVENOUS; SUBCUTANEOUS at 20:23

## 2021-04-20 RX ADMIN — ENOXAPARIN SODIUM SCH MG: 40 INJECTION SUBCUTANEOUS at 10:41

## 2021-04-20 RX ADMIN — OXYCODONE HYDROCHLORIDE AND ACETAMINOPHEN SCH MG: 500 TABLET ORAL at 10:40

## 2021-04-20 RX ADMIN — CEFAZOLIN SCH: 330 INJECTION, POWDER, FOR SOLUTION INTRAMUSCULAR; INTRAVENOUS at 07:44

## 2021-04-20 RX ADMIN — Medication SCH MG: at 10:41

## 2021-04-20 RX ADMIN — ALBUTEROL SULFATE SCH PUFF: 90 AEROSOL, METERED RESPIRATORY (INHALATION) at 07:59

## 2021-04-20 RX ADMIN — ACETAMINOPHEN PRN MG: 325 TABLET, FILM COATED ORAL at 21:11

## 2021-04-20 RX ADMIN — Medication SCH MCG: at 10:39

## 2021-04-20 RX ADMIN — ALBUTEROL SULFATE SCH: 90 AEROSOL, METERED RESPIRATORY (INHALATION) at 21:06

## 2021-04-20 RX ADMIN — ASPIRIN 81 MG CHEWABLE TABLET SCH MG: 81 TABLET CHEWABLE at 10:40

## 2021-04-20 RX ADMIN — INSULIN ASPART SCH: 100 INJECTION, SOLUTION INTRAVENOUS; SUBCUTANEOUS at 12:47

## 2021-04-20 RX ADMIN — INSULIN ASPART SCH: 100 INJECTION, SOLUTION INTRAVENOUS; SUBCUTANEOUS at 17:55

## 2021-04-20 RX ADMIN — GUAIFENESIN SCH MG: 200 SOLUTION ORAL at 12:47

## 2021-04-20 RX ADMIN — CITALOPRAM HYDROBROMIDE SCH MG: 20 TABLET ORAL at 10:40

## 2021-04-20 RX ADMIN — ALBUTEROL SULFATE SCH: 90 AEROSOL, METERED RESPIRATORY (INHALATION) at 11:58

## 2021-04-20 RX ADMIN — ATORVASTATIN CALCIUM SCH MG: 40 TABLET, FILM COATED ORAL at 10:40

## 2021-04-20 RX ADMIN — GUAIFENESIN SCH MG: 200 SOLUTION ORAL at 18:00

## 2021-04-20 RX ADMIN — CEFAZOLIN SCH: 330 INJECTION, POWDER, FOR SOLUTION INTRAMUSCULAR; INTRAVENOUS at 07:43

## 2021-04-20 RX ADMIN — CEFAZOLIN SCH: 330 INJECTION, POWDER, FOR SOLUTION INTRAMUSCULAR; INTRAVENOUS at 07:42

## 2021-04-20 RX ADMIN — INSULIN ASPART SCH: 100 INJECTION, SOLUTION INTRAVENOUS; SUBCUTANEOUS at 06:16

## 2021-04-20 RX ADMIN — GUAIFENESIN SCH: 200 SOLUTION ORAL at 10:41

## 2021-04-20 RX ADMIN — NICARDIPINE HYDROCHLORIDE SCH: 2.5 INJECTION INTRAVENOUS at 17:55

## 2021-04-20 RX ADMIN — PANTOPRAZOLE SODIUM SCH MG: 40 TABLET, DELAYED RELEASE ORAL at 10:41

## 2021-04-20 RX ADMIN — GUAIFENESIN SCH: 200 SOLUTION ORAL at 04:22

## 2021-04-20 RX ADMIN — ALBUTEROL SULFATE SCH PUFF: 90 AEROSOL, METERED RESPIRATORY (INHALATION) at 16:48

## 2021-04-20 RX ADMIN — GUAIFENESIN SCH: 200 SOLUTION ORAL at 03:31

## 2021-04-20 RX ADMIN — FUROSEMIDE SCH MG: 40 TABLET ORAL at 10:40

## 2021-04-20 RX ADMIN — SPIRONOLACTONE SCH MG: 25 TABLET, FILM COATED ORAL at 10:41

## 2021-04-21 VITALS
SYSTOLIC BLOOD PRESSURE: 138 MMHG | DIASTOLIC BLOOD PRESSURE: 80 MMHG | HEART RATE: 104 BPM | TEMPERATURE: 98 F | RESPIRATION RATE: 18 BRPM

## 2021-04-21 LAB
GLUCOSE BLD-MCNC: 122 MG/DL (ref 75–99)
GLUCOSE BLD-MCNC: 88 MG/DL (ref 75–99)

## 2021-04-21 RX ADMIN — ALBUTEROL SULFATE SCH PUFF: 90 AEROSOL, METERED RESPIRATORY (INHALATION) at 08:26

## 2021-04-21 RX ADMIN — FUROSEMIDE SCH MG: 40 TABLET ORAL at 10:51

## 2021-04-21 RX ADMIN — ASPIRIN 81 MG CHEWABLE TABLET SCH MG: 81 TABLET CHEWABLE at 10:50

## 2021-04-21 RX ADMIN — Medication SCH MCG: at 10:50

## 2021-04-21 RX ADMIN — PANTOPRAZOLE SODIUM SCH MG: 40 TABLET, DELAYED RELEASE ORAL at 10:51

## 2021-04-21 RX ADMIN — Medication SCH MG: at 10:51

## 2021-04-21 RX ADMIN — ATORVASTATIN CALCIUM SCH MG: 40 TABLET, FILM COATED ORAL at 10:49

## 2021-04-21 RX ADMIN — ALBUTEROL SULFATE SCH PUFF: 90 AEROSOL, METERED RESPIRATORY (INHALATION) at 11:59

## 2021-04-21 RX ADMIN — ENOXAPARIN SODIUM SCH MG: 40 INJECTION SUBCUTANEOUS at 10:51

## 2021-04-21 RX ADMIN — SPIRONOLACTONE SCH MG: 25 TABLET, FILM COATED ORAL at 10:50

## 2021-04-21 RX ADMIN — CITALOPRAM HYDROBROMIDE SCH MG: 20 TABLET ORAL at 10:49

## 2021-04-21 RX ADMIN — INSULIN ASPART SCH: 100 INJECTION, SOLUTION INTRAVENOUS; SUBCUTANEOUS at 12:16

## 2021-04-21 RX ADMIN — INSULIN ASPART SCH: 100 INJECTION, SOLUTION INTRAVENOUS; SUBCUTANEOUS at 06:30

## 2021-04-21 RX ADMIN — OXYCODONE HYDROCHLORIDE AND ACETAMINOPHEN SCH MG: 500 TABLET ORAL at 10:50

## 2021-04-21 RX ADMIN — ONDANSETRON PRN MG: 2 INJECTION INTRAMUSCULAR; INTRAVENOUS at 12:48

## 2022-01-07 ENCOUNTER — HOSPITAL ENCOUNTER (OUTPATIENT)
Dept: HOSPITAL 47 - RADMAMWWP | Age: 62
Discharge: HOME | End: 2022-01-07
Attending: FAMILY MEDICINE
Payer: COMMERCIAL

## 2022-01-07 DIAGNOSIS — Z80.3: ICD-10-CM

## 2022-01-07 DIAGNOSIS — Z12.31: Primary | ICD-10-CM

## 2022-01-07 DIAGNOSIS — Z78.0: ICD-10-CM

## 2022-01-07 PROCEDURE — 77063 BREAST TOMOSYNTHESIS BI: CPT

## 2022-01-07 PROCEDURE — 77067 SCR MAMMO BI INCL CAD: CPT

## 2022-01-10 NOTE — MM
Reason for exam: screening  (asymptomatic).

Last mammogram was performed 1 year and 3 months ago.



History:

Patient is postmenopausal.

Family history of breast cancer in mother at age 70.



Physical Findings:

A clinical breast exam by your physician is recommended on an annual basis and 

results should be correlated with mammographic findings.



MG 3D Screening Mammo W/Cad

Bilateral CC and MLO view(s) were taken.

Prior study comparison: September 23, 2020, bilateral MG 3d screening mammo w/cad.

August 29, 2018, bilateral MG 3d screening mammo w/cad.

There are scattered fibroglandular densities.  There is chronic nodularity 

bilaterally. Benign vascular calcifications bilaterally.  No significant changes 

when compared with prior studies.





ASSESSMENT: Benign, BI-RAD 2



RECOMMENDATION:

Routine screening mammogram of both breasts in 1 year.

## 2022-02-08 ENCOUNTER — HOSPITAL ENCOUNTER (OUTPATIENT)
Dept: HOSPITAL 47 - ORWHC2ENDO | Age: 62
Discharge: HOME | End: 2022-02-08
Attending: INTERNAL MEDICINE
Payer: COMMERCIAL

## 2022-02-08 VITALS — TEMPERATURE: 97.2 F | RESPIRATION RATE: 16 BRPM

## 2022-02-08 VITALS — HEART RATE: 74 BPM | DIASTOLIC BLOOD PRESSURE: 68 MMHG | SYSTOLIC BLOOD PRESSURE: 131 MMHG

## 2022-02-08 VITALS — BODY MASS INDEX: 40.3 KG/M2

## 2022-02-08 DIAGNOSIS — Z12.11: Primary | ICD-10-CM

## 2022-02-08 DIAGNOSIS — Z90.710: ICD-10-CM

## 2022-02-08 DIAGNOSIS — Z88.6: ICD-10-CM

## 2022-02-08 DIAGNOSIS — Z90.49: ICD-10-CM

## 2022-02-08 DIAGNOSIS — I48.91: ICD-10-CM

## 2022-02-08 DIAGNOSIS — Z86.16: ICD-10-CM

## 2022-02-08 DIAGNOSIS — I25.2: ICD-10-CM

## 2022-02-08 DIAGNOSIS — Z79.899: ICD-10-CM

## 2022-02-08 DIAGNOSIS — Z79.82: ICD-10-CM

## 2022-02-08 DIAGNOSIS — I12.9: ICD-10-CM

## 2022-02-08 DIAGNOSIS — K21.9: ICD-10-CM

## 2022-02-08 DIAGNOSIS — E78.5: ICD-10-CM

## 2022-02-08 DIAGNOSIS — Z86.711: ICD-10-CM

## 2022-02-08 DIAGNOSIS — K57.30: ICD-10-CM

## 2022-02-08 DIAGNOSIS — N18.9: ICD-10-CM

## 2022-02-08 DIAGNOSIS — Z87.01: ICD-10-CM

## 2022-02-08 DIAGNOSIS — Z88.8: ICD-10-CM

## 2022-02-08 DIAGNOSIS — F41.9: ICD-10-CM

## 2022-02-08 DIAGNOSIS — K63.5: ICD-10-CM

## 2022-02-08 PROCEDURE — 45380 COLONOSCOPY AND BIOPSY: CPT

## 2022-02-08 PROCEDURE — 88305 TISSUE EXAM BY PATHOLOGIST: CPT

## 2022-02-08 NOTE — P.PCN
Date of Procedure: 02/08/22


Procedure(s) Performed: 


BRIEF HISTORY: Patient is a 61-year-old pleasant white female scheduled for an 

elective colonoscopy as a part of screening for colorectal neoplasia.  Her last 

colonoscopy was 10 years ago.





PROCEDURE PERFORMED: Colonoscopy with biopsy                                    

      . 


      


PREOPERATIVE DIAGNOSIS: Screening for colon cancer. 





IV sedation per Anesthesia. 





PROCEDURE: After informed consent was obtained, the patient, was brought into 

the endoscopy unit. IV sedation was administered by Anesthesia under continuous 

monitoring.  Digital rectal examination was normal. Initially the Olympus CF-160

flexible video colonoscope was then inserted in the rectum, gradually advanced 

into the cecum without any difficulty. Careful examination was performed as the 

scope was gradually being withdrawn. Ileocecal valve and the appendiceal orifice

were visualized and appeared normal.  Prep was excellent.  The base of the cecum

there was a 5 mm polyp that was removed by cold biopsy.  Rest of the, ascending 

colon, transverse colon, descending colon, sigmoid colon, and rectum appeared 

normal.  Scattered sigmoid diverticulosis seen.  Retroflexion was performed in 

the rectum and no lesions were seen. The patient tolerated the procedure well. 





IMPRESSION: 


5 mm sessile cecal polyp status post removal by cold biopsy


Scattered sigmoid diverticulosis.





RECOMMENDATIONS:  Findings of this examination were discussed with the patient 

as well as her family.  She was advised to follow with the biopsy results.  If 

the biopsy reveals adenoma she can have a repeat colonoscopy in 5 years.

## 2023-04-18 ENCOUNTER — HOSPITAL ENCOUNTER (OUTPATIENT)
Dept: HOSPITAL 47 - RADMAMWWP | Age: 63
Discharge: HOME | End: 2023-04-18
Attending: FAMILY MEDICINE
Payer: COMMERCIAL

## 2023-04-18 DIAGNOSIS — M85.89: ICD-10-CM

## 2023-04-18 DIAGNOSIS — Z12.31: Primary | ICD-10-CM

## 2023-04-18 DIAGNOSIS — Z78.0: ICD-10-CM

## 2023-04-18 PROCEDURE — 77067 SCR MAMMO BI INCL CAD: CPT

## 2023-04-18 PROCEDURE — 77080 DXA BONE DENSITY AXIAL: CPT

## 2023-04-18 PROCEDURE — 77063 BREAST TOMOSYNTHESIS BI: CPT

## 2023-04-18 NOTE — BD
EXAMINATION TYPE: Axial Bone Density

 

DATE OF EXAM: 4/18/2023

 

CLINICAL HISTORY: 63 years old Female.  ICD-10 CODE: Z78.0 MENOPAUSAL STATE

 

Comparison: Prior DEXA bone scan 2018.

 

Height:  65.6

Weight:  265

 

FRAX RISK QUESTIONS:

Glucocorticoids (More than 3mos):  yes

           (Ex: prednisone, prednisolone, methylprednisolone, dexamethasone, and hydrocortisone).    
     

History of Fracture in Adulthood: yes

 

RISK FACTORS 

HISTORY OF: 

hx of lt elbow and wrist fx Jan. 2023

Postmenopausal woman: yes, at age 51 yrs old

Hyperparathyroidism: no

Adrenal Insufficiency: no

 

MEDICATIONS: 

Prednisone or other steroids: yes, for copd and asthma, and covid results, and steroids for last 2 ye
ars

Additional Medications: bp med, citalopram, aspirin, reflux meds, vit and calcium, 

Additional History: hx of severe case of covid, 2 yrs ago, still have, osteoarthritis,  

 

EXAM MEASUREMENTS: 

Bone mineral densitometry was performed using the [employer].

Bone mineral density as measured about the Lumbar spine is:  

----- L1-L4(G/cm2):   1.153

T Score Values are as follows:

----- L1:   -0.6

----- L2:   -1.0

----- L3:   -0.2

----- L4:    0.6

----- L1-L4:    -0.2

 

Z Score Values are as follows:

----- L1:   -0.2

----- L2:   -0.7

----- L3:    0.2  

----- L4:    1.0   

----- L1-L4:    0.1

 

Bone mineral density has: Decreased -4.0% since study of: 09.13.2018

 

Bone mineral density about the R hip (g/cm2): 1.002

Bone mineral density about the L hip (g/cm2): 1.075

T Score values are as follows:

-----R Neck:   -0.6

-----L Neck:   -0.6

-----R Total:   0.0

-----L Total:   0.5   

 

Z Score values are as follows:

-----R Neck:    0.0

-----L Neck:    0.0

-----R Total:    0.3

-----L Total:    0.8

 

Bone mineral density has: Decreased -2.0% since study of: 09.13.2018

 

FRAX%s: The graph provided illustrates a 16.9% chance for a major osteoporotic fx and a 0.9% chance f
or the hips probability for fx in 10 years time.

 

 

 

 

IMPRESSION:

Normal (Values between +1 and -1 indicate normal bone mass).  Consider repeating this study in 5 year
s or sooner if there is some new clinical indication.

 

NOTE:  T-SCORE=SD OF THE YOUNG ADULT MEAN.

## 2023-04-19 NOTE — MM
Reason for Exam: Screening  (asymptomatic). 

Last mammogram was performed 1 year(s) and 3 month(s) ago. 





Patient History: 

Menarche at age 12. First Full-Term Pregnancy at age 19. Hysterectomy at age 43. Postmenopausal.

Patient has history of breast feeding.

Mother had breast cancer, age 70. 





Risk Values: 

Kathleen 5 year model risk: 3.1%.

NCI Lifetime model risk: 12.1%.





Prior Study Comparison: 

8/29/2018 Bilateral Screening Mammogram, [facility]. 9/23/2020 Bilateral Screening Mammogram, [facility]. 1/7/2022

Bilateral Screening Mammogram, [facility]. 





Tissue Density: 

There are scattered fibroglandular densities.





Findings: 

Analyzed By CAD. 

There is no suspicious group of microcalcifications or new suspicious mass in either breast. Benign

calcifications within both breasts. Chronic nodularity within both breasts. 





Overall Assessment: Benign, BI-RAD 2





Management: 

Screening Mammogram of both breasts in 1 year.

A clinical breast exam by your physician is recommended on an annual basis and results should be

correlated with mammographic findings.



Electronically signed and approved by: Roscoe Morfin D.O.

## 2024-05-08 ENCOUNTER — HOSPITAL ENCOUNTER (OUTPATIENT)
Dept: HOSPITAL 47 - RADMAMWWP | Age: 64
Discharge: HOME | End: 2024-05-08
Attending: FAMILY MEDICINE
Payer: MEDICARE

## 2024-05-08 DIAGNOSIS — Z12.31: Primary | ICD-10-CM

## 2024-05-08 DIAGNOSIS — Z80.3: ICD-10-CM

## 2024-05-08 DIAGNOSIS — Z78.0: ICD-10-CM

## 2024-05-08 PROCEDURE — 77067 SCR MAMMO BI INCL CAD: CPT

## 2024-05-08 PROCEDURE — 77063 BREAST TOMOSYNTHESIS BI: CPT

## 2024-05-09 NOTE — MM
Reason for Exam: Screening  (asymptomatic). 

Last mammogram was performed 1 year(s) and 1 month(s) ago. 





Patient History: 

Menarche at age 12. First Full-Term Pregnancy at age 19. Hysterectomy at age 43. Postmenopausal.

Patient has history of breast feeding.

Mother had breast cancer, age 70. 





Risk Values: 

Kathleen 5 year model risk: 3.2%.

NCI Lifetime model risk: 11.7%.





Prior Study Comparison: 

9/23/2020 Bilateral Screening Mammogram, [facility]. 1/7/2022 Bilateral Screening Mammogram, [facility]. 4/18/2023

Bilateral MG 3D screening mammo w/cad, [facility]. 





Tissue Density: 

There are scattered areas of fibroglandular density.





Findings: 

Analyzed By CAD. 

Chronic bilateral nodularity.

There is no suspicious group of microcalcifications or new suspicious mass in either breast. 





Overall Assessment: Benign, BI-RAD 2





Management: 

Screening Mammogram of both breasts in 1 year.

See note below in regards to patient's increased 5 year Kathleen score.



Patient should continue monthly self-breast exams.  A clinical breast exam by your physician is

recommended on an annual basis.

This exam should not preclude additional follow-up of suspicious palpable abnormalities.



Note on Kathleen scores and lifetime risk:

1. A Kathleen score greater than 3% is considered moderate risk. If this is the case, consider

specialist referral to assess eligibility for a risk reducing agent.

2. If overall lifetime risk for the development of breast cancer is 20% or higher, the patient may

qualify for future screening with alternating mammogram and breast MRI.



Electronically signed and approved by: Madeleine Kolb M.D. Radiologist

## 2025-06-19 ENCOUNTER — HOSPITAL ENCOUNTER (OUTPATIENT)
Dept: HOSPITAL 47 - RADMAMWWP | Age: 65
Discharge: HOME | End: 2025-06-19
Attending: FAMILY MEDICINE
Payer: MEDICARE

## 2025-06-19 DIAGNOSIS — Z78.0: ICD-10-CM

## 2025-06-19 DIAGNOSIS — Z80.3: ICD-10-CM

## 2025-06-19 DIAGNOSIS — Z12.31: Primary | ICD-10-CM

## 2025-06-19 DIAGNOSIS — R92.323: ICD-10-CM

## 2025-06-19 PROCEDURE — 77063 BREAST TOMOSYNTHESIS BI: CPT

## 2025-06-19 PROCEDURE — 77067 SCR MAMMO BI INCL CAD: CPT
